# Patient Record
Sex: MALE | Race: WHITE | Employment: OTHER | URBAN - METROPOLITAN AREA
[De-identification: names, ages, dates, MRNs, and addresses within clinical notes are randomized per-mention and may not be internally consistent; named-entity substitution may affect disease eponyms.]

---

## 2017-01-05 ENCOUNTER — OFFICE VISIT (OUTPATIENT)
Dept: NEPHROLOGY | Facility: CLINIC | Age: 72
End: 2017-01-05

## 2017-01-05 VITALS — WEIGHT: 288 LBS | SYSTOLIC BLOOD PRESSURE: 148 MMHG | DIASTOLIC BLOOD PRESSURE: 82 MMHG | BODY MASS INDEX: 39 KG/M2

## 2017-01-05 DIAGNOSIS — N18.3 CKD (CHRONIC KIDNEY DISEASE), STAGE 3 (MODERATE): Primary | ICD-10-CM

## 2017-01-05 PROCEDURE — 99214 OFFICE O/P EST MOD 30 MIN: CPT | Performed by: INTERNAL MEDICINE

## 2017-01-05 NOTE — PROGRESS NOTES
Nephrology Progress Note      ASSESSMENT/PLAN:        1) Elevated Creatinine- historically, serum creatinine was approximately 1.0 mg/dL as of 2014; this is increased to 1.5 mg/dL and is likely due to the addition of torsemide 20 mg daily about 2 years ago Celebrex but no other NSAIDs over the last several years. His blood pressures been well controlled. No new diagnoses recently. Urinary habits are stable.     HISTORY:  Past Medical History   Diagnosis Date   • BPH    • PMR (polymyalgia rheumatica) (HCC) ONCE DAILY Disp: 90 capsule Rfl: 3   Metoprolol Tartrate 50 MG Oral Tab Two tabs in morning, one in evening Disp: 270 tablet Rfl: 3   Sildenafil Citrate (VIAGRA) 100 MG Oral Tab Take 1 tablet (100 mg total) by mouth daily as needed for Erectile Dysfunction

## 2017-01-16 ENCOUNTER — TELEPHONE (OUTPATIENT)
Dept: NEPHROLOGY | Facility: CLINIC | Age: 72
End: 2017-01-16

## 2017-01-16 PROCEDURE — 81001 URINALYSIS AUTO W/SCOPE: CPT | Performed by: INTERNAL MEDICINE

## 2017-01-16 PROCEDURE — 80048 BASIC METABOLIC PNL TOTAL CA: CPT | Performed by: INTERNAL MEDICINE

## 2017-08-03 ENCOUNTER — APPOINTMENT (OUTPATIENT)
Dept: LAB | Facility: HOSPITAL | Age: 72
End: 2017-08-03
Attending: INTERNAL MEDICINE
Payer: OTHER GOVERNMENT

## 2017-08-03 ENCOUNTER — TELEPHONE (OUTPATIENT)
Dept: NEPHROLOGY | Facility: CLINIC | Age: 72
End: 2017-08-03

## 2017-08-03 DIAGNOSIS — N18.30 CKD (CHRONIC KIDNEY DISEASE) STAGE 3, GFR 30-59 ML/MIN (HCC): Primary | ICD-10-CM

## 2017-08-03 DIAGNOSIS — N18.30 CKD (CHRONIC KIDNEY DISEASE) STAGE 3, GFR 30-59 ML/MIN (HCC): ICD-10-CM

## 2017-08-03 DIAGNOSIS — R79.89 ELEVATED SERUM CREATININE: Primary | ICD-10-CM

## 2017-08-03 DIAGNOSIS — I10 ESSENTIAL HYPERTENSION: ICD-10-CM

## 2017-08-03 LAB
BUN BLD-MCNC: 30 MG/DL (ref 8–20)
CALCIUM BLD-MCNC: 9.7 MG/DL (ref 8.3–10.3)
CHLORIDE: 105 MMOL/L (ref 101–111)
CO2: 29 MMOL/L (ref 22–32)
CREAT BLD-MCNC: 1.74 MG/DL (ref 0.7–1.3)
GLUCOSE BLD-MCNC: 126 MG/DL (ref 70–99)
POTASSIUM SERPL-SCNC: 4.1 MMOL/L (ref 3.6–5.1)
SODIUM SERPL-SCNC: 140 MMOL/L (ref 136–144)

## 2017-08-03 PROCEDURE — 80048 BASIC METABOLIC PNL TOTAL CA: CPT

## 2017-08-03 PROCEDURE — 36415 COLL VENOUS BLD VENIPUNCTURE: CPT

## 2018-05-04 PROCEDURE — 87070 CULTURE OTHR SPECIMN AEROBIC: CPT | Performed by: INTERNAL MEDICINE

## 2018-05-04 PROCEDURE — 87205 SMEAR GRAM STAIN: CPT | Performed by: INTERNAL MEDICINE

## 2018-05-15 PROBLEM — M10.072 ACUTE IDIOPATHIC GOUT INVOLVING TOE OF LEFT FOOT: Status: ACTIVE | Noted: 2018-05-15

## 2018-06-20 PROCEDURE — 87186 SC STD MICRODIL/AGAR DIL: CPT | Performed by: INTERNAL MEDICINE

## 2018-06-20 PROCEDURE — 87086 URINE CULTURE/COLONY COUNT: CPT | Performed by: INTERNAL MEDICINE

## 2018-06-20 PROCEDURE — 87077 CULTURE AEROBIC IDENTIFY: CPT | Performed by: INTERNAL MEDICINE

## 2019-04-08 PROCEDURE — 84425 ASSAY OF VITAMIN B-1: CPT | Performed by: OTHER

## 2019-04-08 PROCEDURE — 83883 ASSAY NEPHELOMETRY NOT SPEC: CPT | Performed by: OTHER

## 2019-04-08 PROCEDURE — 86334 IMMUNOFIX E-PHORESIS SERUM: CPT | Performed by: OTHER

## 2019-04-08 PROCEDURE — 84165 PROTEIN E-PHORESIS SERUM: CPT | Performed by: OTHER

## 2019-04-08 PROCEDURE — 86618 LYME DISEASE ANTIBODY: CPT | Performed by: OTHER

## 2019-04-26 PROCEDURE — 84156 ASSAY OF PROTEIN URINE: CPT | Performed by: OTHER

## 2019-04-26 PROCEDURE — 86335 IMMUNFIX E-PHORSIS/URINE/CSF: CPT | Performed by: OTHER

## 2019-04-26 PROCEDURE — 83883 ASSAY NEPHELOMETRY NOT SPEC: CPT | Performed by: OTHER

## 2019-04-26 PROCEDURE — 36415 COLL VENOUS BLD VENIPUNCTURE: CPT | Performed by: OTHER

## 2019-06-06 PROBLEM — T78.3XXA ANGIOEDEMA: Status: ACTIVE | Noted: 2019-06-06

## 2019-06-26 PROCEDURE — 86162 COMPLEMENT TOTAL (CH50): CPT | Performed by: PEDIATRICS

## 2019-06-26 PROCEDURE — 86160 COMPLEMENT ANTIGEN: CPT | Performed by: PEDIATRICS

## 2019-06-26 PROCEDURE — 86161 COMPLEMENT/FUNCTION ACTIVITY: CPT | Performed by: PEDIATRICS

## 2019-06-26 PROCEDURE — 36415 COLL VENOUS BLD VENIPUNCTURE: CPT | Performed by: PEDIATRICS

## 2019-10-01 ENCOUNTER — HEALTH MAINTENANCE LETTER (OUTPATIENT)
Age: 74
End: 2019-10-01

## 2019-12-15 ENCOUNTER — HEALTH MAINTENANCE LETTER (OUTPATIENT)
Age: 74
End: 2019-12-15

## 2020-03-10 PROBLEM — D69.2 PURPURA (HCC): Status: ACTIVE | Noted: 2020-03-10

## 2020-03-10 PROBLEM — D69.2 PURPURA: Status: ACTIVE | Noted: 2020-03-10

## 2020-06-16 PROBLEM — N18.30 CHRONIC RENAL DISEASE, STAGE III (HCC): Status: ACTIVE | Noted: 2020-06-16

## 2021-01-15 ENCOUNTER — HEALTH MAINTENANCE LETTER (OUTPATIENT)
Age: 76
End: 2021-01-15

## 2021-02-03 DIAGNOSIS — Z23 NEED FOR VACCINATION: ICD-10-CM

## 2021-06-22 PROBLEM — I70.0 AORTIC ATHEROSCLEROSIS: Status: ACTIVE | Noted: 2021-06-22

## 2021-06-22 PROBLEM — I70.0 AORTIC ATHEROSCLEROSIS (HCC): Status: ACTIVE | Noted: 2021-06-22

## 2021-07-12 ENCOUNTER — OFFICE VISIT (OUTPATIENT)
Dept: WOUND CARE | Facility: HOSPITAL | Age: 76
End: 2021-07-12
Attending: INTERNAL MEDICINE
Payer: MEDICARE

## 2021-07-12 VITALS
DIASTOLIC BLOOD PRESSURE: 83 MMHG | RESPIRATION RATE: 16 BRPM | BODY MASS INDEX: 38.5 KG/M2 | TEMPERATURE: 98 F | SYSTOLIC BLOOD PRESSURE: 184 MMHG | HEART RATE: 54 BPM | WEIGHT: 275 LBS | HEIGHT: 71 IN

## 2021-07-12 DIAGNOSIS — S21.201A: Primary | ICD-10-CM

## 2021-07-12 DIAGNOSIS — T81.89XA WOUND, SURGICAL, NONHEALING, INITIAL ENCOUNTER: ICD-10-CM

## 2021-07-12 DIAGNOSIS — L02.212 CUTANEOUS ABSCESS OF BACK EXCLUDING BUTTOCKS: ICD-10-CM

## 2021-07-12 DIAGNOSIS — R21 RASH: ICD-10-CM

## 2021-07-12 PROCEDURE — 87205 SMEAR GRAM STAIN: CPT | Performed by: INTERNAL MEDICINE

## 2021-07-12 PROCEDURE — 87077 CULTURE AEROBIC IDENTIFY: CPT | Performed by: INTERNAL MEDICINE

## 2021-07-12 PROCEDURE — 11042 DBRDMT SUBQ TIS 1ST 20SQCM/<: CPT | Performed by: INTERNAL MEDICINE

## 2021-07-12 PROCEDURE — 11042 DBRDMT SUBQ TIS 1ST 20SQCM/<: CPT

## 2021-07-12 PROCEDURE — 87070 CULTURE OTHR SPECIMN AEROBIC: CPT | Performed by: INTERNAL MEDICINE

## 2021-07-12 PROCEDURE — 99214 OFFICE O/P EST MOD 30 MIN: CPT

## 2021-07-12 RX ORDER — MAGNESIUM HYDROXIDE 1200 MG/15ML
LIQUID ORAL
COMMUNITY
Start: 2021-07-07 | End: 2021-09-21 | Stop reason: ALTCHOICE

## 2021-07-12 NOTE — PROGRESS NOTES
Patient ID: Teetee Rebollar is a 67year old male.     Debridement   Wound 07/12/21 # 1 Right lateral upper back Other (comment) Back Lateral;Right;Upper    Consent obtained? verbal  Consent given by: patient    Performed by: provider  Debridement type: surgic

## 2021-07-12 NOTE — PATIENT INSTRUCTIONS
1. Shower with protection  2. Cleanse with saline solution. 3. Apply silver alginate  4. Secure with bordered foam.   5. Apply steroid cream in alyce-wound region. 6. Change dressing every other day. 7. Wound culture done today.    8. High protein diet physician or go to the nearest emergency room. 5.  The treatment plan has been discussed at length with you and your provider. Follow all       instructions carefully, it is very important.  If you do not follow all instructions, you are at        r

## 2021-07-12 NOTE — PROGRESS NOTES
.Weekly Wound Education Note    Teaching Provided To: Patient; Family  Training Topics: Cleasing and general instructions;Dressing; Discharge instructions  Training Method: Explain/Verbal;Written  Training Response: Patient responds and understands

## 2021-07-12 NOTE — PROGRESS NOTES
[address] CONSULTATION NOTE  Adele Ascencio MD  7/12/2021    Subjective   Zara Underwood is a 67year old male. Patient presents with:  Wound Care: Patient is here for an initial consult.  He presents with a wound on his right lateral upper jocelyn HISTORY  08/05/2020    cysto- Dr. Edouard Wright   • SKIN SURGERY  10/01/2019    MMS/R nasal ala /BCC done by [facility]   • TONSILLECTOMY     • TOTAL KNEE REPLACEMENT Right 02/25/2015    Right total knee replacement 02/25/2015   • TOTAL KNEE REPLACEMENT Left 05/11/2015 2 tablets (100 mg total) by mouth daily. 180 tablet 3   • finasteride (PROSCAR) 5 MG Oral Tab Take 1 tablet (5 mg total) by mouth daily.  90 tablet 3   • metoprolol tartrate 50 MG Oral Tab Two tabs in morning, one in evening 270 tablet 3   • Lovastatin 40 M 07/12/21 0825   Wound Length (cm) 2.5 cm 07/12/21 0825   Wound Width (cm) 6.2 cm 07/12/21 0825   Wound Surface Area (cm^2) 15.5 cm^2 07/12/21 0825   Wound Depth (cm) 0.5 cm 07/12/21 0825   Wound Volume (cm^3) 7.75 cm^3 07/12/21 0825   Margins Well-defined gout involving toe of left foot     Angioedema     Purpura ([facility] [address]     Chronic renal disease, stage III (HCC)     Aortic atherosclerosis ([facility] [address]    Debridement     Wound 07/12/21 # 1 Right lateral upper back Other (comment) Back Lateral;Right;Upper    Consent your provider    2. Increase activity as tolerated or as directed by your provider    3.    As directed by your providers Increase protein intake     FOCUS ON THE FOLLOWING FOODS:  Protein: Meats, beans, eggs, milk and yogurt particularly Greek yogurt), tof reviewing separately obtained history, performing a medically appropriate examination, counseling and educating the patient/family/caregiver, ordering medications or testing, referring and communicating with other healthcare providers, documenting clinical

## 2021-07-19 ENCOUNTER — OFFICE VISIT (OUTPATIENT)
Dept: WOUND CARE | Facility: HOSPITAL | Age: 76
End: 2021-07-19
Attending: INTERNAL MEDICINE
Payer: MEDICARE

## 2021-07-19 VITALS
RESPIRATION RATE: 18 BRPM | HEART RATE: 54 BPM | SYSTOLIC BLOOD PRESSURE: 174 MMHG | TEMPERATURE: 99 F | DIASTOLIC BLOOD PRESSURE: 80 MMHG

## 2021-07-19 DIAGNOSIS — S21.201D OPEN WOUND OF RIGHT BACK WALL OF THORAX WITHOUT PENETRATION INTO THORACIC CAVITY, SUBSEQUENT ENCOUNTER: Primary | ICD-10-CM

## 2021-07-19 DIAGNOSIS — T81.89XD NON-HEALING SURGICAL WOUND, SUBSEQUENT ENCOUNTER: ICD-10-CM

## 2021-07-19 DIAGNOSIS — I10 ESSENTIAL HYPERTENSION: ICD-10-CM

## 2021-07-19 PROCEDURE — 99214 OFFICE O/P EST MOD 30 MIN: CPT

## 2021-07-19 PROCEDURE — 11042 DBRDMT SUBQ TIS 1ST 20SQCM/<: CPT | Performed by: INTERNAL MEDICINE

## 2021-07-19 NOTE — PROGRESS NOTES
[facility] NOTE  Js Gray MD  7/19/2021    Chief Complaint:   Patient presents with:  Wound Care: Patient is here for a follow up of a back wound.       HPI:   Subjective   Gume Medic is a 67year old male coming in for a follow- °C)       Wound Assessment  Wound 07/12/21 # 1 Right lateral upper back Other (comment) Back Lateral;Right;Upper (Active)   Wound Image    07/19/21 1307   Drainage Amount Moderate 07/19/21 1307   Drainage Description Serous; Yellow 07/19/21 1307   Wound [place] (cm): 4.8  Depth (cm): 0.6  Percent debrided: 100%  Surface Area (cm^2): 7.2  Area debrided (cm^2): 7.2  Volume (cm^3): 4.32     Tissue and other material debrided: subcutaneous tissue  Devitalized tissue debrided: biofilm and slough  Instrument(s) utilize instructions carefully, it is very important. If you do not follow all instructions, you are at  risk of your wound not healing, infection, possible loss of limb and even end of life.   2. Please call the clinic during regular business hours ( 7:30 AM - 5:3

## 2021-07-19 NOTE — PATIENT INSTRUCTIONS
Patient Instructions and orders for Wound Care      Wound Cleaning and Dressings:    • Wash your hands with soap and water. Always wear gloves while changing dressings. Donot touch wound / alyce-wound skin with un-gloved hands.  Remove old dressing, discard

## 2021-07-19 NOTE — PROGRESS NOTES
.Weekly Wound Education Note    Teaching Provided To: Patient; Family  Training Topics: Discharge instructions;Dressing;Cleasing and general instructions  Training Method: Explain/Verbal;Written  Training Response: Patient responds and understands

## 2021-07-19 NOTE — PROGRESS NOTES
Patient ID: Tania Parekh is a 67year old male.     Debridement   Wound 07/12/21 # 1 Right lateral upper back Other (comment) Back Lateral;Right;Upper    Consent obtained? verbal  Consent given by: patient    Performed by: provider  Debridement type: surgic

## 2021-07-26 ENCOUNTER — OFFICE VISIT (OUTPATIENT)
Dept: WOUND CARE | Facility: HOSPITAL | Age: 76
End: 2021-07-26
Attending: INTERNAL MEDICINE
Payer: MEDICARE

## 2021-07-26 VITALS
DIASTOLIC BLOOD PRESSURE: 82 MMHG | HEART RATE: 62 BPM | SYSTOLIC BLOOD PRESSURE: 152 MMHG | TEMPERATURE: 98 F | RESPIRATION RATE: 16 BRPM

## 2021-07-26 DIAGNOSIS — S21.201D OPEN WOUND OF RIGHT BACK WALL OF THORAX WITHOUT PENETRATION INTO THORACIC CAVITY, SUBSEQUENT ENCOUNTER: Primary | ICD-10-CM

## 2021-07-26 DIAGNOSIS — T81.89XD NON-HEALING SURGICAL WOUND, SUBSEQUENT ENCOUNTER: ICD-10-CM

## 2021-07-26 DIAGNOSIS — I10 ESSENTIAL HYPERTENSION: ICD-10-CM

## 2021-07-26 PROCEDURE — 99214 OFFICE O/P EST MOD 30 MIN: CPT

## 2021-07-26 NOTE — PATIENT INSTRUCTIONS
Patient Instructions and orders for Wound Care      Wound Cleaning and Dressings:    • Wash your hands with soap and water. Always wear gloves while changing dressings. Donot touch wound / alyce-wound skin with un-gloved hands.  Remove old dressing, discard 100.3. 3. For after hour emergencies, please call your primary physician or go to the nearest emergency room.

## 2021-07-26 NOTE — PROGRESS NOTES
[facility] NOTE  Christiano Richardson MD  7/26/2021    Chief Complaint:   Patient presents with:  Wound Care: Patient is here for a follow up visit for a back wound.       HPI:   Subjective   Sulaiman De Luna is a 67year old male coming in for a Temp: 98.2 °F (36.8 °C)       Wound Assessment  Wound 07/12/21 # 1 Right lateral upper back Other (comment) Back Lateral;Right;Upper (Active)   Wound Image   07/26/21 1554   Drainage Amount Scant 07/26/21 5010   Drainage Description Serosanguineous 07/26/2 transfer  Cleansing: Cleanse with normal saline or wound cleanser  Frequency: Change dressing two times per week  •      Miscellaneous Instructions:  • Supplement with a daily multivitamin   • Low salt diet  • Intense blood sugar control - Goal Blood sugar call with any questions, complications, allergies, or worsening or changing symptoms. Patient is to call with any side effects or complications as a result of the treatments today.       DOCUMENTATION OF TIME SPENT: Code selection for this visit was based

## 2021-08-09 ENCOUNTER — OFFICE VISIT (OUTPATIENT)
Dept: WOUND CARE | Facility: HOSPITAL | Age: 76
End: 2021-08-09
Attending: INTERNAL MEDICINE
Payer: MEDICARE

## 2021-08-09 VITALS
TEMPERATURE: 99 F | RESPIRATION RATE: 16 BRPM | HEART RATE: 55 BPM | DIASTOLIC BLOOD PRESSURE: 78 MMHG | SYSTOLIC BLOOD PRESSURE: 157 MMHG

## 2021-08-09 DIAGNOSIS — T81.89XD NON-HEALING SURGICAL WOUND, SUBSEQUENT ENCOUNTER: ICD-10-CM

## 2021-08-09 DIAGNOSIS — S21.201D OPEN WOUND OF RIGHT BACK WALL OF THORAX WITHOUT PENETRATION INTO THORACIC CAVITY, SUBSEQUENT ENCOUNTER: Primary | ICD-10-CM

## 2021-08-09 DIAGNOSIS — I10 ESSENTIAL HYPERTENSION: ICD-10-CM

## 2021-08-09 PROCEDURE — 99213 OFFICE O/P EST LOW 20 MIN: CPT

## 2021-08-09 NOTE — PATIENT INSTRUCTIONS
Patient Instructions and orders for Wound Care        Wound Cleaning and Dressings:     · Wash your hands with soap and water. Always wear gloves while changing dressings. Donot touch wound / alyce-wound skin with un-gloved hands.  Remove old dressing, disca greater than 100.3. 3. For after hour emergencies, please call your primary physician or go to the nearest emergency room.

## 2021-08-09 NOTE — PROGRESS NOTES
.Weekly Wound Education Note    Teaching Provided To: Patient; Family  Training Topics: Discharge instructions;Dressing;Cleasing and general instructions  Training Method: Explain/Verbal;Written  Training Response: Patient responds and understands         C

## 2021-08-09 NOTE — PROGRESS NOTES
Burak Main MD  8/9/2021    Chief Complaint:   Patient presents with:  Wound Care: Patient is here for a follow up visit for a back wound.       HPI:   Sophy Forbes is a 67year old male coming in for a f Assessment  Wound 07/12/21 # 1 Right lateral upper back Other (comment) Back Lateral;Right;Upper (Active)   Wound Image   08/09/21 1417   Drainage Amount Small 08/09/21 1417   Drainage Description Serosanguineous 08/09/21 1417   Wound Length (cm) 0.9 cm 08 Supplement with a daily multivitamin   · Low salt diet  · Intense blood sugar control - Goal Blood sugar below 180 at all times recommended.   · Increase protein intake / consider protein supplements - see below  · Elevate extremities at all times when sitt a result of the treatments today.       DOCUMENTATION OF TIME SPENT: Code selection for this visit was based on time spent : 25 min on date of service in preparing to see the patient, obtaining and/or reviewing separately obtained history, performing a medi

## 2021-08-23 ENCOUNTER — OFFICE VISIT (OUTPATIENT)
Dept: WOUND CARE | Facility: HOSPITAL | Age: 76
End: 2021-08-23
Attending: INTERNAL MEDICINE
Payer: MEDICARE

## 2021-08-23 VITALS
HEART RATE: 61 BPM | RESPIRATION RATE: 16 BRPM | DIASTOLIC BLOOD PRESSURE: 65 MMHG | SYSTOLIC BLOOD PRESSURE: 145 MMHG | TEMPERATURE: 98 F

## 2021-08-23 DIAGNOSIS — I10 ESSENTIAL HYPERTENSION: ICD-10-CM

## 2021-08-23 DIAGNOSIS — S21.201D OPEN WOUND OF RIGHT BACK WALL OF THORAX WITHOUT PENETRATION INTO THORACIC CAVITY, SUBSEQUENT ENCOUNTER: Primary | ICD-10-CM

## 2021-08-23 DIAGNOSIS — T81.89XD NON-HEALING SURGICAL WOUND, SUBSEQUENT ENCOUNTER: ICD-10-CM

## 2021-08-23 PROCEDURE — 99213 OFFICE O/P EST LOW 20 MIN: CPT

## 2021-08-23 NOTE — PROGRESS NOTES
.Weekly Wound Education Note    Teaching Provided To: Patient  Training Topics: Discharge instructions;Cleasing and general instructions;Dressing  Training Method: Explain/Verbal;Written  Training Response: Patient responds and understands           [place]

## 2021-08-23 NOTE — PATIENT INSTRUCTIONS
Patient Instructions and orders for Wound Care        Wound Cleaning and Dressings:     · Wash your hands with soap and water. Always wear gloves while changing dressings. Donot touch wound / alyce-wound skin with un-gloved hands.  Remove old dressing, disca greater than 100. 3.    3. For after hour emergencies, please call your primary physician or go to the nearest emergency room.

## 2021-08-23 NOTE — PROGRESS NOTES
[facility] NOTE  Chelsea Sin MD  8/23/2021    Chief Complaint:   Patient presents with:  Wound Care: Patients is here for a follow up.  Patients has no issues       HPI:   Sophy   Ashli Corcoran is a 67year old male coming in for BP: 145/65   Pulse: 61   Resp: 16   Temp: 98.2 °F (36.8 °C)       Wound Assessment  Wound 07/12/21 # 1 Right lateral upper back Other (comment) Back Lateral;Right;Upper (Active)   Wound Image   08/23/21 1433   Drainage Amount Scant 08/23/21 1433   Drainag Dressing: apply coloplast on wound bed. No dressing needed  · Cleansing: Cleanse with normal saline or wound cleanser  · Frequency: Change dressing daily.        Miscellaneous Instructions:  · Supplement with a daily multivitamin   · Low salt diet  · Intens verbalizes understanding. Patient is notified to call with any questions, complications, allergies, or worsening or changing symptoms. Patient is to call with any side effects or complications as a result of the treatments today.       DOCUMENTATION OF TOYA

## 2021-09-04 ENCOUNTER — HEALTH MAINTENANCE LETTER (OUTPATIENT)
Age: 76
End: 2021-09-04

## 2021-09-13 ENCOUNTER — OFFICE VISIT (OUTPATIENT)
Dept: WOUND CARE | Facility: HOSPITAL | Age: 76
End: 2021-09-13
Attending: INTERNAL MEDICINE
Payer: MEDICARE

## 2021-09-13 VITALS
HEART RATE: 59 BPM | RESPIRATION RATE: 14 BRPM | TEMPERATURE: 98 F | SYSTOLIC BLOOD PRESSURE: 175 MMHG | DIASTOLIC BLOOD PRESSURE: 75 MMHG

## 2021-09-13 DIAGNOSIS — S21.201D OPEN WOUND OF RIGHT BACK WALL OF THORAX WITHOUT PENETRATION INTO THORACIC CAVITY, SUBSEQUENT ENCOUNTER: Primary | ICD-10-CM

## 2021-09-13 DIAGNOSIS — T81.89XD NON-HEALING SURGICAL WOUND, SUBSEQUENT ENCOUNTER: ICD-10-CM

## 2021-09-13 DIAGNOSIS — I10 ESSENTIAL HYPERTENSION: ICD-10-CM

## 2021-09-13 PROCEDURE — 99213 OFFICE O/P EST LOW 20 MIN: CPT

## 2021-09-13 NOTE — PROGRESS NOTES
.Weekly Wound Education Note    Teaching Provided To: Patient  Training Topics: Discharge instructions; Cleasing and general instructions  Training Method: Explain/Verbal; Written  Training Response: Patient responds and understands         Wound healed.

## 2021-09-13 NOTE — PROGRESS NOTES
[facility] NOTE  Fede Springer MD  9/13/2021    Chief Complaint:   Patient presents with:  Wound Care: Patient is here for a follow up visit for a back wound.       HPI:   Subjective   Cheryal Linear is a 67year old male coming in for a lateral upper back Other (comment) Back Lateral;Right;Upper (Active)   Wound Image   09/13/21 1452   Drainage Amount Scant 08/23/21 1433   Drainage Description Sanguineous 08/23/21 1433   Wound Length (cm) 0.9 cm 08/23/21 1433   Wound Width (cm) 1.8 cm 08/ SPENT: Code selection for this visit was based on time spent : 15 min on date of service in preparing to see the patient, obtaining and/or reviewing separately obtained history, performing a medically appropriate examination, counseling and educating the p

## 2022-02-13 ENCOUNTER — HEALTH MAINTENANCE LETTER (OUTPATIENT)
Age: 77
End: 2022-02-13

## 2022-08-04 ENCOUNTER — HOSPITAL ENCOUNTER (EMERGENCY)
Facility: HOSPITAL | Age: 77
Discharge: HOME | End: 2022-08-04
Attending: EMERGENCY MEDICINE
Payer: OTHER GOVERNMENT

## 2022-08-04 ENCOUNTER — APPOINTMENT (OUTPATIENT)
Dept: CT IMAGING | Facility: HOSPITAL | Age: 77
End: 2022-08-04
Attending: EMERGENCY MEDICINE
Payer: OTHER GOVERNMENT

## 2022-08-04 VITALS
OXYGEN SATURATION: 95 % | BODY MASS INDEX: 34.65 KG/M2 | SYSTOLIC BLOOD PRESSURE: 200 MMHG | WEIGHT: 270 LBS | RESPIRATION RATE: 22 BRPM | HEIGHT: 74 IN | HEART RATE: 62 BPM | TEMPERATURE: 98 F | DIASTOLIC BLOOD PRESSURE: 77 MMHG

## 2022-08-04 DIAGNOSIS — I10 PRIMARY HYPERTENSION: ICD-10-CM

## 2022-08-04 DIAGNOSIS — R42 VERTIGO: Primary | ICD-10-CM

## 2022-08-04 LAB
ALBUMIN SERPL-MCNC: 3.7 G/DL (ref 3.4–5)
ALBUMIN/GLOB SERPL: 1.1 {RATIO} (ref 1–2)
ALP LIVER SERPL-CCNC: 85 U/L
ALT SERPL-CCNC: 28 U/L
ANION GAP SERPL CALC-SCNC: 4 MMOL/L (ref 0–18)
AST SERPL-CCNC: 15 U/L (ref 15–37)
ATRIAL RATE: 55 BPM
BASOPHILS # BLD AUTO: 0.04 X10(3) UL (ref 0–0.2)
BASOPHILS NFR BLD AUTO: 0.6 %
BILIRUB SERPL-MCNC: 0.5 MG/DL (ref 0.1–2)
BUN BLD-MCNC: 28 MG/DL (ref 7–18)
CALCIUM BLD-MCNC: 9.6 MG/DL (ref 8.5–10.1)
CHLORIDE SERPL-SCNC: 108 MMOL/L (ref 98–112)
CO2 SERPL-SCNC: 29 MMOL/L (ref 21–32)
CREAT BLD-MCNC: 1.59 MG/DL
EOSINOPHIL # BLD AUTO: 0.42 X10(3) UL (ref 0–0.7)
EOSINOPHIL NFR BLD AUTO: 5.9 %
ERYTHROCYTE [DISTWIDTH] IN BLOOD BY AUTOMATED COUNT: 14.1 %
GFR SERPLBLD BASED ON 1.73 SQ M-ARVRAT: 44 ML/MIN/1.73M2 (ref 60–?)
GLOBULIN PLAS-MCNC: 3.3 G/DL (ref 2.8–4.4)
GLUCOSE BLD-MCNC: 119 MG/DL (ref 70–99)
HCT VFR BLD AUTO: 44.5 %
HGB BLD-MCNC: 14.8 G/DL
IMM GRANULOCYTES # BLD AUTO: 0.03 X10(3) UL (ref 0–1)
IMM GRANULOCYTES NFR BLD: 0.4 %
LYMPHOCYTES # BLD AUTO: 2.36 X10(3) UL (ref 1–4)
LYMPHOCYTES NFR BLD AUTO: 33.4 %
MCH RBC QN AUTO: 30 PG (ref 26–34)
MCHC RBC AUTO-ENTMCNC: 33.3 G/DL (ref 31–37)
MCV RBC AUTO: 90.3 FL
MONOCYTES # BLD AUTO: 0.71 X10(3) UL (ref 0.1–1)
MONOCYTES NFR BLD AUTO: 10.1 %
NEUTROPHILS # BLD AUTO: 3.5 X10 (3) UL (ref 1.5–7.7)
NEUTROPHILS # BLD AUTO: 3.5 X10(3) UL (ref 1.5–7.7)
NEUTROPHILS NFR BLD AUTO: 49.6 %
OSMOLALITY SERPL CALC.SUM OF ELEC: 299 MOSM/KG (ref 275–295)
P AXIS: 30 DEGREES
P-R INTERVAL: 264 MS
PLATELET # BLD AUTO: 162 10(3)UL (ref 150–450)
POTASSIUM SERPL-SCNC: 3.5 MMOL/L (ref 3.5–5.1)
PROT SERPL-MCNC: 7 G/DL (ref 6.4–8.2)
Q-T INTERVAL: 490 MS
QRS DURATION: 102 MS
QTC CALCULATION (BEZET): 468 MS
R AXIS: -7 DEGREES
RBC # BLD AUTO: 4.93 X10(6)UL
SARS-COV-2 RNA RESP QL NAA+PROBE: NOT DETECTED
SODIUM SERPL-SCNC: 141 MMOL/L (ref 136–145)
T AXIS: 8 DEGREES
TROPONIN I HIGH SENSITIVITY: 19 NG/L
VENTRICULAR RATE: 55 BPM
WBC # BLD AUTO: 7.1 X10(3) UL (ref 4–11)

## 2022-08-04 PROCEDURE — 93005 ELECTROCARDIOGRAM TRACING: CPT

## 2022-08-04 PROCEDURE — 99285 EMERGENCY DEPT VISIT HI MDM: CPT

## 2022-08-04 PROCEDURE — 84484 ASSAY OF TROPONIN QUANT: CPT | Performed by: EMERGENCY MEDICINE

## 2022-08-04 PROCEDURE — 85025 COMPLETE CBC W/AUTO DIFF WBC: CPT | Performed by: EMERGENCY MEDICINE

## 2022-08-04 PROCEDURE — 80053 COMPREHEN METABOLIC PANEL: CPT | Performed by: EMERGENCY MEDICINE

## 2022-08-04 PROCEDURE — 93010 ELECTROCARDIOGRAM REPORT: CPT

## 2022-08-04 PROCEDURE — 70450 CT HEAD/BRAIN W/O DYE: CPT | Performed by: EMERGENCY MEDICINE

## 2022-08-04 PROCEDURE — 96374 THER/PROPH/DIAG INJ IV PUSH: CPT

## 2022-08-04 PROCEDURE — 96361 HYDRATE IV INFUSION ADD-ON: CPT

## 2022-08-04 RX ORDER — MECLIZINE HYDROCHLORIDE 25 MG/1
25 TABLET ORAL 3 TIMES DAILY PRN
Qty: 30 TABLET | Refills: 0 | Status: SHIPPED | OUTPATIENT
Start: 2022-08-04

## 2022-08-04 RX ORDER — ONDANSETRON 4 MG/1
4 TABLET, ORALLY DISINTEGRATING ORAL EVERY 4 HOURS PRN
Qty: 10 TABLET | Refills: 0 | Status: SHIPPED | OUTPATIENT
Start: 2022-08-04 | End: 2022-08-11

## 2022-08-04 RX ORDER — HYDRALAZINE HYDROCHLORIDE 20 MG/ML
10 INJECTION INTRAMUSCULAR; INTRAVENOUS ONCE
Status: COMPLETED | OUTPATIENT
Start: 2022-08-04 | End: 2022-08-04

## 2022-08-04 RX ORDER — MECLIZINE HYDROCHLORIDE 25 MG/1
25 TABLET ORAL ONCE
Status: COMPLETED | OUTPATIENT
Start: 2022-08-04 | End: 2022-08-04

## 2022-08-04 NOTE — ED INITIAL ASSESSMENT (HPI)
Arrived via EMS for c/o dizziness, \"woke up to use the bathroom, felt dizzy. \" EMS also reports high 's/90's.  Pt denies CP, he denies MATTHEW, denies n/v, denies visual changes, denies HA

## 2022-10-06 ENCOUNTER — OFFICE VISIT (OUTPATIENT)
Dept: NEPHROLOGY | Facility: CLINIC | Age: 77
End: 2022-10-06
Payer: MEDICARE

## 2022-10-06 VITALS — DIASTOLIC BLOOD PRESSURE: 64 MMHG | BODY MASS INDEX: 36 KG/M2 | SYSTOLIC BLOOD PRESSURE: 168 MMHG | WEIGHT: 280 LBS

## 2022-10-06 DIAGNOSIS — E26.9 HYPERALDOSTERONISM (HCC): ICD-10-CM

## 2022-10-06 DIAGNOSIS — N18.30 STAGE 3 CHRONIC KIDNEY DISEASE, UNSPECIFIED WHETHER STAGE 3A OR 3B CKD (HCC): Primary | ICD-10-CM

## 2022-10-06 DIAGNOSIS — I10 PRIMARY HYPERTENSION: ICD-10-CM

## 2022-10-06 PROCEDURE — 3078F DIAST BP <80 MM HG: CPT | Performed by: INTERNAL MEDICINE

## 2022-10-06 PROCEDURE — 3077F SYST BP >= 140 MM HG: CPT | Performed by: INTERNAL MEDICINE

## 2022-10-06 PROCEDURE — 99204 OFFICE O/P NEW MOD 45 MIN: CPT | Performed by: INTERNAL MEDICINE

## 2022-10-07 ENCOUNTER — TELEPHONE (OUTPATIENT)
Dept: NEPHROLOGY | Facility: CLINIC | Age: 77
End: 2022-10-07

## 2022-10-07 RX ORDER — LOSARTAN POTASSIUM 100 MG/1
100 TABLET ORAL DAILY
Qty: 30 TABLET | Refills: 11 | Status: SHIPPED | OUTPATIENT
Start: 2022-10-07

## 2022-10-07 RX ORDER — AMLODIPINE BESYLATE 10 MG/1
10 TABLET ORAL DAILY
Qty: 30 TABLET | Refills: 11 | Status: SHIPPED | OUTPATIENT
Start: 2022-10-07

## 2022-10-16 ENCOUNTER — HEALTH MAINTENANCE LETTER (OUTPATIENT)
Age: 77
End: 2022-10-16

## 2023-03-26 ENCOUNTER — HEALTH MAINTENANCE LETTER (OUTPATIENT)
Age: 78
End: 2023-03-26

## 2024-01-09 ENCOUNTER — HOSPITAL ENCOUNTER (INPATIENT)
Facility: HOSPITAL | Age: 79
LOS: 2 days | Discharge: HOME | End: 2024-01-11
Attending: EMERGENCY MEDICINE | Admitting: INTERNAL MEDICINE
Payer: MEDICARE

## 2024-01-09 DIAGNOSIS — N17.9 ACUTE KIDNEY INJURY (HCC): ICD-10-CM

## 2024-01-09 DIAGNOSIS — J18.9 COMMUNITY ACQUIRED PNEUMONIA, UNSPECIFIED LATERALITY: Primary | ICD-10-CM

## 2024-01-09 DIAGNOSIS — U07.1 COVID-19: ICD-10-CM

## 2024-01-09 LAB
ADENOVIRUS PCR:: NOT DETECTED
ALBUMIN SERPL-MCNC: 3.6 G/DL (ref 3.4–5)
ALBUMIN/GLOB SERPL: 1.2 {RATIO} (ref 1–2)
ALP LIVER SERPL-CCNC: 78 U/L
ALT SERPL-CCNC: 85 U/L
ANION GAP SERPL CALC-SCNC: 5 MMOL/L (ref 0–18)
ANION GAP SERPL CALC-SCNC: 8 MMOL/L (ref 0–18)
APTT PPP: 28.4 SECONDS (ref 23.3–35.6)
AST SERPL-CCNC: 24 U/L (ref 15–37)
ATRIAL RATE: 58 BPM
B PARAPERT DNA SPEC QL NAA+PROBE: NOT DETECTED
B PERT DNA SPEC QL NAA+PROBE: NOT DETECTED
BASOPHILS # BLD AUTO: 0.06 X10(3) UL (ref 0–0.2)
BASOPHILS NFR BLD AUTO: 0.5 %
BILIRUB SERPL-MCNC: 1 MG/DL (ref 0.1–2)
BUN BLD-MCNC: 49 MG/DL (ref 9–23)
BUN BLD-MCNC: 51 MG/DL (ref 9–23)
C PNEUM DNA SPEC QL NAA+PROBE: NOT DETECTED
CALCIUM BLD-MCNC: 9.4 MG/DL (ref 8.5–10.1)
CALCIUM BLD-MCNC: 9.6 MG/DL (ref 8.5–10.1)
CHLORIDE SERPL-SCNC: 112 MMOL/L (ref 98–112)
CHLORIDE SERPL-SCNC: 114 MMOL/L (ref 98–112)
CO2 SERPL-SCNC: 23 MMOL/L (ref 21–32)
CO2 SERPL-SCNC: 24 MMOL/L (ref 21–32)
CORONAVIRUS 229E PCR:: NOT DETECTED
CORONAVIRUS HKU1 PCR:: NOT DETECTED
CORONAVIRUS NL63 PCR:: NOT DETECTED
CORONAVIRUS OC43 PCR:: NOT DETECTED
CREAT BLD-MCNC: 2.03 MG/DL
CREAT BLD-MCNC: 2.06 MG/DL
EGFRCR SERPLBLD CKD-EPI 2021: 32 ML/MIN/1.73M2 (ref 60–?)
EGFRCR SERPLBLD CKD-EPI 2021: 33 ML/MIN/1.73M2 (ref 60–?)
EOSINOPHIL # BLD AUTO: 0.07 X10(3) UL (ref 0–0.7)
EOSINOPHIL NFR BLD AUTO: 0.5 %
ERYTHROCYTE [DISTWIDTH] IN BLOOD BY AUTOMATED COUNT: 15 %
FLUAV RNA SPEC QL NAA+PROBE: NOT DETECTED
FLUBV RNA SPEC QL NAA+PROBE: NOT DETECTED
GLOBULIN PLAS-MCNC: 3.1 G/DL (ref 2.8–4.4)
GLUCOSE BLD-MCNC: 111 MG/DL (ref 70–99)
GLUCOSE BLD-MCNC: 182 MG/DL (ref 70–99)
HCT VFR BLD AUTO: 40.3 %
HGB BLD-MCNC: 13.3 G/DL
IMM GRANULOCYTES # BLD AUTO: 0.3 X10(3) UL (ref 0–1)
IMM GRANULOCYTES NFR BLD: 2.3 %
LACTATE SERPL-SCNC: 1.8 MMOL/L (ref 0.4–2)
LYMPHOCYTES # BLD AUTO: 2.35 X10(3) UL (ref 1–4)
LYMPHOCYTES NFR BLD AUTO: 17.7 %
MCH RBC QN AUTO: 29.9 PG (ref 26–34)
MCHC RBC AUTO-ENTMCNC: 33 G/DL (ref 31–37)
MCV RBC AUTO: 90.6 FL
METAPNEUMOVIRUS PCR:: NOT DETECTED
MONOCYTES # BLD AUTO: 1.56 X10(3) UL (ref 0.1–1)
MONOCYTES NFR BLD AUTO: 11.8 %
MYCOPLASMA PNEUMONIA PCR:: NOT DETECTED
NEUTROPHILS # BLD AUTO: 8.92 X10 (3) UL (ref 1.5–7.7)
NEUTROPHILS # BLD AUTO: 8.92 X10(3) UL (ref 1.5–7.7)
NEUTROPHILS NFR BLD AUTO: 67.2 %
OSMOLALITY SERPL CALC.SUM OF ELEC: 310 MOSM/KG (ref 275–295)
OSMOLALITY SERPL CALC.SUM OF ELEC: 314 MOSM/KG (ref 275–295)
P AXIS: -1 DEGREES
P-R INTERVAL: 228 MS
PARAINFLUENZA 1 PCR:: NOT DETECTED
PARAINFLUENZA 2 PCR:: NOT DETECTED
PARAINFLUENZA 3 PCR:: NOT DETECTED
PARAINFLUENZA 4 PCR:: NOT DETECTED
PLATELET # BLD AUTO: 212 10(3)UL (ref 150–450)
POTASSIUM SERPL-SCNC: 4.1 MMOL/L (ref 3.5–5.1)
POTASSIUM SERPL-SCNC: 4.1 MMOL/L (ref 3.5–5.1)
PROT SERPL-MCNC: 6.7 G/DL (ref 6.4–8.2)
Q-T INTERVAL: 476 MS
QRS DURATION: 94 MS
QTC CALCULATION (BEZET): 467 MS
R AXIS: 2 DEGREES
RBC # BLD AUTO: 4.45 X10(6)UL
RHINOVIRUS/ENTERO PCR:: NOT DETECTED
RSV RNA SPEC QL NAA+PROBE: NOT DETECTED
SARS-COV-2 RNA NPH QL NAA+NON-PROBE: NOT DETECTED
SODIUM SERPL-SCNC: 143 MMOL/L (ref 136–145)
SODIUM SERPL-SCNC: 143 MMOL/L (ref 136–145)
T AXIS: 10 DEGREES
VENTRICULAR RATE: 58 BPM
WBC # BLD AUTO: 13.3 X10(3) UL (ref 4–11)

## 2024-01-09 RX ORDER — IPRATROPIUM BROMIDE AND ALBUTEROL SULFATE 2.5; .5 MG/3ML; MG/3ML
3 SOLUTION RESPIRATORY (INHALATION)
Status: DISCONTINUED | OUTPATIENT
Start: 2024-01-09 | End: 2024-01-10

## 2024-01-09 RX ORDER — HEPARIN SODIUM AND DEXTROSE 10000; 5 [USP'U]/100ML; G/100ML
1000 INJECTION INTRAVENOUS ONCE
Status: COMPLETED | OUTPATIENT
Start: 2024-01-09 | End: 2024-01-10

## 2024-01-09 RX ORDER — GUAIFENESIN 600 MG/1
600 TABLET, EXTENDED RELEASE ORAL 2 TIMES DAILY
Status: DISCONTINUED | OUTPATIENT
Start: 2024-01-09 | End: 2024-01-11

## 2024-01-09 RX ORDER — MELATONIN
3 NIGHTLY PRN
Status: DISCONTINUED | OUTPATIENT
Start: 2024-01-09 | End: 2024-01-11

## 2024-01-09 RX ORDER — BISACODYL 10 MG
10 SUPPOSITORY, RECTAL RECTAL
Status: DISCONTINUED | OUTPATIENT
Start: 2024-01-09 | End: 2024-01-11

## 2024-01-09 RX ORDER — SODIUM CHLORIDE 9 MG/ML
INJECTION, SOLUTION INTRAVENOUS CONTINUOUS
Status: DISCONTINUED | OUTPATIENT
Start: 2024-01-09 | End: 2024-01-10

## 2024-01-09 RX ORDER — METOPROLOL TARTRATE 50 MG/1
50 TABLET, FILM COATED ORAL EVERY EVENING
Status: DISCONTINUED | OUTPATIENT
Start: 2024-01-09 | End: 2024-01-11

## 2024-01-09 RX ORDER — AZITHROMYCIN 250 MG/1
500 TABLET, FILM COATED ORAL
Status: COMPLETED | OUTPATIENT
Start: 2024-01-10 | End: 2024-01-11

## 2024-01-09 RX ORDER — FINASTERIDE 5 MG/1
5 TABLET, FILM COATED ORAL NIGHTLY
COMMUNITY

## 2024-01-09 RX ORDER — POLYETHYLENE GLYCOL 3350 17 G/17G
17 POWDER, FOR SOLUTION ORAL DAILY PRN
Status: DISCONTINUED | OUTPATIENT
Start: 2024-01-09 | End: 2024-01-11

## 2024-01-09 RX ORDER — BENZONATATE 200 MG/1
200 CAPSULE ORAL 3 TIMES DAILY PRN
Status: DISCONTINUED | OUTPATIENT
Start: 2024-01-09 | End: 2024-01-11

## 2024-01-09 RX ORDER — EPLERENONE 25 MG/1
50 TABLET, FILM COATED ORAL 2 TIMES DAILY
Status: DISCONTINUED | OUTPATIENT
Start: 2024-01-09 | End: 2024-01-11

## 2024-01-09 RX ORDER — SENNOSIDES 8.6 MG
17.2 TABLET ORAL NIGHTLY PRN
Status: DISCONTINUED | OUTPATIENT
Start: 2024-01-09 | End: 2024-01-11

## 2024-01-09 RX ORDER — PRAVASTATIN SODIUM 10 MG
10 TABLET ORAL NIGHTLY
Status: DISCONTINUED | OUTPATIENT
Start: 2024-01-09 | End: 2024-01-09

## 2024-01-09 RX ORDER — IPRATROPIUM BROMIDE AND ALBUTEROL SULFATE 2.5; .5 MG/3ML; MG/3ML
3 SOLUTION RESPIRATORY (INHALATION) ONCE
Status: COMPLETED | OUTPATIENT
Start: 2024-01-09 | End: 2024-01-09

## 2024-01-09 RX ORDER — AMLODIPINE BESYLATE 5 MG/1
5 TABLET ORAL
Status: DISCONTINUED | OUTPATIENT
Start: 2024-01-09 | End: 2024-01-09

## 2024-01-09 RX ORDER — HEPARIN SODIUM AND DEXTROSE 10000; 5 [USP'U]/100ML; G/100ML
INJECTION INTRAVENOUS CONTINUOUS
Status: DISCONTINUED | OUTPATIENT
Start: 2024-01-10 | End: 2024-01-10

## 2024-01-09 RX ORDER — ATORVASTATIN CALCIUM 10 MG/1
10 TABLET, FILM COATED ORAL NIGHTLY
Status: DISCONTINUED | OUTPATIENT
Start: 2024-01-09 | End: 2024-01-11

## 2024-01-09 RX ORDER — METOPROLOL TARTRATE 50 MG/1
100 TABLET, FILM COATED ORAL EVERY MORNING
Status: DISCONTINUED | OUTPATIENT
Start: 2024-01-10 | End: 2024-01-11

## 2024-01-09 RX ORDER — AMLODIPINE BESYLATE 5 MG/1
5 TABLET ORAL 2 TIMES DAILY
Status: DISCONTINUED | OUTPATIENT
Start: 2024-01-09 | End: 2024-01-11

## 2024-01-09 RX ORDER — HEPARIN SODIUM 5000 [USP'U]/ML
5000 INJECTION, SOLUTION INTRAVENOUS; SUBCUTANEOUS EVERY 8 HOURS SCHEDULED
Status: DISCONTINUED | OUTPATIENT
Start: 2024-01-09 | End: 2024-01-09

## 2024-01-09 RX ORDER — HEPARIN SODIUM 1000 [USP'U]/ML
5000 INJECTION, SOLUTION INTRAVENOUS; SUBCUTANEOUS ONCE
Status: COMPLETED | OUTPATIENT
Start: 2024-01-09 | End: 2024-01-09

## 2024-01-09 RX ORDER — ACETAMINOPHEN 500 MG
500 TABLET ORAL EVERY 4 HOURS PRN
Status: DISCONTINUED | OUTPATIENT
Start: 2024-01-09 | End: 2024-01-11

## 2024-01-09 RX ORDER — METOCLOPRAMIDE HYDROCHLORIDE 5 MG/ML
5 INJECTION INTRAMUSCULAR; INTRAVENOUS EVERY 8 HOURS PRN
Status: DISCONTINUED | OUTPATIENT
Start: 2024-01-09 | End: 2024-01-11

## 2024-01-09 RX ORDER — METOPROLOL TARTRATE 50 MG/1
50 TABLET, FILM COATED ORAL
Status: DISCONTINUED | OUTPATIENT
Start: 2024-01-09 | End: 2024-01-09

## 2024-01-09 RX ORDER — EPLERENONE 25 MG/1
100 TABLET, FILM COATED ORAL DAILY
Status: DISCONTINUED | OUTPATIENT
Start: 2024-01-09 | End: 2024-01-09

## 2024-01-09 RX ORDER — ONDANSETRON 2 MG/ML
4 INJECTION INTRAMUSCULAR; INTRAVENOUS EVERY 6 HOURS PRN
Status: DISCONTINUED | OUTPATIENT
Start: 2024-01-09 | End: 2024-01-11

## 2024-01-09 RX ORDER — DEXAMETHASONE SODIUM PHOSPHATE 10 MG/ML
10 INJECTION, SOLUTION INTRAMUSCULAR; INTRAVENOUS ONCE
Status: COMPLETED | OUTPATIENT
Start: 2024-01-09 | End: 2024-01-09

## 2024-01-09 RX ORDER — FINASTERIDE 5 MG/1
5 TABLET, FILM COATED ORAL NIGHTLY
Status: DISCONTINUED | OUTPATIENT
Start: 2024-01-09 | End: 2024-01-11

## 2024-01-09 NOTE — CONSULTS
[facility]    Wagner Sheridan Patient Status:  Emergency    1945 MRN [id number]   Location [facility] EMERGENCY DEPARTMENT Attending Ross Cordova MD   Hosp Day # 0 PCP Edil Roy MD     Date of Admission: 2024  1:12 PM  Admission Diagnosis: No admission diagnoses are documented for this encounter.  Reason for Consult: hypoxia, PNA     History of Present Illness: 77 y/o with h/o HTN, PMR who developed SOB and malaise about one week ago.  Seen by PCP and had CXR which confirmed PNA; was started on doxy but didn't feel better; 5 days into treatment had second CXR which showed worsening PNA and pt subsequently referred to ER.  While in ER, noted to be hypoxic on room air and in mild distress. Still has ongoing cough/some phlegm.  Denies N/V/D/dysuria/hemoptysis.  - labs so far show leukocytosis with left shift.  COVID test today showed + while at [facility] urgent care.     Past Medical History:   Diagnosis Date    BPH     Encounter for incision and drainage procedure 2021    High blood pressure     High cholesterol     Hyperaldosteronism (HCC)     Osteoarthrosis, unspecified whether generalized or localized, unspecified site     PMR (polymyalgia rheumatica) (HCC) 2012    off corticosteroids since 2013    Retention of urine       Past Surgical History:   Procedure Laterality Date    KNEE SURGERY      OTHER SURGICAL HISTORY  09    [facility], Dr. Tavera    OTHER SURGICAL HISTORY  09    [facility] Dr. Tavera    OTHER SURGICAL HISTORY  11    [facility] - Dr. Tavera    OTHER SURGICAL HISTORY  13    [facility] - Dr. Tavera    OTHER SURGICAL HISTORY  2/3/16     [facility]     OTHER SURGICAL HISTORY  2020    cysto- Dr. Tavera    SKIN SURGERY  10/01/2019    MMS/R nasal ala /BCC done by [facility]    TONSILLECTOMY      TOTAL KNEE REPLACEMENT Right 2015    Right total knee replacement 2015    TOTAL KNEE REPLACEMENT Left 2015     Left knee total replacement 2015       Allergies    Allergen Reactions    Clindamycin HIVES    Penicillins HIVES    Amoxicillin HIVES    Benazepril Coughing    Wasp Venom RESPIRATORY FAILURE    Wasp Venom Protein UNKNOWN        Social History:   reports that he has quit smoking. His smoking use included cigarettes. He has a 2.50 pack-year smoking history. He has never used smokeless tobacco. He reports current alcohol use. He reports that he does not use drugs.      Family History:  Family History   Problem Relation Age of Onset    Hypertension Father     Heart Disorder Father     Hypertension Mother     Diabetes Sister          Home Medications:  Outpatient Medications Marked as Taking for the 1/9/24 encounter (Hospital Encounter)   Medication Sig Dispense Refill    losartan 100 MG Oral Tab Take 1 tablet (100 mg total) by mouth daily. 30 tablet 11    CELECOXIB 200 MG Oral Cap TAKE 1 CAPSULE DAILY 90 capsule 1    amLODIPine Besylate 5 MG Oral Tab Take 1 tablet (5 mg total) by mouth once daily. 90 tablet 3    eplerenone (INSPRA) 50 MG Oral Tab Take 2 tablets (100 mg total) by mouth daily. 180 tablet 3    metoprolol tartrate 50 MG Oral Tab Two tabs in morning, one in evening 270 tablet 3    Lovastatin 40 MG Oral Tab Take 1 tablet (40 mg total) by mouth daily with dinner. 90 tablet 3    Sildenafil Citrate 100 MG Oral Tab Take 1 tablet (100 mg total) by mouth daily as needed for Erectile Dysfunction. 24 tablet 3    torsemide 20 MG Oral Tab Take 1 tablet (20 mg total) by mouth once daily. 90 tablet 3        Current Medications:    Current Facility-Administered Medications:     [COMPLETED] sodium chloride 0.9 % IV bolus 500 mL, 500 mL, Intravenous, Once **FOLLOWED BY** sodium chloride 0.9% infusion, , Intravenous, Continuous    azithromycin (Zithromax) 500 mg in sodium chloride 0.9% 250mL IVPB premix, 500 mg, Intravenous, Once     REVIEW OF SYSTEMS:   As listed in HPI, otherwise ten point ROS is negative.   OBJECTIVE:  Patient Vitals for the past 24 hrs:   BP Temp Pulse  Resp SpO2 Height Weight   01/09/24 1505 -- 98 °F (36.7 °C) -- -- -- -- --   01/09/24 1500 (!) 166/87 -- 56 (!) 28 96 % -- --   01/09/24 1445 137/68 -- 55 18 99 % -- --   01/09/24 1412 -- -- 53 15 96 % -- --   01/09/24 1323 -- -- -- -- -- 182.9 cm (6') 280 lb (127 kg)   01/09/24 1322 155/78 -- 60 22 95 % -- --     O2 requirement: 3L nc    Wt Readings from Last 3 Encounters:   01/09/24 280 lb (127 kg)   10/06/22 280 lb (127 kg)   08/04/22 270 lb (122.5 kg)      No intake/output data recorded.  No intake/output data recorded.    General appearance: alert, appears stated age, cooperative, and moderately obese  Head: Normocephalic, without obvious abnormality, atraumatic  Neck: no adenopathy, no carotid bruit, no JVD, and supple, symmetrical, trachea midline  Back: symmetric, no curvature. ROM normal. No CVA tenderness.  Lungs: rhonchi bilaterally  Chest wall: no tenderness  Heart: regular rate and rhythm  Abdomen: soft, non-tender; bowel sounds normal; no masses,  no organomegaly  Extremities: edema trace  Pulses: 2+ and symmetric  Skin: Skin color, texture, turgor normal. No rashes or lesions     Lab Results   Component Value Date    WBC 13.3 01/09/2024    RBC 4.45 01/09/2024    HGB 13.3 01/09/2024    HCT 40.3 01/09/2024    MCV 90.6 01/09/2024    MCH 29.9 01/09/2024    MCHC 33.0 01/09/2024    RDW 15.0 01/09/2024    .0 01/09/2024     Lab Results   Component Value Date     01/09/2024    K 4.1 01/09/2024     01/09/2024    CO2 23.0 01/09/2024    BUN 51 01/09/2024    CREATSERUM 2.06 01/09/2024     01/09/2024    CA 9.6 01/09/2024     Lab Results   Component Value Date     01/09/2024    K 4.1 01/09/2024     01/09/2024    CO2 23.0 01/09/2024    BUN 51 01/09/2024    CREATSERUM 2.06 01/09/2024     01/09/2024    CA 9.6 01/09/2024    ALKPHO 78 01/09/2024    ALT 85 01/09/2024    AST 24 01/09/2024    BILT 1.0 01/09/2024    ALB 3.6 01/09/2024    TP 6.7 01/09/2024     Lab Results    Component Value Date    PT 13.6 12/05/2013    INR 1.0 04/28/2015    INR 1.0 02/10/2015    INR 1.08 12/05/2013        Imaging: CXR at [facility], personally reviewed.  Bilateral infiltrates, worsening from previous CXR.     ASSESSMENT/PLAN:  Acute hypoxic resp failure- due to acute bacterial PNA superimposed on COVID  - cont with o2 and wean as tolerated  - bronchial hygiene  COVID 19- was + 6 weeks ago at home.  Now positive again.  Given severity of illness, I think we should treat empirically with remdesivir and decadron 6mg dialy  - check PCT, ddimer  Bacterial PNA- superimposed on above. Failed OP doxy treatment  - check RVP, PCT, sputum culture  - agree with urine legionella/strep  - has various allergies- started on ceftriaxone and tolerating well- will cont  - add azithro for now  LE edema- mild; no prior h/o CHF  - has held diuretics recently- restart  - check pBNP  Proph- subcut hep  Dispo - Full code  - d/w family    Dakota Lopez MD  1/9/2024  3:44 PM

## 2024-01-09 NOTE — ED QUICK NOTES
Orders for admission, patient is aware of plan and ready to go upstairs. Any questions, please call ED RN  at extension [phone].     Patient Covid vaccination status: Fully vaccinated     COVID Test Ordered in ED: None    COVID Suspicion at Admission: N/A    Running Infusions:    sodium chloride          Mental Status/LOC at time of transport: A/OX4 3L NC. URINAL AT BEDSIDE     Other pertinent information:   CIWA score: N/A   NIH score:  N/A

## 2024-01-09 NOTE — ED INITIAL ASSESSMENT (HPI)
Pt to ED from home w/ c/o sob. Pt diagnosed with PNA last Wednesday, taking prednisone and cipro. Pt had follow xray today and is worsened from previous. +cough, denies fevers. 86% on RA. Placed on 3L NC

## 2024-01-09 NOTE — H&P
[facility] Hospitalist H&P       CC:   Chief Complaint   Patient presents with    Shortness Of Breath        PCP: Edil Roy MD    History of Present Illness:      Patient is a 78-year-old male with significant past medical history of hypertension, CKD stage III hyperaldosteronism, hyperlipidemia presented with worsening dyspnea on exertion x 2 weeks.  Patient states that he was COVID-positive around Thanksgiving, completed Paxlovid and felt better for couple weeks however 2 weeks ago he started to feel short of breath, about a week ago he went to the immediate care was found to have pneumonia started on doxycycline as well as prednisone, continued to feel dyspneic despite the antibiotics and steroids  and saw his PCP today, repeated chest x-ray today that showed worsening of pneumonia and was sent to the emergency room.  Patient at home does not wear any oxygen, no underlying lung disease-patient is compliant with medications no new weight gain lower extremity edema no chest pain, cough is returned and is mostly dry no fevers or chills.  Review of system otherwise is completely negative      In the emergency room, patient was hypoxic to 86% on room air placed on 3 L and saturating 95%, had physical dyspnea on on rest, hypertensive, afebrile, creatinine was slightly elevated 2.06 patient had a positive COVID test in the immediate care center last week, had a leukocytosis of 13.3 but patient has been on steroids he had a chest x-ray done that showed significant progressive multifocal patchy and hazy airspace opacities throughout the right greater than left lung  Mild pulmonary venous congestion    EKG showed sinus bradycardia with first-degree AV block    Patient was given IV antibiotics, admitted    PMH  Past Medical History:   Diagnosis Date    BPH     Encounter for incision and drainage procedure 07/02/2021    High blood pressure     High cholesterol     Hyperaldosteronism (HCC)     Osteoarthrosis, unspecified  whether generalized or localized, unspecified site     PMR (polymyalgia rheumatica) ([facility]) 2012    off corticosteroids since 2013    Retention of urine         PSH  Past Surgical History:   Procedure Laterality Date    KNEE SURGERY      OTHER SURGICAL HISTORY  2/27/09    [facility], Dr. Tavera    OTHER SURGICAL HISTORY  9/4/09    [facility] Dr. Tavera    OTHER SURGICAL HISTORY  1/4/11    PNB - Dr. Tavera    OTHER SURGICAL HISTORY  1/17/13    PVP - Dr. Tavera    OTHER SURGICAL HISTORY  2/3/16     [facility]     OTHER SURGICAL HISTORY  08/05/2020    cysto- Dr. Tavera    SKIN SURGERY  10/01/2019    MMS/R nasal ala /BCC done by MM    TONSILLECTOMY      TOTAL KNEE REPLACEMENT Right 02/25/2015    Right total knee replacement 02/25/2015    TOTAL KNEE REPLACEMENT Left 05/11/2015     Left knee total replacement 05/11/2015         ALL:  Allergies   Allergen Reactions    Clindamycin HIVES    Penicillins HIVES    Amoxicillin HIVES    Benazepril Coughing    Wasp Venom RESPIRATORY FAILURE    Wasp Venom Protein UNKNOWN        Home Medications:  Outpatient Medications Marked as Taking for the 1/9/24 encounter (Hospital Encounter)   Medication Sig Dispense Refill    losartan 100 MG Oral Tab Take 1 tablet (100 mg total) by mouth daily. 30 tablet 11    CELECOXIB 200 MG Oral Cap TAKE 1 CAPSULE DAILY 90 capsule 1    amLODIPine Besylate 5 MG Oral Tab Take 1 tablet (5 mg total) by mouth once daily. 90 tablet 3    eplerenone (INSPRA) 50 MG Oral Tab Take 2 tablets (100 mg total) by mouth daily. 180 tablet 3    metoprolol tartrate 50 MG Oral Tab Two tabs in morning, one in evening 270 tablet 3    Lovastatin 40 MG Oral Tab Take 1 tablet (40 mg total) by mouth daily with dinner. 90 tablet 3    Sildenafil Citrate 100 MG Oral Tab Take 1 tablet (100 mg total) by mouth daily as needed for Erectile Dysfunction. 24 tablet 3    torsemide 20 MG Oral Tab Take 1 tablet (20 mg total) by mouth once daily. 90 tablet 3         Soc Hx  Social History     Tobacco  Use    Smoking status: Former     Packs/day: 0.50     Years: 5.00     Additional pack years: 0.00     Total pack years: 2.50     Types: Cigarettes    Smokeless tobacco: Never    Tobacco comments:     in college   Substance Use Topics    Alcohol use: Yes     Alcohol/week: 0.0 standard drinks of alcohol     Comment: rare        Fam Hx  Family History   Problem Relation Age of Onset    Hypertension Father     Heart Disorder Father     Hypertension Mother     Diabetes Sister        Review of Systems  General: Denies unintentional weight loss, fevers, or chills-negative except for above  HEENT: Denies vision loss or double vision, denies hearing loss  Cardiovascular: Denies chest pain, palpitations, peripheral edema  Pulmonary: Denies cough, shortness of breath, or wheezing  Gastrointestinal: Denies abdominal pain, melena, or hematochezia  Genitourinary: Denies urinary frequency, urgency, and dysuria  Neurologic: Denies numbness, headaches, focal weakness  Skin: Denies rashes, sores  Endocrine: Denies heat or cold intolerance, denies polydipsia  Hematologic: Denies abnormal bleeding or bruising     OBJECTIVE:  BP (!) 166/87   Pulse 56   Temp 98 °F (36.7 °C)   Resp (!) 28   Ht 6' (1.829 m)   Wt 280 lb (127 kg)   SpO2 96%   BMI 37.97 kg/m²     BP Readings from Last 3 Encounters:   01/09/24 (!) 166/87   10/06/22 (!) 168/64   08/04/22 (!) 200/77     Wt Readings from Last 3 Encounters:   01/09/24 280 lb (127 kg)   10/06/22 280 lb (127 kg)   08/04/22 270 lb (122.5 kg)       Wt Readings from Last 6 Encounters:   01/09/24 280 lb (127 kg)   10/06/22 280 lb (127 kg)   08/04/22 270 lb (122.5 kg)   03/10/22 281 lb 14.4 oz (127.9 kg)   11/02/21 270 lb (122.5 kg)   09/21/21 278 lb (126.1 kg)     Gen: No acute distress, alert and oriented x 3  Pulm: inspiratory next very wheezing noted, tightness decreased inspiratory effort  CV:  nL S1/S2  Abd: soft, NT/ND, no hepatomegaly, +BS  MSK: moving all extremities, no edema  Neuro: no  focal deficits  Skin: no rashes/lesions  Psych: normal mood/affect          Diagnostic Data:    CBC/Chem  Recent Labs   Lab 01/09/24  1326   WBC 13.3*   HGB 13.3   MCV 90.6   .0       Recent Labs   Lab 01/09/24  1326      K 4.1      CO2 23.0   BUN 51*   CREATSERUM 2.06*   *   CA 9.6       Recent Labs   Lab 01/09/24  1326   ALT 85*   AST 24   ALB 3.6       No results for input(s): \"TROP\" in the last 168 hours.      Radiology: No results found.         ASSESSMENT / PLAN:       78-year-old male with significant past medical history of hypertension, CKD stage III hyperaldosteronism, hyperlipidemia presented with worsening dyspnea on exertion x 2 weeks    # Acute hypoxic respiratory failure secondary to community-acquired pneumonia  # Superimposed on COVID-pneumonia  -COVID-positive about a week ago at the [facility], currently hypoxic with increased work of breathing  -Given remdesivir, started on steroids  -IV Rocephin as well as azithromycin  -Check RSV, and expanded flu panel, Legionella strep pneumo and sputum cultures  -Pulmonology consulted  -Wean off oxygen as tolerated  -Check a proBNP, troponin  -DuoNebs       # CEDRICK on CKD stage III  -Creatinine at baseline is 1.56, admission creatinine is 2.0  -Holding Lasix  -Resume eplerenone however as patient has hyperaldosteronism  -Dr. Handley consulted  -Checking urine electrolytes  -Hold ARB    # Uncontrolled hypertension secondary to hyperaldosteronism  -Resume eplerenone, resume other antihypertensives holding ARB however    # Hyperaldosteronism    # Hyperlipidemia    # Gout    Prophy:  DVT: Heparin  Deconditioning prevention: PT OT    Dispo: admit to [facility] with telemetry    Outpatient records reviewed confirming patient's medical history and medications.     Further recommendations pending patient's clinical course.  [facility] hospitalist to continue to follow patient while in house    Time spent: greater than 95 minutes spent in d/w  pt/family, coordination of care, and d/w staff.     Terrell romero MD   Internal Medicine  [facility] Hospitalist  Pager: [phone]

## 2024-01-09 NOTE — ED QUICK NOTES
Orders for admission, patient is aware of plan and ready to go upstairs. Any questions, please call ED RN Leo at extension [phone].     Patient Covid vaccination status: Fully vaccinated     COVID Test Ordered in ED: None    COVID Suspicion at Admission: N/A    Running Infusions:    sodium chloride 125 mL/hr at 01/09/24 1427        Mental Status/LOC at time of transport: A/Ox4, calm and cooperative.     Other pertinent information: COVID +  CIWA score: N/A   NIH score:  N/A

## 2024-01-09 NOTE — ED PROVIDER NOTES
Patient Seen in: [facility] Emergency Department      History     Chief Complaint   Patient presents with    Shortness Of Breath     Stated Complaint: SOB, covid pneumonia, 86% RA    Subjective:   HPI    78-year-old male complaining of increasing shortness of breath the patient was diagnosed with a pneumonia with x-ray Wednesday of last week about 5 days ago.  He followed up with his primary care doctor today after taking doxycycline for 5 days had a repeat x-ray and showed worsening pneumonia.  His symptoms started approximate 10 days ago.  Send coughing shortness of breath worse with exertion there is shortness of breath at rest as well no history of CHF has had some chills.  Below is the impression from chest x-ray done as outpatient earlier today  PRESSION:    1.  Significantly progressed multifocal patchy and hazy airspace opacities, which could relate to   worsening pneumonia.   2.  Trace pleural effusions.   Objective:   Past Medical History:   Diagnosis Date    BPH     Encounter for incision and drainage procedure 07/02/2021    High blood pressure     High cholesterol     Hyperaldosteronism (HCC)     Osteoarthrosis, unspecified whether generalized or localized, unspecified site     PMR (polymyalgia rheumatica) (HCC) 2012    off corticosteroids since 2013    Retention of urine               Past Surgical History:   Procedure Laterality Date    KNEE SURGERY      OTHER SURGICAL HISTORY  2/27/09    flow , Dr. Tavera    OTHER SURGICAL HISTORY  9/4/09    flow  Dr. Tavera    OTHER SURGICAL HISTORY  1/4/11    PNB - Dr. Tavera    OTHER SURGICAL HISTORY  1/17/13    PVP - Dr. Tavera    OTHER SURGICAL HISTORY  2/3/16     Flow      OTHER SURGICAL HISTORY  08/05/2020    cysto- Dr. Tavera    SKIN SURGERY  10/01/2019    MMS/R nasal ala /BCC done by [facility]    TONSILLECTOMY      TOTAL KNEE REPLACEMENT Right 02/25/2015    Right total knee replacement 02/25/2015    TOTAL KNEE REPLACEMENT Left 05/11/2015     Left  knee total replacement 05/11/2015                 Social History     Socioeconomic History    Marital status:    Tobacco Use    Smoking status: Former     Packs/day: 0.50     Years: 5.00     Additional pack years: 0.00     Total pack years: 2.50     Types: Cigarettes    Smokeless tobacco: Never    Tobacco comments:     in college   Vaping Use    Vaping Use: Never used   Substance and Sexual Activity    Alcohol use: Yes     Alcohol/week: 0.0 standard drinks of alcohol     Comment: rare    Drug use: No   Other Topics Concern    Exercise Yes    Seat Belt Yes              Review of Systems    Positive for stated complaint: SOB, covid pneumonia, 86% RA  Other systems are as noted in HPI.  Constitutional and vital signs reviewed.      All other systems reviewed and negative except as noted above.    Physical Exam     ED Triage Vitals   BP 01/09/24 1322 155/78   Pulse 01/09/24 1322 60   Resp 01/09/24 1322 22   Temp 01/09/24 1505 98 °F (36.7 °C)   Temp src --    SpO2 01/09/24 1322 95 %   O2 Device 01/09/24 1322 Nasal cannula       Current:BP (!) 166/87   Pulse 56   Temp 98 °F (36.7 °C)   Resp (!) 28   Ht 182.9 cm (6')   Wt 127 kg   SpO2 96%   BMI 37.97 kg/m²         Physical Exam    Patient is alert orient x 3 no acute distress the O2 sat was 86% initially in triage neck there is no JVD lungs there is some mild wheezing throughout with some rales at the base cardiovascular exam shows a grade 2 systolic ejection murm regular rate and rhythm.  Abdomen is soft and nontender extremities trace edema no calf swelling neurologic exam is normal no focal deficits.    ED Course     Labs Reviewed   COMP METABOLIC PANEL (14) - Abnormal; Notable for the following components:       Result Value    Glucose 111 (*)     BUN 51 (*)     Creatinine 2.06 (*)     Calculated Osmolality 310 (*)     eGFR-Cr 32 (*)     ALT 85 (*)     All other components within normal limits   CBC W/ DIFFERENTIAL - Abnormal; Notable for the following  components:    WBC 13.3 (*)     Neutrophil Absolute Prelim 8.92 (*)     Neutrophil Absolute 8.92 (*)     Monocyte Absolute 1.56 (*)     All other components within normal limits   LACTIC ACID, PLASMA - Normal   CBC WITH DIFFERENTIAL WITH PLATELET    Narrative:     The following orders were created for panel order CBC With Differential With Platelet.  Procedure                               Abnormality         Status                     ---------                               -----------         ------                     CBC W/ DIFFERENTIAL[[id number]]          Abnormal            Final result                 Please view results for these tests on the individual orders.   RAINBOW DRAW BLUE   RAINBOW DRAW LAVENDER   RAINBOW DRAW LIGHT GREEN   BLOOD CULTURE   BLOOD CULTURE     EKG    Rate, intervals and axes as noted on EKG Report.  Rate: 58  Rhythm: Sinus bradycardia with first-degree AV block otherwise normal EKG  Reading: Sinus bradycardia first-degree AV block         BUN 51 creatinine 2.06 white this count 13.3                 MDM      Initial differential diagnosis includes pulmonary embolus worsening pneumonia CHF bronchitis  Admission disposition: 1/9/2024  2:18 PM           Patient has worsening pneumonia despite being on doxycycline for 5 days and will be admitted for IV antibiotics he is hypoxic and well                             Medical Decision Making      Disposition and Plan     Clinical Impression:  1. Community acquired pneumonia, unspecified laterality    2. Acute kidney injury (HCC)         Disposition:  Admit  1/9/2024  2:18 pm    Follow-up:  No follow-up provider specified.        Medications Prescribed:  Current Discharge Medication List                            Hospital Problems       Present on Admission  Date Reviewed: 10/6/2022            ICD-10-CM Noted POA    * (Principal) Community acquired pneumonia, unspecified laterality J18.9 1/9/2024 Unknown

## 2024-01-10 ENCOUNTER — APPOINTMENT (OUTPATIENT)
Dept: CV DIAGNOSTICS | Facility: HOSPITAL | Age: 79
End: 2024-01-10
Payer: MEDICARE

## 2024-01-10 PROBLEM — N17.9 AKI (ACUTE KIDNEY INJURY): Status: ACTIVE | Noted: 2024-01-09

## 2024-01-10 PROBLEM — N17.9 AKI (ACUTE KIDNEY INJURY) (HCC): Status: ACTIVE | Noted: 2024-01-09

## 2024-01-10 LAB
ANION GAP SERPL CALC-SCNC: 4 MMOL/L (ref 0–18)
APTT PPP: 21.5 SECONDS (ref 23.3–35.6)
APTT PPP: 44.4 SECONDS (ref 23.3–35.6)
ATRIAL RATE: 84 BPM
BASOPHILS # BLD AUTO: 0.04 X10(3) UL (ref 0–0.2)
BASOPHILS NFR BLD AUTO: 0.5 %
BUN BLD-MCNC: 48 MG/DL (ref 9–23)
CALCIUM BLD-MCNC: 9.7 MG/DL (ref 8.5–10.1)
CHLORIDE SERPL-SCNC: 117 MMOL/L (ref 98–112)
CO2 SERPL-SCNC: 22 MMOL/L (ref 21–32)
CREAT BLD-MCNC: 1.76 MG/DL
CREAT UR-SCNC: 68.3 MG/DL
EGFRCR SERPLBLD CKD-EPI 2021: 39 ML/MIN/1.73M2 (ref 60–?)
EOSINOPHIL # BLD AUTO: 0 X10(3) UL (ref 0–0.7)
EOSINOPHIL NFR BLD AUTO: 0 %
ERYTHROCYTE [DISTWIDTH] IN BLOOD BY AUTOMATED COUNT: 15.1 %
GLUCOSE BLD-MCNC: 132 MG/DL (ref 70–99)
HCT VFR BLD AUTO: 40.6 %
HGB BLD-MCNC: 13 G/DL
IMM GRANULOCYTES # BLD AUTO: 0.18 X10(3) UL (ref 0–1)
IMM GRANULOCYTES NFR BLD: 2.2 %
L PNEUMO AG UR QL: NEGATIVE
LYMPHOCYTES # BLD AUTO: 0.92 X10(3) UL (ref 1–4)
LYMPHOCYTES NFR BLD AUTO: 11.2 %
MCH RBC QN AUTO: 30 PG (ref 26–34)
MCHC RBC AUTO-ENTMCNC: 32 G/DL (ref 31–37)
MCV RBC AUTO: 93.8 FL
MONOCYTES # BLD AUTO: 0.47 X10(3) UL (ref 0.1–1)
MONOCYTES NFR BLD AUTO: 5.7 %
NEUTROPHILS # BLD AUTO: 6.64 X10 (3) UL (ref 1.5–7.7)
NEUTROPHILS # BLD AUTO: 6.64 X10(3) UL (ref 1.5–7.7)
NEUTROPHILS NFR BLD AUTO: 80.4 %
NT-PROBNP SERPL-MCNC: 2417 PG/ML (ref ?–450)
OSMOLALITY SERPL CALC.SUM OF ELEC: 310 MOSM/KG (ref 275–295)
PLATELET # BLD AUTO: 201 10(3)UL (ref 150–450)
POTASSIUM SERPL-SCNC: 4.6 MMOL/L (ref 3.5–5.1)
PROT UR-MCNC: 27.1 MG/DL
PROT/CREAT UR-RTO: 0.4
Q-T INTERVAL: 410 MS
QRS DURATION: 100 MS
QTC CALCULATION (BEZET): 496 MS
R AXIS: 14 DEGREES
RBC # BLD AUTO: 4.33 X10(6)UL
SODIUM SERPL-SCNC: 143 MMOL/L (ref 136–145)
SODIUM SERPL-SCNC: 47 MMOL/L
STREP PNEUMO ANTIGEN, URINE: NEGATIVE
T AXIS: 10 DEGREES
VENTRICULAR RATE: 88 BPM
WBC # BLD AUTO: 8.3 X10(3) UL (ref 4–11)

## 2024-01-10 PROCEDURE — 99222 1ST HOSP IP/OBS MODERATE 55: CPT | Performed by: INTERNAL MEDICINE

## 2024-01-10 PROCEDURE — 93306 TTE W/DOPPLER COMPLETE: CPT

## 2024-01-10 RX ORDER — IPRATROPIUM BROMIDE AND ALBUTEROL SULFATE 2.5; .5 MG/3ML; MG/3ML
3 SOLUTION RESPIRATORY (INHALATION)
Status: DISCONTINUED | OUTPATIENT
Start: 2024-01-10 | End: 2024-01-11

## 2024-01-10 NOTE — PROGRESS NOTES
[facility] AND CARE   Progress Note  -  Wagner Sheridan Patient Status:  Inpatient    1945 MRN [id number]   [facility] 5NW-A Attending Terrell Guzmán MD   Hosp Day # 1 PCP Edil Roy MD     PCP: Edil Roy MD      SEE ATTENDING NOTE AT BOTTOM OF PAGE    Is this a shared or split note between Advanced Practice Provider and Physician? Yes    Assessment and Plan:    78-year-old male with significant past medical history of hypertension, CKD stage III hyperaldosteronism, hyperlipidemia presented with worsening dyspnea on exertion x 2 weeks     # Acute hypoxic respiratory failure secondary to community-acquired pneumonia  # Superimposed on COVID-pneumonia  -COVID-positive about a week ago at the [facility], currently hypoxic with increased work of breathing  -Given remdesivir, started on steroids  -IV Rocephin as well as azithromycin  -RSV, and expanded flu panel negative, Legionella strep pneumo and sputum cultures in process  -Pulmonology consulted, discussed with pulmonology  -Weaned off oxygen now currently on room air  - proBNP-->2417  -DuoNebs  -repeat ECHO today per cards    New onset AFIB  -rate controlled  -heparin IV,  -start eliquis BID  -on metoprolol   -Echo pending         # CEDRICK on CKD stage III  -Creatinine at baseline is 1.56, admission creatinine is 2.0  -1/10 cr 1.76 down trending  -Holding Lasix, DC fluids  -Resume eplerenone however as patient has hyperaldosteronism  -Dr. Handley consulted  -Okay to resume ARB per Dr. Persaud     # Uncontrolled hypertension secondary to hyperaldosteronism  -Resume eplerenone, resume other antihypertensives  -Resume ARB     # Hyperaldosteronism  -eplerenone     # Hyperlipidemia  -statin     # Gout     Deconditioning prevention: PT OT     GOC: Full code    MA/ACO Reach  -Re- Entry:no  -Consults:renal, pulm, cards  -Discharge Needs:TBD  -Appointments: PCP      FEN  -lytes in  am  -IVF  -diet-reg    Prophy  -SCD  -eliquis  -heparin    Dispo  -pending clinical coarse  PCP: Edil Roy MD      Concerns regarding plan of care were discussed with patient. Patient agrees with plan as detailed above. Discussed plan of care with Dr. Guzmán    Note: This chart was prepared using voice recognition software and may contain unintended word substitution errors.        Ronny MARIE  [facility] Hospitalist Team  Contact via [employer] (Check Availability)  1/10/2024    SUBJECTIVE:   Patient sitting on the edge of bed, resting comfortably.  On 4L 02, states it helps him a lot.  Daughter and spouse at bedside.  All questions answered.       OBJECTIVE:   Blood pressure 149/85, pulse 79, temperature 97.7 °F (36.5 °C), resp. rate 24, height 6' (1.829 m), weight 287 lb 11.2 oz (130.5 kg), SpO2 96%.    GENERAL: no apparent distress, overweight  NEURO: A/A Ox3  RESP: non labored, bilateral rhonchi  CARDIO: Regular, no murmur  ABD: soft, NT, ND, BS+  EXTREMITIES: mild LE edema    DIAGNOSTIC DATA:   Labs:     Recent Labs   Lab 01/09/24  1326 01/10/24  0450   WBC 13.3* 8.3   HGB 13.3 13.0   MCV 90.6 93.8   .0 201.0       Recent Labs   Lab 01/09/24  1326 01/09/24  2245 01/10/24  0450    143 143   K 4.1 4.1 4.6    114* 117*   CO2 23.0 24.0 22.0   BUN 51* 49* 48*   CREATSERUM 2.06* 2.03* 1.76*   CA 9.6 9.4 9.7   * 182* 132*       Recent Labs   Lab 01/09/24  1326   ALT 85*   AST 24   ALB 3.6       No results for input(s): \"PGLU\" in the last 168 hours.    No results for input(s): \"TROP\" in the last 168 hours.        MEDICATIONS      Current Facility-Administered Medications   Medication Dose Route Frequency    apixaban (Eliquis) tab 5 mg  5 mg Oral BID    sodium chloride 0.9% infusion   Intravenous Continuous    sodium chloride 0.9% infusion   Intravenous Continuous    acetaminophen (Tylenol Extra Strength) tab 500 mg  500 mg Oral Q4H PRN    melatonin tab 3 mg   3 mg Oral Nightly PRN    polyethylene glycol (PEG 3350) (Miralax) 17 g oral packet 17 g  17 g Oral Daily PRN    sennosides (Senokot) tab 17.2 mg  17.2 mg Oral Nightly PRN    bisacodyl (Dulcolax) 10 MG rectal suppository 10 mg  10 mg Rectal Daily PRN    guaiFENesin ER (Mucinex) 12 hr tab 600 mg  600 mg Oral BID    ondansetron (Zofran) 4 MG/2ML injection 4 mg  4 mg Intravenous Q6H PRN    metoclopramide (Reglan) 5 mg/mL injection 5 mg  5 mg Intravenous Q8H PRN    benzonatate (Tessalon) cap 200 mg  200 mg Oral TID PRN    azithromycin (Zithromax) tab 500 mg  500 mg Oral Daily    ipratropium-albuterol (Duoneb) 0.5-2.5 (3) MG/3ML inhalation solution 3 mL  3 mL Nebulization 6 times per day    metoprolol tartrate (Lopressor) tab 100 mg  100 mg Oral QAM    metoprolol tartrate (Lopressor) tab 50 mg  50 mg Oral QPM    amLODIPine (Norvasc) tab 5 mg  5 mg Oral BID    eplerenone (Inspra) tab 50 mg  50 mg Oral BID    cefTRIAXone (Rocephin) 2 g in D5W 100 mL IVPB-ADD  2 g Intravenous Q24H    atorvastatin (Lipitor) tab 10 mg  10 mg Oral Nightly    finasteride (Proscar) tab 5 mg  5 mg Oral Nightly         IMAGING     No results found.      SEE ATTENDING NOTE BELOW:       Patient seen and evaluated independently by me,    -Presented with acute hypoxic respiratory failure likely combination of bacterial pneumonia as well as COVID viral pneumonia  -Repeat COVID has been negative  -Sputum cultures are pending, continue Decadron, defer remdesivir and Decadron to pulmonology  -Continue IV antibiotics  -Weaned off oxygen, proBNP is elevated to getting an echo    # New onset A-fib with RVR  -Rate is now well-controlled, on anticoagulation  -

## 2024-01-10 NOTE — CONSULTS
[facility]  Report of Consultation    Wagner Sheridan Patient Status:  Inpatient    1945 MRN [id number]   Location [facility] 5NW-A Attending Terrell Guzmán MD   Hosp Day # 1 PCP Edil Roy MD       Assessment / Plan:    1) CKD 3- baseline Cr 1.6-1.7 mg/dl due to longstanding HTN / age-related nephrosclerosis; previous eval for other etiologies unrevealing. PLAN- focus on BP mgmt    2) New onset AF- in setting of PNA -> BB / DOAC per cards; echo pending    3) HTN- essential + hyperaldo; continue usual amlodipine / eplerenone / metoprolol; OK to resume losartan    4) CEDRICK- due to mild dehydration in setting of PNA / COVID- resolved    5) SOB / MORAES- due to PNA / COVID- on azithro / CTX / decadron    D/W wife at bedside.      Reason for Consultation:  CEDRICK / CKD 3 / hyperaldo    History of Present Illness:  Wagner Sheridan is a a(n) 78 year old male.  Very pleasant 78-year-old male well-known to me with a history of chronic kidney disease, hypertension hyperaldosteronism who presents with increasing exertional dyspnea over the past 1 to 2 weeks; had initially developed COVID around Thanksgiving and completed Paxlovid and felt better.  Started developing recurrent shortness of breath with a mild cough.  Found to have pneumonia as well as being COVID-positive.  Feeling significantly better overnight after receiving steroids and antibiotics.  Creatinine was 2.0 on admit, now down to 1.7 mg/dL.      History:  Past Medical History:   Diagnosis Date    BPH     Encounter for incision and drainage procedure 2021    High blood pressure     High cholesterol     Hyperaldosteronism (HCC)     Osteoarthrosis, unspecified whether generalized or localized, unspecified site     PMR (polymyalgia rheumatica) (HCC)     off corticosteroids since     Retention of urine      Past Surgical History:   Procedure Laterality Date    KNEE SURGERY      OTHER SURGICAL HISTORY  09    flow , Dr. Tavera    OTHER  SURGICAL HISTORY  9/4/09    [facility] Dr. Tavera    OTHER SURGICAL HISTORY  1/4/11    PNB - Dr. Tavera    OTHER SURGICAL HISTORY  1/17/13    PVP - Dr. Tavera    OTHER SURGICAL HISTORY  2/3/16     [facility]     OTHER SURGICAL HISTORY  08/05/2020    cysto- Dr. Tavera    SKIN SURGERY  10/01/2019    MMS/R nasal ala /BCC done by MM    TONSILLECTOMY      TOTAL KNEE REPLACEMENT Right 02/25/2015    Right total knee replacement 02/25/2015    TOTAL KNEE REPLACEMENT Left 05/11/2015     Left knee total replacement 05/11/2015      Family History   Problem Relation Age of Onset    Hypertension Father     Heart Disorder Father     Hypertension Mother     Diabetes Sister      Denies family history of kidney disease.    reports that he has quit smoking. His smoking use included cigarettes. He has a 2.5 pack-year smoking history. He has never used smokeless tobacco. He reports current alcohol use. He reports that he does not use drugs.    Allergies:  Allergies   Allergen Reactions    Clindamycin HIVES    Penicillins HIVES    Amoxicillin HIVES    Benazepril Coughing    Wasp Venom RESPIRATORY FAILURE    Wasp Venom Protein UNKNOWN       Medications:    Current Facility-Administered Medications:     apixaban (Eliquis) tab 5 mg, 5 mg, Oral, BID    [COMPLETED] sodium chloride 0.9 % IV bolus 500 mL, 500 mL, Intravenous, Once **FOLLOWED BY** sodium chloride 0.9% infusion, , Intravenous, Continuous    sodium chloride 0.9% infusion, , Intravenous, Continuous    acetaminophen (Tylenol Extra Strength) tab 500 mg, 500 mg, Oral, Q4H PRN    melatonin tab 3 mg, 3 mg, Oral, Nightly PRN    polyethylene glycol (PEG 3350) (Miralax) 17 g oral packet 17 g, 17 g, Oral, Daily PRN    sennosides (Senokot) tab 17.2 mg, 17.2 mg, Oral, Nightly PRN    bisacodyl (Dulcolax) 10 MG rectal suppository 10 mg, 10 mg, Rectal, Daily PRN    guaiFENesin ER (Mucinex) 12 hr tab 600 mg, 600 mg, Oral, BID    ondansetron (Zofran) 4 MG/2ML injection 4 mg, 4 mg, Intravenous, Q6H  PRN    metoclopramide (Reglan) 5 mg/mL injection 5 mg, 5 mg, Intravenous, Q8H PRN    benzonatate (Tessalon) cap 200 mg, 200 mg, Oral, TID PRN    azithromycin (Zithromax) tab 500 mg, 500 mg, Oral, Daily    ipratropium-albuterol (Duoneb) 0.5-2.5 (3) MG/3ML inhalation solution 3 mL, 3 mL, Nebulization, 6 times per day    metoprolol tartrate (Lopressor) tab 100 mg, 100 mg, Oral, QAM    metoprolol tartrate (Lopressor) tab 50 mg, 50 mg, Oral, QPM    amLODIPine (Norvasc) tab 5 mg, 5 mg, Oral, BID    eplerenone (Inspra) tab 50 mg, 50 mg, Oral, BID    cefTRIAXone (Rocephin) 2 g in D5W 100 mL IVPB-ADD, 2 g, Intravenous, Q24H    atorvastatin (Lipitor) tab 10 mg, 10 mg, Oral, Nightly    finasteride (Proscar) tab 5 mg, 5 mg, Oral, Nightly  No current outpatient medications on file.       Review of Systems:  Please see HPI for pertinent positives. 10 point review of systems otherwise reviewed and negative.     Physical Exam:  /85   Pulse 79   Temp 97.7 °F (36.5 °C) (Oral)   Resp 24   Ht 6' (1.829 m)   Wt 287 lb 11.2 oz (130.5 kg)   SpO2 96%   BMI 39.02 kg/m²   Temp (24hrs), Av.9 °F (36.6 °C), Min:97.7 °F (36.5 °C), Max:98.3 °F (36.8 °C)       Intake/Output Summary (Last 24 hours) at 1/10/2024 1233  Last data filed at 1/10/2024 1027  Gross per 24 hour   Intake 1194.3 ml   Output 575 ml   Net 619.3 ml     Wt Readings from Last 3 Encounters:   24 287 lb 11.2 oz (130.5 kg)   10/06/22 280 lb (127 kg)   22 270 lb (122.5 kg)     General: awake alert  HEENT: No scleral icterus, MMM  Neck: Supple, no OWEN or thyromegaly  Cardiac: Regular rate and rhythm, S1, S2 normal, no murmur, rub, or gallop  Lungs: Decreased breath sounds at the bases bilaterally.   Abdomen: Soft, non-tender. + bowel sounds, no palpable organomegaly  Extremities: Without clubbing, cyanosis; no edema  Neurologic: Cranial nerves grossly intact, moving all extremities  Skin: Warm and dry, no rashes      Laboratory Data:  Lab Results    Component Value Date    WBC 8.3 01/10/2024    HGB 13.0 01/10/2024    HCT 40.6 01/10/2024    .0 01/10/2024    CREATSERUM 1.76 01/10/2024    BUN 48 01/10/2024     01/10/2024    K 4.6 01/10/2024     01/10/2024    CO2 22.0 01/10/2024     01/10/2024    CA 9.7 01/10/2024    ALB 3.6 01/09/2024    ALKPHO 78 01/09/2024    BILT 1.0 01/09/2024    TP 6.7 01/09/2024    AST 24 01/09/2024    ALT 85 01/09/2024    PTT 44.4 01/10/2024       Imaging:  All imaging studies reviewed.      Thank you for allowing me to participate in the care of your patient.    Britta Persaud MD  1/10/2024  12:33 PM

## 2024-01-10 NOTE — PROGRESS NOTES
[facility] Note: Antimicrobial Weight Based Dose Adjustment for: ceftriaxone (ROCEPHIN)    Wagner Sheridan is a 78 year old patient who has been prescribed ceftriaxone (ROCEPHIN) 1000 mg every 24 hours.    Estimated Creatinine Clearance: 31.9 mL/min (A) (based on SCr of 2.06 mg/dL (H)).  Wt Readings from Last 6 Encounters:   01/09/24 130.5 kg (287 lb 11.2 oz)   10/06/22 127 kg (280 lb)   08/04/22 122.5 kg (270 lb)   07/12/21 124.7 kg (275 lb)   03/04/19 127 kg (280 lb)   01/05/17 130.6 kg (288 lb)     Body mass index is 39.02 kg/m².    Based on this criteria and renal function, dose will be adjusted to ceftriaxone (ROCEPHIN) 2000 mg every 24 hours.    Thank you,    Romina Muhammad, PharmD  1/9/2024  8:33 PM

## 2024-01-10 NOTE — PLAN OF CARE
Pt is A&Ox 4. VSS, afebrile and denies any pain. SPO2 maintained on 3L. Continuous pulse ox. Denies any SOB. Non-productive cough. Tele/Afib. Heparin gtt 12ml/hr. Reg diet. Denies any n/v/d. Voids. Up with Standby. 0.9 NS @ 75ml/hr IV Zithromax, Rocephen. Safety precautions in place. Pt is updated with plan of care. No further needs at this time.    Problem: Patient/Family Goals  Goal: Patient/Family Long Term Goal  Description: Patient's Long Term Goal: Discharge    Interventions:  - Consults  - See additional Care Plan goals for specific interventions  Outcome: Progressing  Goal: Patient/Family Short Term Goal  Description: Patient's Short Term Goal: Manage cough, get some rest    Interventions:   - Cluster care  PRN meds  - See additional Care Plan goals for specific interventions  Outcome: Progressing

## 2024-01-10 NOTE — PROGRESS NOTES
NURSING ADMISSION NOTE      Patient admitted via Cart  Oriented to room 508.  Safety precautions initiated.  Bed in low position.  Call light in reach.

## 2024-01-10 NOTE — RESPIRATORY THERAPY NOTE
Pt weaned to room air to keep sats above 90-92%. O2 sat 96% BS clear diminished bases. . IS given tolerated well 1500 ml with good breath hold. BD Duo QID.

## 2024-01-10 NOTE — PLAN OF CARE
Pt is admitted for PNA. Pt is AOX4.  VSS, afebrile and denies any pain. O2 weaned down to RA. . SOB with ambulation . Tele-Afib. Heparin gtt DC-ed. PO Eliquis. Reg diet. Denies any n/v/d. Voids. Up with SBA.  IV fluid DC-ed.. IV ABX. Echo done. WCTM. Pt is updated with plan of care.        Problem: Patient/Family Goals  Goal: Patient/Family Long Term Goal  Description: Patient's Long Term Goal: Discharge    Interventions:  - Consults  - See additional Care Plan goals for specific interventions  Outcome: Progressing  Goal: Patient/Family Short Term Goal  Description: Patient's Short Term Goal: Manage cough, get some rest  1/10: wean O2     Interventions:   - meds, IS   - Cluster care  PRN meds  - See additional Care Plan goals for specific interventions  Outcome: Progressing     Problem: CARDIOVASCULAR - ADULT  Goal: Absence of cardiac arrhythmias or at baseline  Description: INTERVENTIONS:  - Continuous cardiac monitoring, monitor vital signs, obtain 12 lead EKG if indicated  - Evaluate effectiveness of antiarrhythmic and heart rate control medications as ordered  - Initiate emergency measures for life threatening arrhythmias  - Monitor electrolytes and administer replacement therapy as ordered  Outcome: Progressing     Problem: RESPIRATORY - ADULT  Goal: Achieves optimal ventilation and oxygenation  Description: INTERVENTIONS:  - Assess for changes in respiratory status  - Assess for changes in mentation and behavior  - Position to facilitate oxygenation and minimize respiratory effort  - Oxygen supplementation based on oxygen saturation or ABGs  - Provide Smoking Cessation handout, if applicable  - Encourage broncho-pulmonary hygiene including cough, deep breathe, Incentive Spirometry  - Assess the need for suctioning and perform as needed  - Assess and instruct to report SOB or any respiratory difficulty  - Respiratory Therapy support as indicated  - Manage/alleviate anxiety  - Monitor for signs/symptoms of  CO2 retention  Outcome: Progressing

## 2024-01-10 NOTE — PHYSICAL THERAPY NOTE
PHYSICAL THERAPY EVALUATION - INPATIENT     Room Number: 508/508-A  Evaluation Date: 1/10/2024  Type of Evaluation: Initial  Physician Order: PT Eval and Treat    Presenting Problem: Pneumonia, COVID 19, CEDRICK, new onset a fib  Co-Morbidities : CKD, HTN, gout  Reason for Therapy: Mobility Dysfunction and Discharge Planning    History related to current admission: Patient is a 78 year old male admitted on 1/9/2024 from home for increasing sob.  Pt diagnosed with pneumonia ~ 5 days ago. Now diagnosed w/ pneumonia and COVID.  Pt had Covid in November.  Per rn pt is not on isolation.    Therapist wore N95 mask, had pt wear mask in hallway.      ASSESSMENT   In this PT evaluation, the patient presents with the following impairments decreased cardiopulmonary endurance, sob w/ minimal gait activities.  These impairments and comorbidities manifest themselves as functional limitations in independent bed mobility, transfers, and gait.  The patient is below baseline and would benefit from skilled inpatient PT to address the above deficits to assist patient in returning to prior to level of function.   Functional outcome measures completed include [facility].  The AM-PAC '6-Clicks' Inpatient Basic Mobility Short Form was completed and this patient is demonstrating a Approx Degree of Impairment: 20.91%  degree of impairment in mobility. Research supports that patients with this level of impairment may benefit from home at d/c.    DISCHARGE RECOMMENDATIONS  PT Discharge Recommendations: Home    PLAN  PT Treatment Plan: Bed mobility;Energy conservation;Patient education;Family education;Gait training;Transfer training;Balance training;Stoop training  Rehab Potential : Good  Frequency (Obs): 3x/week  Number of Visits to Meet Established Goals: 2      CURRENT GOALS    Goal #1 Patient is able to demonstrate supine - sit EOB @ level: modified independent     Goal #2 Patient is able to demonstrate transfers EOB to/from [facility] at assistance  level: modified independent - met 1/10     Goal #3 Patient is able to ambulate 200 feet with assist device: none at assistance level: supervision while maintaining sat level, not on O2.     Goal #4 Up and down one stoop step w/ supervision assist.   Goal #5    Goal #6    Goal Comments: Goals established on 1/10/2024    HOME SITUATION  Type of Home: House   Home Layout: One level  Stairs to Enter : 1     Stairs to Bedroom: 0       Lives With: Spouse  Drives: Yes     Patient Regularly Uses: Glasses    Prior Level of [place]: Lives at [facility] w/ his wife.  Usually walks w/o device, drives.  Has basement, but doesn't have to go down in it when returning home.    SUBJECTIVE  \"I feel a lot better.\" Then \"That was harder than I thought\" - with gait.      OBJECTIVE     Fall Risk: Standard fall risk    WEIGHT BEARING RESTRICTION  Weight Bearing Restriction: None                PAIN ASSESSMENT  Ratin          COGNITION  Overall Cognitive Status:  WFL - within functional limits    RANGE OF MOTION AND STRENGTH ASSESSMENT  Upper extremity ROM and strength are within functional limits     Lower extremity ROM is within functional limits     Lower extremity strength is within functional limits       BALANCE  Static Sitting: Good  Dynamic Sitting: Good  Static Standing: Fair  Dynamic Standing: Fair -    ADDITIONAL TESTS                                    ACTIVITY TOLERANCE  Pulse: 81  Heart Rate Source: Monitor                   O2 WALK  Oxygen Therapy  SPO2% on Oxygen at Rest: 98  At rest oxygen flow (liters per minute): 1  SPO2% Ambulation on Oxygen: 99  Ambulation oxygen flow (liters per minute): 2    NEUROLOGICAL FINDINGS                        AM-PAC '6-Clicks' INPATIENT SHORT FORM - BASIC MOBILITY  How much difficulty does the patient currently have...  Patient Difficulty: Turning over in bed (including adjusting bedclothes, sheets and blankets)?: None   Patient Difficulty: Sitting down on and standing  up from a chair with arms (e.g., wheelchair, bedside commode, etc.): None   Patient Difficulty: Moving from lying on back to sitting on the side of the bed?: None   How much help from another person does the patient currently need...   Help from Another: Moving to and from a bed to a chair (including a wheelchair)?: None   Help from Another: Need to walk in hospital room?: A Little   Help from Another: Climbing 3-5 steps with a railing?: A Little       AM-PAC Score:  Raw Score: 22   Approx Degree of Impairment: 20.91%   Standardized Score (AM-PAC Scale): 53.28   CMS Modifier (G-Code): CJ    FUNCTIONAL ABILITY STATUS  Gait Assessment   Functional Mobility/Gait Assessment  Gait Assistance: Supervision  Distance (ft): 120  Assistive Device: Other (Comment) (1/2 of distance pushing iv pole, 1/2 without)  Pattern: Comment (Trunk flexion w/ inability to achieve full upright posture during gait.)    Skilled Therapy Provided     Transfer Mobility:  Sit to stand: Mod I   Stand to sit: Mod I  Gait = Completed gait 60'x1 pushing IV pole, note anterior lean w/ diminished posture throughout low back.  Pt st this is his norm.  Pt sob at end of 60' - had pt take a standing rest, cues for proper breathing technique and increased O2 to 2L from 1l.  Completed additional 60', this time w/o pushing IV pole and supervision assist.  Pt rated modified Norma as 7/10 at end of gait, sat level was % immediately following gait. Pt recovered more natural breathing pattern w/I one minute of sitting, O2 back down to 1L.    Therapist's Comments: Discussed use of modified Norma to assist w/ mobility - both express understanding.  Covered energy conservation techniques.  Encouraged pt to walk 2-3x a day w/ rn staff to increase his pulmonary endurance.  Pt and spouse express understanding.    MODIFIED NORMA DYSPNEA SCALE - (SHORTNESS OF BREATH SCALE)    SCORE DESCRIPTION   0 Nothing at all   1 Very slight   2 Slight   3 Moderate   4 Somewhat  severe   5 Strong or hard breathing   6    7 Very hard breathing   8    9 Very, Very Severe   10 MAX - You need to stop     Patient Education provided:    Use this scale to rate the difficulty of your breathing:  - As you would rate your pain from 0-10, you can rate your SOB from 0-10  - Goal is to stay below 7/10 with activity  - If you reach beyond 7/10, it is important to rest; stop activity and if you need to sit down to recove     Exercise/Education Provided:  Functional activity tolerated  Gait training  Transfer training    Patient End of Session: Up in chair;Needs met;Call light within reach;RN aware of session/findings;All patient questions and concerns addressed (Rn Lindsey aware of eval session)      Patient Evaluation Complexity Level:  History Moderate - 1 or 2 personal factors and/or co-morbidities   Examination of body systems Moderate - addressing a total of 3 or more elements   Clinical Presentation Low - Stable   Clinical Decision Making Low - Stable       PT Session Time: 25 minutes  Gait Trainin minutes  Therapeutic Activity: 4 minutes  Neuromuscular Re-education: 0 minutes  Therapeutic Exercise: 0 minutes

## 2024-01-10 NOTE — PROGRESS NOTES
Pulmonary Progress Note     Assessment / Plan:  Acute hypoxic resp failure- due to acute bacterial PNA superimposed on recent COVID  - on RA  Bacterial PNA- failed OP doxy treatment  - urine legionella/strep- negative  - has various allergies- started on ceftriaxone/azithromycin and tolerating well  - follow-up cultures  Proph- DOAC  Dispo- full code  - will follow      Subjective:  Feels much better  No new complaints  Wants to go home    Objective:  Vitals:    01/10/24 0744 01/10/24 0933 01/10/24 1026 01/10/24 1206   BP:  149/85     BP Location:       Pulse:       Resp:       Temp:  97.7 °F (36.5 °C)  97.7 °F (36.5 °C)   TempSrc:    Oral   SpO2: 95%  96%    Weight:       Height:         Physical Exam:  General: no apparent distress, conversant  Skin: no rash, ulcers or subcutaneous nodules  Eyes: anicteric sclerae, moist conjunctivae  Head, ears, nose, throat: atraumatic, oropharynx clear with moist mucous membranes  Neck: trachea midline with no thyromegaly  Heart: regular rate and rhythm, no murmurs / rubs / gallops  Lungs: clear bilaterally, normal respiratory effort, no accessory muscle use  Extremities: no edema or cyanosis  Psych: interactive, answering questions appropriately, appropriate affect    Medications:  Reviewed in EMR    Lab Data:  Reviewed in EMR    Imaging:  I independently visualized all relevant chest imaging in PACS and agree with radiology interpretation except where noted.

## 2024-01-10 NOTE — PAYOR COMM NOTE
--------------  ADMISSION REVIEW     Payor: [employer]  Subscriber #:  [id number]  Authorization Number: [id number]    Admit date: 1/9/24  Admit time:  7:27 PM      Stated Complaint: SOB, covid pneumonia, 86% RA      HPI    78-year-old male complaining of increasing shortness of breath the patient was diagnosed with a pneumonia with x-ray Wednesday of last week about 5 days ago.  He followed up with his primary care doctor today after taking doxycycline for 5 days had a repeat x-ray and showed worsening pneumonia.  His symptoms started approximate 10 days ago.  Send coughing shortness of breath worse with exertion there is shortness of breath at rest as well no history of CHF has had some chills.  Below is the impression from chest x-ray done as outpatient earlier today  PRESSION:    1.  Significantly progressed multifocal patchy and hazy airspace opacities, which could relate to   worsening pneumonia.   2.  Trace pleural effusions.       ED Triage Vitals   BP 01/09/24 1322 155/78   Pulse 01/09/24 1322 60   Resp 01/09/24 1322 22   Temp 01/09/24 1505 98 °F (36.7 °C)   Temp src --    SpO2 01/09/24 1322 95 %   O2 Device 01/09/24 1322 Nasal cannula       Physical Exam    Patient is alert orient x 3 no acute distress the O2 sat was 86% initially in triage neck there is no JVD lungs there is some mild wheezing throughout with some rales at the base cardiovascular exam shows a grade 2 systolic ejection murm regular rate and rhythm.  Abdomen is soft and nontender extremities trace edema no calf swelling neurologic exam is normal no focal deficits.      Labs Reviewed   COMP METABOLIC PANEL (14) - Abnormal; Notable for the following components:       Result Value    Glucose 111 (*)     BUN 51 (*)     Creatinine 2.06 (*)     Calculated Osmolality 310 (*)     eGFR-Cr 32 (*)     ALT 85 (*)     All other components within normal limits   CBC W/ DIFFERENTIAL - Abnormal; Notable for the following components:    WBC 13.3 (*)      Neutrophil Absolute Prelim 8.92 (*)     Neutrophil Absolute 8.92 (*)     Monocyte Absolute 1.56 (*)     All other components within normal limits   LACTIC ACID, PLASMA - Normal   CBC WITH DIFFERENTIAL WITH PLATELET    Narrative:     The following orders were created for panel order CBC With Differential With Platelet.  Procedure                               Abnormality         Status                     ---------                               -----------         ------                     CBC W/ DIFFERENTIAL[[id number]]          Abnormal            Final result                 Please view results for these tests on the individual orders.   RAINBOW DRAW BLUE   RAINBOW DRAW LAVENDER   RAINBOW DRAW LIGHT GREEN   BLOOD CULTURE   BLOOD CULTURE     EKG    Rate, intervals and axes as noted on EKG Report.  Rate: 58  Rhythm: Sinus bradycardia with first-degree AV block otherwise normal EKG  Reading: Sinus bradycardia first-degree AV block         Initial differential diagnosis includes pulmonary embolus worsening pneumonia CHF bronchitis  Admission disposition: 1/9/2024  2:18 PM         Patient has worsening pneumonia despite being on doxycycline for 5 days and will be admitted for IV antibiotics he is hypoxic and well    Disposition and Plan     Clinical Impression:  1. Community acquired pneumonia, unspecified laterality    2. Acute kidney injury (HCC)                 H&P - H&P Note          Patient is a 78-year-old male with significant past medical history of hypertension, CKD stage III hyperaldosteronism, hyperlipidemia presented with worsening dyspnea on exertion x 2 weeks.  Patient states that he was COVID-positive around Thanksgiving, completed Paxlovid and felt better for couple weeks however 2 weeks ago he started to feel short of breath, about a week ago he went to the immediate care was found to have pneumonia started on doxycycline as well as prednisone, continued to feel dyspneic despite the antibiotics and  steroids  and saw his PCP today, repeated chest x-ray today that showed worsening of pneumonia and was sent to the emergency room.  Patient at home does not wear any oxygen, no underlying lung disease-patient is compliant with medications no new weight gain lower extremity edema no chest pain, cough is returned and is mostly dry no fevers or chills.        In the emergency room, patient was hypoxic to 86% on room air placed on 3 L and saturating 95%, had physical dyspnea on on rest, hypertensive, afebrile, creatinine was slightly elevated 2.06 patient had a positive COVID test in the [facility] center last week, had a leukocytosis of 13.3 but patient has been on steroids he had a chest x-ray done that showed significant progressive multifocal patchy and hazy airspace opacities throughout the right greater than left lung  Mild pulmonary venous congestion    EKG showed sinus bradycardia with first-degree AV block    Patient was given IV antibiotics, admitted  ²   CBC/Chem  Recent Labs   Lab 01/09/24  1326   WBC 13.3*   HGB 13.3   MCV 90.6   .0       Recent Labs   Lab 01/09/24  1326      K 4.1      CO2 23.0   BUN 51*   CREATSERUM 2.06*   *   CA 9.6       Recent Labs   Lab 01/09/24  1326   ALT 85*   AST 24   ALB 3.6         78-year-old male with significant past medical history of hypertension, CKD stage III hyperaldosteronism, hyperlipidemia presented with worsening dyspnea on exertion x 2 weeks    # Acute hypoxic respiratory failure secondary to community-acquired pneumonia  # Superimposed on COVID-pneumonia  -COVID-positive about a week ago at the [facility], currently hypoxic with increased work of breathing  -Given remdesivir, started on steroids  -IV Rocephin as well as azithromycin  -Check RSV, and expanded flu panel, Legionella strep pneumo and sputum cultures  -Pulmonology consulted  -Wean off oxygen as tolerated  -Check a proBNP, troponin  -DuoNebs       # CEDRICK on CKD  stage III  -Creatinine at baseline is 1.56, admission creatinine is 2.0  -Holding Lasix  -Resume eplerenone however as patient has hyperaldosteronism  -Dr. Handley consulted  -Checking urine electrolytes  -Hold ARB         MEDICATIONS ADMINISTERED IN LAST 1 DAY:           apixaban (Eliquis) tab 5 mg       Date Action Dose Route User    1/10/2024 1057 Given 5 mg Oral Andrzej Alvarez RN                 azithromycin (Zithromax) 500 mg in sodium chloride 0.9% 250mL IVPB premix       Date Action Dose Route User    1/9/2024 1457 New Bag 500 mg Intravenous Simba Irwin RN          heparin (Porcine) 1000 UNIT/ML injection - BOLUS IV 5,000 Units       Date Action Dose Route User    1/9/2024 2247 Given 5,000 Units Intravenous Belen Chand RN          heparin (Porcine) 81070 units/250mL infusion CONTINUOUS       Date Action Dose Route User    1/10/2024 0557 New Bag 1,200 Units/hr Intravenous Belen Chand RN          dexAMETHasone PF (Decadron) 10 MG/ML injection 10 mg       Date Action Dose Route User    1/9/2024 1718 Given 10 mg Intravenous Leo Mueller RN          eplerenone (Inspra) tab 50 mg       Date Action Dose Route User    1/10/2024 0930 Given 50 mg Oral Andrzej Alvarez RN    1/9/2024 2123 Given 50 mg Oral Belen Chand RN          finasteride (Proscar) tab 5 mg       Date Action Dose Route User    1/9/2024 2059 Given 5 mg Oral Belen Chand RN          guaiFENesin ER (Mucinex) 12 hr tab 600 mg       Date Action Dose Route User    1/10/2024 0930 Given 600 mg Oral Andrzej Alvarez RN    1/9/2024 2059 Given 600 mg Oral Belen Chand RN          heparin (Porcine) 5000 UNIT/ML injection 5,000 Units       Date Action Dose Route User    1/9/2024 2123 Given 5,000 Units Subcutaneous (Bilateral Lower Abdomen) Belen Chand RN          heparin (Porcine) 65532 units/250mL infusion INITIAL DOSE       Date Action Dose Route User    1/9/2024 2248 New Bag 1,000 Units/hr Intravenous Mannie  EMI Glover          ipratropium-albuterol (Duoneb) 0.5-2.5 (3) MG/3ML inhalation solution 3 mL       Date Action Dose Route User    1/10/2024 1129 Given 3 mL Nebulization Nikolas Carroll    1/10/2024 0736 Given 3 mL Nebulization Nikolas Carroll    1/10/2024 0343 Given 3 mL Nebulization Clary Burgos    1/9/2024 2317 Given 3 mL Nebulization Clary Burgos          metoprolol tartrate (Lopressor) tab 100 mg       Date Action Dose Route User    1/10/2024 0930 Given 100 mg Oral Andrzej Alvarez RN          metoprolol tartrate (Lopressor) tab 50 mg       Date Action Dose Route User    1/9/2024 2059 Given 50 mg Oral Belen Chand RN          remdesivir (Veklury) 200 mg in sodium chloride 0.9% 100 mL IVPB       Date Action Dose Route User    1/9/2024 1719 New Bag 200 mg Intravenous Leo Mueller RN          sodium chloride 0.9% infusion       Date Action Dose Route User    1/10/2024 0538 New Bag (none) Intravenous Belen Chand RN    1/9/2024 2057 New Bag (none) Intravenous Belen Chand RN          sodium chloride 0.9 % IV bolus 500 mL       Date Action Dose Route User    1/9/2024 1446 New Bag 500 mL Intravenous Simba Irwin RN            Vitals (last day)       Date/Time Temp Pulse Resp BP SpO2 Weight O2 Device O2 Flow Rate (L/min) Who    01/10/24 1206 97.7 °F (36.5 °C) -- -- -- -- -- -- -- WB    01/10/24 1137 -- -- -- -- -- -- None (Room air) -- JK    01/10/24 1026 -- -- -- -- 96 % -- -- 1 L/min EG    01/10/24 0933 97.7 °F (36.5 °C) -- -- 149/85 -- -- -- -- EG    01/10/24 0744 -- -- -- -- 95 % -- -- 3 L/min JK    01/10/24 0624 -- 79 24 156/92 95 % -- -- -- FK    01/10/24 0623 97.8 °F (36.6 °C) 91 24 150/91 95 % -- Nasal cannula 3 L/min FK    01/10/24 0343 -- 79 16 -- 95 % -- Nasal cannula 3 L/min CS    01/09/24 2340 98.1 °F (36.7 °C) 69 30 130/80 94 % -- Nasal cannula 3 L/min FK    01/09/24 2320 -- 91 16 -- 97 % -- -- -- CS    01/09/24 2029 -- -- -- -- -- -- Nasal cannula 3  L/min ER    01/09/24 1945 -- 82 -- 169/73 92 % -- -- -- LM    01/09/24 1944 -- 80 -- 169/80 94 % -- -- -- LM    01/09/24 1944 -- -- -- -- -- 287 lb 11.2 oz -- -- COLLEEN    01/09/24 1940 98.3 °F (36.8 °C) 82 18 195/83 92 % -- Nasal cannula 3 L/min LM    01/09/24 1900 -- 75 18 170/91 97 % -- Nasal cannula 3 L/min LL    01/09/24 1808 -- 60 24 153/71 99 % -- Nasal cannula 3 L/min LL    01/09/24 1730 -- 60 27 153/80 95 % -- Nasal cannula 3 L/min LL    01/09/24 1505 98 °F (36.7 °C) -- -- -- -- -- -- -- AL    01/09/24 1500 -- 56 28 166/87 96 % -- Nasal cannula -- AL    01/09/24 1445 -- 55 18 137/68 99 % -- Nasal cannula 3 L/min AL    01/09/24 1412 -- 53 15 -- 96 % -- Nasal cannula 3 L/min EO    01/09/24 1349 -- -- -- -- -- -- Nasal cannula 3 L/min AL    01/09/24 1323 -- -- -- -- -- 280 lb -- -- AL    01/09/24 1322 -- 60 22 155/78 95 % -- Nasal cannula 3 L/min AL          CIWA Scores (since admission)       None

## 2024-01-10 NOTE — CONSULTS
Cardiology Consultation Note      Wagner Sheridan Patient Status:  Inpatient    1945 MRN [id number]   Location [facility] 5NW-A Attending Terrell Guzmán MD   Hosp Day # 1 PCP Edil Roy MD     Reason for consultation:  New onset AFIB    Impression:  AFIB  Hypoxic respiratory failure  PNA  + COVID  HTN 2/2 hyperaldo  CEDRICK on CKD  HLD    Plan:  AFIB in the setting of respiratory compromise. [id number] 3. Would recommend eliquis 5 mg BID. If AFIB is transient in the setting of acute illness can consider stopping AC  Already on metoprolol and rates are well controlled  Normal Echo and stress test in . Repeat echo today  Continue amlodipine 5 mg, eplerenone  Renal consulted      History of Present Illness:  Wagner Sheridan is a 78 year old male who presented to [facility] on 2024.    This is a patient of my partner: Dr. Billy.    The patient presented with MORAES on admission. Found to have COVID + and evidence of PNA.    1 week prior was started on po antibiotics for PNA but continued to feel dyspnea    CXR shows worsening of PNA. Was hypoxic to 80's in ED with slightly elevaed creatinine     Cardiology consultation was requested.    Medications:  Current Facility-Administered Medications   Medication Dose Route Frequency    sodium chloride 0.9% infusion   Intravenous Continuous    sodium chloride 0.9% infusion   Intravenous Continuous    acetaminophen (Tylenol Extra Strength) tab 500 mg  500 mg Oral Q4H PRN    melatonin tab 3 mg  3 mg Oral Nightly PRN    polyethylene glycol (PEG 3350) (Miralax) 17 g oral packet 17 g  17 g Oral Daily PRN    sennosides (Senokot) tab 17.2 mg  17.2 mg Oral Nightly PRN    bisacodyl (Dulcolax) 10 MG rectal suppository 10 mg  10 mg Rectal Daily PRN    guaiFENesin ER (Mucinex) 12 hr tab 600 mg  600 mg Oral BID    ondansetron (Zofran) 4 MG/2ML injection 4 mg  4 mg Intravenous Q6H PRN    metoclopramide (Reglan) 5 mg/mL injection 5 mg  5 mg Intravenous Q8H PRN    benzonatate  (Tessalon) cap 200 mg  200 mg Oral TID PRN    azithromycin (Zithromax) tab 500 mg  500 mg Oral Daily    ipratropium-albuterol (Duoneb) 0.5-2.5 (3) MG/3ML inhalation solution 3 mL  3 mL Nebulization 6 times per day    metoprolol tartrate (Lopressor) tab 100 mg  100 mg Oral QAM    metoprolol tartrate (Lopressor) tab 50 mg  50 mg Oral QPM    amLODIPine (Norvasc) tab 5 mg  5 mg Oral BID    eplerenone (Inspra) tab 50 mg  50 mg Oral BID    cefTRIAXone (Rocephin) 2 g in D5W 100 mL IVPB-ADD  2 g Intravenous Q24H    atorvastatin (Lipitor) tab 10 mg  10 mg Oral Nightly    finasteride (Proscar) tab 5 mg  5 mg Oral Nightly    heparin (Porcine) 18239 units/250mL infusion CONTINUOUS  200-3,000 Units/hr Intravenous Continuous       Past Medical History:   Diagnosis Date    BPH     Encounter for incision and drainage procedure 07/02/2021    High blood pressure     High cholesterol     Hyperaldosteronism (HCC)     Osteoarthrosis, unspecified whether generalized or localized, unspecified site     PMR (polymyalgia rheumatica) (HCC) 2012    off corticosteroids since 2013    Retention of urine        Past Surgical History:   Procedure Laterality Date    KNEE SURGERY      OTHER SURGICAL HISTORY  2/27/09    [facility], Dr. Tavera    OTHER SURGICAL HISTORY  9/4/09    flow  Dr. Tavera    OTHER SURGICAL HISTORY  1/4/11    PNB - Dr. Tavera    OTHER SURGICAL HISTORY  1/17/13    PVP - Dr. Tavera    OTHER SURGICAL HISTORY  2/3/16     [facility]     OTHER SURGICAL HISTORY  08/05/2020    cysto- Dr. Tavera    SKIN SURGERY  10/01/2019    MMS/R nasal ala /BCC done by [facility]    TONSILLECTOMY      TOTAL KNEE REPLACEMENT Right 02/25/2015    Right total knee replacement 02/25/2015    TOTAL KNEE REPLACEMENT Left 05/11/2015     Left knee total replacement 05/11/2015        Family History  There is no family history of sudden cardiac death.    Social History   reports that he has quit smoking. His smoking use included cigarettes. He has a 2.5 pack-year smoking  history. He has never used smokeless tobacco. He reports current alcohol use. He reports that he does not use drugs.     Allergies  Allergies   Allergen Reactions    Clindamycin HIVES    Penicillins HIVES    Amoxicillin HIVES    Benazepril Coughing    Wasp Venom RESPIRATORY FAILURE    Wasp Venom Protein UNKNOWN         Review of Systems:  Constitutional: negative for fevers  Eyes: negative for visual disturbance  Ears, nose, mouth, throat, and face: negative for epistaxis  Respiratory: negative for dyspnea on exertion  Cardiovascular: negative for chest pain  Gastrointestinal: negative for melena  Genitourinary:negative for hematuria  Hematologic/lymphatic: negative for bleeding  Musculoskeletal:negative for myalgias  Neurological: negative for dizziness and headaches  Endocrine: negative for temperature intolerance      Physical Exam:  Blood pressure (!) 156/92, pulse 79, temperature 97.8 °F (36.6 °C), temperature source Oral, resp. rate 24, height 6' (1.829 m), weight 287 lb 11.2 oz (130.5 kg), SpO2 95%.  Temp (24hrs), Av.1 °F (36.7 °C), Min:97.8 °F (36.6 °C), Max:98.3 °F (36.8 °C)    Wt Readings from Last 3 Encounters:   24 287 lb 11.2 oz (130.5 kg)   10/06/22 280 lb (127 kg)   22 270 lb (122.5 kg)       General: Awake and alert; in no acute distress  HEENT: Extraocular movements are intact; sclerae are anicteric; scalp is atrauamatic; no thyromegaly  Neck: Supple; no JVD; no carotid bruits  Cardiac: Regular rate and regular rhythm; no murmurs/rubs/gallops are appreciated; PMI is non-displaced; there is no evidence of a sternal heave  Lungs: Clear to auscultation bilaterally; no accessory muscle use is noted  Abdomen: Soft, non-tender; bowel sounds are normoactive; no hepatosplenomegaly  Extremities: No clubbing or cyanosis; moves all 4 extremities normally  Psychiatric: Normal mood and affect; answers questions appropriately  Dermatologic: No rashes; normal skin turgor    Diagnostic  testing:    EKG: AFIB    Labs:   Lab Results   Component Value Date    PT 13.6 12/05/2013    INR 1.0 04/28/2015    INR 1.0 02/10/2015        Lab Results   Component Value Date    WBC 8.3 01/10/2024    HGB 13.0 01/10/2024    HCT 40.6 01/10/2024    .0 01/10/2024    CREATSERUM 1.76 01/10/2024    BUN 48 01/10/2024     01/10/2024    K 4.6 01/10/2024     01/10/2024    CO2 22.0 01/10/2024     01/10/2024    CA 9.7 01/10/2024    ALB 3.6 01/09/2024    ALKPHO 78 01/09/2024    BILT 1.0 01/09/2024    TP 6.7 01/09/2024    AST 24 01/09/2024    ALT 85 01/09/2024    PTT 44.4 01/10/2024         Thank you for allowing our practice to participate in the care of your patient. Please do not hesitate to contact me if you have any questions.    Flavio Wyatt MD

## 2024-01-11 VITALS
OXYGEN SATURATION: 95 % | HEART RATE: 54 BPM | BODY MASS INDEX: 38.97 KG/M2 | HEIGHT: 72 IN | TEMPERATURE: 98 F | WEIGHT: 287.69 LBS | SYSTOLIC BLOOD PRESSURE: 104 MMHG | DIASTOLIC BLOOD PRESSURE: 83 MMHG | RESPIRATION RATE: 27 BRPM

## 2024-01-11 PROBLEM — U07.1 COVID-19: Status: RESOLVED | Noted: 2024-01-09 | Resolved: 2024-01-11

## 2024-01-11 PROBLEM — N17.9 AKI (ACUTE KIDNEY INJURY): Status: RESOLVED | Noted: 2024-01-09 | Resolved: 2024-01-11

## 2024-01-11 PROBLEM — N17.9 AKI (ACUTE KIDNEY INJURY) (HCC): Status: RESOLVED | Noted: 2024-01-09 | Resolved: 2024-01-11

## 2024-01-11 LAB
ANION GAP SERPL CALC-SCNC: 4 MMOL/L (ref 0–18)
BASOPHILS # BLD AUTO: 0.07 X10(3) UL (ref 0–0.2)
BASOPHILS NFR BLD AUTO: 0.7 %
BUN BLD-MCNC: 44 MG/DL (ref 9–23)
CALCIUM BLD-MCNC: 9.8 MG/DL (ref 8.5–10.1)
CHLORIDE SERPL-SCNC: 116 MMOL/L (ref 98–112)
CO2 SERPL-SCNC: 21 MMOL/L (ref 21–32)
CREAT BLD-MCNC: 1.64 MG/DL
EGFRCR SERPLBLD CKD-EPI 2021: 42 ML/MIN/1.73M2 (ref 60–?)
EOSINOPHIL # BLD AUTO: 0.35 X10(3) UL (ref 0–0.7)
EOSINOPHIL NFR BLD AUTO: 3.3 %
ERYTHROCYTE [DISTWIDTH] IN BLOOD BY AUTOMATED COUNT: 15.6 %
GLUCOSE BLD-MCNC: 88 MG/DL (ref 70–99)
HCT VFR BLD AUTO: 41.5 %
HGB BLD-MCNC: 13.6 G/DL
IMM GRANULOCYTES # BLD AUTO: 0.18 X10(3) UL (ref 0–1)
IMM GRANULOCYTES NFR BLD: 1.7 %
LYMPHOCYTES # BLD AUTO: 2.57 X10(3) UL (ref 1–4)
LYMPHOCYTES NFR BLD AUTO: 23.9 %
MCH RBC QN AUTO: 29.9 PG (ref 26–34)
MCHC RBC AUTO-ENTMCNC: 32.8 G/DL (ref 31–37)
MCV RBC AUTO: 91.2 FL
MONOCYTES # BLD AUTO: 1.07 X10(3) UL (ref 0.1–1)
MONOCYTES NFR BLD AUTO: 10 %
NEUTROPHILS # BLD AUTO: 6.51 X10 (3) UL (ref 1.5–7.7)
NEUTROPHILS # BLD AUTO: 6.51 X10(3) UL (ref 1.5–7.7)
NEUTROPHILS NFR BLD AUTO: 60.4 %
OSMOLALITY SERPL CALC.SUM OF ELEC: 303 MOSM/KG (ref 275–295)
PLATELET # BLD AUTO: 205 10(3)UL (ref 150–450)
POTASSIUM SERPL-SCNC: 4.7 MMOL/L (ref 3.5–5.1)
RBC # BLD AUTO: 4.55 X10(6)UL
SODIUM SERPL-SCNC: 141 MMOL/L (ref 136–145)
WBC # BLD AUTO: 10.8 X10(3) UL (ref 4–11)

## 2024-01-11 RX ORDER — CEFUROXIME AXETIL 500 MG/1
500 TABLET ORAL 2 TIMES DAILY
Qty: 10 TABLET | Refills: 0 | Status: SHIPPED | OUTPATIENT
Start: 2024-01-12 | End: 2024-01-29

## 2024-01-11 RX ORDER — ATORVASTATIN CALCIUM 10 MG/1
10 TABLET, FILM COATED ORAL NIGHTLY
Qty: 30 TABLET | Refills: 0 | Status: SHIPPED | OUTPATIENT
Start: 2024-01-11 | End: 2024-01-29

## 2024-01-11 RX ORDER — FUROSEMIDE 40 MG/1
40 TABLET ORAL DAILY
Qty: 30 TABLET | Refills: 0 | Status: SHIPPED | OUTPATIENT
Start: 2024-01-11

## 2024-01-11 RX ORDER — AZITHROMYCIN 250 MG/1
TABLET, FILM COATED ORAL
Qty: 3 TABLET | Refills: 0 | Status: SHIPPED | OUTPATIENT
Start: 2024-01-11 | End: 2024-01-11

## 2024-01-11 RX ORDER — AMLODIPINE BESYLATE 5 MG/1
5 TABLET ORAL 2 TIMES DAILY
Qty: 60 TABLET | Refills: 0 | Status: SHIPPED | OUTPATIENT
Start: 2024-01-11

## 2024-01-11 RX ORDER — CEFUROXIME AXETIL 500 MG/1
500 TABLET ORAL 2 TIMES DAILY
Qty: 10 TABLET | Refills: 0 | Status: SHIPPED | OUTPATIENT
Start: 2024-01-12 | End: 2024-01-11

## 2024-01-11 RX ORDER — EPLERENONE 50 MG/1
50 TABLET, FILM COATED ORAL 2 TIMES DAILY
Qty: 60 TABLET | Refills: 0 | Status: SHIPPED | OUTPATIENT
Start: 2024-01-11

## 2024-01-11 RX ORDER — LOSARTAN POTASSIUM 100 MG/1
100 TABLET ORAL DAILY
Status: DISCONTINUED | OUTPATIENT
Start: 2024-01-11 | End: 2024-01-11

## 2024-01-11 RX ORDER — FUROSEMIDE 40 MG/1
40 TABLET ORAL DAILY
Status: DISCONTINUED | OUTPATIENT
Start: 2024-01-11 | End: 2024-01-11

## 2024-01-11 RX ORDER — AZITHROMYCIN 250 MG/1
TABLET, FILM COATED ORAL
Qty: 3 TABLET | Refills: 0 | Status: SHIPPED | OUTPATIENT
Start: 2024-01-11 | End: 2024-01-29

## 2024-01-11 NOTE — DISCHARGE SUMMARY
[facility]ist Discharge Summary   Patient ID:  Wagner Sheridan  [id number]  78 year old  12/24/1945    Admit date: 1/9/2024  Discharge date: 1/11/2024    Primary Care Physician: Edil Roy MD   Attending Physician: Terrell Guzmán MD   Consults:   Consultants         Provider   Role Specialty     Dandre Claudio MD  Consulting Physician CARDIOLOGY     Ronny Stewart DO  Consulting Physician Internal Medicine     Britta Persaud MD  Consulting Physician NEPHROLOGY     Adal Gonzalez IV, MD  Consulting Physician PULMONARY DISEASES     Clemente Harrison MD  Consulting Physician HOSPITALIST            Hospital Discharge Diagnoses:   Community acquired pneumonia, unspecified laterality  ----See D/C Summary for further Dx    Risk of Readmission Lace+ Score: 46  59-90 High Risk  29-58 Medium Risk  0-28   Low Risk.    TCM Follow-Up Recommendation:  LACE 29-58: Moderate Risk of readmission after discharge from the hospital.    Please note that only I[facility] DMG and EMG patients enrolled in the Medicare ACO, [employer] ACO and [employer] HMOs will be handled by the [facility] Care Management team.  For all other patients, please follow usual protocol for discharge care transition.    Reason for admission  Per H/P Dated 1/9/23 by Dr Guzmán  Patient is a 78-year-old male with significant past medical history of hypertension, CKD stage III hyperaldosteronism, hyperlipidemia presented with worsening dyspnea on exertion x 2 weeks.  Patient states that he was COVID-positive around Thanksgiving, completed Paxlovid and felt better for couple weeks however 2 weeks ago he started to feel short of breath, about a week ago he went to the immediate care was found to have pneumonia started on doxycycline as well as prednisone, continued to feel dyspneic despite the antibiotics and steroids  and saw his PCP today, repeated chest x-ray today that showed worsening of pneumonia and was sent to the emergency room.  Patient at home does not  wear any oxygen, no underlying lung disease-patient is compliant with medications no new weight gain lower extremity edema no chest pain, cough is returned and is mostly dry no fevers or chills.  Review of system otherwise is completely negative        In the emergency room, patient was hypoxic to 86% on room air placed on 3 L and saturating 95%, had physical dyspnea on on rest, hypertensive, afebrile, creatinine was slightly elevated 2.06 patient had a positive COVID test in the [facility] center last week, had a leukocytosis of 13.3 but patient has been on steroids he had a chest x-ray done that showed significant progressive multifocal patchy and hazy airspace opacities throughout the right greater than left lung  Mild pulmonary venous congestion     EKG showed sinus bradycardia with first-degree AV block     Patient was given IV antibiotics, admitted    (see HPI on HP for further detail)    Hospital Course:     78-year-old male with significant past medical history of hypertension, CKD stage III hyperaldosteronism, hyperlipidemia presented with worsening dyspnea on exertion x 2 weeks     # Acute hypoxic respiratory failure secondary to community-acquired pneumonia  # Superimposed on COVID-pneumonia  -COVID-positive about a week ago at the [facility] care center, presented with hypoxia and increased work of breathing  -Given remdesivir, started on steroids DC steroids as well as remdesivir  -IV Rocephin while inpatient, going home with azithromycin as well as cefuroxime  -RSV, and expanded flu panel negative, Legionella strep pneumo and sputum cultures negative  -Okay per pulmonology and cardiology to be discharged  -Weaned off oxygen now currently on room air  - proBNP-->2417  -1/10 ECHO-->PAP markedly increased (71), mod to sever mitral regurgitation, EF 65-70, will follow up with cards as an outpatient, discussed with Dr Frausto     New onset AFIB  -rate controlled  -start eliquis   -on metoprolol    -Echo as above      New moderate to severe mitral regurgitation and pulmonary hypertension  -Likely responsible for hypoxemia, discharged on Lasix  -Will need to be assessed by cardiology for STAN possible MitraClip  -Follow-up for pulmonary hypertension     # CEDRICK on CKD stage III  -Resolved  -Creatinine at baseline is 1.56, admission creatinine is 2.0  -cr down trending  -PO lasix 40 mg daily  -Resume eplerenone however as patient has hyperaldosteronism  -Dr. Handley consulted  -Okay to resume ARB per Dr. Persaud     # Uncontrolled hypertension secondary to hyperaldosteronism  -Resume eplerenone, resume other antihypertensives  -Resume ARB     # Hyperaldosteronism  -eplerenone     # Hyperlipidemia  -statin     # Gout     GOC: Full code     MA/ACO Reach  -Re- Entry:no  -Consults:renal, pulm, cards  -Discharge Needs:none  -Appointments: PCP        EXAM: NAD  GENERAL: no apparent distress, overweight  NEURO: A/A Ox3  RESP: non labored, CTA  CARDIO: Regular, no murmur  ABD: soft, NT, ND, BS+  EXTREMITIES: no edema, no calf tenderness    Operative Procedures:   Radiology:   No results found.    Discharge Instructions     Medication List        ASK your doctor about these medications      amLODIPine 5 MG Tabs  Commonly known as: Norvasc  Take 1 tablet (5 mg total) by mouth once daily.  Ask about: Which instructions should I use?     celecoxib 200 MG Caps  Commonly known as: CeleBREX  TAKE 1 CAPSULE DAILY     EPINEPHrine 0.3 MG/0.3ML Soaj  Commonly known as: EpiPen 2-Koko  USE AS DIRECTED     eplerenone 50 MG Tabs  Commonly known as: Inspra  Take 2 tablets (100 mg total) by mouth daily.  Ask about: Which instructions should I use?     finasteride 5 MG Tabs  Commonly known as: Proscar     losartan 100 MG Tabs  Commonly known as: Cozaar  Take 1 tablet (100 mg total) by mouth daily.     Lovastatin 40 MG Tabs  Take 1 tablet (40 mg total) by mouth daily with dinner.     metoprolol tartrate 50 MG Tabs  Commonly known as:  Lopressor  Two tabs in morning, one in evening     torsemide 20 MG Tabs  Commonly known as: Demadex  Take 1 tablet (20 mg total) by mouth once daily.                Activity: activity as tolerated  Diet: regular diet  Wound Care: none needed  Code Status: Prior    Important follow up:      -PCP in [x] within 3 days [] within 14 days [] other   Disposition: home  Discharged Condition: good    =========================================================================================================================    I Reconciled current and discharge medications on day of discharge  Patient had opportunity to ask questions and state understand and agree with therapeutic plan as outlined    Total Time Coordinating Care: greater than 30 minutes  Is this a shared or split note between Advanced Practice Provider and Physician? Yes    Note: This chart was prepared using voice recognition software and may contain unintended word substitution errors.     Ronny MARIE  [facility] Hospitalist Team   1/11/2024      SEE ATTENDING NOTE BELOW      Seen and evaluated also added to the note above, patient is stable to be discharged has new onset A-fib, moderate to severe mitral regurgitation pulmonary hypertension will need STAN on discharge cardiology follow-up    Also being discharged on antibiotics for community-acquired pneumonia  Steroids and remdesivir has been stopped

## 2024-01-11 NOTE — PROGRESS NOTES
Pulmonary Progress Note      NAME: Wagner Sheridan - ROOM: [address] [place] [address]-A - MRN: [id number] - Age: 78 year old - : 1945    Assessment/Plan:    Bacterial PNA- failed OP doxy treatment  - urine legionella/strep- negative  - has various allergies- started on ceftriaxone/azithromycin and tolerating well  - follow-up cultures  Proph- DOAC  Dispo- full code  - will follow  - may discharge from my end.  Will sign off.  Can follow up with PCP after discharge      Elvin Gregory M.D.  Pulmonary and Critical Care Medicine  [facility]      Subjective:  No acute events overnight. He feels great and is on RA now    Medications:   losartan  100 mg Oral Daily    apixaban  5 mg Oral BID    ipratropium-albuterol  3 mL Nebulization QID    guaiFENesin ER  600 mg Oral BID    azithromycin  500 mg Oral Daily    metoprolol tartrate  100 mg Oral QAM    metoprolol tartrate  50 mg Oral QPM    amLODIPine  5 mg Oral BID    eplerenone  50 mg Oral BID    cefTRIAXone  2 g Intravenous Q24H    atorvastatin  10 mg Oral Nightly    finasteride  5 mg Oral Nightly   No intake or output data in the 24 hours ending 24 1157    Physical Exam:  /83   Pulse 54   Temp 97.6 °F (36.4 °C) (Oral)   Resp (!) 27   Ht 6' (1.829 m)   Wt 287 lb 11.2 oz (130.5 kg)   SpO2 95%   BMI 39.02 kg/m²   Physical Exam:   General: alert, cooperative, no respiratory distress.   HEENT: Normocephalic atraumatic.Lips, mucosa, and tongue normal.  No thrush noted.   Neck: supple without mass   Lungs: clear   Chest wall: No tenderness or deformity.   Heart: Regular rate and rhythm, normal S1S2, no murmur.   Abdomen: soft, non-tender, non-distended, positive BS.   Extremity: no edema   Skin: no new rash   Neuro: normal    Recent Labs   Lab 24  0932   RBC 4.55   HGB 13.6   HCT 41.5   MCV 91.2   MCH 29.9   MCHC 32.8   RDW 15.6   NEPRELIM 6.51   WBC 10.8   .0     Recent Labs   Lab 24  1326 24  2245 01/10/24  0450 24  0932   *  182* 132* 88   BUN 51* 49* 48* 44*   CREATSERUM 2.06* 2.03* 1.76* 1.64*   CA 9.6 9.4 9.7 9.8   ALB 3.6  --   --   --     143 143 141   K 4.1 4.1 4.6 4.7    114* 117* 116*   CO2 23.0 24.0 22.0 21.0   ALKPHO 78  --   --   --    AST 24  --   --   --    ALT 85*  --   --   --    BILT 1.0  --   --   --    TP 6.7  --   --   --        Imaging: I independently visualized all relevant chest imaging in PACS, agree with radiology interpretation except where noted.

## 2024-01-11 NOTE — PROGRESS NOTES
[facility] Cardiology Progress Note        Wagner Sheridan Patient Status:  Inpatient    1945 MRN [id number]   Location [facility] 5NW-A Attending Terrell Guzmán MD   Hosp Day # 2 PCP Edil Roy MD     Subjective:  The patient denies  chest pain   Some dyspnea but marked improvement compared to admission    Medications:   losartan  100 mg Oral Daily    apixaban  5 mg Oral BID    ipratropium-albuterol  3 mL Nebulization QID    guaiFENesin ER  600 mg Oral BID    azithromycin  500 mg Oral Daily    metoprolol tartrate  100 mg Oral QAM    metoprolol tartrate  50 mg Oral QPM    amLODIPine  5 mg Oral BID    eplerenone  50 mg Oral BID    cefTRIAXone  2 g Intravenous Q24H    atorvastatin  10 mg Oral Nightly    finasteride  5 mg Oral Nightly       Continuous Infusions:        Allergies:  Allergies   Allergen Reactions    Clindamycin HIVES    Penicillins HIVES    Amoxicillin HIVES    Benazepril Coughing    Wasp Venom RESPIRATORY FAILURE    Wasp Venom Protein UNKNOWN         Objective:        Intake/Output:    No intake or output data in the 24 hours ending 24 1316  Wt Readings from Last 3 Encounters:   24 287 lb 11.2 oz (130.5 kg)   10/06/22 280 lb (127 kg)   22 270 lb (122.5 kg)       Physical Exam:        Vitals:    24 0328 24 0609 24 0849 24 1152   BP:  147/81 (!) 160/98 104/83   BP Location:  Left arm Left arm    Pulse:  81 78 54   Resp:  19 17 (!)    Temp:  97.5 °F (36.4 °C) 97.6 °F (36.4 °C)    TempSrc:  Oral Oral    SpO2: (!) 87% 95% 100% 95%   Weight:       Height:           Temp:  [97.5 °F (36.4 °C)-98.8 °F (37.1 °C)] 97.6 °F (36.4 °C)  Pulse:  [54-81] 54  Resp:  [17-27] 27  BP: (104-160)/(81-98) 104/83  SpO2:  [87 %-100 %] 95 %      Temp: 97.6 °F (36.4 °C)  Pulse: 54  Resp: 27  BP: 104/83  General:  Appears comfortable  HEENT: No focal deficits.  Neck: No JVD, carotids 2+ no bruits.  Cardiac: Irregular S1S2.  No S3, S4, rub, click.    systolic   murmur.  Lungs: diminished at bases  Abdomen: Soft, non-tender.   Extremities: No LE edema.  No clubbing or cyanosis.    Neurologic: Alert and oriented, normal affect.  Skin: Warm and dry.           LABS:      HEM:  Recent Labs   Lab 01/09/24  1326 01/10/24  0450 01/11/24  0932   WBC 13.3* 8.3 10.8   HGB 13.3 13.0 13.6   HCT 40.3 40.6 41.5   .0 201.0 205.0       Chem:  Recent Labs   Lab 01/09/24  1326 01/09/24  2245 01/10/24  0450 01/11/24  0932    143 143 141   K 4.1 4.1 4.6 4.7    114* 117* 116*   CO2 23.0 24.0 22.0 21.0   BUN 51* 49* 48* 44*   CREATSERUM 2.06* 2.03* 1.76* 1.64*   CA 9.6 9.4 9.7 9.8   * 182* 132* 88       Recent Labs   Lab 01/09/24  1326   ALT 85*   AST 24   ALB 3.6       Recent Labs   Lab 01/09/24  2245 01/10/24  0450 01/10/24  1219   PTT 28.4 44.4* 21.5*           Lab Results   Component Value Date    TROP < 0.046 12/27/2012         Invalid input(s): \"PBNPML\"                       Diagnostics:   Telemetry: Atrial Fibrillation    EKG, 1/11/2024,         Echo: 1/10/23:    1. Left ventricle: The cavity size was normal. Wall thickness was normal.      Systolic function was normal. The estimated ejection fraction was 65-70%.      Unable to assess LV diastolic function.   2. Left atrium: The atrium was moderately dilated.   3. Right atrium: The atrium was mildly dilated.   4. Aortic valve: There was mild regurgitation.   5. Aortic root: The aortic root was 4.2cm diameter.   6. Ascending aorta: The ascending aorta was 3.7cm diameter.   7. Mitral valve: There was an eccentric moderate to severe regurgitation jet      directed anteriorally   8. Tricuspid valve: There was mild-moderate regurgitation.   9. Pulmonary arteries: Systolic pressure was markedly increased, estimated      to be 71mm Hg.   Impressions:  Due to the eccentric nature of mitral regurgitation, the   severity of the regurgitation is likely underestimated   *                   Impression:      Atrial  Fibrillation . Controlled HR. May reflect PNA or possible the MR  Hypoxic respiratory failure. Improved with Abx  PNA  + COVID  HTN 2/2 hyperaldo  CEDRICK on CKD. Back to baseline  Dyslipidemia      Plan:    Po lasix 40 mg daily  Eliquis for Atrial Fibrillation  Continue losartan  Cardio f/u next week  Will arrange op STAN thereafter to better assess         Boy Hernandes MD  1/11/2024  1:16 PM

## 2024-01-11 NOTE — PLAN OF CARE
1/11 a/ox4, denies any pain nor sob, on RA at this time. A-fib on tele, up ad carloz, on azithromycin po and rocephin iv . Still with on and off cough. Family at bedside. Call light within reach.    Problem: Patient/Family Goals  Goal: Patient/Family Long Term Goal  Description: Patient's Long Term Goal: Discharge    Interventions:  - Consults  - See additional Care Plan goals for specific interventions  Outcome: Progressing  Goal: Patient/Family Short Term Goal  Description: Patient's Short Term Goal: Manage cough, get some rest  1/10: wean O2   1/10 noc: get some rest  Interventions:   - meds, IS   - Cluster care  PRN meds  - See additional Care Plan goals for specific interventions  Outcome: Progressing     Problem: CARDIOVASCULAR - ADULT  Goal: Absence of cardiac arrhythmias or at baseline  Description: INTERVENTIONS:  - Continuous cardiac monitoring, monitor vital signs, obtain 12 lead EKG if indicated  - Evaluate effectiveness of antiarrhythmic and heart rate control medications as ordered  - Initiate emergency measures for life threatening arrhythmias  - Monitor electrolytes and administer replacement therapy as ordered  Outcome: Progressing     Problem: RESPIRATORY - ADULT  Goal: Achieves optimal ventilation and oxygenation  Description: INTERVENTIONS:  - Assess for changes in respiratory status  - Assess for changes in mentation and behavior  - Position to facilitate oxygenation and minimize respiratory effort  - Oxygen supplementation based on oxygen saturation or ABGs  - Provide Smoking Cessation handout, if applicable  - Encourage broncho-pulmonary hygiene including cough, deep breathe, Incentive Spirometry  - Assess the need for suctioning and perform as needed  - Assess and instruct to report SOB or any respiratory difficulty  - Respiratory Therapy support as indicated  - Manage/alleviate anxiety  - Monitor for signs/symptoms of CO2 retention  Outcome: Progressing

## 2024-01-11 NOTE — PLAN OF CARE
Pt is A&Ox4. VSS, afebrile and denies any pain. SPO2 maintained on 2L overnight. Continuous pulse ox. Denies any SOB, cough. Tele/AFIb. Eliquis. Reg diet. Denies any n/v/d. Voids. Up with Standby. IV Zithromax, Rocephin. Safety precautions in place. Pt is updated with plan of care. No further needs at this time.    Problem: Patient/Family Goals  Goal: Patient/Family Long Term Goal  Description: Patient's Long Term Goal: Discharge    Interventions:  - Consults  - See additional Care Plan goals for specific interventions  Outcome: Progressing  Goal: Patient/Family Short Term Goal  Description: Patient's Short Term Goal: Manage cough, get some rest  1/10: wean O2   1/10 noc: get some rest  Interventions:   - meds, IS   - Cluster care  PRN meds  - See additional Care Plan goals for specific interventions  Outcome: Progressing     Problem: CARDIOVASCULAR - ADULT  Goal: Absence of cardiac arrhythmias or at baseline  Description: INTERVENTIONS:  - Continuous cardiac monitoring, monitor vital signs, obtain 12 lead EKG if indicated  - Evaluate effectiveness of antiarrhythmic and heart rate control medications as ordered  - Initiate emergency measures for life threatening arrhythmias  - Monitor electrolytes and administer replacement therapy as ordered  Outcome: Progressing     Problem: RESPIRATORY - ADULT  Goal: Achieves optimal ventilation and oxygenation  Description: INTERVENTIONS:  - Assess for changes in respiratory status  - Assess for changes in mentation and behavior  - Position to facilitate oxygenation and minimize respiratory effort  - Oxygen supplementation based on oxygen saturation or ABGs  - Provide Smoking Cessation handout, if applicable  - Encourage broncho-pulmonary hygiene including cough, deep breathe, Incentive Spirometry  - Assess the need for suctioning and perform as needed  - Assess and instruct to report SOB or any respiratory difficulty  - Respiratory Therapy support as indicated  -  Manage/alleviate anxiety  - Monitor for signs/symptoms of CO2 retention  Outcome: Progressing

## 2024-01-12 NOTE — PAYOR COMM NOTE
--------------  DISCHARGE REVIEW    Payor: [employer]  Subscriber #:  [id number]  Authorization Number: [id number]    Admit date: 1/9/24  Admit time:   7:27 PM  Discharge Date: 1/11/2024  5:45 PM     Admitting Physician: Cholo Patel MD  Attending Physician:  No att. providers found  Primary Care Physician: Edil Roy MD          Discharge Summary Notes        Discharge Summary signed by Terrell Guzmán MD at 1/11/2024  3:48 PM       Author: Terrell Guzmán MD Specialty: HOSPITALIST Author Type: Physician    Filed: 1/11/2024  3:48 PM Date of Service: 1/11/2024 11:37 AM Status: Signed    : Terrell Guzmán MD (Physician)    Related Notes: Original Note by Ronny Edwards APRN (Nurse Practitioner) filed at 1/11/2024  2:50 PM           [facility] Hospitalist Discharge Summary   Patient ID:  Wagner Sheridan  [id number]  78 year old  12/24/1945    Admit date: 1/9/2024  Discharge date: 1/11/2024    Primary Care Physician: Edil Roy MD   Attending Physician: Terrell Guzmán MD   Consults:   Consultants         Provider   Role Specialty     Dandre Claudio MD  Consulting Physician CARDIOLOGY     Ronny Stewart DO  Consulting Physician Internal Medicine     Britta Persaud MD  Consulting Physician NEPHROLOGY     Adal Gonzalez IV, MD  Consulting Physician PULMONARY DISEASES     Clemente Harrison MD  Consulting Physician HOSPITALIST            Hospital Discharge Diagnoses:   Community acquired pneumonia, unspecified laterality  ----See D/C Summary for further Dx    Risk of Readmission Lace+ Score: 46  59-90 High Risk  29-58 Medium Risk  0-28   Low Risk.    TCM Follow-Up Recommendation:  LACE 29-58: Moderate Risk of readmission after discharge from the hospital.    Please note that only [facility] DMG and EMG patients enrolled in the Medicare ACO, BCBS ACO and BCBS HMOs will be handled by the [facility] Care Management team.  For all other patients, please follow usual protocol for  discharge care transition.    Reason for admission  Per H/P Dated 1/9/23 by Dr Guzmán  Patient is a 78-year-old male with significant past medical history of hypertension, CKD stage III hyperaldosteronism, hyperlipidemia presented with worsening dyspnea on exertion x 2 weeks.  Patient states that he was COVID-positive around Thanksgiving, completed Paxlovid and felt better for couple weeks however 2 weeks ago he started to feel short of breath, about a week ago he went to the immediate care was found to have pneumonia started on doxycycline as well as prednisone, continued to feel dyspneic despite the antibiotics and steroids  and saw his PCP today, repeated chest x-ray today that showed worsening of pneumonia and was sent to the emergency room.  Patient at home does not wear any oxygen, no underlying lung disease-patient is compliant with medications no new weight gain lower extremity edema no chest pain, cough is returned and is mostly dry no fevers or chills.  Review of system otherwise is completely negative        In the emergency room, patient was hypoxic to 86% on room air placed on 3 L and saturating 95%, had physical dyspnea on on rest, hypertensive, afebrile, creatinine was slightly elevated 2.06 patient had a positive COVID test in the immediate care center last week, had a leukocytosis of 13.3 but patient has been on steroids he had a chest x-ray done that showed significant progressive multifocal patchy and hazy airspace opacities throughout the right greater than left lung  Mild pulmonary venous congestion     EKG showed sinus bradycardia with first-degree AV block     Patient was given IV antibiotics, admitted    (see HPI on HP for further detail)    Hospital Course:     78-year-old male with significant past medical history of hypertension, CKD stage III hyperaldosteronism, hyperlipidemia presented with worsening dyspnea on exertion x 2 weeks     # Acute hypoxic respiratory failure secondary to  community-acquired pneumonia  # Superimposed on COVID-pneumonia  -COVID-positive about a week ago at the [facility] center, presented with hypoxia and increased work of breathing  -Given remdesivir, started on steroids DC steroids as well as remdesivir  -IV Rocephin while inpatient, going home with azithromycin as well as cefuroxime  -RSV, and expanded flu panel negative, Legionella strep pneumo and sputum cultures negative  -Okay per pulmonology and cardiology to be discharged  -Weaned off oxygen now currently on room air  - proBNP-->2417  -1/10 ECHO-->PAP markedly increased (71), mod to sever mitral regurgitation, EF 65-70, will follow up with cards as an outpatient, discussed with Dr Frausto     New onset AFIB  -rate controlled  -start eliquis   -on metoprolol   -Echo as above      New moderate to severe mitral regurgitation and pulmonary hypertension  -Likely responsible for hypoxemia, discharged on Lasix  -Will need to be assessed by cardiology for STAN possible MitraClip  -Follow-up for pulmonary hypertension     # CEDRICK on CKD stage III  -Resolved  -Creatinine at baseline is 1.56, admission creatinine is 2.0  -cr down trending  -PO lasix 40 mg daily  -Resume eplerenone however as patient has hyperaldosteronism  -Dr. Handley consulted  -Okay to resume ARB per Dr. Persaud     # Uncontrolled hypertension secondary to hyperaldosteronism  -Resume eplerenone, resume other antihypertensives  -Resume ARB     # Hyperaldosteronism  -eplerenone     # Hyperlipidemia  -statin     # Gout     GOC: Full code     MA/ACO Reach  -Re- Entry:no  -Consults:renal, pulm, cards  -Discharge Needs:none  -Appointments: PCP        EXAM: NAD  GENERAL: no apparent distress, overweight  NEURO: A/A Ox3  RESP: non labored, CTA  CARDIO: Regular, no murmur  ABD: soft, NT, ND, BS+  EXTREMITIES: no edema, no calf tenderness    Operative Procedures:   Radiology:   No results found.    Discharge Instructions     Medication List        ASK your  doctor about these medications      amLODIPine 5 MG Tabs  Commonly known as: Norvasc  Take 1 tablet (5 mg total) by mouth once daily.  Ask about: Which instructions should I use?     celecoxib 200 MG Caps  Commonly known as: CeleBREX  TAKE 1 CAPSULE DAILY     EPINEPHrine 0.3 MG/0.3ML Soaj  Commonly known as: EpiPen 2-Koko  USE AS DIRECTED     eplerenone 50 MG Tabs  Commonly known as: Inspra  Take 2 tablets (100 mg total) by mouth daily.  Ask about: Which instructions should I use?     finasteride 5 MG Tabs  Commonly known as: Proscar     losartan 100 MG Tabs  Commonly known as: Cozaar  Take 1 tablet (100 mg total) by mouth daily.     Lovastatin 40 MG Tabs  Take 1 tablet (40 mg total) by mouth daily with dinner.     metoprolol tartrate 50 MG Tabs  Commonly known as: Lopressor  Two tabs in morning, one in evening     torsemide 20 MG Tabs  Commonly known as: Demadex  Take 1 tablet (20 mg total) by mouth once daily.                Activity: activity as tolerated  Diet: regular diet  Wound Care: none needed  Code Status: Prior    Important follow up:      -PCP in [x] within 3 days [] within 14 days [] other   Disposition: home  Discharged Condition: good    =========================================================================================================================    I Reconciled current and discharge medications on day of discharge  Patient had opportunity to ask questions and state understand and agree with therapeutic plan as outlined    Total Time Coordinating Care: greater than 30 minutes  Is this a shared or split note between Advanced Practice Provider and Physician? Yes    Note: This chart was prepared using voice recognition software and may contain unintended word substitution errors.     Ronny MARIE  [facility] Hospitalist Team   1/11/2024      SEE ATTENDING NOTE BELOW      Seen and evaluated also added to the note above, patient is stable to be discharged has new onset A-fib,  moderate to severe mitral regurgitation pulmonary hypertension will need STAN on discharge cardiology follow-up    Also being discharged on antibiotics for community-acquired pneumonia  Steroids and remdesivir has been stopped        Electronically signed by Terrell Guzmán MD on 1/11/2024  3:48 PM         REVIEWER COMMENTS

## 2024-01-15 NOTE — CDS QUERY
CLINICAL DOCUMENTATION CLARIFICATION FORM    Dear Ronny MARIE or Dr Guzmán   Clinical information (provided below) includes documentation of a Covid-19 Infection.   BASED ON YOUR CLINICAL JUDGEMENT, PLEASE SELECT   THE MOST APPROPRIATE DIAGNOSIS:     (    ) Covid-19 is a current & active infection   (    ) Covid-19 is not a current infection, but the worsening dyspnea is a residual effect/sequelae of the Covid-19     (    ) Covid-19 is not an active infection (personal history of Covid-19 infection only), and the worsening dyspnea is unrelated to the prior Covid-19 infection   (    ) Other (Please comment) ______________________________________  ____________________________________________________________________________                                                    Documentation from the Medical Record:  Risk Factor Advanced age, CKD stage III hyperaldosteronism, Patient states that he was  COVID-positive around Thanksgiving, completed Paxlovid and felt better   Clinical Indicators   >ER patient reports positive covid test this morning at    >H&P- presented with worsening dyspnea on exertion x 2 weeks. Patient states that he was  COVID-positive around Thanksgiving, completed Paxlovid and felt better for couple weeks.  Patient was hypoxic to 86% on room air placed on 3 L and saturating 95%, had physical dyspnea on on rest,  had a positive COVID test in the immediate care center last week. Assessment/plan  Superimposed on COVID-pneumonia -COVID-positive about a week ago at the immediate care center, currently hypoxic with increased work of breathing -Given remdesivir, started on steroids  >01/09/24 Pulmonary- Acute hypoxic resp failure- due to acute bacterial PNA superimposed on COVID…COVID 19- was + 6 weeks ago at home. Now positive again. Given severity of  illness, I think we should treat empirically with remdesivir and decadron 6mg daily    01/11/24 cardiology- PNA  + COVID   Discharge summary  diagnosis -  Superimposed on COVID-pneumonia -COVID-positive about a week ago at the [facility], presented with hypoxia and increased work of breathing -Given remdesivir, started on steroids DC steroids as well as remdesivir    Treatment Pulmonary consult, remdesivir, steroids    For questions regarding this query, please contact Clinical Documentation :  Carol Anderson RN/BSN  [phone] cell                      Thank you!                                                           THIS FORM IS A PERMANENT PART OF THE MEDICAL RECORD

## 2024-01-19 NOTE — H&P
The patient was seen and examined.     There was no change in the patient's clinical status from the date of the office note, to the date of their procedure at [facility]. Thus, the progress note is up-to-date.    Oskar Luz MD, FACC  1/30/2024  9:59 AM

## 2024-01-29 RX ORDER — METOPROLOL SUCCINATE 50 MG/1
50 TABLET, EXTENDED RELEASE ORAL 2 TIMES DAILY
COMMUNITY

## 2024-01-29 RX ORDER — ATORVASTATIN CALCIUM 20 MG/1
20 TABLET, FILM COATED ORAL NIGHTLY
COMMUNITY

## 2024-01-29 NOTE — PAT NURSING NOTE
PreOp Instructions     You are scheduled for: a Cardiac Procedure     Date of Procedure: 01/30/24. Check in at 10:00 am     Diet Instructions: Do not eat or drink anything after midnight     Medications: Medications you are allowed to take can be taken with a sip of water the morning of your procedure. Hold furosemide     Driving After Procedure: If sedation is given, you WILL NOT be able to drive home. You will need a responsible adult  to drive you home.     Discharge Teaching: Your nurse will give you specific instructions before discharge, Most people can resume normal activities in 2-3 days, Any questions, please call the physician's office         parking is available starting at 6 am or park in the [place] parking garage at [facility]. Check in at the [facility] reception desk. Our  will be there to check you in for your procedure. Please bring your insurance cards and ID with you.

## 2024-01-30 ENCOUNTER — HOSPITAL ENCOUNTER (OUTPATIENT)
Dept: CV DIAGNOSTICS | Facility: HOSPITAL | Age: 79
Discharge: HOME | End: 2024-01-30
Payer: MEDICARE

## 2024-01-30 ENCOUNTER — HOSPITAL ENCOUNTER (OUTPATIENT)
Dept: INTERVENTIONAL RADIOLOGY/VASCULAR | Facility: HOSPITAL | Age: 79
Discharge: HOME | End: 2024-01-30
Admitting: INTERNAL MEDICINE
Payer: MEDICARE

## 2024-01-30 VITALS
HEART RATE: 70 BPM | RESPIRATION RATE: 16 BRPM | BODY MASS INDEX: 36.57 KG/M2 | DIASTOLIC BLOOD PRESSURE: 70 MMHG | HEIGHT: 72 IN | TEMPERATURE: 98 F | WEIGHT: 270 LBS | OXYGEN SATURATION: 91 % | SYSTOLIC BLOOD PRESSURE: 120 MMHG

## 2024-01-30 DIAGNOSIS — I34.0 MITRAL REGURGITATION: ICD-10-CM

## 2024-01-30 LAB
ATRIAL RATE: 61 BPM
P AXIS: 31 DEGREES
P-R INTERVAL: 266 MS
Q-T INTERVAL: 478 MS
QRS DURATION: 96 MS
QTC CALCULATION (BEZET): 481 MS
R AXIS: -14 DEGREES
T AXIS: -13 DEGREES
VENTRICULAR RATE: 61 BPM

## 2024-01-30 PROCEDURE — 5A2204Z RESTORATION OF CARDIAC RHYTHM, SINGLE: ICD-10-PCS | Performed by: INTERNAL MEDICINE

## 2024-01-30 PROCEDURE — 93312 ECHO TRANSESOPHAGEAL: CPT | Performed by: INTERNAL MEDICINE

## 2024-01-30 PROCEDURE — 99152 MOD SED SAME PHYS/QHP 5/>YRS: CPT | Performed by: INTERNAL MEDICINE

## 2024-01-30 PROCEDURE — 93325 DOPPLER ECHO COLOR FLOW MAPG: CPT

## 2024-01-30 PROCEDURE — 99153 MOD SED SAME PHYS/QHP EA: CPT | Performed by: INTERNAL MEDICINE

## 2024-01-30 PROCEDURE — B24BZZ4 ULTRASONOGRAPHY OF HEART WITH AORTA, TRANSESOPHAGEAL: ICD-10-PCS | Performed by: INTERNAL MEDICINE

## 2024-01-30 PROCEDURE — 92960 CARDIOVERSION ELECTRIC EXT: CPT | Performed by: INTERNAL MEDICINE

## 2024-01-30 PROCEDURE — 93010 ELECTROCARDIOGRAM REPORT: CPT | Performed by: INTERNAL MEDICINE

## 2024-01-30 PROCEDURE — 93320 DOPPLER ECHO COMPLETE: CPT

## 2024-01-30 PROCEDURE — 93005 ELECTROCARDIOGRAM TRACING: CPT

## 2024-01-30 RX ORDER — MIDAZOLAM HYDROCHLORIDE 1 MG/ML
INJECTION INTRAMUSCULAR; INTRAVENOUS
Status: COMPLETED
Start: 2024-01-30 | End: 2024-01-30

## 2024-01-30 RX ORDER — SODIUM CHLORIDE 9 MG/ML
INJECTION, SOLUTION INTRAVENOUS
Status: COMPLETED | OUTPATIENT
Start: 2024-01-30 | End: 2024-01-30

## 2024-01-30 RX ORDER — METHOHEXITAL IN WATER/PF 100MG/10ML
SYRINGE (ML) INTRAVENOUS
Status: DISCONTINUED
Start: 2024-01-30 | End: 2024-01-30 | Stop reason: WASHOUT

## 2024-01-30 RX ADMIN — MIDAZOLAM HYDROCHLORIDE 7 MG: 1 INJECTION INTRAMUSCULAR; INTRAVENOUS at 11:39:00

## 2024-01-30 RX ADMIN — SODIUM CHLORIDE: 9 INJECTION, SOLUTION INTRAVENOUS at 10:43:00

## 2024-01-30 NOTE — DISCHARGE INSTRUCTIONS
HOME CARE INSTRUCTIONS FOLLOWING CARDIOVERSION OR ICD EVALUATION      Activity:  - DO NOT drive after the procedure. You may resume driving after 24 hours.  - DO NOT operate any machinery (including kitchen appliances or power tools).  - Avoid drinking alcohol for 24 hours.  - You may resume your normal activity after 24 hours.    What is Normal:  - Your skin may be red where the patches were placed.  - The redness may last 2 to 3 days and feel like a \"sunburn\".  - You may treat the area with an over-the-counter cream that is used for sunburned skin.    Special Instructions:  - If you were taking the medication Eliquis, Xarelto, Pradaxa or Coumadin, it is very important to continue taking it until your follow-up appointment with your physician.    When you should NOTIFY YOUR PHYSICIAN:  - If you feel that you have returned to an irregular rhythm  - If you have an ICD, any time your ICD device fires    Other:  - You may resume your present diet, unless otherwise specified by your physician.  - You may resume all of your medications as prescribed, unless otherwise directed by your physician.  - A list of your medications was provided to you at discharge    Home Care Instructions Following   Transesophageal Echocardiography (STAN)    Activity  -DO NOT drive after the procedure. You may resume driving the following day  -Do not operate any machinery (including kitchen appliances and power tools)  -Avoid drinking alcohol for 24 hours  -You may resume your normal activities tomorrow  -Do not eat anything for one hour. Sip cool liquids initially and advance to a soft diet tonight    What is normal  -You may experience a sore throat for 2 to 3 days following the examination  -Gargling with warm salt water (1/2 teaspoon of salt to 8 oz. of warm water) or using throat lozenges will help to soothe your throat    When you should NOTIFY YOUR PHYSICIAN  -If you experience sharp pain in your neck, abdomen, or chest  -If you have  sudden nausea and/or vomiting  -If you vomit blood  -If you have a fever greater than 100 degrees    Other  -You may resume your present diet, unless otherwise specified by your physician  -You may resume all of your medications as prescribed, unless otherwise directed by your physician. A list of your medications was provided to you at discharge

## 2024-01-30 NOTE — PROCEDURES
[facility]  Cardioversion Report    PREOPERATIVE DIAGNOSIS:  Atrial fibrillation.    POSTOPERATIVE DIAGNOSIS:  Normal sinus rhythm.    PROCEDURE PERFORMED:  Direct current cardioversion.     DESCRIPTION OF PROCEDURE:  The risks, benefits and alternatives to cardioversion were discussed with the patient. The risks included, but are not limited to: lethal arrhythmia, stroke, respiratory failure, shock and death. The benefits of the procedure included symptomatic improvement and improved survival. Alternatives to the procedure included: not performing a cardioversion, treatment with medications only and observation. The patient verbalized understanding, and agreed to proceed with the procedure.     Defibrillator pads were placed on the anterior and posterior aspects of the patient's left chest. Intravenous sedation was administered, with the patient monitored by continuous pulse oximetry and cardiac telemetry. Once adequate sedation was achieved, 360 joules of direct current, synchronized, biphasic energy were applied. The patient was successfully cardioverted from atrial fibrillation to normal sinus rhythm. No complications were noted at the end of the procedure.    CONCLUSION:  Successful cardioversion to normal sinus rhythm.    Oskar Luz MD, FACC  1/30/2024  12:37 PM

## 2024-01-30 NOTE — PROGRESS NOTES
Pt received for bedside STAN and CV with Dr. Schmitt. Both procedures completed, pt tolerated well. EKG done to confirm conversion to SR. Recovery complete. Discharge instructions given to pt and pt's wife and daughter. IV discontinued, pt taken down to [facility] via wheelchair for discharge.

## 2024-01-30 NOTE — PROCEDURES
Transesophageal Echocardiogram Report    PREOPERATIVE DIAGNOSIS: Mitral regurgitation    POSTOPERATIVE DIAGNOSIS: Mitral regurgitation    PROCEDURE PERFORMED: Transesophageal echocardiogram  (CPT code 48026) with Doppler echocardiography (CPT code 82597), color flow velocity mapping (CPT code 42007) and 3D rendering with interpretation not requiring image postprocessing on a workstation (CPT code 02130). Maximum intensity projections and 3D volume rendering were utilized.     TECHNIQUE: The risks, benefits, and alternatives to transesophageal echocardiography were discussed with the patient. The risks included, but were not limited to: dental trauma, respiratory failure, esophageal perforation and death. The benefits of the procedure included: identification of a cardiac source of embolus, evaluation of valvular disease, evaluation of aortic pathology, and identification of endocarditis. Alternatives to the procedure included: not performing a transesophageal echocardiogram, treatment with medications only and observation. he verbalized understanding and agreed to proceed with the procedure.     Benzocaine spray was applied to the oropharynx for local anesthesia. The patient was placed in the left lateral decubitus position. A bite block was placed. Intravenous sedation was administered, with the patient monitored by continuous pulse oximetry and cardiac telemetry.     Intravenous sedation was administered by the nurse (trained independent observer) under my supervision from 1112 to 1140. A total of 7 mg midazolam and 125 mcg of fentanyl was administered.    Once adequate sedation was achieved, a lubricated transesophageal echocardiography probe was inserted orally. It was smoothly advanced to the upper esophageal and mid esophageal positions. Imaging was obtained. It was then advanced to the deep gastric position. Further imaging was obtained. It was then slowly withdrawn orally. No complications were noted at  the end of the procedure.    FINDINGS:  2D and 3D echocardiography:  The left ventricle appears normal in size, thickness, and systolic function. The estimated left ventricular ejection fraction is 60%. The right ventricle appears normal in size, thickness and systolic function. The visualized portions of the  pulmonic valve have normal morphology and motion. There is flail of the P3 scallop of the mitral valve. The aortic valve morphology appears trileaflet, with normal leaflet motion. The tricuspid valve has annular enlargement, with central malcoaptation. There is no evidence of valvular vegetations, intracardiac masses or thrombi. A patent foramen ovale is present (tunnel length 34 mm). The visualized portion of the thoracic aorta appeared normal. Agitated saline contrast bubble study was not performed.    Spectral, 2D color and 3D color Doppler echocardiography:  The tricuspid valve has moderate, centrally directed, regurgitation. The pulmonic valve has no evidence of stenosis or regurgitation. The mitral valve has severe, 4+ eccentric (laterally directed) regurgitation. The aortic valve has no evidence of stenosis or regurgitation. The pulmonary artery systolic pressure could not be accurately measured. Left atrial appendage velocities are consistent with preserved left atrial transit function. There is left-to-right shunting across the patent foramen ovale.      CONCLUSIONS:   1. There is flail of the P3 scallop of the mitral valve. The mitral valve has severe, 4+ eccentric (laterally directed) regurgitation.   2. The tricuspid valve has annular enlargement, with central malcoaptation. The tricuspid valve has moderate, centrally directed, regurgitation.  3. The left ventricle appears normal in size, thickness, and systolic function. The estimated left ventricular ejection fraction is 60%  4. A patent foramen ovale is present (tunnel length 34 mm). There is left-to-right shunting across the patent foramen  beae.    Oskar Luz MD, FACC  1/30/2024  12:24 PM

## 2024-02-07 NOTE — PAT NURSING NOTE
PreOp Instructions     You are scheduled for: a Cardiac Procedure     Date of Procedure: 02/13/24     Diet Instructions: Do not eat or drink anything after midnight     Medications: Medications you are allowed to take can be taken with a sip of water the morning of your procedure. Hold furosemide and losartan morning of procedure     Do not take the following Blood Thinner(s): LAST DOSE OF XARELTO SATURDAY EVENING     Skin Prep: Shower with antibacterial soap using a clean washcloth, prior to procedure     Arrival Time: You will receive a call the afternoon before your procedure after 3 pm on what time you should arrive the day of your procedure    Driving After Procedure: If sedation is given, you WILL NOT be able to drive home. You will need a responsible adult  to drive you home.     Discharge Teaching: Your nurse will give you specific instructions before discharge, Most people can resume normal activities in 2-3 days, Any questions, please call the physician's office

## 2024-02-13 ENCOUNTER — HOSPITAL ENCOUNTER (OUTPATIENT)
Dept: INTERVENTIONAL RADIOLOGY/VASCULAR | Facility: HOSPITAL | Age: 79
Discharge: HOME | End: 2024-02-13
Attending: INTERNAL MEDICINE | Admitting: INTERNAL MEDICINE
Payer: MEDICARE

## 2024-02-13 VITALS
DIASTOLIC BLOOD PRESSURE: 74 MMHG | WEIGHT: 270 LBS | TEMPERATURE: 99 F | BODY MASS INDEX: 36.57 KG/M2 | OXYGEN SATURATION: 94 % | HEART RATE: 84 BPM | RESPIRATION RATE: 21 BRPM | SYSTOLIC BLOOD PRESSURE: 127 MMHG | HEIGHT: 72 IN

## 2024-02-13 DIAGNOSIS — I34.0 NONRHEUMATIC MITRAL VALVE REGURGITATION: ICD-10-CM

## 2024-02-13 PROCEDURE — 99153 MOD SED SAME PHYS/QHP EA: CPT | Performed by: INTERNAL MEDICINE

## 2024-02-13 PROCEDURE — 99152 MOD SED SAME PHYS/QHP 5/>YRS: CPT | Performed by: INTERNAL MEDICINE

## 2024-02-13 PROCEDURE — B2111ZZ FLUOROSCOPY OF MULTIPLE CORONARY ARTERIES USING LOW OSMOLAR CONTRAST: ICD-10-PCS | Performed by: INTERNAL MEDICINE

## 2024-02-13 PROCEDURE — 93460 R&L HRT ART/VENTRICLE ANGIO: CPT | Performed by: INTERNAL MEDICINE

## 2024-02-13 PROCEDURE — 4A023N8 MEASUREMENT OF CARDIAC SAMPLING AND PRESSURE, BILATERAL, PERCUTANEOUS APPROACH: ICD-10-PCS | Performed by: INTERNAL MEDICINE

## 2024-02-13 PROCEDURE — B2161ZZ FLUOROSCOPY OF RIGHT AND LEFT HEART USING LOW OSMOLAR CONTRAST: ICD-10-PCS | Performed by: INTERNAL MEDICINE

## 2024-02-13 RX ORDER — MIDAZOLAM HYDROCHLORIDE 1 MG/ML
INJECTION INTRAMUSCULAR; INTRAVENOUS
Status: COMPLETED
Start: 2024-02-13 | End: 2024-02-13

## 2024-02-13 RX ORDER — IODIXANOL 320 MG/ML
100 INJECTION, SOLUTION INTRAVASCULAR
Status: COMPLETED | OUTPATIENT
Start: 2024-02-13 | End: 2024-02-13

## 2024-02-13 RX ORDER — LIDOCAINE HYDROCHLORIDE 10 MG/ML
INJECTION, SOLUTION EPIDURAL; INFILTRATION; INTRACAUDAL; PERINEURAL
Status: COMPLETED
Start: 2024-02-13 | End: 2024-02-13

## 2024-02-13 RX ORDER — HEPARIN SODIUM 5000 [USP'U]/ML
INJECTION, SOLUTION INTRAVENOUS; SUBCUTANEOUS
Status: COMPLETED
Start: 2024-02-13 | End: 2024-02-13

## 2024-02-13 RX ORDER — SODIUM CHLORIDE 9 MG/ML
INJECTION, SOLUTION INTRAVENOUS
Status: DISCONTINUED | OUTPATIENT
Start: 2024-02-14 | End: 2024-02-13 | Stop reason: HOSPADM

## 2024-02-13 RX ADMIN — IODIXANOL 100 ML: 320 INJECTION, SOLUTION INTRAVASCULAR at 08:49:00

## 2024-02-13 NOTE — H&P
[facility] Consultation    Wagner Sheridan Patient Status:  Outpatient    1945 MRN [id number]   [facility] INTERVENTIONAL SUITES Attending Jasper Hernandes*   Hosp Day # 0 PCP Edil Roy MD     Reason for Consultation:  severe MR      History of Present Illness:  Wagner Sheridan is a a(n) 78 year old male.  He has hx of Dyslipidemia , Hypertension , CKD. Presented with resp distress in setting of covid infection, 2024.  Atrial Fibrillation occurred and he was started on eliquis.  His breathing is fairly comfortable with lasix.  TTE and STAN demonstrate severe MR with flail P3 scallop of MV and moderate TR.    History:  Past Medical History:   Diagnosis Date    Arrhythmia     BPH     Encounter for incision and drainage procedure 2021    High blood pressure     High cholesterol     Hyperaldosteronism (HCC)     Mitral regurgitation     Osteoarthrosis, unspecified whether generalized or localized, unspecified site     PMR (polymyalgia rheumatica) (HCC) 2012    off corticosteroids since     Retention of urine      Past Surgical History:   Procedure Laterality Date    KNEE SURGERY      OTHER SURGICAL HISTORY  09    flow , Dr. Tavera    OTHER SURGICAL HISTORY  09    flow  Dr. Tavera    OTHER SURGICAL HISTORY  11    PNB - Dr. Tavera    OTHER SURGICAL HISTORY  13    PVP - Dr. Tavera    OTHER SURGICAL HISTORY  2/3/16     [facility]     OTHER SURGICAL HISTORY  2020    cysto- Dr. Tavera    SKIN SURGERY  10/01/2019    MMS/R nasal ala /BCC done by MM    TONSILLECTOMY      TOTAL KNEE REPLACEMENT Right 2015    Right total knee replacement 2015    TOTAL KNEE REPLACEMENT Left 2015     Left knee total replacement 2015      Family History   Problem Relation Age of Onset    Hypertension Father     Heart Disorder Father     Hypertension Mother     Diabetes Sister          Allergies:  Allergies   Allergen Reactions    Clindamycin HIVES     Penicillins HIVES    Amoxicillin HIVES    Benazepril Coughing    Wasp Venom RESPIRATORY FAILURE    Wasp Venom Protein UNKNOWN       Medications:   [START ON 2/14/2024] sodium chloride   Intravenous On Call    sodium chloride  500 mL Intravenous Once       Continuous Infusions:      Social History:   reports that he has quit smoking. His smoking use included cigarettes. He has a 2.5 pack-year smoking history. He has never used smokeless tobacco. He reports current alcohol use. He reports that he does not use drugs.    Review of Systems:  All systems were reviewed and are negative except as described above in HPI.    Physical Exam:      Wt Readings from Last 3 Encounters:   02/07/24 270 lb (122.5 kg)   01/29/24 270 lb (122.5 kg)   01/09/24 287 lb 11.2 oz (130.5 kg)       Vitals:    02/07/24 1456 02/13/24 0745   BP:  153/90   BP Location:  Right arm   Pulse:  85   Resp:  12   Temp:  98.5 °F (36.9 °C)   TempSrc:  Temporal   SpO2:  95%   Weight: 270 lb (122.5 kg)    Height: 6' (1.829 m)        Temp:  [98.5 °F (36.9 °C)] 98.5 °F (36.9 °C)  Pulse:  [85] 85  Resp:  [12] 12  BP: (153)/(90) 153/90  SpO2:  [95 %] 95 %    Temp: 98.5 °F (36.9 °C)  Pulse: 85  Resp: 12  BP: 153/90      General:  Appears comfortable  HEENT: No focal deficits.  Neck: No JVD, carotids 2+ no bruits.  Cardiac: Regular S1S2.  No S3, S4, rub, click.  2/6 blowing systolic   murmur.  Lungs: Clear to auscultation and percussion.  Abdomen: Soft, non-tender.   Extremities: No LE edema.  No clubbing or cyanosis  Neurologic: Alert and oriented, normal affect.  Skin: Warm and dry.     Labs:      HEM:  No results for input(s): \"WBC\", \"HGB\", \"HCT\", \"PLT\", \"BANDSPCT\", \"LYMPHOPCT\", \"MONOPCT\" in the last 168 hours.    Invalid input(s): \"NEUTOPHILPCT\", \"EOSPCT\"    Chem:  No results for input(s): \"NA\", \"K\", \"CL\", \"CO2\", \"BUN\", \"CREATSERUM\", \"GLUCOSE\", \"CAION\", \"MG\", \"ALT\", \"AST\", \"ALKPHOS\", \"BILITOT\", \"ALB\", \"LIPASE\", \"BNP\" in the last 168 hours.    Invalid input(s):  \"CALCIUM\", \"PHOSPH\", \"BILIDIR\", \"PROT\", \"AMYASE\"    No results for input(s): \"INR\" in the last 168 hours.                  Lab Results   Component Value Date    TROP < 0.046 12/27/2012         Invalid input(s): \"PBNPML\"                 Telemetry:     Laboratories and Data:  Diagnostics:    EKG, 2/13/2024:      CXR, 2/13/2024:            Echo: 1/10/23:     1. Left ventricle: The cavity size was normal. Wall thickness was normal.      Systolic function was normal. The estimated ejection fraction was 65-70%.      Unable to assess LV diastolic function.   2. Left atrium: The atrium was moderately dilated.   3. Right atrium: The atrium was mildly dilated.   4. Aortic valve: There was mild regurgitation.   5. Aortic root: The aortic root was 4.2cm diameter.   6. Ascending aorta: The ascending aorta was 3.7cm diameter.   7. Mitral valve: There was an eccentric moderate to severe regurgitation jet      directed anteriorally   8. Tricuspid valve: There was mild-moderate regurgitation.   9. Pulmonary arteries: Systolic pressure was markedly increased, estimated      to be 71mm Hg.   Impressions:  Due to the eccentric nature of mitral regurgitation, the   severity of the regurgitation is likely underestimated   *                           Impression:        Atrial Fibrillation.  Started 1/2024 in setting of covid infection.  Echo then showed severe MR.  STAN shows flail P3 leaflet of MV and severe MR  Hypoxic respiratory failure, 1/2024.  Improved with Abx  Severe MR with marked pulm Hypertension . Related to flail P3  Moderate TR on STAN  HTN 2/2 hyperaldo  6.   CKD   7. Dyslipidemia       Plan:    R+L heart cath in anticipation of surgical repair of MV and TV        Boy Hernandes MD  2/13/2024  8:05 AM

## 2024-02-13 NOTE — DISCHARGE INSTRUCTIONS
HOME CARE INSTRUCTIONS FOLLOWING CORONARY ANGIOGRAPHY,  PERIPHERAL ANGIOGRAPHY, ANGIOPLASTY (PTCA/PTA) OR INSERTION  OF STENT IN THE CORONARY, CAROTID, AND/OR PERIPHERAL ARTERIES      Activity:   DO NOT drive after the procedure. You may resume driving late the following day according to the nurse or physician’s instructions   Plan on resting and relaxing tonight and tomorrow   Resume your normal activity after 48 hours, or as instructed by your physician   Do not lift anything over 10 pounds for the next 24 hours   Avoid sexual activity for the next 24 hours   Avoid drinking alcohol for the next 24 hours   If the groin site was used, avoid repeated stair use and excessive walking for the next 24 hours   If the wrist was used, avoid bending/flexing of the wrist for the next 24 hours.       What is Normal?   A small lump at the procedure site associated with mild tenderness when touched   The procedure site may be bruised or discolored   There may be a small amount of drainage on the bandage    Special Instructions:   Drink plenty of fluids during the next 24 hours to “flush” the contrast from your system   The bandage is to remain in place for 24 hours   Keep the bandage clean and dry   DO NOT submerge the procedure site for 72 hours (no bath tubs or pools). This includes dishwashing/submersion of the wrist, if the wrist was used   After 24 hours, you must remove the bandage   You should shower after removing the bandage, and wash the procedure site gently with soap and water   If you choose to wear a bandage for a few days, make sure it remains clean and dry and that it is changed daily    When you should NOTIFY YOUR PHYSICIAN:   Bleeding can occur at the procedure site - both on the outside of the skin and/or beneath the surface of the skin   Swelling or a large lump at the procedure site can occur, which may be accompanied by moderate to severe pain. If either of the above occurs, lie down flat. Have someone  apply pressure to the procedure site with both hands, as instructed by the nurse. Hold pressure for 20 minutes and the bleeding should stop. Notify your physician of the occurrence. If the bleeding does not stop, call 911 and continue to apply pressure   If you experience signs of a fever, temperature >101 degrees, chills, infection (redness, swelling, thick yellow drainage, or a foul odor from the procedure site)   If you notice any numbness, tingling, or loss of feeling to your leg or foot for groin access   If you notice any numbness, tingling, or loss of feeling to your fingers or hand, if wrist access was utilized      Other:  - You may resume your present diet, unless otherwise specified by your physician.   - You may resume all of your medications as prescribed, unless otherwise directed by your physician. A list of your medications was provided to you at discharge.  - Please call your physician's office for a follow-up appointment. You should be seen in 2 weeks.

## 2024-02-13 NOTE — PROGRESS NOTES
Pt s/p RLHC via R fem vein (manual hold) and R fem artery (perclose). Post procedure sites remained CDI with no bleeding or hematoma noted throughout recovery period including after ambulating in hallway and voiding. VSS. Pt denied pain. Tolerated PO intake. IV d/c'd. Discharge instructions given-pt verbalized understanding. Pt to lobby via WC and wife to drive home.

## 2024-02-13 NOTE — PROCEDURES
[facility]    PATIENT'S NAME: JULIO KWAN   ATTENDING PHYSICIAN: Pito Hernandes M.D.   OPERATING PHYSICIAN: Pito Hernandes M.D.   PATIENT ACCOUNT#:   [id number]    LOCATION:  [address]  MEDICAL RECORD #:   [id number]       YOB: 1945  ADMISSION DATE:       02/13/2024      OPERATION DATE:  02/13/2024    CARDIAC PROCEDURE TRANSCRIPTION      CARDIAC CATHETERIZATION    PREOPERATIVE DIAGNOSIS:    POSTOPERATIVE DIAGNOSIS:    PROCEDURE PERFORMED:  Right and left heart catheterization, left ventriculography, coronary angiography, Perclose closure of the right femoral artery puncture site.    SEDATION:  Moderate sedation was employed using 2 mg of IV Versed, 50 mcg of IV fentanyl.  A trained professional under my supervision and I observed the patient from 0822 until 0846, for a total of 24 minutes.      CLINICAL SUMMARY:  The patient is a 78-year-old gentleman who is undergoing cardiac catheterization to evaluate mitral regurgitation.  He recently went into atrial fibrillation for the first time and echocardiography demonstrated severe mitral regurgitation.  Transesophageal echocardiography demonstrated severe mitral regurgitation and moderate tricuspid insufficiency.  Flail P3 scallop of the mitral valve was identified.    DESCRIPTION OF PROCEDURE:  Hemodynamics were measured using a pulmonary artery catheter advanced from the right femoral vein to the right atrium, across the tricuspid valve into the right ventricle, across the pulmonic valve into the PA position, and from there into the wedge position.  Pressure was measured in each of those locations.  A pigtail catheter was advanced from right femoral artery to the ascending aorta, across the aortic valve into the left ventricle and pressure was measured in each of those locations.  Selective coronary angiography of the left and right coronary arteries was achieved using 6-Botswanan 4 left and 6-Botswanan 4 right Jeb  catheter respectively.    HEMODYNAMICS:  The right atrial mean pressure is 14, RV 48/8, PA 50/22 with a mean of 35, mean wedge pressure 24 with a V-wave of 40, /22, /67, mean of 89.  Sonam cardiac output was 8.1    LEFT VENTRICULOGRAPHY:  The procedure was performed in the 30-degree PALENCIA projection.  This demonstrated normal left ventricular size and systolic function with an ejection fraction of approximately 60% to 65%.  It was difficult to appreciate the mitral regurgitation on this image, presumably due to enlargement of the left atrium and/or the eccentric nature of the mitral regurgitation.    CORONARY ANGIOGRAPHY:  Fluoroscopy demonstrates dense calcification involving the LAD.  The left coronary artery is the dominant vessel.  The left main segment to the left coronary artery is patulous and ectatic without stenosis.  The left circumflex artery is relatively large.  It gives rise to a large obtuse marginal artery which travels all the way to the apex and then to some extent up the interventricular groove, supplying a portion of the distal anterior wall.  It also gives rise to a left posterior descending artery, a small second obtuse marginal artery, and a posterior left ventricular branch.  The left circumflex artery system shows minor nonobstructive plaquing.  The LAD is normal caliber proximally, but then becomes quite diminutive in its middle one-third and does not reach the apex, as described above.  The distal half of this LAD is severely diffusely diseased with areas of narrowing of up to 90%.  The right coronary artery is normal caliber proximally, but is anatomically nondominant.  At the end of the procedure, the right femoral artery puncture site was closed using a Perclose device.    IMPRESSION:    1.   Hemodynamics show moderate pulmonary hypertension and right and left ventricular diastolic dysfunction.  2.   Mitral regurgitation difficult to visualize on the left ventriculogram;  however, it has been graded as severe on the transesophageal echo.  V-wave seen on the wedge tracing is quite high at 40, which is consistent with severe mitral regurgitation.  3.   Coronary disease as described above involving the distal left anterior descending, which is a fairly diminutive vessel.    RECOMMENDATION:  The patient will be referred to CV Surgery for consideration of mitral valve repair and possibly tricuspid valve repair.    Dictated By Pito Hernandes M.D.  d: 02/13/2024 09:06:42  t: 02/13/2024 09:53:50  [facility] [id number]  [facility]/

## 2024-02-14 ENCOUNTER — PATIENT OUTREACH (OUTPATIENT)
Dept: CASE MANAGEMENT | Age: 79
End: 2024-02-14

## 2024-02-14 NOTE — PROGRESS NOTES
[facility] apt request (discharged 02/13)    Dr Boy Hernandes  [facility]  Cardiovascular Diseases  [address]  [facility] [address]  Phone : [phone]  Follow up 1 week  Apt made:  Fri 02/23 @3:20pm w/Christine Bustillos NP  Confirmed w/pt  Closing encounter

## 2024-02-20 RX ORDER — TRAZODONE HYDROCHLORIDE 50 MG/1
50 TABLET ORAL NIGHTLY
COMMUNITY
End: 2024-03-21 | Stop reason: ALTCHOICE

## 2024-02-20 RX ORDER — FUROSEMIDE 20 MG/1
40 TABLET ORAL DAILY
COMMUNITY
End: 2024-04-12

## 2024-02-20 RX ORDER — EPLERENONE 50 MG/1
50 TABLET, FILM COATED ORAL EVERY EVENING
COMMUNITY
End: 2024-02-20

## 2024-02-23 ENCOUNTER — TELEPHONE (OUTPATIENT)
Dept: CARDIOLOGY UNIT | Facility: HOSPITAL | Age: 79
End: 2024-02-23

## 2024-02-23 ENCOUNTER — HOSPITAL ENCOUNTER (OUTPATIENT)
Dept: CV DIAGNOSTICS | Facility: HOSPITAL | Age: 79
Discharge: HOME | End: 2024-02-23
Attending: THORACIC SURGERY (CARDIOTHORACIC VASCULAR SURGERY)
Payer: MEDICARE

## 2024-02-23 ENCOUNTER — HOSPITAL ENCOUNTER (OUTPATIENT)
Dept: INTERVENTIONAL RADIOLOGY/VASCULAR | Facility: HOSPITAL | Age: 79
Discharge: HOME | End: 2024-02-23
Attending: THORACIC SURGERY (CARDIOTHORACIC VASCULAR SURGERY)
Payer: MEDICARE

## 2024-02-23 ENCOUNTER — HOSPITAL ENCOUNTER (OUTPATIENT)
Dept: GENERAL RADIOLOGY | Facility: HOSPITAL | Age: 79
Discharge: HOME | End: 2024-02-23
Attending: THORACIC SURGERY (CARDIOTHORACIC VASCULAR SURGERY)
Payer: MEDICARE

## 2024-02-23 ENCOUNTER — HOSPITAL ENCOUNTER (OUTPATIENT)
Dept: CARDIOLOGY CLINIC | Facility: HOSPITAL | Age: 79
Discharge: HOME | End: 2024-02-23
Attending: THORACIC SURGERY (CARDIOTHORACIC VASCULAR SURGERY)
Payer: MEDICARE

## 2024-02-23 ENCOUNTER — HOSPITAL ENCOUNTER (OUTPATIENT)
Dept: LAB | Facility: HOSPITAL | Age: 79
Discharge: HOME | End: 2024-02-23
Attending: THORACIC SURGERY (CARDIOTHORACIC VASCULAR SURGERY)
Payer: MEDICARE

## 2024-02-23 ENCOUNTER — HOSPITAL ENCOUNTER (INPATIENT)
Facility: HOSPITAL | Age: 79
LOS: 5 days | Discharge: HOME | End: 2024-02-28
Attending: SURGERY | Admitting: SURGERY
Payer: MEDICARE

## 2024-02-23 DIAGNOSIS — Z91.89 AT HIGH RISK FOR BLEEDING: ICD-10-CM

## 2024-02-23 DIAGNOSIS — Q21.12 PFO (PATENT FORAMEN OVALE) (HCC): ICD-10-CM

## 2024-02-23 DIAGNOSIS — I25.10 CAD (CORONARY ARTERY DISEASE), NATIVE CORONARY ARTERY: ICD-10-CM

## 2024-02-23 DIAGNOSIS — Z01.818 PRE-OP TESTING: ICD-10-CM

## 2024-02-23 DIAGNOSIS — I07.1 TRICUSPID REGURGITATION: ICD-10-CM

## 2024-02-23 DIAGNOSIS — I48.91 A-FIB (HCC): ICD-10-CM

## 2024-02-23 DIAGNOSIS — I34.0 MITRAL REGURGITATION: ICD-10-CM

## 2024-02-23 PROBLEM — D64.9 ANEMIA: Status: ACTIVE | Noted: 2024-02-23

## 2024-02-23 LAB
ALBUMIN SERPL-MCNC: 3.2 G/DL (ref 3.4–5)
ALBUMIN/GLOB SERPL: 1.1 {RATIO} (ref 1–2)
ALP LIVER SERPL-CCNC: 67 U/L
ALT SERPL-CCNC: 14 U/L
ANION GAP SERPL CALC-SCNC: 5 MMOL/L (ref 0–18)
ANTIBODY SCREEN: NEGATIVE
APTT PPP: 32.8 SECONDS (ref 23.3–35.6)
AST SERPL-CCNC: 10 U/L (ref 15–37)
ATRIAL RATE: 214 BPM
BASOPHILS # BLD AUTO: 0.04 X10(3) UL (ref 0–0.2)
BASOPHILS # BLD AUTO: 0.05 X10(3) UL (ref 0–0.2)
BASOPHILS NFR BLD AUTO: 0.6 %
BASOPHILS NFR BLD AUTO: 0.8 %
BILIRUB SERPL-MCNC: 0.6 MG/DL (ref 0.1–2)
BILIRUB UR QL STRIP.AUTO: NEGATIVE
BUN BLD-MCNC: 40 MG/DL (ref 9–23)
CALCIUM BLD-MCNC: 9.4 MG/DL (ref 8.5–10.1)
CHLORIDE SERPL-SCNC: 113 MMOL/L (ref 98–112)
CLARITY UR REFRACT.AUTO: CLEAR
CO2 SERPL-SCNC: 23 MMOL/L (ref 21–32)
CREAT BLD-MCNC: 2.16 MG/DL
DEPRECATED HBV CORE AB SER IA-ACNC: 13.3 NG/ML
EGFRCR SERPLBLD CKD-EPI 2021: 30 ML/MIN/1.73M2 (ref 60–?)
EOSINOPHIL # BLD AUTO: 0.31 X10(3) UL (ref 0–0.7)
EOSINOPHIL # BLD AUTO: 0.32 X10(3) UL (ref 0–0.7)
EOSINOPHIL NFR BLD AUTO: 5.1 %
EOSINOPHIL NFR BLD AUTO: 5.2 %
ERYTHROCYTE [DISTWIDTH] IN BLOOD BY AUTOMATED COUNT: 15.6 %
ERYTHROCYTE [DISTWIDTH] IN BLOOD BY AUTOMATED COUNT: 15.8 %
EST. AVERAGE GLUCOSE BLD GHB EST-MCNC: 105 MG/DL (ref 68–126)
GLOBULIN PLAS-MCNC: 2.8 G/DL (ref 2.8–4.4)
GLUCOSE BLD-MCNC: 132 MG/DL (ref 70–99)
GLUCOSE UR STRIP.AUTO-MCNC: NORMAL MG/DL
HBA1C MFR BLD: 5.3 % (ref ?–5.7)
HCT VFR BLD AUTO: 21.5 %
HCT VFR BLD AUTO: 22.7 %
HGB BLD-MCNC: 7 G/DL
HGB BLD-MCNC: 7 G/DL
HGB BLD-MCNC: 7.3 G/DL
IMM GRANULOCYTES # BLD AUTO: 0.03 X10(3) UL (ref 0–1)
IMM GRANULOCYTES # BLD AUTO: 0.04 X10(3) UL (ref 0–1)
IMM GRANULOCYTES NFR BLD: 0.5 %
IMM GRANULOCYTES NFR BLD: 0.7 %
INR BLD: 1.34 (ref 0.8–1.2)
IRON SATN MFR SERPL: 6 %
IRON SERPL-MCNC: 20 UG/DL
KETONES UR STRIP.AUTO-MCNC: NEGATIVE MG/DL
LEUKOCYTE ESTERASE UR QL STRIP.AUTO: NEGATIVE
LYMPHOCYTES # BLD AUTO: 1.48 X10(3) UL (ref 1–4)
LYMPHOCYTES # BLD AUTO: 1.99 X10(3) UL (ref 1–4)
LYMPHOCYTES NFR BLD AUTO: 24.7 %
LYMPHOCYTES NFR BLD AUTO: 31.5 %
MCH RBC QN AUTO: 28.7 PG (ref 26–34)
MCH RBC QN AUTO: 29.3 PG (ref 26–34)
MCHC RBC AUTO-ENTMCNC: 30.8 G/DL (ref 31–37)
MCHC RBC AUTO-ENTMCNC: 32.6 G/DL (ref 31–37)
MCV RBC AUTO: 90 FL
MCV RBC AUTO: 93 FL
MONOCYTES # BLD AUTO: 0.57 X10(3) UL (ref 0.1–1)
MONOCYTES # BLD AUTO: 0.58 X10(3) UL (ref 0.1–1)
MONOCYTES NFR BLD AUTO: 9 %
MONOCYTES NFR BLD AUTO: 9.7 %
NEUTROPHILS # BLD AUTO: 3.36 X10 (3) UL (ref 1.5–7.7)
NEUTROPHILS # BLD AUTO: 3.36 X10(3) UL (ref 1.5–7.7)
NEUTROPHILS # BLD AUTO: 3.54 X10 (3) UL (ref 1.5–7.7)
NEUTROPHILS # BLD AUTO: 3.54 X10(3) UL (ref 1.5–7.7)
NEUTROPHILS NFR BLD AUTO: 53.3 %
NEUTROPHILS NFR BLD AUTO: 58.9 %
NITRITE UR QL STRIP.AUTO: NEGATIVE
NT-PROBNP SERPL-MCNC: 1139 PG/ML (ref ?–450)
OSMOLALITY SERPL CALC.SUM OF ELEC: 304 MOSM/KG (ref 275–295)
PH UR STRIP.AUTO: 6 [PH] (ref 5–8)
PLATELET # BLD AUTO: 192 10(3)UL (ref 150–450)
PLATELET # BLD AUTO: 207 10(3)UL (ref 150–450)
POTASSIUM SERPL-SCNC: 3.9 MMOL/L (ref 3.5–5.1)
PROT SERPL-MCNC: 6 G/DL (ref 6.4–8.2)
PROT UR STRIP.AUTO-MCNC: 20 MG/DL
PROTHROMBIN TIME: 16.7 SECONDS (ref 11.6–14.8)
Q-T INTERVAL: 424 MS
QRS DURATION: 94 MS
QTC CALCULATION (BEZET): 457 MS
R AXIS: 6 DEGREES
RBC # BLD AUTO: 2.39 X10(6)UL
RBC # BLD AUTO: 2.44 X10(6)UL
RBC UR QL AUTO: NEGATIVE
RH BLOOD TYPE: POSITIVE
SODIUM SERPL-SCNC: 141 MMOL/L (ref 136–145)
SP GR UR STRIP.AUTO: 1.01 (ref 1–1.03)
T AXIS: 0 DEGREES
TIBC SERPL-MCNC: 353 UG/DL (ref 240–450)
TRANSFERRIN SERPL-MCNC: 237 MG/DL (ref 200–360)
UROBILINOGEN UR STRIP.AUTO-MCNC: NORMAL MG/DL
VENTRICULAR RATE: 70 BPM
VIT B12 SERPL-MCNC: 432 PG/ML (ref 193–986)
WBC # BLD AUTO: 6 X10(3) UL (ref 4–11)
WBC # BLD AUTO: 6.3 X10(3) UL (ref 4–11)

## 2024-02-23 PROCEDURE — 81001 URINALYSIS AUTO W/SCOPE: CPT | Performed by: THORACIC SURGERY (CARDIOTHORACIC VASCULAR SURGERY)

## 2024-02-23 PROCEDURE — 93005 ELECTROCARDIOGRAM TRACING: CPT

## 2024-02-23 PROCEDURE — 86900 BLOOD TYPING SEROLOGIC ABO: CPT | Performed by: THORACIC SURGERY (CARDIOTHORACIC VASCULAR SURGERY)

## 2024-02-23 PROCEDURE — 83880 ASSAY OF NATRIURETIC PEPTIDE: CPT

## 2024-02-23 PROCEDURE — 80053 COMPREHEN METABOLIC PANEL: CPT | Performed by: THORACIC SURGERY (CARDIOTHORACIC VASCULAR SURGERY)

## 2024-02-23 PROCEDURE — 85025 COMPLETE CBC W/AUTO DIFF WBC: CPT | Performed by: THORACIC SURGERY (CARDIOTHORACIC VASCULAR SURGERY)

## 2024-02-23 PROCEDURE — 86920 COMPATIBILITY TEST SPIN: CPT

## 2024-02-23 PROCEDURE — 83036 HEMOGLOBIN GLYCOSYLATED A1C: CPT | Performed by: THORACIC SURGERY (CARDIOTHORACIC VASCULAR SURGERY)

## 2024-02-23 PROCEDURE — 86850 RBC ANTIBODY SCREEN: CPT | Performed by: THORACIC SURGERY (CARDIOTHORACIC VASCULAR SURGERY)

## 2024-02-23 PROCEDURE — 82728 ASSAY OF FERRITIN: CPT | Performed by: INTERNAL MEDICINE

## 2024-02-23 PROCEDURE — 86901 BLOOD TYPING SEROLOGIC RH(D): CPT | Performed by: THORACIC SURGERY (CARDIOTHORACIC VASCULAR SURGERY)

## 2024-02-23 PROCEDURE — 85018 HEMOGLOBIN: CPT | Performed by: INTERNAL MEDICINE

## 2024-02-23 PROCEDURE — 82607 VITAMIN B-12: CPT | Performed by: INTERNAL MEDICINE

## 2024-02-23 PROCEDURE — 71045 X-RAY EXAM CHEST 1 VIEW: CPT | Performed by: THORACIC SURGERY (CARDIOTHORACIC VASCULAR SURGERY)

## 2024-02-23 PROCEDURE — 36430 TRANSFUSION BLD/BLD COMPNT: CPT

## 2024-02-23 PROCEDURE — 93010 ELECTROCARDIOGRAM REPORT: CPT | Performed by: INTERNAL MEDICINE

## 2024-02-23 PROCEDURE — 85730 THROMBOPLASTIN TIME PARTIAL: CPT | Performed by: THORACIC SURGERY (CARDIOTHORACIC VASCULAR SURGERY)

## 2024-02-23 PROCEDURE — 83540 ASSAY OF IRON: CPT | Performed by: INTERNAL MEDICINE

## 2024-02-23 PROCEDURE — 83550 IRON BINDING TEST: CPT | Performed by: INTERNAL MEDICINE

## 2024-02-23 PROCEDURE — 85610 PROTHROMBIN TIME: CPT | Performed by: THORACIC SURGERY (CARDIOTHORACIC VASCULAR SURGERY)

## 2024-02-23 PROCEDURE — 30233N1 TRANSFUSION OF NONAUTOLOGOUS RED BLOOD CELLS INTO PERIPHERAL VEIN, PERCUTANEOUS APPROACH: ICD-10-PCS | Performed by: SURGERY

## 2024-02-23 RX ORDER — METOPROLOL SUCCINATE 50 MG/1
50 TABLET, EXTENDED RELEASE ORAL 2 TIMES DAILY
Status: DISCONTINUED | OUTPATIENT
Start: 2024-02-23 | End: 2024-02-28

## 2024-02-23 RX ORDER — TRAZODONE HYDROCHLORIDE 50 MG/1
50 TABLET ORAL NIGHTLY
Status: DISCONTINUED | OUTPATIENT
Start: 2024-02-23 | End: 2024-02-23

## 2024-02-23 RX ORDER — FINASTERIDE 5 MG/1
5 TABLET, FILM COATED ORAL NIGHTLY
Status: DISCONTINUED | OUTPATIENT
Start: 2024-02-23 | End: 2024-02-28

## 2024-02-23 RX ORDER — MIRTAZAPINE 30 MG/1
30 TABLET, FILM COATED ORAL NIGHTLY
Status: DISCONTINUED | OUTPATIENT
Start: 2024-02-23 | End: 2024-02-25

## 2024-02-23 RX ORDER — AMLODIPINE BESYLATE 5 MG/1
5 TABLET ORAL 2 TIMES DAILY
Status: DISCONTINUED | OUTPATIENT
Start: 2024-02-23 | End: 2024-02-28

## 2024-02-23 RX ORDER — SODIUM CHLORIDE 9 MG/ML
INJECTION, SOLUTION INTRAVENOUS ONCE
Status: COMPLETED | OUTPATIENT
Start: 2024-02-23 | End: 2024-02-23

## 2024-02-23 NOTE — PAT NURSING NOTE
PAT nursing note: met with patient, spouse and daughters in [facility] to discuss pre-surgical instructions for his upcoming surgery with Dr. Hansen on 3/6; testing underway; reviewed binder, medication stop dates and shower schedule/instructions; has Chlorhexidine allergy-instructed to use Dial Gold Antibacterial Wash; strict npo after 2200; continue to take the prescribed medications except: the morning of surgery only take Metoprolol with a sip of water; last dose of vitamins, supplements, herbal products and NSAIDs 2/27, Eliquis 2/28, Losartan 3/1; denies taking ASA, diabetic or weight loss medications; no PPM, ICD or spinal cord stimulator; park north garage; register in [facility] at 0530; admit; visitors welcome; keep possessions to a minimum: bring binder, pajama problems, comfortable clothing to wear home, gym shoes, toiletries, eye glass case; keep valuables at home; Covid test 3/4 @[facility]; intra-op and post-op expectations discussed; all questions answered; patient, spouse and daughters verbalized understanding of all instructions.      5 meter walk: 4.51, 4.65, 4.71

## 2024-02-23 NOTE — CONSULTS
[facility]    Report of Consultation    Wagner Sheridan Patient Status:  Inpatient    1945 MRN [id number]   Location [facility] 0SW-A Attending Edil Salgado MD   Hosp Day # 0 PCP Edil Roy MD     Date of Admission:  2024  Date of Consult:  2024  Outpatient cardiologist: Boy Hernandes MD    Reason for Consultation:  Anemia on DOAC    History of Present Illness:    Wagner Sheridan is a a(n) 78 year old male with PMH CKD, HTN, hyperaldosteronism, pAF- on eliquis, HFpEF, severe MR with pulmonary hypertension currently being worked up by CV surgery for mitral valve repair and possibly tricuspid valve repair. Was tentatively planned for 2024 but today with preop labs was noted to have hgb 7.0. directly admitted for workup.     History:  Past Medical History:   Diagnosis Date    Arrhythmia     BPH     Encounter for incision and drainage procedure 2021    High blood pressure     High cholesterol     Hyperaldosteronism (HCC)     Mitral regurgitation     Osteoarthrosis, unspecified whether generalized or localized, unspecified site     PMR (polymyalgia rheumatica) (HCC) 2012    off corticosteroids since 2013    Retention of urine     Shortness of breath      Past Surgical History:   Procedure Laterality Date    KNEE SURGERY      OTHER SURGICAL HISTORY  09    flow , Dr. Tavera    OTHER SURGICAL HISTORY  09    flow  Dr. Tavera    OTHER SURGICAL HISTORY  11    PNB - Dr. Tavera    OTHER SURGICAL HISTORY  13    PVP - Dr. Tavera    OTHER SURGICAL HISTORY  2/3/16     Flow      OTHER SURGICAL HISTORY  2020    cysto- Dr. Tavera    SKIN SURGERY  10/01/2019    MMS/R nasal ala /BCC done by MM    TONSILLECTOMY      TOTAL KNEE REPLACEMENT Right 2015    Right total knee replacement 2015    TOTAL KNEE REPLACEMENT Left 2015     Left knee total replacement 2015      Family History   Problem Relation Age of Onset    Hypertension  Father     Heart Disorder Father     Hypertension Mother     Diabetes Sister       reports that he has quit smoking. His smoking use included cigarettes. He has a 2.5 pack-year smoking history. He has never used smokeless tobacco. He reports current alcohol use. He reports that he does not use drugs.    Allergies:  Allergies   Allergen Reactions    Amoxicillin HIVES    Clindamycin HIVES    Penicillins HIVES    Wasp Venom RESPIRATORY FAILURE    Wasp Venom Protein RESPIRATORY FAILURE    Benazepril Coughing    Chlorhexidine RASH and OTHER (SEE COMMENTS)     redness       Medications:  No current facility-administered medications for this encounter.    Review of Systems:  All systems were reviewed and are negative except as described above in HPI.    Physical Exam:  Blood pressure 123/55, pulse 79, temperature 97.7 °F (36.5 °C), temperature source Oral, resp. rate 16, SpO2 98%.  Temp (24hrs), Av.7 °F (36.5 °C), Min:97.7 °F (36.5 °C), Max:97.7 °F (36.5 °C)    Wt Readings from Last 3 Encounters:   24 275 lb (124.7 kg)   24 270 lb (122.5 kg)   24 270 lb (122.5 kg)       Telemetry: rate controlled afib     Physical Exam  Constitutional:       General: He is not in acute distress.     Appearance: Normal appearance. He is well-developed and well-groomed. He is not ill-appearing, toxic-appearing or diaphoretic.   Cardiovascular:      Rate and Rhythm: Normal rate and regular rhythm.      Heart sounds: Normal heart sounds.   Pulmonary:      Effort: Pulmonary effort is normal.      Breath sounds: Normal breath sounds and air entry.   Skin:     General: Skin is warm and dry.      Capillary Refill: Capillary refill takes less than 2 seconds.   Neurological:      General: No focal deficit present.      Mental Status: He is alert and oriented to person, place, and time.   Psychiatric:         Attention and Perception: Attention normal.         Mood and Affect: Mood normal.         Behavior: Behavior is  cooperative.         Cognition and Memory: Cognition normal.         Laboratories and Data:  Diagnostics:    2/23/24    Atrial fibrillation   Nonspecific ST abnormality   Abnormal ECG   When compared with ECG of 30-JAN-2024 11:48,   Atrial fibrillation has replaced Sinus rhythm     1/30/2024: Cardioversion: STAN  CONCLUSIONS:   1. There is flail of the P3 scallop of the mitral valve. The mitral valve has severe, 4+ eccentric (laterally directed) regurgitation.   2. The tricuspid valve has annular enlargement, with central malcoaptation. The tricuspid valve has moderate, centrally directed, regurgitation.  3. The left ventricle appears normal in size, thickness, and systolic function. The estimated left ventricular ejection fraction is 60%  4. A patent foramen ovale is present (tunnel length 34 mm). There is left-to-right shunting across the patent foramen ovale.  2/13/2024:   R+L heart cath in anticipation of surgical repair of MV and TV  IMPRESSION:   1. Hemodynamics show moderate pulmonary hypertension and right and left ventricular diastolic dysfunction.   2. Mitral regurgitation difficult to visualize on the left ventriculogram; however, it has been graded as severe on the transesophageal echo. V-wave seen on the wedge tracing is quite high at 40, which is consistent with severe mitral regurgitation.   3. Coronary disease as described above involving the distal left anterior descending, which is a fairly diminutive vessel.     RECOMMENDATION: The patient will be referred to CV Surgery for consideration of mitral valve repair and possibly tricuspid valve repair.   Today in office:   Plan for valvular surgery 4-3-2024 patient is attempting to get surgery moved up. Will notify is if able. Post cath site stable no drainage or hematome. Patient had an allergic reaction to skin prep in area causing itching, and redness which have largely improved since procedure. Patient reports increased fatigue over last few weeks.  Improved lower extremity edema. Shortness of breath continues. Patient inquiring about reducing diuretic. Plan to attempt with close symptom monitoring and to resume should dyspnea, edema or weight gain occur.   The patient is tolerating medications with no reported side effects.     Labs:      Lab Results   Component Value Date    PT 13.6 12/05/2013    INR 1.34 (H) 02/23/2024    INR 1.0 04/28/2015    INR 1.0 02/10/2015       Assessment/Plan:  Patient Active Problem List   Diagnosis    Benign non-nodular prostatic hyperplasia without lower urinary tract symptoms    Hyperaldosteronism (HCC)    Essential hypertension    Acute idiopathic gout involving toe of left foot    Angioedema    Purpura (HCC)    Chronic renal disease, stage III (HCC)    Aortic atherosclerosis (HCC)    Community acquired pneumonia, unspecified laterality    Anemia       Is this a shared or split note between Advanced Practice Provider and Physician? Yes    Assessment/Plan    Anemia  Hgb 7 hematocrit 22.7  - GI workup  - Transfuse to keep Hgb >7    HFpEF  moderate to severe mitral regurgitation  pulmonary hypertension   - On furosemide 20 mg bid - hold for CEDRICK  - labs:  add on BNP  - monitor kidney function   - telemetry  - daily weights  - strict I & O  - CV surgery to follow     Atrial Fibrillation  - rates controlled with BB - continue   - Hold eliquis given Hgb 7.     HTN  Hyperaldosteronism   - Bp controlled   - hold  eplerenone 50mg given CEDRICK  - Hold losartan 100mg given CEDRICK  - continue metoprolol ER 50mg   - continue amlodipine 5mg   - titrate amlodipine and metoprolol to maintain BP <140/90    CEDRICK on CKD   - in the setting of acute anemia.  - Cr 2.16  - Cr at baseline 1.56 -> last Cr 1.64  - Hold lasix, eplerenone, losartan as above      MILDRED Burroughs  2/23/2024  12:31 PM    I saw and examined the patient and agree with the note above.    MDM:  Hold DOAC.  Hold Lasix  Eval anemia    Petar Simon MD

## 2024-02-23 NOTE — CONSULTS
GASTROENTEROLOGY CONSULTATION  Francesco Hamm MD    Department of Gastroenterology  [facility]    Wagner Sheridan Patient Status:  Inpatient    1945 MRN [id number]   Location [address] Attending Edil Salgado MD   Hosp Day # 0 PCP Edil Roy MD     Reason for Consultation:  Iron deficiency anemia     History of Present Illness:  Wagner Sheridan is a a(n) 78 year old male with a hx of CKD, HTN, a fib on eliquis, CHF, severe MR with pulmonary HTN, with plan for MVR on 3/6.  GI consult requested to evaluate an iron deficiency anemia, with hgb 7, with low iron levels.  No GI complaints at present - no rectal bleeding or melena.  Last colonoscopy about 5 years ago.    Pt took eliquis today.    History:  Past Medical History:   Diagnosis Date    Arrhythmia     BPH     Encounter for incision and drainage procedure 2021    High blood pressure     High cholesterol     Hyperaldosteronism (HCC)     Mitral regurgitation     Osteoarthrosis, unspecified whether generalized or localized, unspecified site     PMR (polymyalgia rheumatica) (HCC)     off corticosteroids since     Retention of urine     Shortness of breath      Past Surgical History:   Procedure Laterality Date    KNEE SURGERY      OTHER SURGICAL HISTORY  09    flow , Dr. Tavera    OTHER SURGICAL HISTORY  09    flow  Dr. Tavera    OTHER SURGICAL HISTORY  11    PNB - Dr. Tavera    OTHER SURGICAL HISTORY  13    PVP - Dr. Tavera    OTHER SURGICAL HISTORY  2/3/16     Flow      OTHER SURGICAL HISTORY  2020    cysto- Dr. Tavera    SKIN SURGERY  10/01/2019    MMS/R nasal ala /BCC done by [facility]    TONSILLECTOMY      TOTAL KNEE REPLACEMENT Right 2015    Right total knee replacement 2015    TOTAL KNEE REPLACEMENT Left 2015     Left knee total replacement 2015      Family History   Problem Relation Age of Onset    Hypertension Father     Heart Disorder Father     Hypertension Mother      Diabetes Sister       reports that he has quit smoking. His smoking use included cigarettes. He has a 2.5 pack-year smoking history. He has never used smokeless tobacco. He reports current alcohol use. He reports that he does not use drugs.    Allergies:  Allergies   Allergen Reactions    Amoxicillin HIVES    Clindamycin HIVES    Penicillins HIVES    Wasp Venom RESPIRATORY FAILURE    Wasp Venom Protein RESPIRATORY FAILURE    Benazepril Coughing    Chlorhexidine RASH and OTHER (SEE COMMENTS)     redness       Medications:    Current Facility-Administered Medications:     amLODIPine (Norvasc) tab 5 mg, 5 mg, Oral, BID    metoprolol succinate ER (Toprol XL) 24 hr tab 50 mg, 50 mg, Oral, BID    finasteride (Proscar) tab 5 mg, 5 mg, Oral, Nightly    traZODone (Desyrel) tab 50 mg, 50 mg, Oral, Nightly    Review of Systems:  Gastrointestinal: See above  General: Denies fatigue, chills/fever, night sweats, weight loss, loss of appetite, weight gain, sleep disturbance.  Cardiovascular: Denies history of heart murmur, chest pain or angina.  Respiratory: Denies shortness of breath, chronic/frequent hoarseness, wheezing, chronic cough, cough up sputum.  Genitourinary: Denies kidney stones, painful/difficult urination, frequent urinary infections, frequent urination, blood in urine, incontinence, kidney failure.  Psychosocial: noncontributory  Neuro: no tremor, dysarthria, gait disturbance  Skin: Denies severe itching, unusual moles, rash, flushing, change in hair or nails.  Bone/joint: No joint pain or inflammation  Heme/Lymphatic: Denies easy bruising, anemia, excessive bleeding, enlarging or painful lymph nodes.  Allergy: Denies medication allergy, latex/rubber allergy, anaphylactic or other reaction to anesthesia, food allergy.   Eyes: Denies blurred/double vision, eye disease, glasses or contacts, glaucoma.  ENT: Denies nose or gums bleeding, mouth sores, bad breath or bad taste in mouth, hearing loss.  Physical Exam:     Blood pressure 123/55, pulse 79, temperature 97.7 °F (36.5 °C), temperature source Oral, resp. rate 16, SpO2 98%.    General: Appears alert, oriented x3 and in no acute distress.  HEENT: Normal. No neck vein distention. Thyroid not enlarged.  No lymphadenopathy.  CV: S1 and S2 normal.  No murmurs or gallops.  Lungs: Clear to auscultation.  Abdomen: Soft and nondistended.  Nontender.  No masses.  Bowel sounds are present.  Back: No CVA tenderness.  Extremities: No edema, cyanosis, or clubbing.  Skin: Warm and dry.  Rectal: deferred  Laboratory Data:  Lab Results   Component Value Date    B12 432 02/23/2024     Component      Latest Ref Rng 2/23/2024   Iron, Serum      65 - 175 ug/dL 20 (L)    Transferrin      200 - 360 mg/dL 237    Iron Bind.Cap.(TIBC)      240 - 450 ug/dL 353    Iron Saturation      20 - 50 % 6 (L)    FERRITIN      30.0 - 530.0 ng/mL 13.3 (L)      Component      Latest Ref Rng 2/23/2024   WBC      4.0 - 11.0 x10(3) uL 6.0    RBC      3.80 - 5.80 x10(6)uL 2.44 (L)    Hemoglobin      13.0 - 17.5 g/dL 7.0 (L)    Hematocrit      39.0 - 53.0 % 22.7 (L)    Platelet Count      150.0 - 450.0 10(3)uL 207.0    MCV      80.0 - 100.0 fL 93.0        Assessment:    Iron deficiency anemia   The patient presents with iron deficiency anemia of unclear etiology.  Differential diagnosis includes iron malabsorption vs occult gastrointestinal blood loss from the upper or lower GI tract.  Evaluation will be performed to rule out celiac disease, colorectal / gastroesophageal cancer, peptic ulcer disease, esophogitis, AVMs of the upper or lower GI tract.     The pt is presently on eliquis with an elevated creatinine.  Eliquis taken today.    Plan:  1. Colonoscopy and EGD may be done after holding eliquis three full days - which would be 2/27 if eliquis is held.  This may be arranged as an outpt.            2. If the above work up is negative, will consider capsule endoscopy to rule out small bowel sources of GI  bleeding.            3. Hold eliquis.            4. General diet.     Cc: Dr. Salgado      Thank you for allowing to participate in the care of this patient.    Francesco Hamm MD  2/23/2024  2:50 PM

## 2024-02-23 NOTE — PROGRESS NOTES
Upon review of pre op labs hgb noted to be 7.0 down from 13 in January, this after xeralto started for afib, pt is very sob with minimal activity.  D/w Dr. Hansen, will admit patient for work up including GI.  D/w patient and family, awaiting bed placement, if unable to admit direct will admit through ER, would like to avoid ER visit.  Gail Nicole RN  Clinical Coordinator  [facility]

## 2024-02-23 NOTE — H&P
[facility] Hospitalist Consult Note     Wagner Sheridan Patient Status:  Inpatient    1945 MRN [id number]   Location [facility] 0SW-A Attending Edil Salgado MD   Hosp Day # 0 PCP Edil Roy MD     Consult: acute anemia     Consulting Provider: Edil Salgado MD    History of Present Illness: Wagner Sheridan is a 78 year old male with HTN/HL, moderate to severe MR, A-fib on Eliquis who presented as a direct admission for evaluation of acute anemia on outpatient preoperative labs. Routine outpatient labs showed Hgb  7.0 (13.6 on 24), Cr 2.16 (1.64 on 24). He denies any recent trauma, bleeding or bruising events     Patient has been endorsing dyspnea with minimal activity that has had darker stools though does not think they have been black or tarry. He denies any other trauma events or bleeding.  Following arrival to the floor he denies any new or worsening symptoms.     Past Medical History:  Past Medical History:   Diagnosis Date    Arrhythmia     BPH     Encounter for incision and drainage procedure 2021    High blood pressure     High cholesterol     Hyperaldosteronism (HCC)     Mitral regurgitation     Osteoarthrosis, unspecified whether generalized or localized, unspecified site     PMR (polymyalgia rheumatica) (HCC) 2012    off corticosteroids since 2013    Retention of urine     Shortness of breath         Past Surgical History:   Past Surgical History:   Procedure Laterality Date    KNEE SURGERY      OTHER SURGICAL HISTORY  09    [facility], Dr. Tavera    OTHER SURGICAL HISTORY  09    [facility] Dr. Tavera    OTHER SURGICAL HISTORY  11    PNB - Dr. Tavera    OTHER SURGICAL HISTORY  13    PVP - Dr. Tavera    OTHER SURGICAL HISTORY  2/3/16     [facility]     OTHER SURGICAL HISTORY  2020    cysto- Dr. Tavera    SKIN SURGERY  10/01/2019    MMS/R nasal ala /BCC done by MM    TONSILLECTOMY      TOTAL KNEE REPLACEMENT Right 2015    Right total  knee replacement 02/25/2015    TOTAL KNEE REPLACEMENT Left 05/11/2015     Left knee total replacement 05/11/2015        Social History:  reports that he has quit smoking. His smoking use included cigarettes. He has a 2.5 pack-year smoking history. He has never used smokeless tobacco. He reports current alcohol use. He reports that he does not use drugs.    Family History:   Family History   Problem Relation Age of Onset    Hypertension Father     Heart Disorder Father     Hypertension Mother     Diabetes Sister        Allergies:   Allergies   Allergen Reactions    Amoxicillin HIVES    Clindamycin HIVES    Penicillins HIVES    Wasp Venom RESPIRATORY FAILURE    Wasp Venom Protein RESPIRATORY FAILURE    Benazepril Coughing    Chlorhexidine RASH and OTHER (SEE COMMENTS)     redness       Medications:    Current Facility-Administered Medications on File Prior to Encounter   Medication Dose Route Frequency Provider Last Rate Last Admin    [COMPLETED] iodixanol (VISIPAQUE) 320 MG/ML injection 100 mL  100 mL Injection ONCE PRN Boy Hernandes MD   100 mL at 02/13/24 0849    [COMPLETED] lidocaine PF (Xylocaine-MPF) 1 % injection             [COMPLETED] heparin (Porcine) 5000 UNIT/ML injection             [COMPLETED] sodium chloride 0.9 % IV bolus 500 mL  500 mL Intravenous Once Boy Hernandes MD   Stopped at 02/13/24 0845    [COMPLETED] fentaNYL (Sublimaze) 50 mcg/mL injection             [COMPLETED] midazolam (Versed) 2 MG/2ML injection             [COMPLETED] sodium chloride 0.9% infusion   Intravenous On Call Oskar Luz MD   Stopped at 01/30/24 1208    [COMPLETED] benzocaine (Hurricaine/Topex) 20 % mouth spray             [COMPLETED] midazolam (Versed) 2 MG/2ML injection        7 mg at 01/30/24 1139    [COMPLETED] fentaNYL (Sublimaze) 50 mcg/mL injection        125 mcg at 01/30/24 1139    [COMPLETED] Perflutren Lipid Microsphere (DEFINITY) 6.52 MG/ML injection 1.5 mL  1.5 mL  Intravenous ONCE PRN Terrell Guzmán MD   1.5 mL at 01/10/24 1618    [COMPLETED] sodium chloride 0.9 % IV bolus 500 mL  500 mL Intravenous Once Ross Cordova MD   Stopped at 01/09/24 1503    [COMPLETED] cefTRIAXone (Rocephin) 2 g in D5W 100 mL IVPB-ADD  2 g Intravenous Once Ross Cordova MD   Stopped at 01/09/24 1437    [COMPLETED] ipratropium-albuterol (Duoneb) 0.5-2.5 (3) MG/3ML inhalation solution 3 mL  3 mL Nebulization Once Ross Cordova MD   3 mL at 01/09/24 1412    [COMPLETED] azithromycin (Zithromax) 500 mg in sodium chloride 0.9% 250mL IVPB premix  500 mg Intravenous Once Ross Cordova MD   Stopped at 01/09/24 1808    [COMPLETED] azithromycin (Zithromax) tab 500 mg  500 mg Oral Daily Terrell Guzmán MD   500 mg at 01/11/24 1401    [COMPLETED] remdesivir (Veklury) 200 mg in sodium chloride 0.9% 100 mL IVPB  200 mg Intravenous Once Ross Cordova MD   Stopped at 01/09/24 1808    [COMPLETED] dexAMETHasone PF (Decadron) 10 MG/ML injection 10 mg  10 mg Intravenous Once Ross Cordova MD   10 mg at 01/09/24 1718    [COMPLETED] heparin (Porcine) 1000 UNIT/ML injection - BOLUS IV 5,000 Units  5,000 Units Intravenous Once Ronny Stewart DO   5,000 Units at 01/09/24 2247    [COMPLETED] heparin (Porcine) 15987 units/250mL infusion INITIAL DOSE  1,000 Units/hr Intravenous Once Ronny Stewart DO   Stopped at 01/10/24 0500     Current Outpatient Medications on File Prior to Encounter   Medication Sig Dispense Refill    furosemide 20 MG Oral Tab Take 1 tablet (20 mg total) by mouth at bedtime.      traZODone 50 MG Oral Tab Take 1 tablet (50 mg total) by mouth nightly.      atorvastatin 20 MG Oral Tab Take 1 tablet (20 mg total) by mouth nightly.      metoprolol succinate ER 50 MG Oral Tablet 24 Hr Take 1 tablet (50 mg total) by mouth in the morning and 1 tablet (50 mg total) before bedtime.      eplerenone (INSPRA) 50 MG Oral Tab Take 1 tablet (50 mg total) by mouth 2 (two) times daily. One  tablet in the morning one in the evening (Patient taking differently: Take 1 tablet (50 mg total) by mouth 2 (two) times daily.) 60 tablet 0    amLODIPine 5 MG Oral Tab Take 1 tablet (5 mg total) by mouth in the morning and 1 tablet (5 mg total) before bedtime. 60 tablet 0    apixaban 5 MG Oral Tab Take 1 tablet (5 mg total) by mouth 2 (two) times daily. 60 tablet 0    furosemide 40 MG Oral Tab Take 1 tablet (40 mg total) by mouth daily. (Patient taking differently: Take 1 tablet (40 mg total) by mouth every morning.) 30 tablet 0    finasteride 5 MG Oral Tab Take 1 tablet (5 mg total) by mouth at bedtime.      losartan 100 MG Oral Tab Take 1 tablet (100 mg total) by mouth daily. 30 tablet 11    EPINEPHrine (EPIPEN 2-JAZ) 0.3 MG/0.3ML Injection Solution Auto-injector USE AS DIRECTED 2 each 5       Review of Systems:   A comprehensive 14 point review of systems was completed.    Pertinent positives and negatives noted in the HPI.    Physical Exam:    /55 (BP Location: Left arm)   Pulse 79   Temp 97.7 °F (36.5 °C) (Oral)   Resp 16   SpO2 98%       General: No acute distress. Comfortable, nontoxic   HEENT: Normocephalic atraumatic. Moist mucous membranes; Sclera anicteric.  Neck: Supple, no JVD  Respiratory: Clear to auscultation bilaterally. Reg resp rate & effort, no wheezes/crackles   Cardiovascular: S1, S2. Regular rate and rhythm. No murmurs appreciable   Chest and Back: No tenderness or deformity.  Abdomen: Soft, nontender, nondistended.  Positive bowel sounds. No rebound, guarding or organomegaly.  Neurologic: No focal neurological deficits. CNII-XII grossly intact.  Musculoskeletal: Moves all extremities.  Extremities: No edema or cyanosis.  Integument: No rashes or lesions.   Psychiatric: Appropriate mood and affect.      Diagnostic Data:      Labs:  Recent Labs   Lab 02/23/24  0907   WBC 6.0   HGB 7.0*   MCV 93.0   .0   INR 1.34*       Recent Labs   Lab 02/23/24  0907   *   BUN 40*    CREATSERUM 2.16*   CA 9.4   ALB 3.2*      K 3.9   *   CO2 23.0   ALKPHO 67   AST 10*   ALT 14*   BILT 0.6   TP 6.0*       CrCl cannot be calculated (Unknown ideal weight.).    Recent Labs   Lab 02/23/24  0907   PTP 16.7*   INR 1.34*       No results for input(s): \"TROP\", \"CK\" in the last 168 hours.    Imaging: Imaging data reviewed in Epic.      ASSESSMENT / PLAN:     Wagner Sheridan Is a a 78 year old male who presents with acute anemia and CEDRICK on CKD3    Problem List / Diagnoses    Acute Anemia  CEDRICK on CKD3  Severe MR   Paroxysmal Afib   BPH  HTN    Recommendations    Acute Anemia  -- Hgb 7 from 13  -- pt denies active bleeding, suspect GI source  -- hold anticoagulation/antiplatelet agents  -- given trend, will transfuse to maintain goal Hgb > 7   -- GI consulted, plan discussed, anticipate will need EGD/Colonoscopy once off apixaban 72 hours    CEDRICK on CKD3  -- Cr 2.1 on admission, prev baseline 1.6   -- likely secondary to anemia  -- Outpatient UA from 2/23 normal, no need to repeat   -- renally dose all meds, avoid nephrotoxic agents    Severe MR   -- management per [facility] Surgery  -- planned for repair 3/6/24     Paroxysmal Afib   -- holding eliquis per above  -- monitor on telemetry  -- resume home beta bocker     BPH   -- resume finasteride    HTN  -- resume amlodipine     DVT Ppx: SCDs; no heparin given anemia   Code Status: FULL     Dispo: stable on floor; [facility] Hospitalist will continue to follow while admitted.     Plan of care discussed with patient and/or family at bedside.     TERENCE Harrison MD  [facility]   [phone]

## 2024-02-24 LAB
ANION GAP SERPL CALC-SCNC: 3 MMOL/L (ref 0–18)
BASOPHILS # BLD AUTO: 0.04 X10(3) UL (ref 0–0.2)
BASOPHILS NFR BLD AUTO: 0.6 %
BUN BLD-MCNC: 36 MG/DL (ref 9–23)
CALCIUM BLD-MCNC: 9.9 MG/DL (ref 8.5–10.1)
CHLORIDE SERPL-SCNC: 114 MMOL/L (ref 98–112)
CO2 SERPL-SCNC: 24 MMOL/L (ref 21–32)
CREAT BLD-MCNC: 1.86 MG/DL
EGFRCR SERPLBLD CKD-EPI 2021: 36 ML/MIN/1.73M2 (ref 60–?)
EOSINOPHIL # BLD AUTO: 0.33 X10(3) UL (ref 0–0.7)
EOSINOPHIL NFR BLD AUTO: 5 %
ERYTHROCYTE [DISTWIDTH] IN BLOOD BY AUTOMATED COUNT: 15.6 %
GLUCOSE BLD-MCNC: 130 MG/DL (ref 70–99)
HCT VFR BLD AUTO: 25 %
HGB BLD-MCNC: 7.3 G/DL
HGB BLD-MCNC: 7.5 G/DL
IMM GRANULOCYTES # BLD AUTO: 0.06 X10(3) UL (ref 0–1)
IMM GRANULOCYTES NFR BLD: 0.9 %
LYMPHOCYTES # BLD AUTO: 1.27 X10(3) UL (ref 1–4)
LYMPHOCYTES NFR BLD AUTO: 19.2 %
MCH RBC QN AUTO: 28.3 PG (ref 26–34)
MCHC RBC AUTO-ENTMCNC: 30 G/DL (ref 31–37)
MCV RBC AUTO: 94.3 FL
MONOCYTES # BLD AUTO: 0.64 X10(3) UL (ref 0.1–1)
MONOCYTES NFR BLD AUTO: 9.7 %
NEUTROPHILS # BLD AUTO: 4.27 X10 (3) UL (ref 1.5–7.7)
NEUTROPHILS # BLD AUTO: 4.27 X10(3) UL (ref 1.5–7.7)
NEUTROPHILS NFR BLD AUTO: 64.6 %
OSMOLALITY SERPL CALC.SUM OF ELEC: 302 MOSM/KG (ref 275–295)
PLATELET # BLD AUTO: 198 10(3)UL (ref 150–450)
POTASSIUM SERPL-SCNC: 4.1 MMOL/L (ref 3.5–5.1)
RBC # BLD AUTO: 2.65 X10(6)UL
SODIUM SERPL-SCNC: 141 MMOL/L (ref 136–145)
WBC # BLD AUTO: 6.6 X10(3) UL (ref 4–11)

## 2024-02-24 PROCEDURE — 85018 HEMOGLOBIN: CPT | Performed by: HOSPITALIST

## 2024-02-24 PROCEDURE — 80048 BASIC METABOLIC PNL TOTAL CA: CPT | Performed by: HOSPITALIST

## 2024-02-24 PROCEDURE — 85025 COMPLETE CBC W/AUTO DIFF WBC: CPT | Performed by: HOSPITALIST

## 2024-02-24 NOTE — PLAN OF CARE
1930: AOx4. Blood transfusion on going. No fever. NSR on tele.   2000: blood transfusion done. VS stable. No fever.   2100: Rpt Hgb 7.3. Denies any pain or any weakness.   0600: did not sleep much the whole night.     Problem: Patient/Family Goals  Goal: Patient/Family Long Term Goal  Description: Patient's Long Term Goal: DC home    Interventions:  - monitor Hgb/labs, intake and output and VS  - administer meds as ordered  - Telemetry  - See additional Care Plan goals for specific interventions  Outcome: Progressing  Goal: Patient/Family Short Term Goal  Description: Patient's Short Term Goal: VS stable    Interventions:   - monitor Hgb/labs, intake and output and VS  - administer meds as ordered  - Telemetry    - See additional Care Plan goals for specific interventions  Outcome: Progressing     Problem: CARDIOVASCULAR - ADULT  Goal: Absence of cardiac arrhythmias or at baseline  Description: INTERVENTIONS:  - Continuous cardiac monitoring, monitor vital signs, obtain 12 lead EKG if indicated  - Evaluate effectiveness of antiarrhythmic and heart rate control medications as ordered  - Initiate emergency measures for life threatening arrhythmias  - Monitor electrolytes and administer replacement therapy as ordered  Outcome: Progressing     Problem: METABOLIC/FLUID AND ELECTROLYTES - ADULT  Goal: Electrolytes maintained within normal limits  Description: INTERVENTIONS:  - Monitor labs and rhythm and assess patient for signs and symptoms of electrolyte imbalances  - Administer electrolyte replacement as ordered  - Monitor response to electrolyte replacements, including rhythm and repeat lab results as appropriate  - Fluid restriction as ordered  - Instruct patient on fluid and nutrition restrictions as appropriate  Outcome: Progressing  Goal: Hemodynamic stability and optimal renal function maintained  Description: INTERVENTIONS:  - Monitor labs and assess for signs and symptoms of volume excess or deficit  - Monitor  intake, output and patient weight  - Monitor urine specific gravity, serum osmolarity and serum sodium as indicated or ordered  - Monitor response to interventions for patient's volume status, including labs, urine output, blood pressure (other measures as available)  - Encourage oral intake as appropriate  - Instruct patient on fluid and nutrition restrictions as appropriate  Outcome: Progressing

## 2024-02-24 NOTE — CM/SW NOTE
Late entry from 2/23/24:    CM met with patient and family in Pre-Admission Testing dept. prior to scheduled procedure with Dr. Hansen on 3/6.  Patient stated he is retired and lives in a one story home with his spouse Myriam.  Patient still drives and does not use an assistive device for ambulation.  CM discussed home health services for discharge and provided patient with [facility] SMART transitions brochure for review.  Patient agreeable to home health services for discharge, [facility]  liaison updated.  Care coordination team to follow up with patient post procedure.     Claire Montilla RN Case Manager [phone]

## 2024-02-24 NOTE — PROGRESS NOTES
[facility]    Wagner Sheridan Patient Status:  Inpatient    1945 MRN [id number]   Location [facility] 0SW-A Attending Edil Salgado MD   Hosp Day # 1 PCP Edil Roy MD     Date of Admission:  2024  Date of Consult:  2024  Outpatient cardiologist: Boy Hernandes MD    Subjective:   No acute events. Hgb stable overnight. No CP or SOB.     History of Present Illness:    Wagner Sheridan is a a(n) 78 year old male with PMH CKD, HTN, hyperaldosteronism, pAF- on eliquis, HFpEF, severe MR with pulmonary hypertension currently being worked up by CV surgery for mitral valve repair and possibly tricuspid valve repair. Was tentatively planned for 2024 but today with preop labs was noted to have hgb 7.0. directly admitted for workup.     History:  Past Medical History:   Diagnosis Date    Arrhythmia     BPH     Encounter for incision and drainage procedure 2021    High blood pressure     High cholesterol     Hyperaldosteronism (HCC)     Mitral regurgitation     Osteoarthrosis, unspecified whether generalized or localized, unspecified site     PMR (polymyalgia rheumatica) (HCC) 2012    off corticosteroids since 2013    Retention of urine     Shortness of breath      Past Surgical History:   Procedure Laterality Date    KNEE SURGERY      OTHER SURGICAL HISTORY  09    [facility], Dr. Tavera    OTHER SURGICAL HISTORY  09    flow  Dr. Tavera    OTHER SURGICAL HISTORY  11    PNB - Dr. Tavera    OTHER SURGICAL HISTORY  13    PVP - Dr. Tavera    OTHER SURGICAL HISTORY  2/3/16     [facility]     OTHER SURGICAL HISTORY  2020    cysto- Dr. Tavera    SKIN SURGERY  10/01/2019    MMS/R nasal ala /BCC done by [facility]    TONSILLECTOMY      TOTAL KNEE REPLACEMENT Right 2015    Right total knee replacement 2015    TOTAL KNEE REPLACEMENT Left 2015     Left knee total replacement 2015      Family History   Problem Relation Age of Onset    Hypertension  Father     Heart Disorder Father     Hypertension Mother     Diabetes Sister       reports that he has quit smoking. His smoking use included cigarettes. He has a 2.5 pack-year smoking history. He has never used smokeless tobacco. He reports current alcohol use. He reports that he does not use drugs.    Allergies:  Allergies   Allergen Reactions    Amoxicillin HIVES    Clindamycin HIVES    Penicillins HIVES    Wasp Venom RESPIRATORY FAILURE    Wasp Venom Protein RESPIRATORY FAILURE    Benazepril Coughing    Chlorhexidine RASH and OTHER (SEE COMMENTS)     redness       Medications:    Current Facility-Administered Medications:     iron sucrose (Venofer) 20 mg/mL injection 200 mg, 200 mg, Intravenous, Daily    amLODIPine (Norvasc) tab 5 mg, 5 mg, Oral, BID    metoprolol succinate ER (Toprol XL) 24 hr tab 50 mg, 50 mg, Oral, BID    finasteride (Proscar) tab 5 mg, 5 mg, Oral, Nightly    mirtazapine (Remeron SolTab) disintegrating tab 30 mg, 30 mg, Oral, Nightly    Review of Systems:  All systems were reviewed and are negative except as described above in HPI.    Physical Exam:  Blood pressure 128/72, pulse 102, temperature 97.8 °F (36.6 °C), temperature source Oral, resp. rate 18, weight 278 lb 8 oz (126.3 kg), SpO2 96%.  Temp (24hrs), Av.1 °F (36.7 °C), Min:97.7 °F (36.5 °C), Max:98.8 °F (37.1 °C)    Wt Readings from Last 3 Encounters:   24 278 lb 8 oz (126.3 kg)   24 275 lb (124.7 kg)   24 270 lb (122.5 kg)       Telemetry: rate controlled afib     /72 (BP Location: Right arm)   Pulse 102   Temp 97.8 °F (36.6 °C) (Oral)   Resp 18   Wt 278 lb 8 oz (126.3 kg)   SpO2 96%   BMI 37.77 kg/m²   General: On physical examination this is a well-developed, well nourished, healthy appearing patient sitting comfortably in the exam room.   HEENT: Normocephalic and atraumatic. No scleral icterus. Oral mucosa well hydrated.   Skin: Intact without lesions.   Neck: Carotid upstrokes were normal with no  carotid bruits. No cervical lymphadenopathy.   Lungs: Clear to auscultation bilaterally; no accessory muscle use is noted.   Cardiac: Irreg/irreg. 3/6 holosystolic murmur at LLSB and apex.   Abdomen: Soft, non-tender, non-distended and without masses.   Extremities: No cyanosis, clubbing or edema. Peripheral pulses were normal.   Psychiatric: Normal mood and affect; answers questions appropriately  Neurologic: Alert and oriented without motor deficits.       Laboratories and Data:  Diagnostics:    2/23/24    Atrial fibrillation   Nonspecific ST abnormality   Abnormal ECG   When compared with ECG of 30-JAN-2024 11:48,   Atrial fibrillation has replaced Sinus rhythm     1/30/2024: Cardioversion: STAN  CONCLUSIONS:   1. There is flail of the P3 scallop of the mitral valve. The mitral valve has severe, 4+ eccentric (laterally directed) regurgitation.   2. The tricuspid valve has annular enlargement, with central malcoaptation. The tricuspid valve has moderate, centrally directed, regurgitation.  3. The left ventricle appears normal in size, thickness, and systolic function. The estimated left ventricular ejection fraction is 60%  4. A patent foramen ovale is present (tunnel length 34 mm). There is left-to-right shunting across the patent foramen ovale.  2/13/2024:   R+L heart cath in anticipation of surgical repair of MV and TV  IMPRESSION:   1. Hemodynamics show moderate pulmonary hypertension and right and left ventricular diastolic dysfunction.   2. Mitral regurgitation difficult to visualize on the left ventriculogram; however, it has been graded as severe on the transesophageal echo. V-wave seen on the wedge tracing is quite high at 40, which is consistent with severe mitral regurgitation.   3. Coronary disease as described above involving the distal left anterior descending, which is a fairly diminutive vessel.     Labs:   Lab Results   Component Value Date    WBC 6.6 02/24/2024    RBC 2.65 02/24/2024    HGB 7.5  02/24/2024    HCT 25.0 02/24/2024    MCV 94.3 02/24/2024    MCH 28.3 02/24/2024    MCHC 30.0 02/24/2024    RDW 15.6 02/24/2024    .0 02/24/2024     Lab Results   Component Value Date    PT 13.6 12/05/2013    INR 1.34 (H) 02/23/2024    INR 1.0 04/28/2015    INR 1.0 02/10/2015       Assessment/Plan:  Patient Active Problem List   Diagnosis    Benign non-nodular prostatic hyperplasia without lower urinary tract symptoms    Hyperaldosteronism (HCC)    Essential hypertension    Acute idiopathic gout involving toe of left foot    Angioedema    Purpura (HCC)    Chronic renal disease, stage III (HCC)    Aortic atherosclerosis (HCC)    Community acquired pneumonia, unspecified laterality    Anemia     Assessment/Plan    Anemia  Hgb 7 hematocrit 22.7  - GI workup  - Transfuse to keep Hgb >7  - Planning for endoscopy on Tuesday.     HFpEF  moderate to severe mitral regurgitation  pulmonary hypertension   - Compensated. Holding furosemide with CEDRICK.     Atrial Fibrillation  - rates controlled with BB - continue   - Hold eliquis given Hgb 7.     HTN  Hyperaldosteronism   - Bp controlled   - hold  eplerenone 50mg given CEDRICK  - Hold losartan 100mg given CEDRICK  - continue metoprolol ER 50mg   - continue amlodipine 5mg   - Will aim to restart epleronone and losartan as renal function improves.     CEDRICK on CKD   - in the setting of acute anemia.  - Improving. Holding meds as above.     Cardiology to follow.

## 2024-02-24 NOTE — PLAN OF CARE
Direct admit from cardiology office  Received pt alert x 4.   On room air.   On tele, VSS in Afib   I unit blood infusing per Dr order    Pt with no complaints  Cardiology, GI, CV on case  Bed in low position,  Call lightl within reach  Family at bedside.

## 2024-02-24 NOTE — PROGRESS NOTES
[facility]  Progress Note    Wagner Sheridan Patient Status:  Inpatient    1945 MRN [id number]   Location [facility] 0SW-A Attending Eidl Salgado MD   Hosp Day # 1 PCP Edil Roy MD     Subjective:  No Gi bleeding overnight.        Current Facility-Administered Medications:     iron sucrose (Venofer) 20 mg/mL injection 200 mg, 200 mg, Intravenous, Daily    amLODIPine (Norvasc) tab 5 mg, 5 mg, Oral, BID    metoprolol succinate ER (Toprol XL) 24 hr tab 50 mg, 50 mg, Oral, BID    finasteride (Proscar) tab 5 mg, 5 mg, Oral, Nightly    mirtazapine (Remeron SolTab) disintegrating tab 30 mg, 30 mg, Oral, Nightly    Past Medical History:   Diagnosis Date    Arrhythmia     BPH     Encounter for incision and drainage procedure 2021    High blood pressure     High cholesterol     Hyperaldosteronism (HCC)     Mitral regurgitation     Osteoarthrosis, unspecified whether generalized or localized, unspecified site     PMR (polymyalgia rheumatica) (HCC)     off corticosteroids since     Retention of urine     Shortness of breath      Past Surgical History:   Procedure Laterality Date    KNEE SURGERY      OTHER SURGICAL HISTORY  09    flow , Dr. Tavera    OTHER SURGICAL HISTORY  09    flow  Dr. Tavera    OTHER SURGICAL HISTORY  11    PNB - Dr. Tavera    OTHER SURGICAL HISTORY  13    PVP - Dr. Tavera    OTHER SURGICAL HISTORY  2/3/16     [facility]     OTHER SURGICAL HISTORY  2020    cysto- Dr. Tavera    SKIN SURGERY  10/01/2019    MMS/R nasal ala /BCC done by MM    TONSILLECTOMY      TOTAL KNEE REPLACEMENT Right 2015    Right total knee replacement 2015    TOTAL KNEE REPLACEMENT Left 2015     Left knee total replacement 2015          Objective:  Blood pressure 128/72, pulse 102, temperature 97.8 °F (36.6 °C), temperature source Oral, resp. rate 18, weight 278 lb 8 oz (126.3 kg), SpO2 96%.      EXAM:  /72 (BP Location: Right arm)   Pulse  102   Temp 97.8 °F (36.6 °C) (Oral)   Resp 18   Wt 278 lb 8 oz (126.3 kg)   SpO2 96%   BMI 37.77 kg/m²   GENERAL: well developed, well nourished, in no apparent distress  HEENT: atraumatic, normocephalic  CHEST: no chest tenderness  LUNGS: clear to auscultation  CARDIO: RRR without murmur  GI: good BS's and no masses, HSM or tenderness  EXTREMITIES: no cyanosis, clubbing or edema      Labs:   Lab Results   Component Value Date    WBC 6.3 02/23/2024    HGB 7.3 02/23/2024    HCT 21.5 02/23/2024    .0 02/23/2024    B12 432 02/23/2024       Assessment:    Iron deficiency anemia                The patient presents with iron deficiency anemia of unclear etiology.  Differential diagnosis includes iron malabsorption vs occult gastrointestinal blood loss from the upper or lower GI tract.  Evaluation will be performed to rule out celiac disease, colorectal / gastroesophageal cancer, peptic ulcer disease, esophogitis, AVMs of the upper or lower GI tract.                  The pt is presently on eliquis with an elevated creatinine.  Eliquis taken on 2/23.     Plan:  1. Colonoscopy and EGD may be done after holding eliquis three full days - which would be 2/27 if eliquis is held.  This may be arranged as an outpt.            2. If the above work up is negative, will consider capsule endoscopy to rule out small bowel sources of GI bleeding.            3. Hold eliquis for 3 full days prior to the endoscopies - tentative plan for endoscopies on 2/27.  If pt is discharged to home, the procedures will need to be scheduled as an oupt.            4. General diet.     Francesco Hamm MD  2/24/2024  9:12 AM

## 2024-02-24 NOTE — PROGRESS NOTES
[facility]    Progress Note     Wagner Sheridan Patient Status:  Inpatient    1945 MRN [id number]   Location [facility] 0SW-A Attending Edil Salgado MD   Hosp Day # 1 PCP Edil Roy MD     Follow up for: There were no encounter diagnoses.      Interval History/Subjective:     Transfused 1u pRBC, did not respond appropriately   JESUS overnight  Afebrile, HDS    Breathing improved following transfusion.  No further melena or hematochezia.  No other worsening symptoms.  Anxious to go home    Vital signs:  Temp:  [97.7 °F (36.5 °C)-98.8 °F (37.1 °C)] 97.8 °F (36.6 °C)  Pulse:  [71-89] 76  Resp:  [16-22] 18  BP: (114-128)/(55-78) 128/72  SpO2:  [85 %-98 %] 92 %    Physical Exam:    General: NAD, Comfortable, Nontoxic   Respiratory: CTAB; reg resp rate & effort, no wheezes/crackles  Cardiovascular: S1, S2. Regular rate and rhythm. No murmurs appreciated  Abdomen: Soft, NTND, no guarding/rebound   Neurologic: No focal neurological deficits.   Extremities: No edema.  Skin: Dry, no rashes, ulcers or lesions     Diagnostic Data:      Labs:  Recent Labs   Lab 24  0907 24  1857 24   WBC 6.0 6.3  --    HGB 7.0* 7.0* 7.3*   MCV 93.0 90.0  --    .0 192.0  --    INR 1.34*  --   --        Recent Labs   Lab 24  0907   *   BUN 40*   CREATSERUM 2.16*   CA 9.4   ALB 3.2*      K 3.9   *   CO2 23.0   ALKPHO 67   AST 10*   ALT 14*   BILT 0.6   TP 6.0*       Recent Labs   Lab 24  0907   PTP 16.7*   INR 1.34*       No results for input(s): \"TROP\", \"CK\" in the last 168 hours.         Imaging: Imaging data reviewed in Epic.    Medications:    amLODIPine  5 mg Oral BID    metoprolol succinate ER  50 mg Oral BID    finasteride  5 mg Oral Nightly    mirtazapine  30 mg Oral Nightly       ASSESSMENT / PLAN:     Wagner Sheridan Is a a 78 year old male who presents with acute anemia and CEDRICK on CKD3     Problem List / Diagnoses     Acute Anemia  CEDRICK on CKD3  Severe MR    Paroxysmal Afib   BPH  HTN     Recommendations     Acute Anemia  -- Admission Hgb 7 from 13 with modest improvement to 7.5 following transfusion   -- pt denies active bleeding, suspect GI source  -- continue holding hold anticoagulation/antiplatelet agents  -- given will transfuse to maintain goal Hgb > 7   -- GI consulted, plan discussed, anticipate will need EGD/Colonoscopy once off apixaban 72 hours, tentatively Tuesday      CEDRICK on CKD3  -- Cr 2.1 on admission, prev baseline 1.6   -- likely secondary to anemia  -- improved to 1.86 following transfusion, cpm   -- Outpatient UA from 2/23 normal, no need to repeat   -- renally dose all meds, avoid nephrotoxic agents     Severe MR   -- management per CV Surgery  -- planned for repair 3/6/24      Paroxysmal Afib   -- holding eliquis per above  -- monitor on telemetry  -- resume home beta bocker      BPH   -- resume finasteride     HTN  -- resume amlodipine     DVT Mechanical Prophylaxis: BARBARA hose, SCDs,    DVT Pharmacologic Prophylaxis   Medication   None              Code Status: Prior     Dispo: stable on floor, will follow     Plan of care discussed with patient and/or family at bedside.    N Ronny Harrison MD  [facility]   [phone]      This note was created using voice recognition technology. It may include inadvertent transcriptional errors. Any such errors should be contextually interpreted and should not be taken to alter the content or the meaning.     Note to Patient: The 21st Century Cures Act makes medical notes like these available to patients in the interest of transparency. However, be advised this is a medical document. It is intended as peer to peer communication. It is written in medical language and may contain abbreviations or verbiage that are unfamiliar. It may appear blunt or direct. Medical documents are intended to carry relevant information, facts as evident, and the clinical opinion of the practitioner and not intended to be  the primary source of your information.  Please refer directly to myself or clinical staff for information regarding plan of care.

## 2024-02-25 LAB
ANION GAP SERPL CALC-SCNC: 3 MMOL/L (ref 0–18)
BASOPHILS # BLD AUTO: 0.08 X10(3) UL (ref 0–0.2)
BASOPHILS NFR BLD AUTO: 1 %
BLOOD TYPE BARCODE: 5100
BUN BLD-MCNC: 35 MG/DL (ref 9–23)
CALCIUM BLD-MCNC: 10 MG/DL (ref 8.5–10.1)
CHLORIDE SERPL-SCNC: 114 MMOL/L (ref 98–112)
CO2 SERPL-SCNC: 23 MMOL/L (ref 21–32)
CREAT BLD-MCNC: 1.69 MG/DL
EGFRCR SERPLBLD CKD-EPI 2021: 41 ML/MIN/1.73M2 (ref 60–?)
EOSINOPHIL # BLD AUTO: 0.53 X10(3) UL (ref 0–0.7)
EOSINOPHIL NFR BLD AUTO: 6.6 %
ERYTHROCYTE [DISTWIDTH] IN BLOOD BY AUTOMATED COUNT: 15.7 %
GLUCOSE BLD-MCNC: 90 MG/DL (ref 70–99)
HCT VFR BLD AUTO: 24.5 %
HGB BLD-MCNC: 7.6 G/DL
IMM GRANULOCYTES # BLD AUTO: 0.06 X10(3) UL (ref 0–1)
IMM GRANULOCYTES NFR BLD: 0.7 %
LYMPHOCYTES # BLD AUTO: 1.98 X10(3) UL (ref 1–4)
LYMPHOCYTES NFR BLD AUTO: 24.7 %
MCH RBC QN AUTO: 28.7 PG (ref 26–34)
MCHC RBC AUTO-ENTMCNC: 31 G/DL (ref 31–37)
MCV RBC AUTO: 92.5 FL
MONOCYTES # BLD AUTO: 0.76 X10(3) UL (ref 0.1–1)
MONOCYTES NFR BLD AUTO: 9.5 %
NEUTROPHILS # BLD AUTO: 4.6 X10 (3) UL (ref 1.5–7.7)
NEUTROPHILS # BLD AUTO: 4.6 X10(3) UL (ref 1.5–7.7)
NEUTROPHILS NFR BLD AUTO: 57.5 %
OSMOLALITY SERPL CALC.SUM OF ELEC: 298 MOSM/KG (ref 275–295)
PLATELET # BLD AUTO: 199 10(3)UL (ref 150–450)
POTASSIUM SERPL-SCNC: 3.9 MMOL/L (ref 3.5–5.1)
RBC # BLD AUTO: 2.65 X10(6)UL
SODIUM SERPL-SCNC: 140 MMOL/L (ref 136–145)
UNIT VOLUME: 350 ML
WBC # BLD AUTO: 8 X10(3) UL (ref 4–11)

## 2024-02-25 PROCEDURE — 85025 COMPLETE CBC W/AUTO DIFF WBC: CPT | Performed by: HOSPITALIST

## 2024-02-25 PROCEDURE — 80048 BASIC METABOLIC PNL TOTAL CA: CPT | Performed by: HOSPITALIST

## 2024-02-25 RX ORDER — FUROSEMIDE 20 MG/1
20 TABLET ORAL DAILY
Status: DISCONTINUED | OUTPATIENT
Start: 2024-02-25 | End: 2024-02-28

## 2024-02-25 RX ORDER — TRAZODONE HYDROCHLORIDE 50 MG/1
50 TABLET ORAL NIGHTLY
Status: DISCONTINUED | OUTPATIENT
Start: 2024-02-25 | End: 2024-02-28

## 2024-02-25 NOTE — PROGRESS NOTES
Assumed care of pt at 0700.  Pt feeling well, no complaints of pain.  No BM's. Appetite improved. Several visitors throughout the day. Plan for EGD/Colonoscopy on Tuesday (inpatient). Monitor Hg- transfuse if less than 7.Lasix resumed- BLE edema.  Pt and wife updated on plan of care

## 2024-02-25 NOTE — PLAN OF CARE
AOx4. On room air, clear lungs. Afib on tele. Up ad carloz, has 2+ LE edema, holding Lasix for CEDRICK. Hgb 7.3 at 1600. Plan: EGD/colonoscopy Tuesday after 3D  holding of Eliquis. No bloody stool today, black tarry stool.   2200: Report given to Duong MIDDLETON for transfer to room 8600.     Problem: Patient/Family Goals  Goal: Patient/Family Long Term Goal  Description: Patient's Long Term Goal: DC home    Interventions:  - monitor Hgb/labs, intake and output and VS  - administer meds as ordered  - Telemetry  - See additional Care Plan goals for specific interventions  Outcome: Progressing  Goal: Patient/Family Short Term Goal  Description: Patient's Short Term Goal: VS stable    Interventions:   - monitor Hgb/labs, intake and output and VS  - administer meds as ordered  - Telemetry    - See additional Care Plan goals for specific interventions  Outcome: Progressing     Problem: CARDIOVASCULAR - ADULT  Goal: Absence of cardiac arrhythmias or at baseline  Description: INTERVENTIONS:  - Continuous cardiac monitoring, monitor vital signs, obtain 12 lead EKG if indicated  - Evaluate effectiveness of antiarrhythmic and heart rate control medications as ordered  - Initiate emergency measures for life threatening arrhythmias  - Monitor electrolytes and administer replacement therapy as ordered  Outcome: Progressing     Problem: METABOLIC/FLUID AND ELECTROLYTES - ADULT  Goal: Electrolytes maintained within normal limits  Description: INTERVENTIONS:  - Monitor labs and rhythm and assess patient for signs and symptoms of electrolyte imbalances  - Administer electrolyte replacement as ordered  - Monitor response to electrolyte replacements, including rhythm and repeat lab results as appropriate  - Fluid restriction as ordered  - Instruct patient on fluid and nutrition restrictions as appropriate  Outcome: Progressing  Goal: Hemodynamic stability and optimal renal function maintained  Description: INTERVENTIONS:  - Monitor labs and assess  for signs and symptoms of volume excess or deficit  - Monitor intake, output and patient weight  - Monitor urine specific gravity, serum osmolarity and serum sodium as indicated or ordered  - Monitor response to interventions for patient's volume status, including labs, urine output, blood pressure (other measures as available)  - Encourage oral intake as appropriate  - Instruct patient on fluid and nutrition restrictions as appropriate  Outcome: Progressing

## 2024-02-25 NOTE — PROGRESS NOTES
02/25/24 0141 02/25/24 0142 02/25/24 0143   Oxygen Therapy   SpO2 (!) 85 % (!) 84 % (!) 87 %      02/25/24 0144 02/25/24 0145   Oxygen Therapy   SpO2 (!) 83 % (!) 78 %     O2 sleep study. Was placed on 2L

## 2024-02-25 NOTE — PROGRESS NOTES
Patient received from [facility] via wheelchair  Oriented to room  Safety precautions initiated  Bed in low position  Call light within reach    Patient alert and oriented x 4. Up  SBA. On RA, 2L at night. Afib on tele, rate controlled. Continent of bowel and bladder. No complaints of pain, shortness of breath or chest pain/discomfort. POC : GI workup, EGD and Colonoscopy on Tuesday. Fall precautions in place. Call light within reach.

## 2024-02-25 NOTE — PROGRESS NOTES
[facility]  Progress Note    [facility]    Wagner Sheridan Patient Status:  Inpatient    1945 MRN [id number]   Location [facility] 0SW-A Attending Edil Salgado MD   Hosp Day # 2 PCP Edil Roy MD     Date of Admission:  2024  Outpatient cardiologist: Boy Hernandes MD    Subjective:   No acute events. Hgb stable overnight. No CP or SOB. C/o leg swelling, noted this morning.    History of Present Illness:    Wagner Sheridan is a a(n) 78 year old male with PMH CKD, HTN, hyperaldosteronism, pAF- on eliquis, HFpEF, severe MR with pulmonary hypertension currently being worked up by CV surgery for mitral valve repair and possibly tricuspid valve repair. Was tentatively planned for 2024 but today with preop labs was noted to have hgb 7.0. directly admitted for workup.     History:  Past Medical History:   Diagnosis Date    Arrhythmia     BPH     Encounter for incision and drainage procedure 2021    High blood pressure     High cholesterol     Hyperaldosteronism (HCC)     Mitral regurgitation     Osteoarthrosis, unspecified whether generalized or localized, unspecified site     PMR (polymyalgia rheumatica) (HCC) 2012    off corticosteroids since 2013    Retention of urine     Shortness of breath      Past Surgical History:   Procedure Laterality Date    KNEE SURGERY      OTHER SURGICAL HISTORY  09    [facility], Dr. Tavera    OTHER SURGICAL HISTORY  09    [facility] Dr. Tavera    OTHER SURGICAL HISTORY  11    PNB - Dr. Tavera    OTHER SURGICAL HISTORY  13    PVP - Dr. Tavera    OTHER SURGICAL HISTORY  2/3/16     [facility]     OTHER SURGICAL HISTORY  2020    cysto- Dr. Tavera    SKIN SURGERY  10/01/2019    MMS/R nasal ala /BCC done by [facility]    TONSILLECTOMY      TOTAL KNEE REPLACEMENT Right 2015    Right total knee replacement 2015    TOTAL KNEE REPLACEMENT Left 2015     Left knee total replacement 2015      Family History   Problem  Relation Age of Onset    Hypertension Father     Heart Disorder Father     Hypertension Mother     Diabetes Sister       reports that he has quit smoking. His smoking use included cigarettes. He has a 2.5 pack-year smoking history. He has never used smokeless tobacco. He reports current alcohol use. He reports that he does not use drugs.    Allergies:  Allergies   Allergen Reactions    Amoxicillin HIVES    Clindamycin HIVES    Penicillins HIVES    Wasp Venom RESPIRATORY FAILURE    Wasp Venom Protein RESPIRATORY FAILURE    Benazepril Coughing    Chlorhexidine RASH and OTHER (SEE COMMENTS)     redness       Medications:    Current Facility-Administered Medications:     iron sucrose (Venofer) 20 mg/mL injection 200 mg, 200 mg, Intravenous, Daily    amLODIPine (Norvasc) tab 5 mg, 5 mg, Oral, BID    metoprolol succinate ER (Toprol XL) 24 hr tab 50 mg, 50 mg, Oral, BID    finasteride (Proscar) tab 5 mg, 5 mg, Oral, Nightly    mirtazapine (Remeron Soltab) disintegrating tab 30 mg, 30 mg, Oral, Nightly    Review of Systems:  All systems were reviewed and are negative except as described above in HPI.    Physical Exam:  Blood pressure 121/77, pulse 91, temperature 98.2 °F (36.8 °C), temperature source Oral, resp. rate 19, weight 278 lb 9.6 oz (126.4 kg), SpO2 (!) 76%.  Temp (24hrs), Av.1 °F (36.7 °C), Min:97.7 °F (36.5 °C), Max:98.8 °F (37.1 °C)    Wt Readings from Last 3 Encounters:   24 278 lb 9.6 oz (126.4 kg)   24 275 lb (124.7 kg)   24 270 lb (122.5 kg)       Telemetry: rate controlled afib     /77 (BP Location: Right arm)   Pulse 91   Temp 98.2 °F (36.8 °C) (Oral)   Resp 19   Wt 278 lb 9.6 oz (126.4 kg)   SpO2 (!) 76%   BMI 37.78 kg/m²   General: On physical examination this is a well-developed, well nourished, healthy appearing patient sitting comfortably in the exam room.   HEENT: Normocephalic and atraumatic. No scleral icterus. Oral mucosa well hydrated.   Skin: Intact without  lesions.   Neck: Carotid upstrokes were normal with no carotid bruits. No cervical lymphadenopathy.   Lungs: Clear to auscultation bilaterally; no accessory muscle use is noted.   Cardiac: Irreg/irreg. 3/6 holosystolic murmur at LLSB and apex.   Abdomen: Soft, non-tender, non-distended and without masses.   Extremities: No cyanosis, clubbing. +1 pitting edema bilateral ankles, L > R. Peripheral pulses were normal.   Psychiatric: Normal mood and affect; answers questions appropriately  Neurologic: Alert and oriented without motor deficits.       Laboratories and Data:  Diagnostics:    2/23/24    Atrial fibrillation   Nonspecific ST abnormality   Abnormal ECG   When compared with ECG of 30-JAN-2024 11:48,   Atrial fibrillation has replaced Sinus rhythm     1/30/2024: Cardioversion: STAN  CONCLUSIONS:   1. There is flail of the P3 scallop of the mitral valve. The mitral valve has severe, 4+ eccentric (laterally directed) regurgitation.   2. The tricuspid valve has annular enlargement, with central malcoaptation. The tricuspid valve has moderate, centrally directed, regurgitation.  3. The left ventricle appears normal in size, thickness, and systolic function. The estimated left ventricular ejection fraction is 60%  4. A patent foramen ovale is present (tunnel length 34 mm). There is left-to-right shunting across the patent foramen ovale.  2/13/2024:   R+L heart cath in anticipation of surgical repair of MV and TV  IMPRESSION:   1. Hemodynamics show moderate pulmonary hypertension and right and left ventricular diastolic dysfunction.   2. Mitral regurgitation difficult to visualize on the left ventriculogram; however, it has been graded as severe on the transesophageal echo. V-wave seen on the wedge tracing is quite high at 40, which is consistent with severe mitral regurgitation.   3. Coronary disease as described above involving the distal left anterior descending, which is a fairly diminutive vessel.     Labs:   Lab  Results   Component Value Date    WBC 8.0 02/25/2024    RBC 2.65 02/25/2024    HGB 7.6 02/25/2024    HCT 24.5 02/25/2024    MCV 92.5 02/25/2024    MCH 28.7 02/25/2024    MCHC 31.0 02/25/2024    RDW 15.7 02/25/2024    .0 02/25/2024     Lab Results   Component Value Date    PT 13.6 12/05/2013    INR 1.34 (H) 02/23/2024    INR 1.0 04/28/2015    INR 1.0 02/10/2015       Assessment/Plan:  Patient Active Problem List   Diagnosis    Benign non-nodular prostatic hyperplasia without lower urinary tract symptoms    Hyperaldosteronism (HCC)    Essential hypertension    Acute idiopathic gout involving toe of left foot    Angioedema    Purpura (HCC)    Chronic renal disease, stage III (HCC)    Aortic atherosclerosis (HCC)    Community acquired pneumonia, unspecified laterality    Anemia     Assessment/Plan    Anemia  Hgb 7.8 hematocrit 22.7  - GI workup  - Transfuse to keep Hgb >7  - Planning for endoscopy on Tuesday.     HFpEF  moderate to severe mitral regurgitation  pulmonary hypertension   NYHA Class II  LVEF 60%  -Hold eplerenone 50mg given CEDRICK; resume tomorrow if improved renal fx  - Hold losartan 100mg given CEDRICK; resume tomorrow if improved renal fx.  - continue metoprolol ER 50mg  -+1 pitting edema bilateral ankle.  Start furosemide 20 mg daily.   -Low-sodium diet.  Fluids 1,800 mL daily.   -BMP daily  -Daily weights, I & O.  -Discussed with EMI Colon.    Atrial Fibrillation  -Telemetry shows atrial fibrillation  - rates controlled with BB - continue   - Hold eliquis given Hgb 7.     HTN  Hyperaldosteronism   - Bp controlled   - hold  eplerenone 50mg given CEDRICK  - Hold losartan 100mg given CEDRICK  - continue metoprolol ER 50mg   - continue amlodipine 5mg   - Will aim to restart epleronone and losartan as renal function improves.     CEDRICK on CKD   - in the setting of acute anemia.  - Improving. Holding meds as above.     Cardiology to follow.     Plan discussed with patient and family at bedside.  Questions answered,  they state understanding and agree with plan.    Thank you for allowing us to participate in the care of the patient.  Please do not hesitate to contact us.    CYNTHIA Jean/NAGA, CHFN  2/25/24  9:54 AM

## 2024-02-25 NOTE — PLAN OF CARE
Received pt alert x 4.   On room air.   On tele, VSS in Afib   HGB slightly improved  BM with no bleeding  Up in hallway  Pt with no complaints  Cardiology, GI, CV on case  Bed in low position,  Call lightl within reach  Family at [facility] blood infusing per Dr tay.

## 2024-02-25 NOTE — PROGRESS NOTES
[facility]    Progress Note     Wagner Sheridan Patient Status:  Inpatient    1945 MRN [id number]   Location [facility] 0SW-A Attending Edil Salgado MD   Hosp Day # 2 PCP Edil Roy MD     Follow up for: There were no encounter diagnoses.      Interval History/Subjective:     Hgb stable, Cr downtrending   JESUS overnight  Afebrile, HDS    No noted melena/hematochezia.  Otherwise feels well at baseline.  Slept poorly with mirtazapine, requesting to change back to trazodone at night    Vital signs:  Temp:  [97.7 °F (36.5 °C)-98.8 °F (37.1 °C)] 98.8 °F (37.1 °C)  Pulse:  [] 85  Resp:  [16-25] 25  BP: (101-134)/(51-80) 101/51  SpO2:  [78 %-99 %] 99 %    Physical Exam:    General: NAD, Comfortable, Nontoxic   Respiratory: CTAB; reg resp rate & effort, no wheezes/crackles  Cardiovascular: S1, S2. Regular rate and rhythm. No murmurs appreciated  Abdomen: Soft, NTND, no guarding/rebound   Neurologic: No focal neurological deficits.   Extremities: No edema.  Skin: Dry, no rashes, ulcers or lesions     Diagnostic Data:      Labs:  Recent Labs   Lab 24  0907 24  1857 24  0911 24  1632 24  0448   WBC 6.0 6.3  --  6.6  --  8.0   HGB 7.0* 7.0* 7.3* 7.5* 7.3* 7.6*   MCV 93.0 90.0  --  94.3  --  92.5   .0 192.0  --  198.0  --  199.0   INR 1.34*  --   --   --   --   --        Recent Labs   Lab 24  0907 24  0911 24  0448   * 130* 90   BUN 40* 36* 35*   CREATSERUM 2.16* 1.86* 1.69*   CA 9.4 9.9 10.0   ALB 3.2*  --   --     141 140   K 3.9 4.1 3.9   * 114* 114*   CO2 23.0 24.0 23.0   ALKPHO 67  --   --    AST 10*  --   --    ALT 14*  --   --    BILT 0.6  --   --    TP 6.0*  --   --        Recent Labs   Lab 24  0907   PTP 16.7*   INR 1.34*       No results for input(s): \"TROP\", \"CK\" in the last 168 hours.         Imaging: Imaging data reviewed in Epic.    Medications:    iron sucrose  200 mg Intravenous Daily     amLODIPine  5 mg Oral BID    metoprolol succinate ER  50 mg Oral BID    finasteride  5 mg Oral Nightly    mirtazapine  30 mg Oral Nightly       ASSESSMENT / PLAN:     Wagner Sheridan Is a a 78 year old male who presents with acute anemia and CEDRICK on CKD3     Problem List / Diagnoses     Acute Anemia  CEDRICK on CKD3  Severe MR   Paroxysmal Afib   BPH  HTN     Recommendations     Acute Anemia  -- Admission Hgb 7 from 13 with modest improvement to 7.5 following transfusion   -- pt denies active bleeding, suspect GI source  -- continue holding hold anticoagulation/antiplatelet agents  -- given will transfuse to maintain goal Hgb > 7   -- GI consulted, plan discussed, anticipate will need EGD/Colonoscopy once off apixaban 72 hours, tentatively Tuesday      CEDRICK on CKD3  -- Cr 2.1 on admission, prev baseline 1.6   -- likely secondary to anemia  -- improved to 1.69 following transfusion, CPM  -- Outpatient UA from 2/23 normal, no need to repeat   -- renally dose all meds, avoid nephrotoxic agents     Severe MR   -- management per CV Surgery  -- planned for repair 3/6/24      Paroxysmal Afib   -- holding eliquis per above  -- monitor on telemetry  -- resume home beta bocker      BPH   -- resume finasteride     HTN  -- resume amlodipine     DVT Mechanical Prophylaxis: BARBARA hose, SCDs,    DVT Pharmacologic Prophylaxis   Medication   None              Code Status: Prior     Dispo: stable on floor, will follow     Plan of care discussed with patient and/or family at bedside.    TERENCE Harrison MD  [facility]   [phone]      This note was created using voice recognition technology. It may include inadvertent transcriptional errors. Any such errors should be contextually interpreted and should not be taken to alter the content or the meaning.     Note to Patient: The 21st Century Cures Act makes medical notes like these available to patients in the interest of transparency. However, be advised this is a medical document. It  is intended as peer to peer communication. It is written in medical language and may contain abbreviations or verbiage that are unfamiliar. It may appear blunt or direct. Medical documents are intended to carry relevant information, facts as evident, and the clinical opinion of the practitioner and not intended to be the primary source of your information.  Please refer directly to myself or clinical staff for information regarding plan of care.

## 2024-02-25 NOTE — PROGRESS NOTES
[facility]  Progress Note    Wagner Sheridan Patient Status:  Inpatient    1945 MRN [id number]   Location [facility] 8NE-A Attending Edil Salgado MD   Hosp Day # 2 PCP Edil Roy MD     Subjective:  No changes overnight.      Current Facility-Administered Medications:     iron sucrose (Venofer) 20 mg/mL injection 200 mg, 200 mg, Intravenous, Daily    amLODIPine (Norvasc) tab 5 mg, 5 mg, Oral, BID    metoprolol succinate ER (Toprol XL) 24 hr tab 50 mg, 50 mg, Oral, BID    finasteride (Proscar) tab 5 mg, 5 mg, Oral, Nightly    mirtazapine (Remeron Soltab) disintegrating tab 30 mg, 30 mg, Oral, Nightly    Past Medical History:   Diagnosis Date    Arrhythmia     BPH     Encounter for incision and drainage procedure 2021    High blood pressure     High cholesterol     Hyperaldosteronism (HCC)     Mitral regurgitation     Osteoarthrosis, unspecified whether generalized or localized, unspecified site     PMR (polymyalgia rheumatica) (HCC)     off corticosteroids since     Retention of urine     Shortness of breath      Past Surgical History:   Procedure Laterality Date    KNEE SURGERY      OTHER SURGICAL HISTORY  09    flow , Dr. Tavera    OTHER SURGICAL HISTORY  09    [facility] Dr. Tavera    OTHER SURGICAL HISTORY  11    PNB - Dr. Tavera    OTHER SURGICAL HISTORY  13    PVP - Dr. Tavera    OTHER SURGICAL HISTORY  2/3/16     [facility]     OTHER SURGICAL HISTORY  2020    cysto- Dr. Tavera    SKIN SURGERY  10/01/2019    MMS/R nasal ala /BCC done by MM    TONSILLECTOMY      TOTAL KNEE REPLACEMENT Right 2015    Right total knee replacement 2015    TOTAL KNEE REPLACEMENT Left 2015     Left knee total replacement 2015          Objective:  Blood pressure 121/77, pulse 91, temperature 98.2 °F (36.8 °C), temperature source Oral, resp. rate 19, weight 278 lb 9.6 oz (126.4 kg), SpO2 (!) 76%.      EXAM:  /77 (BP Location: Right arm)   Pulse  91   Temp 98.2 °F (36.8 °C) (Oral)   Resp 19   Wt 278 lb 9.6 oz (126.4 kg)   SpO2 (!) 76%   BMI 37.78 kg/m²   GENERAL: well developed, well nourished, in no apparent distress  HEENT: atraumatic, normocephalic  CHEST: no chest tenderness  LUNGS: clear to auscultation  CARDIO: RRR without murmur  GI: good BS's and no masses, HSM or tenderness  EXTREMITIES: no cyanosis, clubbing or edema      Labs:   Lab Results   Component Value Date    WBC 8.0 02/25/2024    HGB 7.6 02/25/2024    HCT 24.5 02/25/2024    .0 02/25/2024    CREATSERUM 1.69 02/25/2024    BUN 35 02/25/2024     02/25/2024    K 3.9 02/25/2024     02/25/2024    CO2 23.0 02/25/2024    GLU 90 02/25/2024    CA 10.0 02/25/2024       Assessment:    Iron deficiency anemia                The patient presents with iron deficiency anemia of unclear etiology.  Differential diagnosis includes iron malabsorption vs occult gastrointestinal blood loss from the upper or lower GI tract.  Evaluation will be performed to rule out celiac disease, colorectal / gastroesophageal cancer, peptic ulcer disease, esophogitis, AVMs of the upper or lower GI tract.                  The pt is presently on eliquis with an elevated creatinine.  Eliquis taken on 2/23.     Plan:  1. Colonoscopy and EGD may be done after holding eliquis three full days - which would be 2/27 if eliquis is held.              2. If the above work up is negative, will consider capsule endoscopy to rule out small bowel sources of GI bleeding.            3. Hold eliquis for 3 full days prior to the endoscopies - tentative plan for endoscopies on 2/27.            4. Clear liquid tomorrow with golytely.    Francesco Hamm MD  2/25/2024  9:50 AM

## 2024-02-26 ENCOUNTER — APPOINTMENT (OUTPATIENT)
Dept: CT IMAGING | Facility: HOSPITAL | Age: 79
End: 2024-02-26
Attending: SURGERY
Payer: MEDICARE

## 2024-02-26 ENCOUNTER — ANESTHESIA EVENT (OUTPATIENT)
Dept: ENDOSCOPY | Facility: HOSPITAL | Age: 79
End: 2024-02-26
Payer: MEDICARE

## 2024-02-26 LAB
ANION GAP SERPL CALC-SCNC: 4 MMOL/L (ref 0–18)
BASOPHILS # BLD AUTO: 0.04 X10(3) UL (ref 0–0.2)
BASOPHILS NFR BLD AUTO: 0.6 %
BUN BLD-MCNC: 32 MG/DL (ref 9–23)
CALCIUM BLD-MCNC: 9.7 MG/DL (ref 8.5–10.1)
CHLORIDE SERPL-SCNC: 113 MMOL/L (ref 98–112)
CO2 SERPL-SCNC: 24 MMOL/L (ref 21–32)
CREAT BLD-MCNC: 1.7 MG/DL
EGFRCR SERPLBLD CKD-EPI 2021: 41 ML/MIN/1.73M2 (ref 60–?)
EOSINOPHIL # BLD AUTO: 0.53 X10(3) UL (ref 0–0.7)
EOSINOPHIL NFR BLD AUTO: 8.3 %
ERYTHROCYTE [DISTWIDTH] IN BLOOD BY AUTOMATED COUNT: 15.9 %
GLUCOSE BLD-MCNC: 88 MG/DL (ref 70–99)
HCT VFR BLD AUTO: 22.9 %
HGB BLD-MCNC: 7.2 G/DL
IMM GRANULOCYTES # BLD AUTO: 0.03 X10(3) UL (ref 0–1)
IMM GRANULOCYTES NFR BLD: 0.5 %
LYMPHOCYTES # BLD AUTO: 1.71 X10(3) UL (ref 1–4)
LYMPHOCYTES NFR BLD AUTO: 26.8 %
MCH RBC QN AUTO: 29 PG (ref 26–34)
MCHC RBC AUTO-ENTMCNC: 31.4 G/DL (ref 31–37)
MCV RBC AUTO: 92.3 FL
MONOCYTES # BLD AUTO: 0.67 X10(3) UL (ref 0.1–1)
MONOCYTES NFR BLD AUTO: 10.5 %
NEUTROPHILS # BLD AUTO: 3.41 X10 (3) UL (ref 1.5–7.7)
NEUTROPHILS # BLD AUTO: 3.41 X10(3) UL (ref 1.5–7.7)
NEUTROPHILS NFR BLD AUTO: 53.3 %
OSMOLALITY SERPL CALC.SUM OF ELEC: 298 MOSM/KG (ref 275–295)
PLATELET # BLD AUTO: 190 10(3)UL (ref 150–450)
POTASSIUM SERPL-SCNC: 4 MMOL/L (ref 3.5–5.1)
RBC # BLD AUTO: 2.48 X10(6)UL
SODIUM SERPL-SCNC: 141 MMOL/L (ref 136–145)
WBC # BLD AUTO: 6.4 X10(3) UL (ref 4–11)

## 2024-02-26 PROCEDURE — 80048 BASIC METABOLIC PNL TOTAL CA: CPT | Performed by: HOSPITALIST

## 2024-02-26 PROCEDURE — 71250 CT THORAX DX C-: CPT | Performed by: SURGERY

## 2024-02-26 PROCEDURE — 85025 COMPLETE CBC W/AUTO DIFF WBC: CPT | Performed by: HOSPITALIST

## 2024-02-26 NOTE — PAYOR COMM NOTE
--------------  ADMISSION REVIEW     Payor: [employer]  Subscriber #:  [id number]  Authorization Number: [id number]    Admit date: 24  Admit time: 11:09 AM       REVIEW DOCUMENTATION:  ED Provider Notes    No notes of this type exist for this encounter.       [facility] Hospitalist Consult Note            Wagner Sheridan Patient Status:  Inpatient    1945 MRN [id number]   Location [facility] 0SW-A Attending Edil Salgado MD   Hosp Day # 0 PCP Edil Roy MD      Consult: acute anemia      Consulting Provider: Edil Salgado MD     History of Present Illness: Wganer Sheridan is a 78 year old male with HTN/HL, moderate to severe MR, A-fib on Eliquis who presented as a direct admission for evaluation of acute anemia on outpatient preoperative labs. Routine outpatient labs showed Hgb  7.0 (13.6 on 24), Cr 2.16 (1.64 on 24). He denies any recent trauma, bleeding or bruising events      Patient has been endorsing dyspnea with minimal activity that has had darker stools though does not think they have been black or tarry. He denies any other trauma events or bleeding.  Following arrival to the floor he denies any new or worsening symptoms.      Past Medical History:       Past Medical History:   Diagnosis Date    Arrhythmia      BPH      Encounter for incision and drainage procedure 2021    High blood pressure      High cholesterol      Hyperaldosteronism (HCC)      Mitral regurgitation      Osteoarthrosis, unspecified whether generalized or localized, unspecified site      PMR (polymyalgia rheumatica) (HCC)      off corticosteroids since     Retention of urine      Shortness of breath           Past Surgical History:         Past Surgical History:   Procedure Laterality Date    KNEE SURGERY        OTHER SURGICAL HISTORY   09     [facility], Dr. Tavera    OTHER SURGICAL HISTORY   09     flow  Dr. Tavera    OTHER SURGICAL HISTORY   11     [facility] - Dr. Tavera    OTHER  SURGICAL HISTORY   1/17/13     PVP - Dr. Tavera    OTHER SURGICAL HISTORY   2/3/16      [facility]     OTHER SURGICAL HISTORY   08/05/2020     cysto- Dr. Tavera    SKIN SURGERY   10/01/2019     MMS/R nasal ala /BCC done by [facility]    TONSILLECTOMY        TOTAL KNEE REPLACEMENT Right 02/25/2015     Right total knee replacement 02/25/2015    TOTAL KNEE REPLACEMENT Left 05/11/2015      Left knee total replacement 05/11/2015          Social History:  reports that he has quit smoking. His smoking use included cigarettes. He has a 2.5 pack-year smoking history. He has never used smokeless tobacco. He reports current alcohol use. He reports that he does not use drugs.     Family History:         Family History   Problem Relation Age of Onset    Hypertension Father      Heart Disorder Father      Hypertension Mother      Diabetes Sister           Allergies:         Allergies   Allergen Reactions    Amoxicillin HIVES    Clindamycin HIVES    Penicillins HIVES    Wasp Venom RESPIRATORY FAILURE    Wasp Venom Protein RESPIRATORY FAILURE    Benazepril Coughing    Chlorhexidine RASH and OTHER (SEE COMMENTS)       redness         Medications:              Current Facility-Administered Medications on File Prior to Encounter   Medication Dose Route Frequency Provider Last Rate Last Admin    [COMPLETED] iodixanol (VISIPAQUE) 320 MG/ML injection 100 mL  100 mL Injection ONCE PRN Boy Hernandes MD   100 mL at 02/13/24 0849    [COMPLETED] lidocaine PF (Xylocaine-MPF) 1 % injection                [COMPLETED] heparin (Porcine) 5000 UNIT/ML injection                [COMPLETED] sodium chloride 0.9 % IV bolus 500 mL  500 mL Intravenous Once Boy Hernandes MD   Stopped at 02/13/24 0845    [COMPLETED] fentaNYL (Sublimaze) 50 mcg/mL injection                [COMPLETED] midazolam (Versed) 2 MG/2ML injection                [COMPLETED] sodium chloride 0.9% infusion   Intravenous On Call Oskar Luz MD   Stopped at  01/30/24 1208    [COMPLETED] benzocaine (Hurricaine/Topex) 20 % mouth spray                [COMPLETED] midazolam (Versed) 2 MG/2ML injection           7 mg at 01/30/24 1139    [COMPLETED] fentaNYL (Sublimaze) 50 mcg/mL injection           125 mcg at 01/30/24 1139    [COMPLETED] Perflutren Lipid Microsphere (DEFINITY) 6.52 MG/ML injection 1.5 mL  1.5 mL Intravenous ONCE PRN Terrell Guzmán MD   1.5 mL at 01/10/24 1618    [COMPLETED] sodium chloride 0.9 % IV bolus 500 mL  500 mL Intravenous Once Ross Cordova MD   Stopped at 01/09/24 1503    [COMPLETED] cefTRIAXone (Rocephin) 2 g in D5W 100 mL IVPB-ADD  2 g Intravenous Once Ross Cordova MD   Stopped at 01/09/24 1437    [COMPLETED] ipratropium-albuterol (Duoneb) 0.5-2.5 (3) MG/3ML inhalation solution 3 mL  3 mL Nebulization Once Ross Cordova MD   3 mL at 01/09/24 1412    [COMPLETED] azithromycin (Zithromax) 500 mg in sodium chloride 0.9% 250mL IVPB premix  500 mg Intravenous Once Ross Cordova MD   Stopped at 01/09/24 1808    [COMPLETED] azithromycin (Zithromax) tab 500 mg  500 mg Oral Daily Terrell Guzmán MD   500 mg at 01/11/24 1401    [COMPLETED] remdesivir (Veklury) 200 mg in sodium chloride 0.9% 100 mL IVPB  200 mg Intravenous Once Ross Cordova MD   Stopped at 01/09/24 1808    [COMPLETED] dexAMETHasone PF (Decadron) 10 MG/ML injection 10 mg  10 mg Intravenous Once Ross Cordova MD   10 mg at 01/09/24 1718    [COMPLETED] heparin (Porcine) 1000 UNIT/ML injection - BOLUS IV 5,000 Units  5,000 Units Intravenous Once Ronny Stewart DO   5,000 Units at 01/09/24 2247    [COMPLETED] heparin (Porcine) 65119 units/250mL infusion INITIAL DOSE  1,000 Units/hr Intravenous Once Ronny Stewart DO   Stopped at 01/10/24 0500             Current Outpatient Medications on File Prior to Encounter   Medication Sig Dispense Refill    furosemide 20 MG Oral Tab Take 1 tablet (20 mg total) by mouth at bedtime.        traZODone 50 MG Oral Tab Take 1  tablet (50 mg total) by mouth nightly.        atorvastatin 20 MG Oral Tab Take 1 tablet (20 mg total) by mouth nightly.        metoprolol succinate ER 50 MG Oral Tablet 24 Hr Take 1 tablet (50 mg total) by mouth in the morning and 1 tablet (50 mg total) before bedtime.        eplerenone (INSPRA) 50 MG Oral Tab Take 1 tablet (50 mg total) by mouth 2 (two) times daily. One tablet in the morning one in the evening (Patient taking differently: Take 1 tablet (50 mg total) by mouth 2 (two) times daily.) 60 tablet 0    amLODIPine 5 MG Oral Tab Take 1 tablet (5 mg total) by mouth in the morning and 1 tablet (5 mg total) before bedtime. 60 tablet 0    apixaban 5 MG Oral Tab Take 1 tablet (5 mg total) by mouth 2 (two) times daily. 60 tablet 0    furosemide 40 MG Oral Tab Take 1 tablet (40 mg total) by mouth daily. (Patient taking differently: Take 1 tablet (40 mg total) by mouth every morning.) 30 tablet 0    finasteride 5 MG Oral Tab Take 1 tablet (5 mg total) by mouth at bedtime.        losartan 100 MG Oral Tab Take 1 tablet (100 mg total) by mouth daily. 30 tablet 11    EPINEPHrine (EPIPEN 2-JAZ) 0.3 MG/0.3ML Injection Solution Auto-injector USE AS DIRECTED 2 each 5         Review of Systems:   A comprehensive 14 point review of systems was completed.    Pertinent positives and negatives noted in the HPI.     Physical Exam:    /55 (BP Location: Left arm)   Pulse 79   Temp 97.7 °F (36.5 °C) (Oral)   Resp 16   SpO2 98%         General: No acute distress. Comfortable, nontoxic   HEENT: Normocephalic atraumatic. Moist mucous membranes; Sclera anicteric.  Neck: Supple, no JVD  Respiratory: Clear to auscultation bilaterally. Reg resp rate & effort, no wheezes/crackles   Cardiovascular: S1, S2. Regular rate and rhythm. No murmurs appreciable   Chest and Back: No tenderness or deformity.  Abdomen: Soft, nontender, nondistended.  Positive bowel sounds. No rebound, guarding or organomegaly.  Neurologic: No focal  neurological deficits. CNII-XII grossly intact.  Musculoskeletal: Moves all extremities.  Extremities: No edema or cyanosis.  Integument: No rashes or lesions.   Psychiatric: Appropriate mood and affect.        Diagnostic Data:       Labs:      Recent Labs   Lab 02/23/24  0907   WBC 6.0   HGB 7.0*   MCV 93.0   .0   INR 1.34*             Recent Labs   Lab 02/23/24  0907   *   BUN 40*   CREATSERUM 2.16*   CA 9.4   ALB 3.2*      K 3.9   *   CO2 23.0   ALKPHO 67   AST 10*   ALT 14*   BILT 0.6   TP 6.0*         CrCl cannot be calculated (Unknown ideal weight.).         Recent Labs   Lab 02/23/24  0907   PTP 16.7*   INR 1.34*         No results for input(s): \"TROP\", \"CK\" in the last 168 hours.     Imaging: Imaging data reviewed in Epic.        ASSESSMENT / PLAN:      Wagner Sherdian Is a a 78 year old male who presents with acute anemia and CEDRICK on CKD3     Problem List / Diagnoses     Acute Anemia  CEDRICK on CKD3  Severe MR   Paroxysmal Afib   BPH  HTN     Recommendations     Acute Anemia  -- Hgb 7 from 13  -- pt denies active bleeding, suspect GI source  -- hold anticoagulation/antiplatelet agents  -- given trend, will transfuse to maintain goal Hgb > 7   -- GI consulted, plan discussed, anticipate will need EGD/Colonoscopy once off apixaban 72 hours     CEDRICK on CKD3  -- Cr 2.1 on admission, prev baseline 1.6   -- likely secondary to anemia  -- Outpatient UA from 2/23 normal, no need to repeat   -- renally dose all meds, avoid nephrotoxic agents     Severe MR   -- management per CV Surgery  -- planned for repair 3/6/24      Paroxysmal Afib   -- holding eliquis per above  -- monitor on telemetry  -- resume home beta bocker      BPH   -- resume finasteride     HTN  -- resume amlodipine      DVT Ppx: SCDs; no heparin given anemia   Code Status: FULL      Dispo: [facility]ist will continue to follow while admitted.    Assessment/Plan     Anemia  Hgb 7 hematocrit 22.7  - GI workup  - Transfuse to keep  Hgb >7     HFpEF  moderate to severe mitral regurgitation  pulmonary hypertension   - On furosemide 20 mg bid - hold for CEDRICK  - labs:  add on BNP  - monitor kidney function   - telemetry  - daily weights  - strict I & O  - CV surgery to follow      Atrial Fibrillation  - rates controlled with BB - continue   - Hold eliquis given Hgb 7.     HTN  Hyperaldosteronism   - Bp controlled   - hold  eplerenone 50mg given CEDRICK  - Hold losartan 100mg given CEDRICK  - continue metoprolol ER 50mg   - continue amlodipine 5mg   - titrate amlodipine and metoprolol to maintain BP <140/90     CEDRICK on CKD   - in the setting of acute anemia.  - Cr 2.16  - Cr at baseline 1.56 -> last Cr 1.64  - Hold lasix, eplerenone, losartan as above  MDM:  Hold DOAC.  Hold Lasix  Eval anemia  Assessment:    Iron deficiency anemia                The patient presents with iron deficiency anemia of unclear etiology.  Differential diagnosis includes iron malabsorption vs occult gastrointestinal blood loss from the upper or lower GI tract.  Evaluation will be performed to rule out celiac disease, colorectal / gastroesophageal cancer, peptic ulcer disease, esophogitis, AVMs of the upper or lower GI tract.                  The pt is presently on eliquis with an elevated creatinine.  Eliquis taken today.    Plan:  1. Colonoscopy and EGD may be done after holding eliquis three full days - which would be 2/27 if eliquis is held.  This may be arranged as an outpt.            2. If the above work up is negative, will consider capsule endoscopy to rule out small bowel sources of GI bleeding.            3. Hold eliquis.  2/24  ASSESSMENT / PLAN:      Wagner Sheridan Is a a 78 year old male who presents with acute anemia and CEDRICK on CKD3     Problem List / Diagnoses     Acute Anemia  CEDRICK on CKD3  Severe MR   Paroxysmal Afib   BPH  HTN     Recommendations     Acute Anemia  -- Admission Hgb 7 from 13 with modest improvement to 7.5 following transfusion   -- pt denies active  bleeding, suspect GI source  -- continue holding hold anticoagulation/antiplatelet agents  -- given will transfuse to maintain goal Hgb > 7   -- GI consulted, plan discussed, anticipate will need EGD/Colonoscopy once off apixaban 72 hours, tentatively Tuesday      CEDRICK on CKD3  -- Cr 2.1 on admission, prev baseline 1.6   -- likely secondary to anemia  -- improved to 1.86 following transfusion, cpm   -- Outpatient UA from 2/23 normal, no need to repeat   -- renally dose all meds, avoid nephrotoxic agents     Severe MR   -- management per CV Surgery  -- planned for repair 3/6/24      Paroxysmal Afib   -- holding eliquis per above  -- monitor on telemetry  -- resume home beta bocker      BPH   -- resume finasteride     HTN  -- resume amlodipine    Assessment:    Iron deficiency anemia                The patient presents with iron deficiency anemia of unclear etiology.  Differential diagnosis includes iron malabsorption vs occult gastrointestinal blood loss from the upper or lower GI tract.  Evaluation will be performed to rule out celiac disease, colorectal / gastroesophageal cancer, peptic ulcer disease, esophogitis, AVMs of the upper or lower GI tract.                  The pt is presently on eliquis with an elevated creatinine.  Eliquis taken on 2/23.     Plan:  1. Colonoscopy and EGD may be done after holding eliquis three full days - which would be 2/27 if eliquis is held.  This may be arranged as an outpt.            2. If the above work up is negative, will consider capsule endoscopy to rule out small bowel sources of GI bleeding.            3. Hold eliquis for 3 full days prior to the endoscopies - tentative plan for endoscopies on 2/27.  If pt is discharged to home, the procedures will need to be scheduled as an oupt.  Assessment/Plan     Anemia  Hgb 7 hematocrit 22.7  - GI workup  - Transfuse to keep Hgb >7  - Planning for endoscopy on Tuesday.      HFpEF  moderate to severe mitral regurgitation  pulmonary  hypertension   - Compensated. Holding furosemide with CEDRICK.      Atrial Fibrillation  - rates controlled with BB - continue   - Hold eliquis given Hgb 7.     HTN  Hyperaldosteronism   - Bp controlled   - hold  eplerenone 50mg given CEDRICK  - Hold losartan 100mg given CEDRICK  - continue metoprolol ER 50mg   - continue amlodipine 5mg   - Will aim to restart epleronone and losartan as renal function improves.      CEDRICK on CKD   - in the setting of acute anemia.  - Improving. Holding meds as above.   2/25  ASSESSMENT / PLAN:      Wagner Sheridan Is a a 78 year old male who presents with acute anemia and CEDRICK on CKD3     Problem List / Diagnoses     Acute Anemia  CEDRICK on CKD3  Severe MR   Paroxysmal Afib   BPH  HTN     Recommendations     Acute Anemia  -- Admission Hgb 7 from 13 with modest improvement to 7.5 following transfusion   -- pt denies active bleeding, suspect GI source  -- continue holding hold anticoagulation/antiplatelet agents  -- given will transfuse to maintain goal Hgb > 7   -- GI consulted, plan discussed, anticipate will need EGD/Colonoscopy once off apixaban 72 hours, tentatively Tuesday      CEDRICK on CKD3  -- Cr 2.1 on admission, prev baseline 1.6   -- likely secondary to anemia  -- improved to 1.69 following transfusion, CPM  -- Outpatient UA from 2/23 normal, no need to repeat   -- renally dose all meds, avoid nephrotoxic agents     Severe MR   -- management per CV Surgery  -- planned for repair 3/6/24      Paroxysmal Afib   -- holding eliquis per above  -- monitor on telemetry  -- resume home beta bocker      BPH   -- resume finasteride     HTN  -- resume amlodipine   Assessment/Plan     Anemia  Hgb 7.8 hematocrit 22.7  - GI workup  - Transfuse to keep Hgb >7  - Planning for endoscopy on Tuesday.      HFpEF  moderate to severe mitral regurgitation  pulmonary hypertension   NYHA Class II  LVEF 60%  -Hold eplerenone 50mg given CEDRICK; resume tomorrow if improved renal fx  - Hold losartan 100mg given CEDRICK; resume  tomorrow if improved renal fx.  - continue metoprolol ER 50mg  -+1 pitting edema bilateral ankle.  Start furosemide 20 mg daily.   -Low-sodium diet.  Fluids 1,800 mL daily.   -BMP daily  -Daily weights, I & O.  -Discussed with EMI Colon.     Atrial Fibrillation  -Telemetry shows atrial fibrillation  - rates controlled with BB - continue   - Hold eliquis given Hgb 7.     HTN  Hyperaldosteronism   - Bp controlled   - hold  eplerenone 50mg given CEDRICK  - Hold losartan 100mg given CEDRICK  - continue metoprolol ER 50mg   - continue amlodipine 5mg   - Will aim to restart epleronone and losartan as renal function improves.      CEDRICK on CKD   - in the setting of acute anemia.  - Improving. Holding meds as above.   Assessment:    Iron deficiency anemia                The patient presents with iron deficiency anemia of unclear etiology.  Differential diagnosis includes iron malabsorption vs occult gastrointestinal blood loss from the upper or lower GI tract.  Evaluation will be performed to rule out celiac disease, colorectal / gastroesophageal cancer, peptic ulcer disease, esophogitis, AVMs of the upper or lower GI tract.                  The pt is presently on eliquis with an elevated creatinine.  Eliquis taken on 2/23.     Plan:  1. Colonoscopy and EGD may be done after holding eliquis three full days - which would be 2/27 if eliquis is held.              2. If the above work up is negative, will consider capsule endoscopy to rule out small bowel sources of GI bleeding.            3. Hold eliquis for 3 full days prior to the endoscopies - tentative plan for endoscopies on 2/27.            4. Clear liquid tomorrow with golytely.     MEDICATIONS ADMINISTERED IN LAST 1 DAY:  amLODIPine (Norvasc) tab 5 mg       Date Action Dose Route User    2/26/2024 0832 Given 5 mg Oral Mahsa Avila, RN    2/25/2024 2021 Given 5 mg Oral Duong Crisostomo, EMI          finasteride (Proscar) tab 5 mg       Date Action Dose Route User    2/25/2024  2021 Given 5 mg Oral Duong rCisostomo RN          furosemide (Lasix) tab 20 mg       Date Action Dose Route User    2/26/2024 0832 Given 20 mg Oral Mahsa Avila RN    2/25/2024 1419 Given 20 mg Oral Darlene Tavera RN          iron sucrose (Venofer) 20 mg/mL injection 200 mg       Date Action Dose Route User    2/26/2024 0832 New Bag 200 mg Intravenous Mahsa Avila RN          metoprolol succinate ER (Toprol XL) 24 hr tab 50 mg       Date Action Dose Route User    2/26/2024 0832 Given 50 mg Oral Mahsa Avila RN    2/25/2024 2021 Given 50 mg Oral Duong Crisostomo RN          traZODone (Desyrel) tab 50 mg       Date Action Dose Route User    2/25/2024 2021 Given 50 mg Oral Duong Crisostomo RN            Vitals (last day)       Date/Time Temp Pulse Resp BP SpO2 Weight O2 Device O2 Flow Rate (L/min) [facility]    02/26/24 0435 98 °F (36.7 °C) 85 25 136/81 94 % 277 lb 9.6 oz High flow nasal cannula 2 L/min AT    02/26/24 0000 -- 79 20 115/74 99 % -- High flow nasal cannula 2 L/min     02/25/24 2336 98.2 °F (36.8 °C) 73 24 -- 97 % -- Nasal cannula 2 L/min     02/25/24 1147 97.6 °F (36.4 °C) 90 22 109/72 88 % -- -- -- MB    02/25/24 0907 -- 91 19 -- 76 % -- -- -- MB    02/25/24 0145 -- -- -- -- 78 % -- -- --     02/25/24 0144 -- -- -- -- 83 % -- -- --     02/25/24 0143 -- -- -- -- 87 % -- -- --     02/25/24 0142 -- -- -- -- 84 % -- -- --     02/25/24 0141 -- -- -- -- 85 % -- -- --           CIWA Scores (since admission)       None          Blood Transfusion Record       Product Unit Status Volume Start End            Transfuse RBC       24  [id number]  [id number] Completed 02/23/24 2038 600 mL 02/23/24 1730 02/23/24 2000              PLEASE FAX DAYS CERTIFIED AND NEXT REVIEW DATE [phone]

## 2024-02-26 NOTE — ANESTHESIA PREPROCEDURE EVALUATION
PRE-OP EVALUATION    Patient Name: Wagner Sheridan    Admit Diagnosis: anemia  Anemia    Pre-op Diagnosis: INPT    ESOPHAGOGASTRODUODENOSCOPY (EGD),  COLONOSCOPY    Anesthesia Procedure: ESOPHAGOGASTRODUODENOSCOPY (EGD), COLONOSCOPY  COLONOSCOPY    Surgeon(s) and Role:     * Heath Mina, DO - Primary    Pre-op vitals reviewed.  Temp: 97.9 °F (36.6 °C)  Pulse: 83  Resp: 23  BP: 111/64  SpO2: 95 %  Body mass index is 37.65 kg/m².    Current medications reviewed.  Hospital Medications:   traZODone (Desyrel) tab 50 mg  50 mg Oral Nightly    furosemide (Lasix) tab 20 mg  20 mg Oral Daily    [COMPLETED] polyethylene glycol-electrolyte (Golytely) 236 g oral solution 4,000 mL  4,000 mL Oral Once    iron sucrose (Venofer) 20 mg/mL injection 200 mg  200 mg Intravenous Daily    amLODIPine (Norvasc) tab 5 mg  5 mg Oral BID    metoprolol succinate ER (Toprol XL) 24 hr tab 50 mg  50 mg Oral BID    finasteride (Proscar) tab 5 mg  5 mg Oral Nightly    [COMPLETED] sodium chloride 0.9% infusion   Intravenous Once    [COMPLETED] iron sucrose (Venofer) 20 mg/mL injection 200 mg  200 mg Intravenous Once       Outpatient Medications:     Medications Prior to Admission   Medication Sig Dispense Refill Last Dose    furosemide 20 MG Oral Tab Take 1 tablet (20 mg total) by mouth at bedtime.   2/22/2024 at 1800    traZODone 50 MG Oral Tab Take 1 tablet (50 mg total) by mouth nightly.   2/22/2024 at 2100    atorvastatin 20 MG Oral Tab Take 1 tablet (20 mg total) by mouth nightly.   2/22/2024    metoprolol succinate ER 50 MG Oral Tablet 24 Hr Take 1 tablet (50 mg total) by mouth in the morning and 1 tablet (50 mg total) before bedtime.   2/23/2024    eplerenone (INSPRA) 50 MG Oral Tab Take 1 tablet (50 mg total) by mouth 2 (two) times daily. One tablet in the morning one in the evening (Patient taking differently: Take 1 tablet (50 mg total) by mouth 2 (two) times daily.) 60 tablet 0 2/23/2024    amLODIPine 5 MG Oral Tab Take 1 tablet (5 mg  total) by mouth in the morning and 1 tablet (5 mg total) before bedtime. 60 tablet 0 2/23/2024    apixaban 5 MG Oral Tab Take 1 tablet (5 mg total) by mouth 2 (two) times daily. 60 tablet 0 2/23/2024 at 0900    finasteride 5 MG Oral Tab Take 1 tablet (5 mg total) by mouth at bedtime.   2/22/2024    losartan 100 MG Oral Tab Take 1 tablet (100 mg total) by mouth daily. 30 tablet 11 2/23/2024    EPINEPHrine (EPIPEN 2-JAZ) 0.3 MG/0.3ML Injection Solution Auto-injector USE AS DIRECTED 2 each 5        Allergies: Amoxicillin, Clindamycin, Penicillins, Wasp venom, Wasp venom protein, Benazepril, and Chlorhexidine      Anesthesia Evaluation    Patient summary reviewed.    Anesthetic Complications           GI/Hepatic/Renal             (+) chronic renal disease                    Cardiovascular  Comment: Per Cardiology:  Anemia  Hgb 7-7.8 hematocrit 22.7  - GI workup  - Transfuse to keep Hgb >7  - Planning for endoscopy on Tuesday.      HFpEF  moderate to severe mitral regurgitation  pulmonary hypertension   NYHA Class II  LVEF 60%  -Hold eplerenone 50mg given CEDRICK; resume tomorrow if improved renal fx  - Hold losartan 100mg given CEDRICK; resume tomorrow if improved renal fx.  - continue metoprolol ER 50mg  -+1 pitting edema bilateral ankle.  Start furosemide 20 mg daily.   -Low-sodium diet.  Fluids 1,800 mL daily.   -BMP daily  -Daily weights, I & O.  -planning on chest CT today to assess asc aorta per CV surgery       Atrial Fibrillation  -Telemetry shows atrial fibrillation  - rates controlled with BB - continue   - Hold eliquis given Hgb 7.         EKG  Atrial fibrillation   Nonspecific ST abnormality   Abnormal ECG   When compared with ECG of 30-JAN-2024 11:48,   Atrial fibrillation has replaced Sinus rhythm   Confirmed by CHANG VARGAS CHRIS ([id number]) on 2/23/2024 6:19:43 PM         STAN  CONCLUSIONS:   1. There is flail of the P3 scallop of the mitral valve. The mitral valve has severe, 4+ eccentric (laterally directed)  regurgitation.   2. The tricuspid valve has annular enlargement, with central malcoaptation. The tricuspid valve has moderate, centrally directed, regurgitation.  3. The left ventricle appears normal in size, thickness, and systolic function. The estimated left ventricular ejection fraction is 60%  4. A patent foramen ovale is present (tunnel length 34 mm). There is left-to-right shunting across the patent foramen ovale.     Oskar Luz MD, [facility]  1/30/2024  12:24 PM      ECG reviewed.            (+) hypertension                                     Endo/Other               (+) anemia                   Pulmonary                           Neuro/Psych                              Hyperaldosteronism          Past Surgical History:   Procedure Laterality Date    KNEE SURGERY      OTHER SURGICAL HISTORY  2/27/09    flow , Dr. Tavera    OTHER SURGICAL HISTORY  9/4/09    flow  Dr. Tavera    OTHER SURGICAL HISTORY  1/4/11    PNB - Dr. Tavera    OTHER SURGICAL HISTORY  1/17/13    PVP - Dr. Tavera    OTHER SURGICAL HISTORY  2/3/16     Flow      OTHER SURGICAL HISTORY  08/05/2020    cysto- Dr. Tavera    SKIN SURGERY  10/01/2019    MMS/R nasal ala /BCC done by [facility]    TONSILLECTOMY      TOTAL KNEE REPLACEMENT Right 02/25/2015    Right total knee replacement 02/25/2015    TOTAL KNEE REPLACEMENT Left 05/11/2015     Left knee total replacement 05/11/2015      Social History     Socioeconomic History    Marital status:    Tobacco Use    Smoking status: Former     Packs/day: 0.50     Years: 5.00     Additional pack years: 0.00     Total pack years: 2.50     Types: Cigarettes    Smokeless tobacco: Never    Tobacco comments:     in college   Vaping Use    Vaping Use: Never used   Substance and Sexual Activity    Alcohol use: Yes     Alcohol/week: 0.0 standard drinks of alcohol     Comment: rare    Drug use: No   Other Topics Concern    Exercise Yes    Seat Belt Yes     History   Drug Use No     Available pre-op  labs reviewed.  Lab Results   Component Value Date    WBC 6.4 02/26/2024    RBC 2.48 (L) 02/26/2024    HGB 7.2 (L) 02/26/2024    HCT 22.9 (L) 02/26/2024    MCV 92.3 02/26/2024    MCH 29.0 02/26/2024    MCHC 31.4 02/26/2024    RDW 15.9 02/26/2024    .0 02/26/2024     Lab Results   Component Value Date     02/26/2024    K 4.0 02/26/2024     (H) 02/26/2024    CO2 24.0 02/26/2024    BUN 32 (H) 02/26/2024    CREATSERUM 1.70 (H) 02/26/2024    GLU 88 02/26/2024    CA 9.7 02/26/2024     Lab Results   Component Value Date    INR 1.34 (H) 02/23/2024         Airway      Mallampati: III  Mouth opening: >3 FB  TM distance: 4 - 6 cm  Neck ROM: full Cardiovascular      Rhythm: irregular       Dental  Comment: Dentition appears grossly intact. Patient denies any loose, chipped or missing teeth other than as noted. Risks of dental trauma related to anesthesia including intubation and during emergence explained.             Pulmonary    Pulmonary exam normal.                 Other findings              ASA: 3   Plan: MAC  NPO status verified and patient meets guidelines.        Comment: This is a limited chart review prior to patient encounter in anticipation of anesthesia for scheduled procedure.    Plan/risks discussed with: patient                Present on Admission:  **None**

## 2024-02-26 NOTE — PROGRESS NOTES
[facility]    Wagner Sheridan Patient Status:  Inpatient    1945 MRN [id number]   Location [facility] 8NE-A Attending Edil Salgado MD   Hosp Day # 3 PCP Edil Roy MD     Wagner Sheridan is a 78 year old male patient.    No overnight events. No nausea or vomiting. No acute abdominal pains. Drinking Golytely colon prep for EGD and colonoscopy tomorrow     Past Medical History:   Diagnosis Date    Arrhythmia     BPH     Encounter for incision and drainage procedure 2021    High blood pressure     High cholesterol     Hyperaldosteronism (HCC)     Mitral regurgitation     Osteoarthrosis, unspecified whether generalized or localized, unspecified site     PMR (polymyalgia rheumatica) (HCC) 2012    off corticosteroids since     Retention of urine     Shortness of breath        No current outpatient medications on file.     Allergies   Allergen Reactions    Amoxicillin HIVES    Clindamycin HIVES    Penicillins HIVES    Wasp Venom RESPIRATORY FAILURE    Wasp Venom Protein RESPIRATORY FAILURE    Benazepril Coughing    Chlorhexidine RASH and OTHER (SEE COMMENTS)     redness     Active Problems:    Anemia    Blood pressure 111/64, pulse 83, temperature 97.9 °F (36.6 °C), temperature source Oral, resp. rate 23, weight 277 lb 9.6 oz (125.9 kg), SpO2 95%.      Physical Exam  GENERAL: well developed, well nourished, in no apparent distress  HEENT: atraumatic, normocephalic  CHEST: no chest tenderness  LUNGS: clear to auscultation  CARDIO: RRR without murmur  GI: good BS's and no masses, HSM or tenderness  EXTREMITIES: no cyanosis, clubbing or edema    Iron deficiency anemia                The patient presents with iron deficiency anemia of unclear etiology.  Differential diagnosis includes iron malabsorption vs occult gastrointestinal blood loss from the upper or lower GI tract.  Evaluation will be performed to rule out celiac disease, colorectal / gastroesophageal cancer, peptic ulcer disease,  esophogitis, AVMs of the upper or lower GI tract.                  The pt is presently on eliquis with an elevated creatinine.  Eliquis taken on 2/23.     Plan:   EGD and colonoscopy tomorrow under MAC   NPO after midnight   Hold NSAID's, antiplatelets and anticoagulation     Heath Mina DO  2/26/2024

## 2024-02-26 NOTE — PLAN OF CARE
Patient alert and oriented x 4. Up  SBA. On RA, supplemental 2L at night. Afib on tele, rates controlled. Continent of bowel and bladder. No complaints of pain, shortness of breath or chest pain/discomfort. POC : EGD & Colonoscopy 2/27, CLD with golytely, monitor Hgb. Fall precautions in place. Call light within reach.    Problem: Patient/Family Goals  Goal: Patient/Family Long Term Goal  Description: Patient's Long Term Goal: DC home    Interventions:  - monitor Hgb/labs, intake and output and VS  - administer meds as ordered  - Telemetry  - See additional Care Plan goals for specific interventions  Outcome: Progressing  Goal: Patient/Family Short Term Goal  Description: Patient's Short Term Goal: VS stable    Interventions:   - monitor Hgb/labs, intake and output and VS  - administer meds as ordered  - Telemetry    - See additional Care Plan goals for specific interventions  Outcome: Progressing     Problem: CARDIOVASCULAR - ADULT  Goal: Absence of cardiac arrhythmias or at baseline  Description: INTERVENTIONS:  - Continuous cardiac monitoring, monitor vital signs, obtain 12 lead EKG if indicated  - Evaluate effectiveness of antiarrhythmic and heart rate control medications as ordered  - Initiate emergency measures for life threatening arrhythmias  - Monitor electrolytes and administer replacement therapy as ordered  Outcome: Progressing     Problem: METABOLIC/FLUID AND ELECTROLYTES - ADULT  Goal: Electrolytes maintained within normal limits  Description: INTERVENTIONS:  - Monitor labs and rhythm and assess patient for signs and symptoms of electrolyte imbalances  - Administer electrolyte replacement as ordered  - Monitor response to electrolyte replacements, including rhythm and repeat lab results as appropriate  - Fluid restriction as ordered  - Instruct patient on fluid and nutrition restrictions as appropriate  Outcome: Progressing  Goal: Hemodynamic stability and optimal renal function  maintained  Description: INTERVENTIONS:  - Monitor labs and assess for signs and symptoms of volume excess or deficit  - Monitor intake, output and patient weight  - Monitor urine specific gravity, serum osmolarity and serum sodium as indicated or ordered  - Monitor response to interventions for patient's volume status, including labs, urine output, blood pressure (other measures as available)  - Encourage oral intake as appropriate  - Instruct patient on fluid and nutrition restrictions as appropriate  Outcome: Progressing

## 2024-02-26 NOTE — PLAN OF CARE
Pt alert and oriented x 4.  Continuous tele monitoring in place.  Afib on the monitor.  Denies pain, dyspnea and dizziness.  Room air.  Steady gait while walking in room and hallways.  IV lasix given. Clear liquids.  Bowel prep to begin later this afternoon.  Safety and comfort maintained.  Will continue to monitor.      Problem: CARDIOVASCULAR - ADULT  Goal: Absence of cardiac arrhythmias or at baseline  Description: INTERVENTIONS:  - Continuous cardiac monitoring, monitor vital signs, obtain 12 lead EKG if indicated  - Evaluate effectiveness of antiarrhythmic and heart rate control medications as ordered  - Initiate emergency measures for life threatening arrhythmias  - Monitor electrolytes and administer replacement therapy as ordered  Outcome: Progressing     Problem: METABOLIC/FLUID AND ELECTROLYTES - ADULT  Goal: Electrolytes maintained within normal limits  Description: INTERVENTIONS:  - Monitor labs and rhythm and assess patient for signs and symptoms of electrolyte imbalances  - Administer electrolyte replacement as ordered  - Monitor response to electrolyte replacements, including rhythm and repeat lab results as appropriate  - Fluid restriction as ordered  - Instruct patient on fluid and nutrition restrictions as appropriate  Outcome: Progressing  Goal: Hemodynamic stability and optimal renal function maintained  Description: INTERVENTIONS:  - Monitor labs and assess for signs and symptoms of volume excess or deficit  - Monitor intake, output and patient weight  - Monitor urine specific gravity, serum osmolarity and serum sodium as indicated or ordered  - Monitor response to interventions for patient's volume status, including labs, urine output, blood pressure (other measures as available)  - Encourage oral intake as appropriate  - Instruct patient on fluid and nutrition restrictions as appropriate  Outcome: Progressing

## 2024-02-26 NOTE — PROGRESS NOTES
[facility] Cardiology Progress Note        Wagner Sheridan Patient Status:  Inpatient    1945 MRN [id number]   [place] [address]NE-A Attending Edil Salgado MD   Hosp Day # 3 PCP Edil Roy MD     Subjective:  The patient denies  chest pain and shortness of breath.  Admitted due to anemia, Hgb of 7.0  Recent dark stools reported    Medications:   traZODone  50 mg Oral Nightly    furosemide  20 mg Oral Daily    polyethylene glycol-electrolyte  4,000 mL Oral Once    iron sucrose  200 mg Intravenous Daily    amLODIPine  5 mg Oral BID    metoprolol succinate ER  50 mg Oral BID    finasteride  5 mg Oral Nightly       Continuous Infusions:        Allergies:  Allergies   Allergen Reactions    Amoxicillin HIVES    Clindamycin HIVES    Penicillins HIVES    Wasp Venom RESPIRATORY FAILURE    Wasp Venom Protein RESPIRATORY FAILURE    Benazepril Coughing    Chlorhexidine RASH and OTHER (SEE COMMENTS)     redness         Objective:        Intake/Output:      Intake/Output Summary (Last 24 hours) at 2024 1034  Last data filed at 2024 0751  Gross per 24 hour   Intake 360 ml   Output --   Net 360 ml     Wt Readings from Last 3 Encounters:   24 277 lb 9.6 oz (125.9 kg)   24 275 lb (124.7 kg)   24 270 lb (122.5 kg)       Physical Exam:        Vitals:    24 2336 24 0000 24 0435 24 0751   BP:  115/74 136/81 116/85   BP Location:  Right arm Right arm Right arm   Pulse: 73 79 85 85   Resp:    Temp: 98.2 °F (36.8 °C)  98 °F (36.7 °C) 98 °F (36.7 °C)   TempSrc: Oral  Oral Oral   SpO2: 97% 99% 94% 92%   Weight:   277 lb 9.6 oz (125.9 kg)        Temp:  [97.5 °F (36.4 °C)-98.2 °F (36.8 °C)] 98 °F (36.7 °C)  Pulse:  [73-97] 85  Resp:  [18-25] 23  BP: (109-137)/(72-85) 116/85  SpO2:  [88 %-100 %] 92 %      Temp: 98 °F (36.7 °C)  Pulse: 85  Resp: 23  BP: 116/85  General:  Appears comfortable  HEENT: No focal deficits.  Neck: No JVD, carotids  2+ no bruits.  Cardiac: Irregular S1S2.  No S3, S4, rub, click.  2/6 holo-systolic   murmur.  Lungs: Clear to auscultation and percussion.  Abdomen: Soft, non-tender.   Extremities: No LE edema.  No clubbing or cyanosis.    Neurologic: Alert and oriented, normal affect.  Skin: Warm and dry.           LABS:      HEM:  Recent Labs   Lab 02/23/24  0907 02/23/24  1857 02/23/24 2024 02/24/24  0911 02/24/24  1632 02/25/24  0448 02/26/24  0429   WBC 6.0 6.3  --  6.6  --  8.0 6.4   HGB 7.0* 7.0* 7.3* 7.5* 7.3* 7.6* 7.2*   HCT 22.7* 21.5*  --  25.0*  --  24.5* 22.9*   .0 192.0  --  198.0  --  199.0 190.0       Chem:  Recent Labs   Lab 02/23/24  0907 02/24/24  0911 02/25/24  0448 02/26/24  0429    141 140 141   K 3.9 4.1 3.9 4.0   * 114* 114* 113*   CO2 23.0 24.0 23.0 24.0   BUN 40* 36* 35* 32*   CREATSERUM 2.16* 1.86* 1.69* 1.70*   CA 9.4 9.9 10.0 9.7   * 130* 90 88       Recent Labs   Lab 02/23/24  0907   ALT 14*   AST 10*   ALB 3.2*       Recent Labs   Lab 02/23/24  0907   PTT 32.8   INR 1.34*           Lab Results   Component Value Date    TROP < 0.046 12/27/2012         Invalid input(s): \"PBNPML\"                       Diagnostics:   Telemetry: Atrial Fibrillation     Diagnostics:     2/23/24     Atrial fibrillation   Nonspecific ST abnormality   Abnormal ECG   When compared with ECG of 30-JAN-2024 11:48,   Atrial fibrillation has replaced Sinus rhythm      1/30/2024: Cardioversion: STAN  CONCLUSIONS:   1. There is flail of the P3 scallop of the mitral valve. The mitral valve has severe, 4+ eccentric (laterally directed) regurgitation.   2. The tricuspid valve has annular enlargement, with central malcoaptation. The tricuspid valve has moderate, centrally directed, regurgitation.  3. The left ventricle appears normal in size, thickness, and systolic function. The estimated left ventricular ejection fraction is 60%  4. A patent foramen ovale is present (tunnel length 34 mm). There is left-to-right  shunting across the patent foramen ovale.  2/13/2024:   R+L heart cath in anticipation of surgical repair of MV and TV  IMPRESSION:   1. Hemodynamics show moderate pulmonary hypertension and right and left ventricular diastolic dysfunction.   2. Mitral regurgitation difficult to visualize on the left ventriculogram; however, it has been graded as severe on the transesophageal echo. V-wave seen on the wedge tracing is quite high at 40, which is consistent with severe mitral regurgitation.   3. Coronary disease as described above involving the distal left anterior descending, which is a fairly diminutive vessel.                                             Assessment/Plan     Anemia  Hgb 7-7.8 hematocrit 22.7  - GI workup  - Transfuse to keep Hgb >7  - Planning for endoscopy on Tuesday.      HFpEF  moderate to severe mitral regurgitation  pulmonary hypertension   NYHA Class II  LVEF 60%  -Hold eplerenone 50mg given CEDRICK; resume tomorrow if improved renal fx  - Hold losartan 100mg given CEDRICK; resume tomorrow if improved renal fx.  - continue metoprolol ER 50mg  -+1 pitting edema bilateral ankle.  Start furosemide 20 mg daily.   -Low-sodium diet.  Fluids 1,800 mL daily.   -BMP daily  -Daily weights, I & O.  -planning on chest CT today to assess asc aorta per CV surgery       Atrial Fibrillation  -Telemetry shows atrial fibrillation  - rates controlled with BB - continue   - Hold eliquis given Hgb 7.     HTN  Hyperaldosteronism   - Bp controlled   - hold  eplerenone 50mg given CEDRICK  - Hold losartan 100mg given CEDRICK  - continue metoprolol ER 50mg   - continue amlodipine 5mg   - Will aim to restart epleronone and losartan as renal function improves.      CEDRICK on CKD   -baseline Cr = 1.62.0    - Improving. Holding meds as above.     Cardiology to follow.          Boy Hernandes MD  2/26/2024  10:34 AM

## 2024-02-26 NOTE — CM/SW NOTE
02/26/24 1000   CM/SW Referral Data   Referral Source Social Work (self-referral)   Reason for Referral Discharge planning   Informant Patient;Spouse/Significant Other;Daughter   Patient Info   Patient's Current Mental Status at Time of Assessment Alert   Number of Levels in Home 1   Number of Stair in Home 2   Patient lives with Spouse/Significant other   Discharge Needs   Anticipated D/C needs To be determined     Patient is a 78 year old male who was admitted for Anemia. The patient presents as alert and oriented x4. Patient resides at home with spouse. [facility] has already been established after the scheduled surgery  on the 3/6.Discussed with Patient if he was interested in HHC before surgery on the 3/6, patient declined the need said he will wait for [facility] after surgery . The patient requires no use of medical devices at home. The patient states that he still drives. Medication is mailed to the home from [facility]. The PCP is Edil Roy.

## 2024-02-27 ENCOUNTER — ANESTHESIA (OUTPATIENT)
Dept: ENDOSCOPY | Facility: HOSPITAL | Age: 79
End: 2024-02-27
Payer: MEDICARE

## 2024-02-27 LAB
ANION GAP SERPL CALC-SCNC: 5 MMOL/L (ref 0–18)
BASOPHILS # BLD AUTO: 0.05 X10(3) UL (ref 0–0.2)
BASOPHILS NFR BLD AUTO: 1 %
BUN BLD-MCNC: 25 MG/DL (ref 9–23)
CALCIUM BLD-MCNC: 9.3 MG/DL (ref 8.5–10.1)
CEA SERPL-MCNC: 0.9 NG/ML (ref ?–5)
CHLORIDE SERPL-SCNC: 111 MMOL/L (ref 98–112)
CO2 SERPL-SCNC: 27 MMOL/L (ref 21–32)
CREAT BLD-MCNC: 1.53 MG/DL
EGFRCR SERPLBLD CKD-EPI 2021: 46 ML/MIN/1.73M2 (ref 60–?)
EOSINOPHIL # BLD AUTO: 0.46 X10(3) UL (ref 0–0.7)
EOSINOPHIL NFR BLD AUTO: 9.4 %
ERYTHROCYTE [DISTWIDTH] IN BLOOD BY AUTOMATED COUNT: 16.9 %
GLUCOSE BLD-MCNC: 89 MG/DL (ref 70–99)
HCT VFR BLD AUTO: 23.6 %
HGB BLD-MCNC: 7.4 G/DL
IMM GRANULOCYTES # BLD AUTO: 0.02 X10(3) UL (ref 0–1)
IMM GRANULOCYTES NFR BLD: 0.4 %
LYMPHOCYTES # BLD AUTO: 1.27 X10(3) UL (ref 1–4)
LYMPHOCYTES NFR BLD AUTO: 26 %
MCH RBC QN AUTO: 28.8 PG (ref 26–34)
MCHC RBC AUTO-ENTMCNC: 31.4 G/DL (ref 31–37)
MCV RBC AUTO: 91.8 FL
MONOCYTES # BLD AUTO: 0.58 X10(3) UL (ref 0.1–1)
MONOCYTES NFR BLD AUTO: 11.9 %
NEUTROPHILS # BLD AUTO: 2.51 X10 (3) UL (ref 1.5–7.7)
NEUTROPHILS # BLD AUTO: 2.51 X10(3) UL (ref 1.5–7.7)
NEUTROPHILS NFR BLD AUTO: 51.3 %
OSMOLALITY SERPL CALC.SUM OF ELEC: 300 MOSM/KG (ref 275–295)
PLATELET # BLD AUTO: 194 10(3)UL (ref 150–450)
POTASSIUM SERPL-SCNC: 3.2 MMOL/L (ref 3.5–5.1)
RBC # BLD AUTO: 2.57 X10(6)UL
SODIUM SERPL-SCNC: 143 MMOL/L (ref 136–145)
WBC # BLD AUTO: 4.9 X10(3) UL (ref 4–11)

## 2024-02-27 PROCEDURE — 3E0H8KZ INTRODUCTION OF OTHER DIAGNOSTIC SUBSTANCE INTO LOWER GI, VIA NATURAL OR ARTIFICIAL OPENING ENDOSCOPIC: ICD-10-PCS | Performed by: INTERNAL MEDICINE

## 2024-02-27 PROCEDURE — 85025 COMPLETE CBC W/AUTO DIFF WBC: CPT | Performed by: HOSPITALIST

## 2024-02-27 PROCEDURE — 0DB68ZX EXCISION OF STOMACH, VIA NATURAL OR ARTIFICIAL OPENING ENDOSCOPIC, DIAGNOSTIC: ICD-10-PCS | Performed by: INTERNAL MEDICINE

## 2024-02-27 PROCEDURE — 80048 BASIC METABOLIC PNL TOTAL CA: CPT | Performed by: HOSPITALIST

## 2024-02-27 PROCEDURE — 82378 CARCINOEMBRYONIC ANTIGEN: CPT | Performed by: INTERNAL MEDICINE

## 2024-02-27 PROCEDURE — 0DBN8ZZ EXCISION OF SIGMOID COLON, VIA NATURAL OR ARTIFICIAL OPENING ENDOSCOPIC: ICD-10-PCS | Performed by: INTERNAL MEDICINE

## 2024-02-27 PROCEDURE — 88305 TISSUE EXAM BY PATHOLOGIST: CPT | Performed by: INTERNAL MEDICINE

## 2024-02-27 PROCEDURE — 0DBK8ZZ EXCISION OF ASCENDING COLON, VIA NATURAL OR ARTIFICIAL OPENING ENDOSCOPIC: ICD-10-PCS | Performed by: INTERNAL MEDICINE

## 2024-02-27 RX ORDER — NALOXONE HYDROCHLORIDE 0.4 MG/ML
0.08 INJECTION, SOLUTION INTRAMUSCULAR; INTRAVENOUS; SUBCUTANEOUS ONCE AS NEEDED
Status: DISCONTINUED | OUTPATIENT
Start: 2024-02-27 | End: 2024-02-27 | Stop reason: HOSPADM

## 2024-02-27 RX ORDER — POTASSIUM CHLORIDE 20 MEQ/1
40 TABLET, EXTENDED RELEASE ORAL ONCE
Status: COMPLETED | OUTPATIENT
Start: 2024-02-27 | End: 2024-02-27

## 2024-02-27 RX ORDER — LIDOCAINE HYDROCHLORIDE 10 MG/ML
INJECTION, SOLUTION EPIDURAL; INFILTRATION; INTRACAUDAL; PERINEURAL AS NEEDED
Status: DISCONTINUED | OUTPATIENT
Start: 2024-02-27 | End: 2024-02-27 | Stop reason: SURG

## 2024-02-27 RX ORDER — SODIUM CHLORIDE, SODIUM LACTATE, POTASSIUM CHLORIDE, CALCIUM CHLORIDE 600; 310; 30; 20 MG/100ML; MG/100ML; MG/100ML; MG/100ML
INJECTION, SOLUTION INTRAVENOUS CONTINUOUS
Status: DISCONTINUED | OUTPATIENT
Start: 2024-02-27 | End: 2024-02-27

## 2024-02-27 RX ORDER — PHENYLEPHRINE HCL 10 MG/ML
VIAL (ML) INJECTION AS NEEDED
Status: DISCONTINUED | OUTPATIENT
Start: 2024-02-27 | End: 2024-02-27 | Stop reason: SURG

## 2024-02-27 RX ORDER — SODIUM CHLORIDE, SODIUM LACTATE, POTASSIUM CHLORIDE, CALCIUM CHLORIDE 600; 310; 30; 20 MG/100ML; MG/100ML; MG/100ML; MG/100ML
INJECTION, SOLUTION INTRAVENOUS CONTINUOUS PRN
Status: DISCONTINUED | OUTPATIENT
Start: 2024-02-27 | End: 2024-02-27 | Stop reason: SURG

## 2024-02-27 RX ORDER — PANTOPRAZOLE SODIUM 40 MG/1
40 TABLET, DELAYED RELEASE ORAL
Status: DISCONTINUED | OUTPATIENT
Start: 2024-02-27 | End: 2024-02-28

## 2024-02-27 RX ADMIN — PHENYLEPHRINE HCL 200 MCG: 10 MG/ML VIAL (ML) INJECTION at 08:09:00

## 2024-02-27 RX ADMIN — PHENYLEPHRINE HCL 100 MCG: 10 MG/ML VIAL (ML) INJECTION at 07:58:00

## 2024-02-27 RX ADMIN — PHENYLEPHRINE HCL 200 MCG: 10 MG/ML VIAL (ML) INJECTION at 08:06:00

## 2024-02-27 RX ADMIN — SODIUM CHLORIDE, SODIUM LACTATE, POTASSIUM CHLORIDE, CALCIUM CHLORIDE: 600; 310; 30; 20 INJECTION, SOLUTION INTRAVENOUS at 07:36:00

## 2024-02-27 RX ADMIN — PHENYLEPHRINE HCL 100 MCG: 10 MG/ML VIAL (ML) INJECTION at 07:52:00

## 2024-02-27 RX ADMIN — PHENYLEPHRINE HCL 100 MCG: 10 MG/ML VIAL (ML) INJECTION at 07:55:00

## 2024-02-27 RX ADMIN — PHENYLEPHRINE HCL 200 MCG: 10 MG/ML VIAL (ML) INJECTION at 08:01:00

## 2024-02-27 RX ADMIN — LIDOCAINE HYDROCHLORIDE 25 MG: 10 INJECTION, SOLUTION EPIDURAL; INFILTRATION; INTRACAUDAL; PERINEURAL at 07:36:00

## 2024-02-27 RX ADMIN — PHENYLEPHRINE HCL 100 MCG: 10 MG/ML VIAL (ML) INJECTION at 07:49:00

## 2024-02-27 RX ADMIN — PHENYLEPHRINE HCL 100 MCG: 10 MG/ML VIAL (ML) INJECTION at 07:46:00

## 2024-02-27 RX ADMIN — PHENYLEPHRINE HCL 200 MCG: 10 MG/ML VIAL (ML) INJECTION at 08:03:00

## 2024-02-27 NOTE — OPERATIVE REPORT
Colonoscopy Operative Report    Wagner Sheridan Patient Status:  Inpatient    1945 MRN [id number]   [facility] Attending Edil Salgado MD   Hosp Day #   4 PCP Edil Roy MD     Pre-Operative Diagnosis: anemia, GIB    Post-Operative Diagnosis:  One 8 mm polypoid ascending colon polyp removed with hot snare. Sara ink was injected for tattooing   One 3 mm sigmoid colon polyp removed with cold forceps   Liquid stool present on right side of colon. Lavaged with tap water. Fair visualization       Procedure Performed: COLONOSCOPY     Informed Consent: Informed consent for both the procedure and sedation were obtained from the patient. The potentially life-threatening complications of sedation, bleeding,  perforation, transfusion or repeat endoscopy  were reviewed along with the possible need for hospitalization, surgical management, transfusion or repeat endoscopy should one of these complications arise. The patient understands and is agreeable to proceed.  Sedation Type: MAC-Patient received sedation with monitored anesthesia provided by an anesthesiologist  A trained sedation nurse was present to assist in monitoring the patient during the entire length of moderate sedation time.    Cecum Withdrawal Time:  12 mins   Date of previous colonoscopy: 10 yrs    Procedure Description: The patient was placed in the left lateral decubitus position.  After careful digital rectal examination, the Adult colonoscope was inserted into the rectum and advanced to the level of the cecum under direct visualization. The cecum was identified by landmarks, including the appendiceal orifice and ileoceccal valve. Careful examination of the entire colon was performed during withdrawal of the endoscope. The scope was withdrawn to the rectum and retroflexion was performed.  The patient tolerated the procedure well with no immediate complications. The  patient was transferred to the recovery area in stable condition.  Quality of Preparation: Adequate  Aronchick Bowel Prep Scale:  3- fair   Findings:   One 8 mm polypoid ascending colon polyp removed with hot snare. Sara ink was injected for tattooing   One 3 mm sigmoid colon polyp removed with cold forceps   Liquid stool present on right side of colon. Lavaged with tap water. Fair visualization     Recommendations:   Await pathology results   Repeat colonoscopy in 3-6 months from now for surveillance of ascending colon polyp as this was suboptimal colon prep   Discharge:  The patient was given an after visit summary detailing the procedure, findings, recommendations and follow up plans.     Heath Mina DO  2/27/2024  8:17 AM

## 2024-02-27 NOTE — PLAN OF CARE
Assumed care of pt around 1930. A&Ox4.  Remains on room air with O2 saturation > 90%. Dyspneic on exertion. Diminished lung sounds. A-fib on tele, denies cardiac symptoms. Generalized edema and bruising noted. Continent. Ambulating with standby assist. Denies pain. Pt updated on plan of care and agreeable. Call light within reach. Pt completed golytely prep.  Plan: IV lasix daily, monitor hgb, NPO- colonoscopy/EGD    0420: pt's AM BP 94/79, recheck after the bathroom 82/60. Orthostatic vitals done, pt asymptomatic. MD notified, see orders.    0630: Pt's K 3.2, MD notified, see orders.     02/27/24 0422 02/27/24 0424 02/27/24 0426   Vital Signs   /69 132/86 114/58   MAP (mmHg) 88 100 74   BP Location Left arm Left arm Left arm   BP Method Automatic Automatic Automatic   Patient Position Lying Sitting Standing       Problem: Patient/Family Goals  Goal: Patient/Family Long Term Goal  Description: Patient's Long Term Goal: DC home    Interventions:  - monitor Hgb/labs, intake and output and VS  - administer meds as ordered  - Telemetry  - See additional Care Plan goals for specific interventions  Outcome: Progressing  Goal: Patient/Family Short Term Goal  Description: Patient's Short Term Goal: VS stable    Interventions:   - monitor Hgb/labs, intake and output and VS  - administer meds as ordered  - Telemetry  - See additional Care Plan goals for specific interventions  Outcome: Progressing     Problem: CARDIOVASCULAR - ADULT  Goal: Absence of cardiac arrhythmias or at baseline  Description: INTERVENTIONS:  - Continuous cardiac monitoring, monitor vital signs, obtain 12 lead EKG if indicated  - Evaluate effectiveness of antiarrhythmic and heart rate control medications as ordered  - Initiate emergency measures for life threatening arrhythmias  - Monitor electrolytes and administer replacement therapy as ordered  Outcome: Progressing     Problem: METABOLIC/FLUID AND ELECTROLYTES - ADULT  Goal: Electrolytes  maintained within normal limits  Description: INTERVENTIONS:  - Monitor labs and rhythm and assess patient for signs and symptoms of electrolyte imbalances  - Administer electrolyte replacement as ordered  - Monitor response to electrolyte replacements, including rhythm and repeat lab results as appropriate  - Fluid restriction as ordered  - Instruct patient on fluid and nutrition restrictions as appropriate  Outcome: Progressing  Goal: Hemodynamic stability and optimal renal function maintained  Description: INTERVENTIONS:  - Monitor labs and assess for signs and symptoms of volume excess or deficit  - Monitor intake, output and patient weight  - Monitor urine specific gravity, serum osmolarity and serum sodium as indicated or ordered  - Monitor response to interventions for patient's volume status, including labs, urine output, blood pressure (other measures as available)  - Encourage oral intake as appropriate  - Instruct patient on fluid and nutrition restrictions as appropriate  Outcome: Progressing

## 2024-02-27 NOTE — PROGRESS NOTES
Pt received post EGD/colonoscopy.  Pt alert.  Denies pain, SOB and nausea.  VSS.  Family at bedside. Resting comfortably in bed.

## 2024-02-27 NOTE — PLAN OF CARE
Pt alert and oriented x 4.  Continuous tele monitoring in place.  Afib with controlled rate on the monitor.  Denies pain, dyspnea and dizziness.  Room air.  Resting in bed.  Safety and comfort maintained.  Will continue to monitor.  Awaiting EGD/colonoscopy later this morning.     Problem: CARDIOVASCULAR - ADULT  Goal: Absence of cardiac arrhythmias or at baseline  Description: INTERVENTIONS:  - Continuous cardiac monitoring, monitor vital signs, obtain 12 lead EKG if indicated  - Evaluate effectiveness of antiarrhythmic and heart rate control medications as ordered  - Initiate emergency measures for life threatening arrhythmias  - Monitor electrolytes and administer replacement therapy as ordered  2/27/2024 1227 by Mahsa Avila RN  Outcome: Progressing  2/27/2024 1226 by Mahsa Avila RN  Outcome: Progressing     Problem: METABOLIC/FLUID AND ELECTROLYTES - ADULT  Goal: Electrolytes maintained within normal limits  Description: INTERVENTIONS:  - Monitor labs and rhythm and assess patient for signs and symptoms of electrolyte imbalances  - Administer electrolyte replacement as ordered  - Monitor response to electrolyte replacements, including rhythm and repeat lab results as appropriate  - Fluid restriction as ordered  - Instruct patient on fluid and nutrition restrictions as appropriate  2/27/2024 1227 by Mahsa Avila RN  Outcome: Progressing  2/27/2024 1226 by Mahsa Avila RN  Outcome: Progressing  Goal: Hemodynamic stability and optimal renal function maintained  Description: INTERVENTIONS:  - Monitor labs and assess for signs and symptoms of volume excess or deficit  - Monitor intake, output and patient weight  - Monitor urine specific gravity, serum osmolarity and serum sodium as indicated or ordered  - Monitor response to interventions for patient's volume status, including labs, urine output, blood pressure (other measures as available)  - Encourage oral intake as appropriate  - Instruct  patient on fluid and nutrition restrictions as appropriate  2/27/2024 1227 by Mahsa Avila, RN  Outcome: Progressing  2/27/2024 1226 by Mahsa Avila, RN  Outcome: Progressing     Problem: GASTROINTESTINAL - ADULT  Goal: Minimal or absence of nausea and vomiting  Description: INTERVENTIONS:  - Maintain adequate hydration with IV or PO as ordered and tolerated  - Nasogastric tube to low intermittent suction as ordered  - Evaluate effectiveness of ordered antiemetic medications  - Provide nonpharmacologic comfort measures as appropriate  - Advance diet as tolerated, if ordered  - Obtain nutritional consult as needed  - Evaluate fluid balance  Outcome: Progressing  Goal: Maintains or returns to baseline bowel function  Description: INTERVENTIONS:  - Assess bowel function  - Maintain adequate hydration with IV or PO as ordered and tolerated  - Evaluate effectiveness of GI medications  - Encourage mobilization and activity  - Obtain nutritional consult as needed  - Establish a toileting routine/schedule  - Consider collaborating with pharmacy to review patient's medication profile  Outcome: Completed  Goal: Maintains adequate nutritional intake (undernourished)  Description: INTERVENTIONS:  - Monitor percentage of each meal consumed  - Identify factors contributing to decreased intake, treat as appropriate  - Assist with meals as needed  - Monitor I&O, WT and lab values  - Obtain nutritional consult as needed  - Optimize oral hygiene and moisture  - Encourage food from home; allow for food preferences  - Enhance eating environment  Outcome: Completed  Goal: Achieves appropriate nutritional intake (bariatric)  Description: INTERVENTIONS:  - Monitor for over-consumption  - Identify factors contributing to increased intake, treat as appropriate  - Monitor I&O, WT and lab values  - Obtain nutritional consult as needed  - Evaluate psychosocial factors contributing to over-consumption  Outcome: Completed

## 2024-02-27 NOTE — PAYOR COMM NOTE
--------------  CONTINUED STAY REVIEW    Payor: [employer]  Subscriber #:  [id number]  Authorization Number: [id number]    Admit date: 2/23/24  Admit time: 11:09 AM    Admitting Physician: Edil Salgado MD  Attending Physician:  Edil Salgado MD  Primary Care Physician: Edil Roy MD    REVIEW DOCUMENTATION:  2/26  Assessment/Plan     Anemia  Hgb 7-7.8 hematocrit 22.7  - GI workup  - Transfuse to keep Hgb >7  - Planning for endoscopy on Tuesday.      HFpEF  moderate to severe mitral regurgitation  pulmonary hypertension   NYHA Class II  LVEF 60%  -Hold eplerenone 50mg given CEDRICK; resume tomorrow if improved renal fx  - Hold losartan 100mg given CEDRICK; resume tomorrow if improved renal fx.  - continue metoprolol ER 50mg  -+1 pitting edema bilateral ankle.  Start furosemide 20 mg daily.   -Low-sodium diet.  Fluids 1,800 mL daily.   -BMP daily  -Daily weights, I & O.  -planning on chest CT today to assess asc aorta per CV surgery       Atrial Fibrillation  -Telemetry shows atrial fibrillation  - rates controlled with BB - continue   - Hold eliquis given Hgb 7.     HTN  Hyperaldosteronism   - Bp controlled   - hold  eplerenone 50mg given CEDRICK  - Hold losartan 100mg given CEDRICK  - continue metoprolol ER 50mg   - continue amlodipine 5mg   - Will aim to restart epleronone and losartan as renal function improves.      CEDRICK on CKD   -baseline Cr = 1.62.0     - Improving. Holding meds as above.     Cardiology to follow.   ASSESSMENT / PLAN:      Wagner Sheridan Is a a 78 year old male who presents with acute anemia and CEDRICK on CKD3     Problem List / Diagnoses     Acute Anemia  CEDRICK on CKD3  Severe MR   Paroxysmal Afib   BPH  HTN     Recommendations     Acute Anemia  -- Admission Hgb 7 from 13 with modest improvement to 7.5 following transfusion.  Hemoglobin now stable  -- pt denies active bleeding, suspect GI source  -- continue holding hold anticoagulation/antiplatelet agents  -- given will transfuse to maintain goal  Hgb > 7   -- GI consulted, plan discussed, anticipate will need EGD/Colonoscopy once off apixaban 72 hours, tentatively Tuesday.  Plan for prep today, n.p.o. after midnight     CEDRICK on CKD3  -- Cr 2.1 on admission, prev baseline 1.6   -- likely secondary to anemia  -- improved to 1.70 following transfusion, CPM  -- Outpatient UA from 2/23 normal, no need to repeat   -- renally dose all meds, avoid nephrotoxic agents     Severe MR   -- management per CV Surgery  -- planned for repair 3/6/24      Paroxysmal Afib   -- holding eliquis per above  -- monitor on telemetry  -- resume home beta bocker      BPH   -- resume finasteride     HTN  -- resume amlodipine     Iron deficiency anemia                The patient presents with iron deficiency anemia of unclear etiology.  Differential diagnosis includes iron malabsorption vs occult gastrointestinal blood loss from the upper or lower GI tract.  Evaluation will be performed to rule out celiac disease, colorectal / gastroesophageal cancer, peptic ulcer disease, esophogitis, AVMs of the upper or lower GI tract.                  The pt is presently on eliquis with an elevated creatinine.  Eliquis taken on 2/23.     Plan:   EGD and colonoscopy tomorrow under MAC   NPO after midnight   Hold NSAID's, antiplatelets and anticoagulation    2/27   Findings:   One 8 mm polypoid ascending colon polyp removed with hot snare. Sara ink was injected for tattooing   One 3 mm sigmoid colon polyp removed with cold forceps   Liquid stool present on right side of colon. Lavaged with tap water. Fair visualization      Recommendations:   Await pathology results   Repeat colonoscopy in 3-6 months from now for surveillance of ascending colon polyp as this was suboptimal colon prep    Findings:    STOMACH:  Small 1-2 mm non-bleeding gastric ulcers near antrum and pylorus. Biopsied for H. Pylori infection. Moderate diffuse gastritis    Recommendations:   Pantoprazole 40 mg 40 daily for 8 weeks and  then 40 mg daily for another 8 weeks   Avoid NSAID's     MEDICATIONS ADMINISTERED IN LAST 1 DAY:  amLODIPine (Norvasc) tab 5 mg       Date Action Dose Route User    2/26/2024 2013 Given 5 mg Oral Barby Linares RN          finasteride (Proscar) tab 5 mg       Date Action Dose Route User    2/26/2024 2013 Given 5 mg Oral Barby Linares RN          lactated ringers infusion       Date Action Dose Route User    2/27/2024 0736 New Bag (none) Intravenous Alexander Weston MD          lidocaine PF (Xylocaine-MPF) 1% injection       Date Action Dose Route User    2/27/2024 0736 Given 25 mg Intravenous Alexander Weston MD          metoprolol succinate ER (Toprol XL) 24 hr tab 50 mg       Date Action Dose Route User    2/26/2024 2013 Given 50 mg Oral Barby Linares RN          phenylephrine (Ray-Synephrine) 10 MG/ML injection       Date Action Dose Route User    2/27/2024 0809 Given 200 mcg Intravenous Alexander Weston MD    2/27/2024 0806 Given 200 mcg Intravenous Alexander Weston MD    2/27/2024 0803 Given 200 mcg Intravenous Alexander Weston MD    2/27/2024 0801 Given 200 mcg Intravenous Alexander Weston MD    2/27/2024 0758 Given 100 mcg Intravenous Alexander Weston MD    2/27/2024 0755 Given 100 mcg Intravenous Alexander Weston MD    2/27/2024 0752 Given 100 mcg Intravenous Alexander Weston MD    2/27/2024 0749 Given 100 mcg Intravenous Alexander Weston MD    2/27/2024 0746 Given 100 mcg Intravenous Alexander Weston MD          polyethylene glycol-electrolyte (Golytely) 236 g oral solution 4,000 mL       Date Action Dose Route User    2/26/2024 1442 Given 4,000 mL Oral Mahsa Avila RN          potassium chloride (K-Dur) tab 40 mEq       Date Action Dose Route User    2/27/2024 0640 Given 40 mEq Oral Barby Linares RN          propofol (Diprivan) 10 MG/ML injection       Date Action Dose Route User    2/27/2024 0737 Given 60 mg Intravenous Alexander Weston MD          propofol (Diprivan) 10 mg/mL infusion premix       Date  Action Dose Route User    2/27/2024 0759 Rate/Dose Change 80 mcg/kg/min × 125.8 kg Intravenous Alexander Weston MD    2/27/2024 0755 Rate/Dose Change 100 mcg/kg/min × 125.8 kg Intravenous Alexander Weston MD    2/27/2024 0749 Rate/Dose Change 120 mcg/kg/min × 125.8 kg Intravenous Alexander Weston MD    2/27/2024 0742 Rate/Dose Change 150 mcg/kg/min × 125.8 kg Intravenous Alexander Weston MD    2/27/2024 0737 New Bag 175 mcg/kg/min × 125.8 kg Intravenous Alexander Weston MD          sodium chloride 0.9 % IV bolus 1,000 mL       Date Action Dose Route User    2/27/2024 0532 Rate/Dose Change (none) Intravenous Vandana Ortiz RN    2/27/2024 0503 New Bag 1,000 mL Intravenous Barby Linares, RN            Vitals (last day)       Date/Time Temp Pulse Resp BP SpO2 Weight O2 Device O2 Flow Rate (L/min) [facility]    02/27/24 0855 -- 77 14 121/95 98 % -- -- 2 L/min     02/27/24 0850 -- 69 17 84/47 96 % -- -- --     02/27/24 0835 -- 72 22 87/55 93 % -- -- --     02/27/24 0830 -- 81 22 85/51 93 % -- -- --     02/27/24 0825 -- 85 20 115/56 86 % -- -- --     02/27/24 0820 -- 79 23 150/108 90 % -- Nasal cannula 3 L/min     02/26/24 0435 98 °F (36.7 °C) 85 25 136/81 94 % 277 lb 9.6 oz High flow nasal cannula 2 L/min AT    02/26/24 0000 -- 79 20 115/74 99 % -- High flow nasal cannula 2 L/min BN          CIWA Scores (since admission)       None          Blood Transfusion Record       Product Unit Status Volume Start End            Transfuse RBC       24  [id number]  [id number] Completed 02/23/24 2038 600 mL 02/23/24 1730 02/23/24 2000                PLEASE FAX DAYS CERTIFIED AND  NEXT REVIEW DATE [phone]

## 2024-02-27 NOTE — ANESTHESIA POSTPROCEDURE EVALUATION
[facility]    Wagner Sheridan Patient Status:  Inpatient   Age/Gender 78 year old male MRN [id number]   Location [facility] Attending Edil Salgado MD   Hosp Day # 4 PCP Edil Roy MD       Anesthesia Post-op Note    ESOPHAGOGASTRODUODENOSCOPY (EGD) with biopsies, COLONOSCOPY with hot snare and forcep polypectomy with tattoo submucosal injection    Procedure Summary       Date: 02/27/24 Room / Location:  ENDOSCOPY 03 / [facility]    Anesthesia Start: 0735 Anesthesia Stop: 0820    Procedures:       ESOPHAGOGASTRODUODENOSCOPY (EGD) with biopsies, COLONOSCOPY with hot snare and forcep polypectomy with tattoo submucosal injection      COLONOSCOPY Diagnosis: (EGD: gastric ulcers with gastritis COLON: ascending and sigmoid polyp)    Surgeons: Heath Mina DO Anesthesiologist: Alexander Weston MD    Anesthesia Type: MAC ASA Status: 3            Anesthesia Type: No value filed.    Vitals Value Taken Time   /98 02/27/24 0821   Temp 99 02/27/24 0824   Pulse 79 02/27/24 0823   Resp 13 02/27/24 0823   SpO2 90 % 02/27/24 0823   Vitals shown include unfiled device data.    Patient Location: Endoscopy    Anesthesia Type: MAC    Airway Patency: patent    Postop Pain Control: adequate    Mental Status: preanesthetic baseline    Nausea/Vomiting: none    Cardiopulmonary/Hydration status: stable euvolemic    Complications: no apparent anesthesia related complications    Postop vital signs: stable    Dental Exam: Unchanged from Preop    Patient to be discharged home when criteria met.

## 2024-02-27 NOTE — OPERATIVE REPORT
EGD Operative Report    Wagner Sheridan Patient Status:  Inpatient    1945 MRN [id number]   [facility] Attending Edil Salgado MD   Hosp Day #   4 PCP Edil Roy MD       Pre-op Diagnosis: anemia, GIB     Post-Op Diagnosis:    ESOPHAGUS:  Normal esophagus. No hiatal hernia    STOMACH:  Small 1-2 mm non-bleeding gastric ulcers near antrum and pylorus. Biopsied for H. Pylori infection. Moderate diffuse gastritis    DUODENUM:  Normal duodenum     Procedure Performed: EGD     Informed Consent: Informed consent for both the procedure and sedation were obtained from the patient. The potentially life-threatening complications of sedation, bleeding,  Perforation, transfusion or repeat endoscopy were reviewed along with the possible need for hospitalization, surgical management, transfusion or repeat endoscopy should one of these complications arise. The patient understands and is agreeable to proceed.  Sedation Type: MAC-Patient received sedation with monitored anesthesia provided by an anesthesiologist  Moderate Sedation Time: None.  Deep sedation provided by anesthesia.  Procedure Description: The patient was placed in the left lateral decubitus position.  A bite block was placed in the patient’s mouth.  The endoscope was inserted through the mouth and advanced under direct visualization to the 3rd portion of the duodenum and was then withdrawn to examine the duodenal bulb and gastric antrum.  The endoscope was then retroflexed to examine the angulus, GE junction, cardia, body and fundus and then withdrawn to examine the esophagus. The endoscope was then removed from the patient. The patient tolerated the procedure well with no immediate complications and was transferred to the recovery area in stable condition.  Findings:     ESOPHAGUS:  Normal esophagus. No hiatal hernia    STOMACH:  Small 1-2 mm non-bleeding gastric ulcers near antrum and pylorus. Biopsied for H. Pylori infection. Moderate diffuse gastritis    DUODENUM:  Normal duodenum   Recommendations:   Pantoprazole 40 mg 40 daily for 8 weeks and then 40 mg daily for another 8 weeks   Avoid NSAID's   Discharge:  The patient was given an after visit summary detailing the procedure, findings, recommendations and follow up plans.  Heath Mina DO  2/27/2024  7:44 AM

## 2024-02-27 NOTE — PROGRESS NOTES
[facility] Cardiology Progress Note        Wagner Sheridan Patient Status:  Inpatient    1945 MRN [id number]   Location [facility] 8NE-A Attending Edil Salgado MD   Hosp Day # 4 PCP Edil Roy MD     Subjective:    The patient denies  chest pain   He notes dyspnea at 30 feet walking prior to admission  Admitted due to anemia, Hgb of 7.0    EGD, 24: moderate diffuse gastritis. 2 small ulcers  C-scope: 2 polyps removed    Medications:   traZODone  50 mg Oral Nightly    furosemide  20 mg Oral Daily    iron sucrose  200 mg Intravenous Daily    amLODIPine  5 mg Oral BID    metoprolol succinate ER  50 mg Oral BID    finasteride  5 mg Oral Nightly       Continuous Infusions:   lactated ringers           Allergies:  Allergies   Allergen Reactions    Amoxicillin HIVES    Clindamycin HIVES    Penicillins HIVES    Wasp Venom RESPIRATORY FAILURE    Wasp Venom Protein RESPIRATORY FAILURE    Benazepril Coughing    Chlorhexidine RASH and OTHER (SEE COMMENTS)     redness         Objective:        Intake/Output:      Intake/Output Summary (Last 24 hours) at 2024 0859  Last data filed at 2024 0000  Gross per 24 hour   Intake 4360 ml   Output --   Net 4360 ml     Wt Readings from Last 3 Encounters:   24 277 lb 4.8 oz (125.8 kg)   24 275 lb (124.7 kg)   24 270 lb (122.5 kg)       Physical Exam:        Vitals:    24 0835 24 0845 24 0850 24 0855   BP: (!) 87/55 99/52 (!) 84/47 (!) 121/95   BP Location:       Pulse: 72 93 69 77   Resp: 22 16 17 14   Temp:       TempSrc:       SpO2: 93% 92% 96% 98%   Weight:           Temp:  [97.6 °F (36.4 °C)-98.4 °F (36.9 °C)] 98.1 °F (36.7 °C)  Pulse:  [69-96] 77  Resp:  [14-23] 14  BP: ()/() 121/95  SpO2:  [86 %-99 %] 98 %      Temp: 98.1 °F (36.7 °C)  Pulse: 77  Resp: 14  BP: 121/95  General:  Appears comfortable  HEENT: No focal deficits.  Neck: No JVD, carotids 2+ no bruits.  Cardiac:  Irregular S1S2.  No S3, S4, rub, click.  2/6 holo-systolic   murmur.  Lungs: Clear to auscultation and percussion.  Abdomen: Soft, non-tender.   Extremities: 1+ bilateral  LE edema.  No clubbing or cyanosis.    Neurologic: Alert and oriented, normal affect.  Skin: Warm and dry.           LABS:      HEM:  Recent Labs   Lab 02/23/24  1857 02/23/24 2024 02/24/24  0911 02/24/24  1632 02/25/24  0448 02/26/24  0429 02/27/24  0534   WBC 6.3  --  6.6  --  8.0 6.4 4.9   HGB 7.0*   < > 7.5* 7.3* 7.6* 7.2* 7.4*   HCT 21.5*  --  25.0*  --  24.5* 22.9* 23.6*   .0  --  198.0  --  199.0 190.0 194.0    < > = values in this interval not displayed.       Chem:  Recent Labs   Lab 02/23/24  0907 02/24/24  0911 02/25/24  0448 02/26/24  0429 02/27/24  0534    141 140 141 143   K 3.9 4.1 3.9 4.0 3.2*   * 114* 114* 113* 111   CO2 23.0 24.0 23.0 24.0 27.0   BUN 40* 36* 35* 32* 25*   CREATSERUM 2.16* 1.86* 1.69* 1.70* 1.53*   CA 9.4 9.9 10.0 9.7 9.3   * 130* 90 88 89       Recent Labs   Lab 02/23/24  0907   ALT 14*   AST 10*   ALB 3.2*       Recent Labs   Lab 02/23/24  0907   PTT 32.8   INR 1.34*           Lab Results   Component Value Date    TROP < 0.046 12/27/2012         Invalid input(s): \"PBNPML\"                       Diagnostics:   Telemetry: Atrial Fibrillation     Diagnostics:     2/23/24     Atrial fibrillation   Nonspecific ST abnormality   Abnormal ECG   When compared with ECG of 30-JAN-2024 11:48,   Atrial fibrillation has replaced Sinus rhythm      1/30/2024: Cardioversion: STAN  CONCLUSIONS:   1. There is flail of the P3 scallop of the mitral valve. The mitral valve has severe, 4+ eccentric (laterally directed) regurgitation.   2. The tricuspid valve has annular enlargement, with central malcoaptation. The tricuspid valve has moderate, centrally directed, regurgitation.  3. The left ventricle appears normal in size, thickness, and systolic function. The estimated left ventricular ejection fraction is  60%  4. A patent foramen ovale is present (tunnel length 34 mm). There is left-to-right shunting across the patent foramen ovale.  2/13/2024:   R+L heart cath in anticipation of surgical repair of MV and TV  IMPRESSION:   1. Hemodynamics show moderate pulmonary hypertension and right and left ventricular diastolic dysfunction.   2. Mitral regurgitation difficult to visualize on the left ventriculogram; however, it has been graded as severe on the transesophageal echo. V-wave seen on the wedge tracing is quite high at 40, which is consistent with severe mitral regurgitation.   3. Coronary disease as described above involving the distal left anterior descending, which is a fairly diminutive vessel.                                             Assessment/Plan     Anemia  Hgb 7-7.8 hematocrit 22.7  - GI workup  - Transfuse to keep Hgb >7  - Planning for endoscopy on Tuesday.     Moderate dilatation asc aorta at 4.4 cm.      HFpEF  moderate to severe mitral regurgitation  pulmonary hypertension   NYHA Class II  LVEF 60%    -timing of MV surgery per CV surgery team.  Some delay would be appropriate given presence of gastritis and gastric ulcers.  - continue metoprolol ER 50mg  -+1 pitting edema bilateral ankle.  Started furosemide 20 mg daily.   -Low-sodium diet.  Fluids 1,800 mL daily.   -BMP daily  -Daily weights, I & O.         Atrial Fibrillation  -Telemetry shows atrial fibrillation  - rates controlled with BB - continue   - Hold eliquis given Hgb 7.     HTN    Hyperaldosteronism      -soft bp's  - continue metoprolol ER 50mg   - continue amlodipine 5mg   -holding losartan and epleranone given low bp's     CEDRICK on CKD   -baseline Cr = 1.5-2.0    - Improving. Holding meds as above.     Cardiology to follow.          Boy Hernandes MD

## 2024-02-28 VITALS
RESPIRATION RATE: 13 BRPM | TEMPERATURE: 98 F | OXYGEN SATURATION: 100 % | SYSTOLIC BLOOD PRESSURE: 126 MMHG | DIASTOLIC BLOOD PRESSURE: 70 MMHG | WEIGHT: 281.94 LBS | HEART RATE: 85 BPM | BODY MASS INDEX: 38 KG/M2

## 2024-02-28 LAB
ANION GAP SERPL CALC-SCNC: 1 MMOL/L (ref 0–18)
BASOPHILS # BLD AUTO: 0.03 X10(3) UL (ref 0–0.2)
BASOPHILS NFR BLD AUTO: 0.5 %
BUN BLD-MCNC: 23 MG/DL (ref 9–23)
CALCIUM BLD-MCNC: 9.1 MG/DL (ref 8.5–10.1)
CHLORIDE SERPL-SCNC: 113 MMOL/L (ref 98–112)
CO2 SERPL-SCNC: 27 MMOL/L (ref 21–32)
CREAT BLD-MCNC: 1.69 MG/DL
EGFRCR SERPLBLD CKD-EPI 2021: 41 ML/MIN/1.73M2 (ref 60–?)
EOSINOPHIL # BLD AUTO: 0.41 X10(3) UL (ref 0–0.7)
EOSINOPHIL NFR BLD AUTO: 6.4 %
ERYTHROCYTE [DISTWIDTH] IN BLOOD BY AUTOMATED COUNT: 17.9 %
GLUCOSE BLD-MCNC: 97 MG/DL (ref 70–99)
HCT VFR BLD AUTO: 24.8 %
HGB BLD-MCNC: 7.5 G/DL
IMM GRANULOCYTES # BLD AUTO: 0.02 X10(3) UL (ref 0–1)
IMM GRANULOCYTES NFR BLD: 0.3 %
LYMPHOCYTES # BLD AUTO: 1.27 X10(3) UL (ref 1–4)
LYMPHOCYTES NFR BLD AUTO: 19.8 %
MCH RBC QN AUTO: 28.4 PG (ref 26–34)
MCHC RBC AUTO-ENTMCNC: 30.2 G/DL (ref 31–37)
MCV RBC AUTO: 93.9 FL
MONOCYTES # BLD AUTO: 0.71 X10(3) UL (ref 0.1–1)
MONOCYTES NFR BLD AUTO: 11 %
NEUTROPHILS # BLD AUTO: 3.99 X10 (3) UL (ref 1.5–7.7)
NEUTROPHILS # BLD AUTO: 3.99 X10(3) UL (ref 1.5–7.7)
NEUTROPHILS NFR BLD AUTO: 62 %
OSMOLALITY SERPL CALC.SUM OF ELEC: 296 MOSM/KG (ref 275–295)
PLATELET # BLD AUTO: 195 10(3)UL (ref 150–450)
POTASSIUM SERPL-SCNC: 3.5 MMOL/L (ref 3.5–5.1)
RBC # BLD AUTO: 2.64 X10(6)UL
SODIUM SERPL-SCNC: 141 MMOL/L (ref 136–145)
WBC # BLD AUTO: 6.4 X10(3) UL (ref 4–11)

## 2024-02-28 PROCEDURE — 80048 BASIC METABOLIC PNL TOTAL CA: CPT | Performed by: HOSPITALIST

## 2024-02-28 PROCEDURE — 85025 COMPLETE CBC W/AUTO DIFF WBC: CPT | Performed by: HOSPITALIST

## 2024-02-28 RX ORDER — MELATONIN
325
Qty: 30 TABLET | Refills: 0 | Status: SHIPPED | OUTPATIENT
Start: 2024-02-28 | End: 2024-03-29

## 2024-02-28 RX ORDER — MELATONIN
325
Qty: 30 TABLET | Refills: 0 | Status: SHIPPED | OUTPATIENT
Start: 2024-02-28 | End: 2024-02-28

## 2024-02-28 RX ORDER — PANTOPRAZOLE SODIUM 40 MG/1
TABLET, DELAYED RELEASE ORAL
Qty: 142 TABLET | Refills: 0 | Status: SHIPPED | OUTPATIENT
Start: 2024-02-28 | End: 2024-02-28

## 2024-02-28 RX ORDER — PANTOPRAZOLE SODIUM 40 MG/1
TABLET, DELAYED RELEASE ORAL
Qty: 142 TABLET | Refills: 0 | Status: SHIPPED | OUTPATIENT
Start: 2024-02-28 | End: 2024-05-24

## 2024-02-28 NOTE — PROGRESS NOTES
[facility]    Wagner Sheridan Patient Status:  Inpatient    1945 MRN [id number]   Location [facility] 8NE-A Attending Edil Salgado MD   Hosp Day # 5 PCP Edil Roy MD     Wagner Sheridan is a 78 year old male patient. Patient seen and examined. Wife at bedside. No nausea or vomiting. No abdominal pain. Hemoglobin is 7.5 this am. Already received iron infusion while in hospital     Past Medical History:   Diagnosis Date    Arrhythmia     BPH     Encounter for incision and drainage procedure 2021    High blood pressure     High cholesterol     Hyperaldosteronism (HCC)     Mitral regurgitation     Osteoarthrosis, unspecified whether generalized or localized, unspecified site     PMR (polymyalgia rheumatica) (HCC)     off corticosteroids since     Retention of urine     Shortness of breath        Current Outpatient Medications   Medication Sig Dispense Refill    pantoprazole 40 MG Oral Tab EC Take 1 tablet (40 mg total) by mouth 2 (two) times daily before meals for 56 days, THEN 1 tablet (40 mg total) every morning before breakfast. 142 tablet 0    ferrous sulfate 325 (65 FE) MG Oral Tab EC Take 1 tablet (325 mg total) by mouth daily with breakfast. 30 tablet 0     Allergies   Allergen Reactions    Amoxicillin HIVES    Clindamycin HIVES    Penicillins HIVES    Wasp Venom RESPIRATORY FAILURE    Wasp Venom Protein RESPIRATORY FAILURE    Benazepril Coughing    Chlorhexidine RASH and OTHER (SEE COMMENTS)     redness     Active Problems:    Anemia    Blood pressure 126/70, pulse 85, temperature 98 °F (36.7 °C), temperature source Oral, resp. rate 13, weight 281 lb 15.5 oz (127.9 kg), SpO2 100%.    ROS  All 12 point ROS negative     Physical Exam  GENERAL: well developed, well nourished, in no apparent distress  HEENT: atraumatic, normocephalic  CHEST: no chest tenderness  LUNGS: clear to auscultation  CARDIO: RRR without murmur  GI: good BS's and no masses, HSM or tenderness  EXTREMITIES: no  cyanosis, clubbing or edema      COLONOSCOPY 2/27/2024  One 8 mm polypoid ascending colon polyp removed with hot snare. Sara ink was injected for tattooing   One 3 mm sigmoid colon polyp removed with cold forceps   Liquid stool present on right side of colon. Lavaged with tap water. Fair visualization     ESOPHAGUS:  Normal esophagus. No hiatal hernia   STOMACH:  Small 1-2 mm non-bleeding gastric ulcers near antrum and pylorus. Biopsied for H. Pylori infection. Moderate diffuse gastritis   DUODENUM:  Normal duodenum   Recommendations:   - Pantoprazole 40 mg 40 daily for 8 weeks and then 40 mg daily for another 8 weeks   - Avoid NSAID's    - Await pathology results   - Repeat colonoscopy in 3-6 months from now for surveillance of ascending colon polyp as this was suboptimal colon prep     Heath Mina DO  2/28/2024

## 2024-02-28 NOTE — PROGRESS NOTES
[facility]    Progress Note     Wagner Sheridan Patient Status:  Inpatient    1945 MRN [id number]   Location [facility] 0SW-A Attending Edil Salgado MD   Hosp Day # 4 PCP Edil Roy MD     Follow up for: There were no encounter diagnoses.      Interval History/Subjective:     Seen and examined at bedside.  This morning, BP slightly low.  Underwent EGD and colonoscopy.  Tolerated.    Vital signs:  Temp:  [97.6 °F (36.4 °C)-98.4 °F (36.9 °C)] 98 °F (36.7 °C)  Pulse:  [69-96] 85  Resp:  [14-31] 24  BP: ()/() 118/68  SpO2:  [86 %-99 %] 96 %    Physical Exam:    General: NAD, Comfortable, Nontoxic   Respiratory: CTAB; reg resp rate & effort, no wheezes/crackles  Cardiovascular: S1, S2. Regular rate and rhythm. No murmurs appreciated  Abdomen: Soft, NTND, no guarding/rebound   Neurologic: No focal neurological deficits.   Extremities: No edema.  Skin: Dry, no rashes, ulcers or lesions     Diagnostic Data:      Labs:  Recent Labs   Lab 24  0907 24  1857 24  0911 24  1632 24  0534   WBC 6.0 6.3  --  6.6  --  8.0 6.4 4.9   HGB 7.0* 7.0*   < > 7.5* 7.3* 7.6* 7.2* 7.4*   MCV 93.0 90.0  --  94.3  --  92.5 92.3 91.8   .0 192.0  --  198.0  --  199.0 190.0 194.0   INR 1.34*  --   --   --   --   --   --   --     < > = values in this interval not displayed.       Recent Labs   Lab 24  0907 24  0911 24  0448 24  04224  0534   *   < > 90 88 89   BUN 40*   < > 35* 32* 25*   CREATSERUM 2.16*   < > 1.69* 1.70* 1.53*   CA 9.4   < > 10.0 9.7 9.3   ALB 3.2*  --   --   --   --       < > 140 141 143   K 3.9   < > 3.9 4.0 3.2*   *   < > 114* 113* 111   CO2 23.0   < > 23.0 24.0 27.0   ALKPHO 67  --   --   --   --    AST 10*  --   --   --   --    ALT 14*  --   --   --   --    BILT 0.6  --   --   --   --    TP 6.0*  --   --   --   --     < > = values in this interval not displayed.        Recent Labs   Lab 02/23/24  0907   PTP 16.7*   INR 1.34*       No results for input(s): \"TROP\", \"CK\" in the last 168 hours.         Imaging: Imaging data reviewed in Epic.    Medications:    pantoprazole  40 mg Oral BID AC    traZODone  50 mg Oral Nightly    furosemide  20 mg Oral Daily    amLODIPine  5 mg Oral BID    metoprolol succinate ER  50 mg Oral BID    finasteride  5 mg Oral Nightly       ASSESSMENT / PLAN:     Wagner Sheridan Is a a 78 year old male who presents with acute anemia and CEDRICK on CKD3     Problem List / Diagnoses     Acute Anemia  CEDRICK on CKD3  Severe MR   Paroxysmal Afib   BPH  HTN     Recommendations     Acute Anemia  -- Admission Hgb 7 from 13 with modest improvement to 7.5 following transfusion.  Hemoglobin now stable  -- pt denies active bleeding, suspect GI source  -- continue holding hold anticoagulation/antiplatelet agents  -- given will transfuse to maintain goal Hgb > 7   -- GI consulted,   -Status post EGD-notable for nonbleeding ulcers, started on PPI 40 mg twice daily  - status post IV iron x4 doses  - Status post colonoscopy, polyps removed, pathology pending     CEDRICK on CKD3  -- Cr 2.1 on admission, prev baseline 1.6   -- likely secondary to anemia  -- improved to 1.53 following transfusion, CPM  -- Outpatient UA from 2/23 normal, no need to repeat   -- renally dose all meds, avoid nephrotoxic agents     Severe MR   -- management per CV Surgery  -- planned for repair 3/6/24      Paroxysmal Afib   -- holding eliquis per above  -- monitor on telemetry  -- resume home beta bocker      BPH   -- resume finasteride     HTN  -- resume amlodipine     DVT Mechanical Prophylaxis: BARBARA hose, SCDs,    DVT Pharmacologic Prophylaxis   Medication   None              Code Status: Prior     Dispo: stable on floor, will follow     Plan of care discussed with patient and/or family at bedside.    Holli Hernandes DO  [facility]        This note was created using voice recognition  technology. It may include inadvertent transcriptional errors. Any such errors should be contextually interpreted and should not be taken to alter the content or the meaning.     Note to Patient: The 21st Century Cures Act makes medical notes like these available to patients in the interest of transparency. However, be advised this is a medical document. It is intended as peer to peer communication. It is written in medical language and may contain abbreviations or verbiage that are unfamiliar. It may appear blunt or direct. Medical documents are intended to carry relevant information, facts as evident, and the clinical opinion of the practitioner and not intended to be the primary source of your information.  Please refer directly to myself or clinical staff for information regarding plan of care.

## 2024-02-28 NOTE — PLAN OF CARE
Pt okay to discharge per hospitalist and consults. Discharge AVS including medication information, follow-ups, and printed prescriptions given. Patient and family at bedside verbalized understanding and is agreeable with discharge. IV removed, telemetry discontinued. All belongings taken with patient. Transported off unit via wheelchair.     Problem: Patient/Family Goals  Goal: Patient/Family Long Term Goal  Description: Patient's Long Term Goal: DC home    Interventions:  - monitor Hgb/labs, intake and output and VS  - administer meds as ordered  - Telemetry  - See additional Care Plan goals for specific interventions  2/28/2024 1611 by Zehra Butcher RN  Outcome: Adequate for Discharge  2/28/2024 1051 by Zehra Butcher RN  Outcome: Progressing  Goal: Patient/Family Short Term Goal  Description: Patient's Short Term Goal: VS stable    Interventions:   - monitor Hgb/labs, intake and output and VS  - administer meds as ordered  - Telemetry    - See additional Care Plan goals for specific interventions  2/28/2024 1611 by Zehra Butcher RN  Outcome: Adequate for Discharge  2/28/2024 1051 by Zehra Butcher RN  Outcome: Progressing     Problem: CARDIOVASCULAR - ADULT  Goal: Absence of cardiac arrhythmias or at baseline  Description: INTERVENTIONS:  - Continuous cardiac monitoring, monitor vital signs, obtain 12 lead EKG if indicated  - Evaluate effectiveness of antiarrhythmic and heart rate control medications as ordered  - Initiate emergency measures for life threatening arrhythmias  - Monitor electrolytes and administer replacement therapy as ordered  2/28/2024 1611 by Zehra Butcher RN  Outcome: Adequate for Discharge  2/28/2024 1051 by Zehra Butcher RN  Outcome: Progressing     Problem: METABOLIC/FLUID AND ELECTROLYTES - ADULT  Goal: Electrolytes maintained within normal limits  Description: INTERVENTIONS:  - Monitor labs and rhythm and assess patient for signs and symptoms of electrolyte  imbalances  - Administer electrolyte replacement as ordered  - Monitor response to electrolyte replacements, including rhythm and repeat lab results as appropriate  - Fluid restriction as ordered  - Instruct patient on fluid and nutrition restrictions as appropriate  2/28/2024 1611 by Zehra Butcher RN  Outcome: Adequate for Discharge  2/28/2024 1051 by Zehra Butcher RN  Outcome: Progressing  Goal: Hemodynamic stability and optimal renal function maintained  Description: INTERVENTIONS:  - Monitor labs and assess for signs and symptoms of volume excess or deficit  - Monitor intake, output and patient weight  - Monitor urine specific gravity, serum osmolarity and serum sodium as indicated or ordered  - Monitor response to interventions for patient's volume status, including labs, urine output, blood pressure (other measures as available)  - Encourage oral intake as appropriate  - Instruct patient on fluid and nutrition restrictions as appropriate  2/28/2024 1611 by Zehra Butcher RN  Outcome: Adequate for Discharge  2/28/2024 1051 by Zehra Butcher RN  Outcome: Progressing     Problem: GASTROINTESTINAL - ADULT  Goal: Minimal or absence of nausea and vomiting  Description: INTERVENTIONS:  - Maintain adequate hydration with IV or PO as ordered and tolerated  - Nasogastric tube to low intermittent suction as ordered  - Evaluate effectiveness of ordered antiemetic medications  - Provide nonpharmacologic comfort measures as appropriate  - Advance diet as tolerated, if ordered  - Obtain nutritional consult as needed  - Evaluate fluid balance  2/28/2024 1611 by Zehra Butcher RN  Outcome: Adequate for Discharge  2/28/2024 1051 by Zehra Butcher RN  Outcome: Progressing

## 2024-02-28 NOTE — PLAN OF CARE
Assumed care of patient @ 0730 patient resting in bed, AOX4, reports no pain. Lung sounds clear, but diminished bilaterally, O2 saturation adequate on room air. No complaints of shortness of breath or chest pain at this time. Afib on tele. Bowel sounds present and active in all quadrants, last bowel movement 2/27. Patient voiding without issue. Pt ambulating in pierce with steady gait. Swelling to BLE and feet.     Plan of Care: Monitor Hgb. Possible discharge for planned readmit with CV surgery in March.     Discussed plan of care with patient, wife at bedside, verbalized understanding. Call light within reach.     Problem: Patient/Family Goals  Goal: Patient/Family Long Term Goal  Description: Patient's Long Term Goal: DC home    Interventions:  - monitor Hgb/labs, intake and output and VS  - administer meds as ordered  - Telemetry  - See additional Care Plan goals for specific interventions  Outcome: Progressing  Goal: Patient/Family Short Term Goal  Description: Patient's Short Term Goal: VS stable    Interventions:   - monitor Hgb/labs, intake and output and VS  - administer meds as ordered  - Telemetry    - See additional Care Plan goals for specific interventions  Outcome: Progressing     Problem: CARDIOVASCULAR - ADULT  Goal: Absence of cardiac arrhythmias or at baseline  Description: INTERVENTIONS:  - Continuous cardiac monitoring, monitor vital signs, obtain 12 lead EKG if indicated  - Evaluate effectiveness of antiarrhythmic and heart rate control medications as ordered  - Initiate emergency measures for life threatening arrhythmias  - Monitor electrolytes and administer replacement therapy as ordered  Outcome: Progressing     Problem: METABOLIC/FLUID AND ELECTROLYTES - ADULT  Goal: Electrolytes maintained within normal limits  Description: INTERVENTIONS:  - Monitor labs and rhythm and assess patient for signs and symptoms of electrolyte imbalances  - Administer electrolyte replacement as ordered  - Monitor  response to electrolyte replacements, including rhythm and repeat lab results as appropriate  - Fluid restriction as ordered  - Instruct patient on fluid and nutrition restrictions as appropriate  Outcome: Progressing  Goal: Hemodynamic stability and optimal renal function maintained  Description: INTERVENTIONS:  - Monitor labs and assess for signs and symptoms of volume excess or deficit  - Monitor intake, output and patient weight  - Monitor urine specific gravity, serum osmolarity and serum sodium as indicated or ordered  - Monitor response to interventions for patient's volume status, including labs, urine output, blood pressure (other measures as available)  - Encourage oral intake as appropriate  - Instruct patient on fluid and nutrition restrictions as appropriate  Outcome: Progressing     Problem: GASTROINTESTINAL - ADULT  Goal: Minimal or absence of nausea and vomiting  Description: INTERVENTIONS:  - Maintain adequate hydration with IV or PO as ordered and tolerated  - Nasogastric tube to low intermittent suction as ordered  - Evaluate effectiveness of ordered antiemetic medications  - Provide nonpharmacologic comfort measures as appropriate  - Advance diet as tolerated, if ordered  - Obtain nutritional consult as needed  - Evaluate fluid balance  Outcome: Progressing

## 2024-02-28 NOTE — PROGRESS NOTES
Per Dr. BRIAN Salgado ok to discharge home today, will not need transfusion per Dr. Fenton, will follow up in two weeks for repeat hgb. D/w Zehra Nicole RN  Clinical Coordinator  CV Surgery

## 2024-02-28 NOTE — DISCHARGE INSTRUCTIONS
Your new surgery date is 4/4.  You will receive a phone call to arrange any necessary repeat testing from the pre op nurse within a week of surgery.     Refer to your primary care for any medication refills.

## 2024-02-28 NOTE — PROGRESS NOTES
[facility] Group Cardiology Progress Note        Wagner Sheridan Patient Status:  Inpatient    1945 MRN [id number]   Formerly [facility] 8NE-A Attending Edil Salgado MD   Hosp Day # 5 PCP Edil Roy MD     Subjective:    The patient denies  chest pain   Breathing is ok while walking in hallway today    EGD, 24: moderate diffuse gastritis. 2 small ulcers  C-scope: 2 polyps removed    Medications:   pantoprazole  40 mg Oral BID AC    traZODone  50 mg Oral Nightly    furosemide  20 mg Oral Daily    amLODIPine  5 mg Oral BID    metoprolol succinate ER  50 mg Oral BID    finasteride  5 mg Oral Nightly       Continuous Infusions:          Allergies:  Allergies   Allergen Reactions    Amoxicillin HIVES    Clindamycin HIVES    Penicillins HIVES    Wasp Venom RESPIRATORY FAILURE    Wasp Venom Protein RESPIRATORY FAILURE    Benazepril Coughing    Chlorhexidine RASH and OTHER (SEE COMMENTS)     redness         Objective:        Intake/Output:      Intake/Output Summary (Last 24 hours) at 2024 1002  Last data filed at 2024 2030  Gross per 24 hour   Intake 600 ml   Output --   Net 600 ml     Wt Readings from Last 3 Encounters:   24 281 lb 15.5 oz (127.9 kg)   24 275 lb (124.7 kg)   24 270 lb (122.5 kg)       Physical Exam:        Vitals:    24 0451 24 0630 24 0700 24 0813   BP: 125/78   112/75   BP Location: Right arm   Right arm   Pulse: 94 88  100   Resp: 23 21  20   Temp: 97.7 °F (36.5 °C)   98.2 °F (36.8 °C)   TempSrc: Oral   Oral   SpO2: 99% 90% 96% 95%   Weight:  281 lb 15.5 oz (127.9 kg)         Temp:  [97.7 °F (36.5 °C)-98.4 °F (36.9 °C)] 98.2 °F (36.8 °C)  Pulse:  [] 100  Resp:  [18-31] 20  BP: (112-138)/() 112/75  SpO2:  [90 %-99 %] 95 %      Temp: 98.2 °F (36.8 °C)  Pulse: 100  Resp: 20  BP: 112/75  General:  Appears comfortable  HEENT: No focal deficits.  Neck: No JVD, carotids 2+ no bruits.  Cardiac: Irregular S1S2.   No S3, S4, rub, click.  2/6 holo-systolic   murmur.  Lungs: Clear to auscultation and percussion.  Abdomen: Soft, non-tender.   Extremities: 1+ bilateral  LE edema.  No clubbing or cyanosis.    Neurologic: Alert and oriented, normal affect.  Skin: Warm and dry.           LABS:      HEM:  Recent Labs   Lab 02/24/24  0911 02/24/24  1632 02/25/24  0448 02/26/24  0429 02/27/24  0534 02/28/24  0501   WBC 6.6  --  8.0 6.4 4.9 6.4   HGB 7.5* 7.3* 7.6* 7.2* 7.4* 7.5*   HCT 25.0*  --  24.5* 22.9* 23.6* 24.8*   .0  --  199.0 190.0 194.0 195.0       Chem:  Recent Labs   Lab 02/24/24  0911 02/25/24  0448 02/26/24  0429 02/27/24  0534 02/28/24  0501    140 141 143 141   K 4.1 3.9 4.0 3.2* 3.5   * 114* 113* 111 113*   CO2 24.0 23.0 24.0 27.0 27.0   BUN 36* 35* 32* 25* 23   CREATSERUM 1.86* 1.69* 1.70* 1.53* 1.69*   CA 9.9 10.0 9.7 9.3 9.1   * 90 88 89 97       Recent Labs   Lab 02/23/24  0907   ALT 14*   AST 10*   ALB 3.2*       Recent Labs   Lab 02/23/24  0907   PTT 32.8   INR 1.34*           Lab Results   Component Value Date    TROP < 0.046 12/27/2012         Invalid input(s): \"PBNPML\"                       Diagnostics:   Telemetry: Atrial Fibrillation     Diagnostics:     2/23/24     Atrial fibrillation   Nonspecific ST abnormality   Abnormal ECG   When compared with ECG of 30-JAN-2024 11:48,   Atrial fibrillation has replaced Sinus rhythm      1/30/2024: Cardioversion: STAN  CONCLUSIONS:   1. There is flail of the P3 scallop of the mitral valve. The mitral valve has severe, 4+ eccentric (laterally directed) regurgitation.   2. The tricuspid valve has annular enlargement, with central malcoaptation. The tricuspid valve has moderate, centrally directed, regurgitation.  3. The left ventricle appears normal in size, thickness, and systolic function. The estimated left ventricular ejection fraction is 60%  4. A patent foramen ovale is present (tunnel length 34 mm). There is left-to-right shunting across  the patent foramen ovale.  2/13/2024:   R+L heart cath in anticipation of surgical repair of MV and TV  IMPRESSION:   1. Hemodynamics show moderate pulmonary hypertension and right and left ventricular diastolic dysfunction.   2. Mitral regurgitation difficult to visualize on the left ventriculogram; however, it has been graded as severe on the transesophageal echo. V-wave seen on the wedge tracing is quite high at 40, which is consistent with severe mitral regurgitation.   3. Coronary disease as described above involving the distal left anterior descending, which is a fairly diminutive vessel.                                             Assessment/Plan     Anemia  Hgb 7-7.8 hematocrit 22.7  - GI workup  - Transfuse to keep Hgb >7  - Planning for endoscopy on Tuesday.     Moderate dilatation asc aorta at 4.4 cm.      HFpEF  moderate to severe mitral regurgitation  pulmonary hypertension   NYHA Class II  LVEF 60%    -timing of MV surgery per CV surgery team.  Some delay would be appropriate given presence of gastritis and gastric ulcers.  - continue metoprolol ER 50mg  -+1 pitting edema bilateral ankle.  Started furosemide 20 mg daily.   -Low-sodium diet.  Fluids 1,800 mL daily.   -BMP daily  -Daily weights, I & O.         Atrial Fibrillation  -Telemetry shows atrial fibrillation  - rates controlled with BB - continue   - Hold eliquis given low  Hgb      HTN    Hyperaldosteronism      -soft bp's  - continue metoprolol ER 50mg   - continue amlodipine 5mg   -holding losartan and epleranone given low bp's     CEDRICK on CKD   -baseline Cr = 1.5-2.0    - Improving. Holding meds as above.         Home today  Iron 325 mg daily  MV surgery in 3-4 weeks         Boy Hernandes MD

## 2024-02-28 NOTE — PLAN OF CARE
Assumed care of pt around 1930. A&Ox4.  Remains on room air with O2 saturation > 90%. Requires 2L O2 with sleep. Dyspneic on exertion. Diminished lung sounds. A-fib on tele, rates controlled 's. Denies cardiac symptoms. Generalized edema and bruising noted. Continent. Ambulating with standby assist. Denies pain. Pt and family updated on plan of care and agreeable. Call light within reach.   Plan: IV lasix daily, monitor Hgb    Problem: Patient/Family Goals  Goal: Patient/Family Long Term Goal  Description: Patient's Long Term Goal: DC home    Interventions:  - monitor Hgb/labs, intake and output and VS  - administer meds as ordered  - Telemetry  - See additional Care Plan goals for specific interventions  Outcome: Progressing  Goal: Patient/Family Short Term Goal  Description: Patient's Short Term Goal: VS stable    Interventions:   - monitor Hgb/labs, intake and output and VS  - administer meds as ordered  - Telemetry    - See additional Care Plan goals for specific interventions  Outcome: Progressing     Problem: CARDIOVASCULAR - ADULT  Goal: Absence of cardiac arrhythmias or at baseline  Description: INTERVENTIONS:  - Continuous cardiac monitoring, monitor vital signs, obtain 12 lead EKG if indicated  - Evaluate effectiveness of antiarrhythmic and heart rate control medications as ordered  - Initiate emergency measures for life threatening arrhythmias  - Monitor electrolytes and administer replacement therapy as ordered  Outcome: Progressing     Problem: METABOLIC/FLUID AND ELECTROLYTES - ADULT  Goal: Electrolytes maintained within normal limits  Description: INTERVENTIONS:  - Monitor labs and rhythm and assess patient for signs and symptoms of electrolyte imbalances  - Administer electrolyte replacement as ordered  - Monitor response to electrolyte replacements, including rhythm and repeat lab results as appropriate  - Fluid restriction as ordered  - Instruct patient on fluid and nutrition restrictions as  appropriate  Outcome: Progressing  Goal: Hemodynamic stability and optimal renal function maintained  Description: INTERVENTIONS:  - Monitor labs and assess for signs and symptoms of volume excess or deficit  - Monitor intake, output and patient weight  - Monitor urine specific gravity, serum osmolarity and serum sodium as indicated or ordered  - Monitor response to interventions for patient's volume status, including labs, urine output, blood pressure (other measures as available)  - Encourage oral intake as appropriate  - Instruct patient on fluid and nutrition restrictions as appropriate  Outcome: Progressing     Problem: GASTROINTESTINAL - ADULT  Goal: Minimal or absence of nausea and vomiting  Description: INTERVENTIONS:  - Maintain adequate hydration with IV or PO as ordered and tolerated  - Nasogastric tube to low intermittent suction as ordered  - Evaluate effectiveness of ordered antiemetic medications  - Provide nonpharmacologic comfort measures as appropriate  - Advance diet as tolerated, if ordered  - Obtain nutritional consult as needed  - Evaluate fluid balance  Outcome: Progressing

## 2024-02-29 NOTE — DISCHARGE SUMMARY
[facility]  Discharge Summary    Wagner Sheridan Patient Status:  Inpatient    1945 MRN [id number]   Location [facility] 8NE-A Attending No att. providers found   Hosp Day # 5 PCP Edil Roy MD     Admit date: 2024    Discharge date and time: 2024  4:32 PM     Discharge Diagnoses:       Procedures Performed:      Colonoscopy  One 8 mm polypoid ascending colon polyp removed with hot snare. Sara ink was injected for tattooing   One 3 mm sigmoid colon polyp removed with cold forceps   Liquid stool present on right side of colon. Lavaged with tap water. Fair visualization     Upper endoscopy  ESOPHAGUS:  Normal esophagus. No hiatal hernia   STOMACH:  Small 1-2 mm non-bleeding gastric ulcers near antrum and pylorus. Biopsied for H. Pylori infection. Moderate diffuse gastritis   DUODENUM:  Normal duodenum     HPI & Hospital Course: Wagner Sheridan is a 78 year old male with extensive PMHx who was admitted to the hospital from pre-operative clinic when found to have symptomatic anemia which was new. Patient was admitted and gastroenterology was consulted. Performed above procedures and patient was started on pantoprazole. Patient did receive 1 unit of pRBC on . Hemoglobin remained stable. Patient was discharged with plans to perform surgery in 4-6 weeks after gastritis settled down to decrease risk of intraoperative GI bleed when performing his MVR/r, TVR/r, PFO closure and MAZE.    Discharge Condition: Stable     Discharge Instructions:  Pt was instructed to call back if symptoms worsen or concerns regarding any rebleeding episodes.    Signed:      Edil Salgado MD  2024

## 2024-02-29 NOTE — CDS QUERY
Clinical Validation  CLINICAL DOCUMENTATION CLARIFICATION FORM   Dear Dr. Hernandes,   Clinical information (provided below) documents a diagnosis of Acute Kidney Injury.  There is either a lack of clinical support for this condition in the current medical record, or there is a lack of recognized standard criteria to support the condition.     BASED ON YOUR CLINICAL JUDGMENT, PLEASE CLARIFY  (  x) Acute Kidney Injury ruled out   (  ) Acute Kidney Injury Confirmed (Please provide additional information supportive of the diagnosis):______________________  (   ) Other: ______________________________________________________________  The current consensus-based authoritative criteria for acute kidney injury (CEDRICK) comes from the National Kidney Foundation KDIGO conference definition.   KDIGO defines CEDRICK (applicable to both adult or pediatric patients) as any of the following:    Increase in creatinine level to ? 1.5x baseline (historical or measured), which is known or presumed to have occurred within the prior 7 days; or  Increase (not a decrease) in creatinine of ? 0.3 mg/dL comparing two separate levels, the second done within 48 hours or less of the first; or  Urine output < 0.5 ml/kg/hr for 6 hours    The KDIGO criteria apply to patients with and without CKD.     When baseline creatinine is unknown, KDIGO advises: “The lowest SCr [Creatinine level] obtained during a hospitalization is usually equal to or greater than the baseline. This SCr should be used to diagnose (and stage) CEDRICK.” Documentation from the Medical Record:    Risk: CKD 3, anemia      Clinical indicators - Creatinine; 2/23-2.16, 2/25-1.69, 2/26-1.7, 2/27-1.53, 2/28-1.69  Historical Creatinine (1/26/24) 1.76. No recorded UOP  2/27 Hospitalist PN ; A/P CEDRICK on CKD 3 notes previous baseline 1.6. likely 2/2 anemia   Treatment - IVF, trend creatinine, renal dose medications       For questions regarding this query, please contact Clinical Documentation  : Johnny Cheng RN, BSN, CCDS ext [phone] . [email or web address].    THIS FORM IS A PERMANENT PART OF THE MEDICAL RECORD

## 2024-03-01 NOTE — PAYOR COMM NOTE
--------------  DISCHARGE REVIEW    Payor: [employer]  Subscriber #:  [id number]  Authorization Number: [id number]    Admit date: 24  Admit time:  11:09 AM  Discharge Date: 2024  4:32 PM     Admitting Physician: Edil Salgado MD  Attending Physician:  No att. providers found  Primary Care Physician: Edil Roy MD          Discharge Summary Notes        Discharge Summary signed by Edil Salgado MD at 2024 11:41 AM       Author: Edil Salgado MD Specialty: Surgery, Cardiothoracic Author Type: Physician    Filed: 2024 11:41 AM Date of Service: 2024  4:30 PM Status: Signed    : Edil Salgado MD (Physician)         [facility]  Discharge Summary    Wagner Sheridan Patient Status:  Inpatient    1945 MRN [id number]   Location [address] Attending No att. providers found   Hosp Day # 5 PCP Edil Roy MD     Admit date: 2024    Discharge date and time: 2024  4:32 PM     Discharge Diagnoses:       Procedures Performed:      Colonoscopy  One 8 mm polypoid ascending colon polyp removed with hot snare. Sara ink was injected for tattooing   One 3 mm sigmoid colon polyp removed with cold forceps   Liquid stool present on right side of colon. Lavaged with tap water. Fair visualization     Upper endoscopy  ESOPHAGUS:  Normal esophagus. No hiatal hernia   STOMACH:  Small 1-2 mm non-bleeding gastric ulcers near antrum and pylorus. Biopsied for H. Pylori infection. Moderate diffuse gastritis   DUODENUM:  Normal duodenum     HPI & Hospital Course: Wagner Sheridan is a 78 year old male with extensive PMHx who was admitted to the hospital from pre-operative clinic when found to have symptomatic anemia which was new. Patient was admitted and gastroenterology was consulted. Performed above procedures and patient was started on pantoprazole. Patient did receive 1 unit of pRBC on . Hemoglobin remained stable. Patient was discharged with plans  to perform surgery in 4-6 weeks after gastritis settled down to decrease risk of intraoperative GI bleed when performing his MVR/r, TVR/r, PFO closure and MAZE.    Discharge Condition: Stable     Discharge Instructions:  Pt was instructed to call back if symptoms worsen or concerns regarding any rebleeding episodes.    Signed:      Edil Salgado MD  2/28/2024     Electronically signed by Edil Salgado MD on 2/29/2024 11:41 AM

## 2024-03-19 ENCOUNTER — TELEPHONE (OUTPATIENT)
Dept: CARDIOLOGY UNIT | Facility: HOSPITAL | Age: 79
End: 2024-03-19

## 2024-03-19 NOTE — PROGRESS NOTES
Per Dr. EFRAIN Salgado, repeat labs within a week of surgery, will arrange.  Reviewed CT chest with Dr. EFRAIN Salgado, ok to proceed with surgery.  Gail Nicole RN  Clinical Coordinator  CV Surgery

## 2024-03-21 RX ORDER — POTASSIUM CHLORIDE 1500 MG/1
40 TABLET, EXTENDED RELEASE ORAL DAILY
Status: ON HOLD | COMMUNITY
End: 2024-04-12

## 2024-03-21 RX ORDER — METOLAZONE 2.5 MG/1
2.5 TABLET ORAL SEE ADMIN INSTRUCTIONS
COMMUNITY
End: 2024-04-12

## 2024-03-21 NOTE — PAT NURSING NOTE
PAT nursing note: denies taking ACEs, ARBs, ASA, blood thinners, diabetic or weight loss medications; no PPM, ICD or nerve stimulator; patient/spouse verbalized understanding of all instructions; per Dr. Mtz to use EKG from 2/23.      Pre-Surgical Instructions for 4/4/24 with Dr. Hansen      Pre-Surgical Testing:     -You are scheduled for a COVID-19 test, non fasting blood work and a urinalysis on Monday, 4/1/24 starting at 10:00 am at the [facility], located at the corner of [address].    -If the COVID-19 test is refused or is positive, the surgery will be cancelled.      Visitor Instructions    Adult Patients:     -Visitors are welcome to accompany you the day of surgery.   -Visiting hours are 7:00 am to 8:00 pm.        Pre-Op Instructions    Scheduled Surgery: You are scheduled for Cardiac Surgery with Dr. Hansen      Date of Procedure: Thursday, 04/04/24      Time of Arrival: 05:30 am    -This is going to be a tentative time of arrival for surgery.   -We will call you the buisness day prior to your surgery in the late afternoon to reconfirm your arrival.   -Please check your voicemail for a message.      Diet Instructions:     -Do not eat or drink after 10:00 pm. This includes water, gum, candy and food.      Medication Instructions:     CONTINUE to take your prescribed medications as directed EXCEPT:    -The morning of surgery only take your Metoprolol with a sip of water.        Medications to Stop:     -Stop all vitamins, supplements, herbal products and NSAID's (ex. Ibuprofen, Excederin, Aleve, Diclofenac, and any gels having steroids) starting 3/27.       Skin Prep:     -Since you have any allergy to Chlorhexidine solution, use Dial Gold Antibacterial Liquid Soap for the 3 showers.     -1st shower Wednesday, 4/3 in the morning.    -2nd shower will be Wednesday, 4/3 in the evening.    -3rd shower will be Thursday, 4/4 in the morning.        Sleep Apnea:      -If you have sleep apnea, please bring your mask and tubing      Admit/Discharge Status:     -You will be admitted to the hospital after surgery.      Discharge Teaching:     -Your nurse will give you specific instructions before discharge.  -Any questions, please call the surgeon's office.    Additional Instructions:     -Bring the pre-surgical binder with you to the hospital.  -Bring insurance card(s) and picture ID.  -Leave all valuables at home, including jewelry.  -Wear comfortable clothing; bring pajama bottoms or sweat pants, slippers and gym shoes, toiletries, eye glass case; denture cup/supplies and/or hearing aid container.    -Park in the [place] parking garage at [facility].    -Check in at the [facility] reception desk.   -Our  will be there to check you in for your procedure.   -Complimentary  parking is available starting at 6:00 am.      If you are scheduled to arrive early for 5:30 am, the [facility] reception desk does not open till 5:30 am. It may be dark, but you are in the correct location.     We are open M-F from 8am- 5pm. We are closed on holidays and weekends. We can be reached at [phone].         Thank you

## 2024-04-01 ENCOUNTER — LAB ENCOUNTER (OUTPATIENT)
Dept: LAB | Age: 79
End: 2024-04-01
Attending: THORACIC SURGERY (CARDIOTHORACIC VASCULAR SURGERY)
Payer: MEDICARE

## 2024-04-01 ENCOUNTER — LAB ENCOUNTER (OUTPATIENT)
Dept: LAB | Age: 79
End: 2024-04-01
Attending: SURGERY
Payer: MEDICARE

## 2024-04-01 DIAGNOSIS — I34.0 MITRAL REGURGITATION: ICD-10-CM

## 2024-04-01 DIAGNOSIS — I25.10 CAD (CORONARY ARTERY DISEASE), NATIVE CORONARY ARTERY: ICD-10-CM

## 2024-04-01 DIAGNOSIS — I48.91 A-FIB (HCC): ICD-10-CM

## 2024-04-01 DIAGNOSIS — Z01.818 PRE-OP EVALUATION: ICD-10-CM

## 2024-04-01 DIAGNOSIS — Q21.12 PFO (PATENT FORAMEN OVALE) (HCC): ICD-10-CM

## 2024-04-01 DIAGNOSIS — Z01.818 PRE-OP TESTING: ICD-10-CM

## 2024-04-01 DIAGNOSIS — I07.1 TRICUSPID REGURGITATION: ICD-10-CM

## 2024-04-01 DIAGNOSIS — Z91.89 AT HIGH RISK FOR BLEEDING: ICD-10-CM

## 2024-04-01 LAB
ALBUMIN SERPL-MCNC: 3.5 G/DL (ref 3.4–5)
ALBUMIN/GLOB SERPL: 1.1 {RATIO} (ref 1–2)
ALP LIVER SERPL-CCNC: 90 U/L
ALT SERPL-CCNC: 18 U/L
ANION GAP SERPL CALC-SCNC: 7 MMOL/L (ref 0–18)
ANTIBODY SCREEN: NEGATIVE
APTT PPP: 30.9 SECONDS (ref 23.3–35.6)
AST SERPL-CCNC: 15 U/L (ref 15–37)
BASOPHILS # BLD AUTO: 0.05 X10(3) UL (ref 0–0.2)
BASOPHILS NFR BLD AUTO: 1 %
BILIRUB SERPL-MCNC: 0.7 MG/DL (ref 0.1–2)
BILIRUB UR QL STRIP.AUTO: NEGATIVE
BUN BLD-MCNC: 33 MG/DL (ref 9–23)
CALCIUM BLD-MCNC: 10.2 MG/DL (ref 8.5–10.1)
CHLORIDE SERPL-SCNC: 109 MMOL/L (ref 98–112)
CLARITY UR REFRACT.AUTO: CLEAR
CO2 SERPL-SCNC: 29 MMOL/L (ref 21–32)
CREAT BLD-MCNC: 2.32 MG/DL
EGFRCR SERPLBLD CKD-EPI 2021: 28 ML/MIN/1.73M2 (ref 60–?)
EOSINOPHIL # BLD AUTO: 0.29 X10(3) UL (ref 0–0.7)
EOSINOPHIL NFR BLD AUTO: 5.8 %
ERYTHROCYTE [DISTWIDTH] IN BLOOD BY AUTOMATED COUNT: 17 %
FASTING STATUS PATIENT QL REPORTED: NO
GLOBULIN PLAS-MCNC: 3.1 G/DL (ref 2.8–4.4)
GLUCOSE BLD-MCNC: 140 MG/DL (ref 70–99)
GLUCOSE UR STRIP.AUTO-MCNC: NORMAL MG/DL
HCT VFR BLD AUTO: 35.1 %
HGB BLD-MCNC: 10.8 G/DL
IMM GRANULOCYTES # BLD AUTO: 0.03 X10(3) UL (ref 0–1)
IMM GRANULOCYTES NFR BLD: 0.6 %
INR BLD: 1.15 (ref 0.8–1.2)
KETONES UR STRIP.AUTO-MCNC: NEGATIVE MG/DL
LEUKOCYTE ESTERASE UR QL STRIP.AUTO: 75
LYMPHOCYTES # BLD AUTO: 1.23 X10(3) UL (ref 1–4)
LYMPHOCYTES NFR BLD AUTO: 24.7 %
MCH RBC QN AUTO: 27.4 PG (ref 26–34)
MCHC RBC AUTO-ENTMCNC: 30.8 G/DL (ref 31–37)
MCV RBC AUTO: 89.1 FL
MONOCYTES # BLD AUTO: 0.5 X10(3) UL (ref 0.1–1)
MONOCYTES NFR BLD AUTO: 10 %
NEUTROPHILS # BLD AUTO: 2.88 X10 (3) UL (ref 1.5–7.7)
NEUTROPHILS # BLD AUTO: 2.88 X10(3) UL (ref 1.5–7.7)
NEUTROPHILS NFR BLD AUTO: 57.9 %
NITRITE UR QL STRIP.AUTO: NEGATIVE
OSMOLALITY SERPL CALC.SUM OF ELEC: 310 MOSM/KG (ref 275–295)
PH UR STRIP.AUTO: 6.5 [PH] (ref 5–8)
PLATELET # BLD AUTO: 221 10(3)UL (ref 150–450)
POTASSIUM SERPL-SCNC: 3.9 MMOL/L (ref 3.5–5.1)
PROT SERPL-MCNC: 6.6 G/DL (ref 6.4–8.2)
PROT UR STRIP.AUTO-MCNC: 50 MG/DL
PROTHROMBIN TIME: 14.8 SECONDS (ref 11.6–14.8)
RBC # BLD AUTO: 3.94 X10(6)UL
RBC UR QL AUTO: NEGATIVE
RH BLOOD TYPE: POSITIVE
SODIUM SERPL-SCNC: 145 MMOL/L (ref 136–145)
SP GR UR STRIP.AUTO: 1.01 (ref 1–1.03)
UROBILINOGEN UR STRIP.AUTO-MCNC: NORMAL MG/DL
WBC # BLD AUTO: 5 X10(3) UL (ref 4–11)

## 2024-04-01 PROCEDURE — 86850 RBC ANTIBODY SCREEN: CPT

## 2024-04-01 PROCEDURE — 87086 URINE CULTURE/COLONY COUNT: CPT

## 2024-04-01 PROCEDURE — 87635 SARS-COV-2 COVID-19 AMP PRB: CPT

## 2024-04-01 PROCEDURE — 86901 BLOOD TYPING SEROLOGIC RH(D): CPT

## 2024-04-01 PROCEDURE — 80053 COMPREHEN METABOLIC PANEL: CPT

## 2024-04-01 PROCEDURE — 85610 PROTHROMBIN TIME: CPT

## 2024-04-01 PROCEDURE — 36415 COLL VENOUS BLD VENIPUNCTURE: CPT

## 2024-04-01 PROCEDURE — 85025 COMPLETE CBC W/AUTO DIFF WBC: CPT

## 2024-04-01 PROCEDURE — 85730 THROMBOPLASTIN TIME PARTIAL: CPT

## 2024-04-01 PROCEDURE — 81001 URINALYSIS AUTO W/SCOPE: CPT

## 2024-04-01 PROCEDURE — 86900 BLOOD TYPING SEROLOGIC ABO: CPT

## 2024-04-02 LAB — SARS-COV-2 RNA RESP QL NAA+PROBE: NOT DETECTED

## 2024-04-02 NOTE — PLAN OF CARE
Reviewed pre-ops labs/urine with Dr. BRIAN Salgado, recommends starting antibiotic for possible UTI. Appears patient has had a few in the past, last treated by Dr. Rosa. Sent message to WEI Winston who will review with Dr. Rosa and provide us with antibiotic recommendation. Will notify patient.     Clary Ahmadi PA-C   Cardiothoracic Surgery   4/2/2024  10:05 AM

## 2024-04-03 ENCOUNTER — ANESTHESIA EVENT (OUTPATIENT)
Dept: CARDIAC SURGERY | Facility: HOSPITAL | Age: 79
End: 2024-04-03
Payer: MEDICARE

## 2024-04-04 ENCOUNTER — ANESTHESIA (OUTPATIENT)
Dept: CARDIAC SURGERY | Facility: HOSPITAL | Age: 79
End: 2024-04-04
Payer: MEDICARE

## 2024-04-04 ENCOUNTER — HOSPITAL ENCOUNTER (INPATIENT)
Facility: HOSPITAL | Age: 79
LOS: 8 days | Discharge: HOME | End: 2024-04-12
Attending: THORACIC SURGERY (CARDIOTHORACIC VASCULAR SURGERY) | Admitting: THORACIC SURGERY (CARDIOTHORACIC VASCULAR SURGERY)
Payer: MEDICARE

## 2024-04-04 ENCOUNTER — APPOINTMENT (OUTPATIENT)
Dept: GENERAL RADIOLOGY | Facility: HOSPITAL | Age: 79
End: 2024-04-04
Attending: PHYSICIAN ASSISTANT
Payer: MEDICARE

## 2024-04-04 DIAGNOSIS — J90 PLEURAL EFFUSION: ICD-10-CM

## 2024-04-04 DIAGNOSIS — Q21.12 PFO (PATENT FORAMEN OVALE) (HCC): ICD-10-CM

## 2024-04-04 DIAGNOSIS — I48.91 A-FIB (HCC): ICD-10-CM

## 2024-04-04 DIAGNOSIS — Z91.89 AT HIGH RISK FOR BLEEDING: ICD-10-CM

## 2024-04-04 DIAGNOSIS — Z01.818 PRE-OP TESTING: ICD-10-CM

## 2024-04-04 DIAGNOSIS — I07.1 TRICUSPID REGURGITATION: ICD-10-CM

## 2024-04-04 DIAGNOSIS — I25.10 CAD (CORONARY ARTERY DISEASE), NATIVE CORONARY ARTERY: ICD-10-CM

## 2024-04-04 DIAGNOSIS — I34.0 MITRAL REGURGITATION: Primary | ICD-10-CM

## 2024-04-04 LAB
ANTIBODY SCREEN: NEGATIVE
APTT PPP: 33 SECONDS (ref 23.3–35.6)
ATRIAL RATE: 83 BPM
BASE EXCESS BLD CALC-SCNC: -2 MMOL/L
BASE EXCESS BLD CALC-SCNC: 2 MMOL/L
BASE EXCESS BLDA CALC-SCNC: 5 MMOL/L (ref ?–30)
BASE EXCESS BLDA CALC-SCNC: 5 MMOL/L (ref ?–30)
BUN BLD-MCNC: 34 MG/DL (ref 9–23)
CA-I BLD-SCNC: 1.24 MMOL/L (ref 1.12–1.32)
CA-I BLD-SCNC: 1.25 MMOL/L (ref 1.12–1.32)
CA-I BLDA-SCNC: 1.23 MMOL/L (ref 1.12–1.32)
CA-I BLDA-SCNC: 1.24 MMOL/L (ref 1.12–1.32)
CALCIUM BLD-MCNC: 9.2 MG/DL (ref 8.5–10.1)
CHLORIDE SERPL-SCNC: 114 MMOL/L (ref 98–112)
CO2 BLD-SCNC: 25 MMOL/L (ref 22–32)
CO2 BLD-SCNC: 27 MMOL/L (ref 22–32)
CO2 BLDA-SCNC: 30 MMOL/L (ref 22–32)
CO2 BLDA-SCNC: 31 MMOL/L (ref 22–32)
CO2 SERPL-SCNC: 25 MMOL/L (ref 21–32)
CREAT BLD-MCNC: 2.75 MG/DL
EGFRCR SERPLBLD CKD-EPI 2021: 23 ML/MIN/1.73M2 (ref 60–?)
ERYTHROCYTE [DISTWIDTH] IN BLOOD BY AUTOMATED COUNT: 16.6 %
FIBRINOGEN PPP-MCNC: 337 MG/DL (ref 180–480)
GLUCOSE BLD-MCNC: 132 MG/DL (ref 70–99)
GLUCOSE BLD-MCNC: 137 MG/DL (ref 70–99)
GLUCOSE BLD-MCNC: 143 MG/DL (ref 70–99)
GLUCOSE BLD-MCNC: 147 MG/DL (ref 70–99)
GLUCOSE BLD-MCNC: 159 MG/DL (ref 70–99)
GLUCOSE BLD-MCNC: 168 MG/DL (ref 70–99)
GLUCOSE BLD-MCNC: 170 MG/DL (ref 70–99)
GLUCOSE BLD-MCNC: 175 MG/DL (ref 70–99)
GLUCOSE BLD-MCNC: 180 MG/DL (ref 70–99)
GLUCOSE BLD-MCNC: 180 MG/DL (ref 70–99)
GLUCOSE BLD-MCNC: 83 MG/DL (ref 70–99)
GLUCOSE BLD-MCNC: 97 MG/DL (ref 70–99)
GLUCOSE BLDA-MCNC: 105 MG/DL (ref 70–99)
GLUCOSE BLDA-MCNC: 121 MG/DL (ref 70–99)
HCO3 BLD-SCNC: 23.8 MEQ/L
HCO3 BLD-SCNC: 26.3 MEQ/L
HCO3 BLDA-SCNC: 28.7 MEQ/L (ref 22–26)
HCO3 BLDA-SCNC: 29.7 MEQ/L (ref 22–26)
HCT VFR BLD AUTO: 30.7 %
HCT VFR BLD CALC: 27 %
HCT VFR BLD CALC: 30 %
HCT VFR BLDA CALC: 25 %
HCT VFR BLDA CALC: 27 %
HGB BLD-MCNC: 9.7 G/DL
INR BLD: 1.41 (ref 0.8–1.2)
ISTAT ACTIVATED CLOTTING TIME: 601 SECONDS (ref 74–137)
ISTAT ACTIVATED CLOTTING TIME: 617 SECONDS (ref 74–137)
ISTAT PATIENT TEMPERATURE: 30 DEGREE
ISTAT PATIENT TEMPERATURE: 30 DEGREE
MAGNESIUM SERPL-MCNC: 2.7 MG/DL (ref 1.6–2.6)
MCH RBC QN AUTO: 27.2 PG (ref 26–34)
MCHC RBC AUTO-ENTMCNC: 31.6 G/DL (ref 31–37)
MCV RBC AUTO: 86 FL
MRSA DNA SPEC QL NAA+PROBE: NEGATIVE
P AXIS: 109 DEGREES
P-R INTERVAL: 224 MS
PCO2 BLD: 39.8 MMHG
PCO2 BLD: 43.8 MMHG
PCO2 BLDA: 31 MMHG (ref 35–45)
PCO2 BLDA: 33.4 MMHG (ref 35–45)
PH BLD: 7.34 [PH]
PH BLD: 7.43 [PH]
PH BLDA: 7.53 [PH] (ref 7.35–7.45)
PH BLDA: 7.55 [PH] (ref 7.35–7.45)
PLATELET # BLD AUTO: 158 10(3)UL (ref 150–450)
PO2 BLD: 113 MMHG
PO2 BLD: 203 MMHG
PO2 BLDA: >400 MMHG (ref 80–105)
PO2 BLDA: >400 MMHG (ref 80–105)
POTASSIUM BLD-SCNC: 2.4 MMOL/L (ref 3.6–5.1)
POTASSIUM BLD-SCNC: 2.7 MMOL/L (ref 3.6–5.1)
POTASSIUM SERPL-SCNC: 2.8 MMOL/L (ref 3.5–5.1)
POTASSIUM SERPL-SCNC: 2.9 MMOL/L (ref 3.5–5.1)
PROTHROMBIN TIME: 17.4 SECONDS (ref 11.6–14.8)
Q-T INTERVAL: 436 MS
QRS DURATION: 96 MS
QTC CALCULATION (BEZET): 512 MS
R AXIS: 24 DEGREES
RBC # BLD AUTO: 3.57 X10(6)UL
RH BLOOD TYPE: POSITIVE
SAO2 % BLD: 100 %
SAO2 % BLD: 99 %
SAO2 % BLDA: 100 % (ref 92–100)
SAO2 % BLDA: 100 % (ref 92–100)
SODIUM BLD-SCNC: 144 MMOL/L (ref 136–145)
SODIUM BLD-SCNC: 148 MMOL/L (ref 136–145)
SODIUM BLDA-SCNC: 144 MMOL/L (ref 136–145)
SODIUM BLDA-SCNC: 144 MMOL/L (ref 136–145)
SODIUM BLDA-SCNC: 2.6 MMOL/L (ref 3.6–5.1)
SODIUM BLDA-SCNC: 3.3 MMOL/L (ref 3.6–5.1)
SODIUM SERPL-SCNC: 150 MMOL/L (ref 136–145)
T AXIS: 6 DEGREES
VENTRICULAR RATE: 83 BPM
WBC # BLD AUTO: 11.8 X10(3) UL (ref 4–11)

## 2024-04-04 PROCEDURE — 71045 X-RAY EXAM CHEST 1 VIEW: CPT | Performed by: PHYSICIAN ASSISTANT

## 2024-04-04 PROCEDURE — 02UG0JZ SUPPLEMENT MITRAL VALVE WITH SYNTHETIC SUBSTITUTE, OPEN APPROACH: ICD-10-PCS | Performed by: SURGERY

## 2024-04-04 PROCEDURE — 36430 TRANSFUSION BLD/BLD COMPNT: CPT | Performed by: ANESTHESIOLOGY

## 2024-04-04 PROCEDURE — 30233R1 TRANSFUSION OF NONAUTOLOGOUS PLATELETS INTO PERIPHERAL VEIN, PERCUTANEOUS APPROACH: ICD-10-PCS | Performed by: SURGERY

## 2024-04-04 PROCEDURE — 02580ZZ DESTRUCTION OF CONDUCTION MECHANISM, OPEN APPROACH: ICD-10-PCS | Performed by: SURGERY

## 2024-04-04 PROCEDURE — 3E0F7SD INTRODUCTION OF NITRIC OXIDE GAS INTO RESPIRATORY TRACT, VIA NATURAL OR ARTIFICIAL OPENING: ICD-10-PCS | Performed by: ANESTHESIOLOGY

## 2024-04-04 PROCEDURE — 93312 ECHO TRANSESOPHAGEAL: CPT | Performed by: ANESTHESIOLOGY

## 2024-04-04 PROCEDURE — B24BZZ4 ULTRASONOGRAPHY OF HEART WITH AORTA, TRANSESOPHAGEAL: ICD-10-PCS | Performed by: ANESTHESIOLOGY

## 2024-04-04 PROCEDURE — 0PU00JZ SUPPLEMENT STERNUM WITH SYNTHETIC SUBSTITUTE, OPEN APPROACH: ICD-10-PCS | Performed by: SURGERY

## 2024-04-04 PROCEDURE — 02UJ0JZ SUPPLEMENT TRICUSPID VALVE WITH SYNTHETIC SUBSTITUTE, OPEN APPROACH: ICD-10-PCS | Performed by: SURGERY

## 2024-04-04 PROCEDURE — 5A1221Z PERFORMANCE OF CARDIAC OUTPUT, CONTINUOUS: ICD-10-PCS | Performed by: SURGERY

## 2024-04-04 PROCEDURE — 02BG0ZZ EXCISION OF MITRAL VALVE, OPEN APPROACH: ICD-10-PCS | Performed by: SURGERY

## 2024-04-04 PROCEDURE — S0028 INJECTION, FAMOTIDINE, 20 MG: HCPCS | Performed by: ANESTHESIOLOGY

## 2024-04-04 PROCEDURE — 5A1223Z PERFORMANCE OF CARDIAC PACING, CONTINUOUS: ICD-10-PCS | Performed by: SURGERY

## 2024-04-04 PROCEDURE — 02B70ZK EXCISION OF LEFT ATRIAL APPENDAGE, OPEN APPROACH: ICD-10-PCS | Performed by: SURGERY

## 2024-04-04 PROCEDURE — 30233N0 TRANSFUSION OF AUTOLOGOUS RED BLOOD CELLS INTO PERIPHERAL VEIN, PERCUTANEOUS APPROACH: ICD-10-PCS | Performed by: SURGERY

## 2024-04-04 PROCEDURE — 30233K1 TRANSFUSION OF NONAUTOLOGOUS FROZEN PLASMA INTO PERIPHERAL VEIN, PERCUTANEOUS APPROACH: ICD-10-PCS | Performed by: SURGERY

## 2024-04-04 PROCEDURE — 30233M1 TRANSFUSION OF NONAUTOLOGOUS PLASMA CRYOPRECIPITATE INTO PERIPHERAL VEIN, PERCUTANEOUS APPROACH: ICD-10-PCS | Performed by: SURGERY

## 2024-04-04 PROCEDURE — 02Q50ZZ REPAIR ATRIAL SEPTUM, OPEN APPROACH: ICD-10-PCS | Performed by: SURGERY

## 2024-04-04 DEVICE — IMPLANTABLE DEVICE
Type: IMPLANTABLE DEVICE | Site: HEART | Status: FUNCTIONAL
Brand: COSGROVE-EDWARDS ANNULOPLASTY BAND WITH TEMPLATE/LANYARD

## 2024-04-04 DEVICE — SCREW BNE 2.4X14MM CANC LOK SLF DRL: Type: IMPLANTABLE DEVICE | Site: CHEST | Status: FUNCTIONAL

## 2024-04-04 DEVICE — PLATE BNE 8 H BLU X PRI CLSR SYS STERNALOCK: Type: IMPLANTABLE DEVICE | Site: CHEST | Status: FUNCTIONAL

## 2024-04-04 DEVICE — EDWARDS MC3 TRICUSPID ANNULOPLASTY RING
Type: IMPLANTABLE DEVICE | Site: HEART | Status: FUNCTIONAL
Brand: EDWARDS MC3 TRICUSPID ANNULOPLASTY RING

## 2024-04-04 DEVICE — BARD® PTFE FELT PLEDGETS, (OVAL), 4.8 MM X 6 MM
Type: IMPLANTABLE DEVICE
Brand: BARD® PTFE FELT PLEDGETS

## 2024-04-04 RX ORDER — SENNOSIDES 8.6 MG
17.2 TABLET ORAL NIGHTLY PRN
Status: DISCONTINUED | OUTPATIENT
Start: 2024-04-04 | End: 2024-04-12

## 2024-04-04 RX ORDER — ONDANSETRON 2 MG/ML
4 INJECTION INTRAMUSCULAR; INTRAVENOUS EVERY 6 HOURS PRN
Status: DISCONTINUED | OUTPATIENT
Start: 2024-04-04 | End: 2024-04-12

## 2024-04-04 RX ORDER — MORPHINE SULFATE 4 MG/ML
4 INJECTION, SOLUTION INTRAMUSCULAR; INTRAVENOUS EVERY 2 HOUR PRN
Status: DISCONTINUED | OUTPATIENT
Start: 2024-04-04 | End: 2024-04-12

## 2024-04-04 RX ORDER — DIPHENHYDRAMINE HYDROCHLORIDE 50 MG/ML
INJECTION INTRAMUSCULAR; INTRAVENOUS AS NEEDED
Status: DISCONTINUED | OUTPATIENT
Start: 2024-04-04 | End: 2024-04-04 | Stop reason: SURG

## 2024-04-04 RX ORDER — NICOTINE POLACRILEX 4 MG
15 LOZENGE BUCCAL
Status: DISCONTINUED | OUTPATIENT
Start: 2024-04-04 | End: 2024-04-12

## 2024-04-04 RX ORDER — IPRATROPIUM BROMIDE AND ALBUTEROL SULFATE 2.5; .5 MG/3ML; MG/3ML
3 SOLUTION RESPIRATORY (INHALATION) EVERY 4 HOURS
Status: DISCONTINUED | OUTPATIENT
Start: 2024-04-04 | End: 2024-04-05

## 2024-04-04 RX ORDER — DOBUTAMINE HYDROCHLORIDE 200 MG/100ML
INJECTION INTRAVENOUS CONTINUOUS PRN
Status: DISCONTINUED | OUTPATIENT
Start: 2024-04-04 | End: 2024-04-04 | Stop reason: SURG

## 2024-04-04 RX ORDER — NALOXONE HYDROCHLORIDE 0.4 MG/ML
0.08 INJECTION, SOLUTION INTRAMUSCULAR; INTRAVENOUS; SUBCUTANEOUS
Status: DISCONTINUED | OUTPATIENT
Start: 2024-04-04 | End: 2024-04-07

## 2024-04-04 RX ORDER — FAMOTIDINE 10 MG/ML
INJECTION, SOLUTION INTRAVENOUS AS NEEDED
Status: DISCONTINUED | OUTPATIENT
Start: 2024-04-04 | End: 2024-04-04 | Stop reason: SURG

## 2024-04-04 RX ORDER — MORPHINE SULFATE 2 MG/ML
2 INJECTION, SOLUTION INTRAMUSCULAR; INTRAVENOUS EVERY 2 HOUR PRN
Status: DISCONTINUED | OUTPATIENT
Start: 2024-04-04 | End: 2024-04-12

## 2024-04-04 RX ORDER — CEFAZOLIN SODIUM 1 G/3ML
INJECTION, POWDER, FOR SOLUTION INTRAMUSCULAR; INTRAVENOUS AS NEEDED
Status: DISCONTINUED | OUTPATIENT
Start: 2024-04-04 | End: 2024-04-04 | Stop reason: SURG

## 2024-04-04 RX ORDER — ROCURONIUM BROMIDE 10 MG/ML
INJECTION, SOLUTION INTRAVENOUS AS NEEDED
Status: DISCONTINUED | OUTPATIENT
Start: 2024-04-04 | End: 2024-04-04 | Stop reason: SURG

## 2024-04-04 RX ORDER — VANCOMYCIN HYDROCHLORIDE
15 EVERY 24 HOURS
Qty: 250 ML | Refills: 0 | Status: COMPLETED | OUTPATIENT
Start: 2024-04-05 | End: 2024-04-05

## 2024-04-04 RX ORDER — SODIUM CHLORIDE 9 MG/ML
INJECTION, SOLUTION INTRAVENOUS CONTINUOUS
Status: DISCONTINUED | OUTPATIENT
Start: 2024-04-04 | End: 2024-04-07

## 2024-04-04 RX ORDER — MIDAZOLAM HYDROCHLORIDE 1 MG/ML
INJECTION INTRAMUSCULAR; INTRAVENOUS AS NEEDED
Status: DISCONTINUED | OUTPATIENT
Start: 2024-04-04 | End: 2024-04-04 | Stop reason: SURG

## 2024-04-04 RX ORDER — PROTAMINE SULFATE 10 MG/ML
INJECTION, SOLUTION INTRAVENOUS AS NEEDED
Status: DISCONTINUED | OUTPATIENT
Start: 2024-04-04 | End: 2024-04-04 | Stop reason: SURG

## 2024-04-04 RX ORDER — FINASTERIDE 5 MG/1
5 TABLET, FILM COATED ORAL NIGHTLY
Status: DISCONTINUED | OUTPATIENT
Start: 2024-04-04 | End: 2024-04-12

## 2024-04-04 RX ORDER — KETAMINE HYDROCHLORIDE 50 MG/ML
INJECTION, SOLUTION INTRAMUSCULAR; INTRAVENOUS AS NEEDED
Status: DISCONTINUED | OUTPATIENT
Start: 2024-04-04 | End: 2024-04-04 | Stop reason: SURG

## 2024-04-04 RX ORDER — POLYETHYLENE GLYCOL 3350 17 G/17G
17 POWDER, FOR SOLUTION ORAL DAILY PRN
Status: DISCONTINUED | OUTPATIENT
Start: 2024-04-04 | End: 2024-04-12

## 2024-04-04 RX ORDER — CEFAZOLIN SODIUM IN 0.9 % NACL 3 G/100 ML
3 INTRAVENOUS SOLUTION, PIGGYBACK (ML) INTRAVENOUS
Status: DISCONTINUED | OUTPATIENT
Start: 2024-04-05 | End: 2024-04-04 | Stop reason: HOSPADM

## 2024-04-04 RX ORDER — IPRATROPIUM BROMIDE AND ALBUTEROL SULFATE 2.5; .5 MG/3ML; MG/3ML
SOLUTION RESPIRATORY (INHALATION)
Status: DISPENSED
Start: 2024-04-04 | End: 2024-04-05

## 2024-04-04 RX ORDER — MAGNESIUM SULFATE 1 G/100ML
1 INJECTION INTRAVENOUS AS NEEDED
Status: DISCONTINUED | OUTPATIENT
Start: 2024-04-04 | End: 2024-04-07

## 2024-04-04 RX ORDER — MELATONIN
3 NIGHTLY PRN
Status: DISCONTINUED | OUTPATIENT
Start: 2024-04-04 | End: 2024-04-12

## 2024-04-04 RX ORDER — VANCOMYCIN HYDROCHLORIDE 1 G/20ML
INJECTION, POWDER, LYOPHILIZED, FOR SOLUTION INTRAVENOUS AS NEEDED
Status: DISCONTINUED | OUTPATIENT
Start: 2024-04-04 | End: 2024-04-04 | Stop reason: SURG

## 2024-04-04 RX ORDER — DEXTROSE MONOHYDRATE 25 G/50ML
50 INJECTION, SOLUTION INTRAVENOUS
Status: DISCONTINUED | OUTPATIENT
Start: 2024-04-04 | End: 2024-04-12

## 2024-04-04 RX ORDER — PANTOPRAZOLE SODIUM 40 MG/1
40 TABLET, DELAYED RELEASE ORAL
Status: DISCONTINUED | OUTPATIENT
Start: 2024-04-05 | End: 2024-04-12

## 2024-04-04 RX ORDER — DEXMEDETOMIDINE HYDROCHLORIDE 4 UG/ML
INJECTION, SOLUTION INTRAVENOUS CONTINUOUS PRN
Status: DISCONTINUED | OUTPATIENT
Start: 2024-04-04 | End: 2024-04-04 | Stop reason: SURG

## 2024-04-04 RX ORDER — MIDAZOLAM HYDROCHLORIDE 1 MG/ML
1 INJECTION INTRAMUSCULAR; INTRAVENOUS EVERY 5 MIN PRN
Status: DISCONTINUED | OUTPATIENT
Start: 2024-04-04 | End: 2024-04-07

## 2024-04-04 RX ORDER — DIPHENHYDRAMINE HYDROCHLORIDE 50 MG/ML
12.5 INJECTION INTRAMUSCULAR; INTRAVENOUS EVERY 4 HOURS PRN
Status: DISCONTINUED | OUTPATIENT
Start: 2024-04-04 | End: 2024-04-07

## 2024-04-04 RX ORDER — ATORVASTATIN CALCIUM 20 MG/1
20 TABLET, FILM COATED ORAL NIGHTLY
Status: DISCONTINUED | OUTPATIENT
Start: 2024-04-04 | End: 2024-04-12

## 2024-04-04 RX ORDER — NICOTINE POLACRILEX 4 MG
30 LOZENGE BUCCAL
Status: DISCONTINUED | OUTPATIENT
Start: 2024-04-04 | End: 2024-04-12

## 2024-04-04 RX ORDER — DEXTROSE AND SODIUM CHLORIDE 5; .45 G/100ML; G/100ML
INJECTION, SOLUTION INTRAVENOUS CONTINUOUS
Status: DISCONTINUED | OUTPATIENT
Start: 2024-04-04 | End: 2024-04-07

## 2024-04-04 RX ORDER — NITROGLYCERIN 20 MG/100ML
INJECTION INTRAVENOUS CONTINUOUS PRN
Status: DISCONTINUED | OUTPATIENT
Start: 2024-04-04 | End: 2024-04-07

## 2024-04-04 RX ORDER — DOBUTAMINE HYDROCHLORIDE 200 MG/100ML
INJECTION INTRAVENOUS CONTINUOUS PRN
Status: DISCONTINUED | OUTPATIENT
Start: 2024-04-04 | End: 2024-04-07

## 2024-04-04 RX ORDER — HEPARIN SODIUM 1000 [USP'U]/ML
INJECTION, SOLUTION INTRAVENOUS; SUBCUTANEOUS AS NEEDED
Status: DISCONTINUED | OUTPATIENT
Start: 2024-04-04 | End: 2024-04-04 | Stop reason: SURG

## 2024-04-04 RX ORDER — POTASSIUM CHLORIDE 29.8 MG/ML
40 INJECTION INTRAVENOUS AS NEEDED
Status: DISCONTINUED | OUTPATIENT
Start: 2024-04-04 | End: 2024-04-07

## 2024-04-04 RX ORDER — DEXMEDETOMIDINE HYDROCHLORIDE 4 UG/ML
INJECTION, SOLUTION INTRAVENOUS CONTINUOUS
Status: DISCONTINUED | OUTPATIENT
Start: 2024-04-04 | End: 2024-04-07

## 2024-04-04 RX ORDER — BISACODYL 10 MG
10 SUPPOSITORY, RECTAL RECTAL
Status: DISCONTINUED | OUTPATIENT
Start: 2024-04-04 | End: 2024-04-12

## 2024-04-04 RX ORDER — MAGNESIUM SULFATE HEPTAHYDRATE 40 MG/ML
2 INJECTION, SOLUTION INTRAVENOUS AS NEEDED
Status: DISCONTINUED | OUTPATIENT
Start: 2024-04-04 | End: 2024-04-07

## 2024-04-04 RX ORDER — ACETAMINOPHEN 10 MG/ML
1000 INJECTION, SOLUTION INTRAVENOUS EVERY 6 HOURS
Qty: 600 ML | Refills: 0 | Status: COMPLETED | OUTPATIENT
Start: 2024-04-04 | End: 2024-04-05

## 2024-04-04 RX ORDER — CHLORHEXIDINE GLUCONATE ORAL RINSE 1.2 MG/ML
15 SOLUTION DENTAL
Status: DISCONTINUED | OUTPATIENT
Start: 2024-04-04 | End: 2024-04-05

## 2024-04-04 RX ORDER — METHYLPREDNISOLONE SODIUM SUCCINATE 500 MG/8ML
INJECTION INTRAMUSCULAR; INTRAVENOUS AS NEEDED
Status: DISCONTINUED | OUTPATIENT
Start: 2024-04-04 | End: 2024-04-04 | Stop reason: SURG

## 2024-04-04 RX ORDER — POTASSIUM CHLORIDE 14.9 MG/ML
20 INJECTION INTRAVENOUS AS NEEDED
Status: DISCONTINUED | OUTPATIENT
Start: 2024-04-04 | End: 2024-04-07

## 2024-04-04 RX ADMIN — ROCURONIUM BROMIDE 100 MG: 10 INJECTION, SOLUTION INTRAVENOUS at 06:52:00

## 2024-04-04 RX ADMIN — VANCOMYCIN HYDROCHLORIDE 1000 MG: 1 INJECTION, POWDER, LYOPHILIZED, FOR SOLUTION INTRAVENOUS at 07:21:00

## 2024-04-04 RX ADMIN — MIDAZOLAM HYDROCHLORIDE 2 MG: 1 INJECTION INTRAMUSCULAR; INTRAVENOUS at 06:50:00

## 2024-04-04 RX ADMIN — ROCURONIUM BROMIDE 50 MG: 10 INJECTION, SOLUTION INTRAVENOUS at 08:39:00

## 2024-04-04 RX ADMIN — CEFAZOLIN SODIUM 3 G: 1 INJECTION, POWDER, FOR SOLUTION INTRAMUSCULAR; INTRAVENOUS at 07:20:00

## 2024-04-04 RX ADMIN — MIDAZOLAM HYDROCHLORIDE 2 MG: 1 INJECTION INTRAMUSCULAR; INTRAVENOUS at 08:38:00

## 2024-04-04 RX ADMIN — FAMOTIDINE 20 MG: 10 INJECTION, SOLUTION INTRAVENOUS at 06:52:00

## 2024-04-04 RX ADMIN — DEXMEDETOMIDINE HYDROCHLORIDE 0.5 MCG/KG/HR: 4 INJECTION, SOLUTION INTRAVENOUS at 07:22:00

## 2024-04-04 RX ADMIN — DOBUTAMINE HYDROCHLORIDE 3 MCG/KG/MIN: 200 INJECTION INTRAVENOUS at 11:40:00

## 2024-04-04 RX ADMIN — METHYLPREDNISOLONE SODIUM SUCCINATE 500 MG: 500 INJECTION INTRAMUSCULAR; INTRAVENOUS at 06:52:00

## 2024-04-04 RX ADMIN — DIPHENHYDRAMINE HYDROCHLORIDE 50 MG: 50 INJECTION INTRAMUSCULAR; INTRAVENOUS at 06:52:00

## 2024-04-04 RX ADMIN — KETAMINE HYDROCHLORIDE 50 MG: 50 INJECTION, SOLUTION INTRAMUSCULAR; INTRAVENOUS at 06:50:00

## 2024-04-04 RX ADMIN — HEPARIN SODIUM 38000 UNITS: 1000 INJECTION, SOLUTION INTRAVENOUS; SUBCUTANEOUS at 08:25:00

## 2024-04-04 RX ADMIN — PROTAMINE SULFATE 25 MG: 10 INJECTION, SOLUTION INTRAVENOUS at 11:24:00

## 2024-04-04 RX ADMIN — ROCURONIUM BROMIDE 50 MG: 10 INJECTION, SOLUTION INTRAVENOUS at 10:52:00

## 2024-04-04 NOTE — PROGRESS NOTES
[facility] Pharmacy Note:  Renal Adjustment for Vancomycin    Wagner Sheridan is a 78 year old patient who has been prescribed vancomycin 15 mg/kg mg every 12 hrs for 24 hours post surgery.    The estimated creatinine clearance is 28.3 mL/min (A) (based on SCr of 2.32 mg/dL (H))..    Vancomycin has been dose adjusted based on adjusted body weight to 1.5 gm IV every 24 hours for 1 dose(s) following the perioperative dose per hospital renal dose adjustment protocol for post-op surgical prophylaxis. Pharmacy will follow and adjust dose as warranted for additional renal function changes.    Thank you,    [place], PharmD  4/4/2024  1:47 PM

## 2024-04-04 NOTE — DISCHARGE INSTRUCTIONS
To access the Dr. Hansen discharge instructions video on your home computer:   [email or web address]  Remove steri strips from all incisions on 4/18      Sometimes managing your health at home requires assistance.  The [facility] team has recognized your preference to use [facility].  They can be reached by phone at [phone].  The fax number for your reference is [phone].  A representative from the home health agency will contact you or your family to schedule your first visit.       Your orientation appointment for cardiac rehabilitation is Tuesday, May 21, 2024, at 10:00am.. (Your initial appointment will be approx 1 hours) Please obtain an order for cardiac rehabilitation from your cardiologist. Stress test to be done before appointment or waived by cardiologist. Patient to confirm insurance coverage for cardiac rehab. Diabetic patients should bring glucometer.  Please wear comfortable clothing, exercise shoes, and glasses if needed. If you have questions about cardiac rehabilitation please call [phone].

## 2024-04-04 NOTE — DIETARY NOTE
Clinical Nutrition    Dietitian consult received per cardiac rehab standing order. Pt to be educated by cardiac rehab staff and encouraged to attend outpatient classes taught by RD. RD available PRN.    Christine Burrell MS, RD, LDN  Clinical Dietitian  Ext: [phone]

## 2024-04-04 NOTE — DIETARY NOTE
Clinical Nutrition     Dietitian consult received per cardiac rehab standing order. Pt to be educated by cardiac rehab staff and encouraged to attend outpatient classes taught by ANANT. ANANT available PRN.    Francheska Ramirez RD, LDN, [facility]  Clinical Dietitian  Phone [phone]

## 2024-04-04 NOTE — ANESTHESIA PROCEDURE NOTES
Central Line    Performed by: Maldonado Patel MD  Authorized by: Maldonado Patel MD    General Information and Staff    Procedure Start:   Anesthesiologist:  Maldonado Patel MD  Performed by:  Anesthesiologist  Patient Location:  OR  Indication: central venous access and CVP monitoring    Site Identification: real time ultrasound guided        Procedure Detail    Patient Position:  Trendelenburg  Laterality:  Right  Site:  Internal jugular  Prep:  Chloraprep  Catheter Size:  9 Fr  Catheter Type:  MAC introducer  Number of Lumens:  Double lumen  Procedure Detail: target vein identified, needle advanced into vein and blood aspirated and guidewire advanced into vein    Seldinger Technique?: Yes    Intravenous Verification: verified by ultrasound and venous blood return    Post Insertion: all ports aspirated, all ports flushed easily, guidewire was removed intact, line was sutured in place and dressing was applied      Assessment    Events: patient tolerated procedure well with no complications      PA Catheter Placement    PA Catheter Placed?: Yes    PA Catheter Type:  Oximetric  PA Catheter Size:  8  Laterality:  Right  Site:  Internal jugular  Placement Confirmation: pressure tracing changes and verified by STAN    Events: patient tolerated procedure well with no complications      Additional Comments     Riggins inserted to 20cm in anticipation of tricuspid repair

## 2024-04-04 NOTE — ANESTHESIA PROCEDURE NOTES
Airway  Date/Time: 4/4/2024 6:54 AM  Urgency: elective      General Information and Staff    Patient location during procedure: OR  Anesthesiologist: Maldonado Patel MD  Performed: anesthesiologist   Performed by: Maldonado Patel MD  Authorized by: Maldonado Patel MD      Indications and Patient Condition  Indications for airway management: anesthesia  Sedation level: deep  Preoxygenated: yes  Patient position: sniffing  Mask difficulty assessment: 1 - vent by mask    Final Airway Details  Final airway type: endotracheal airway      Successful airway: ETT  Cuffed: yes   Successful intubation technique: direct laryngoscopy  Endotracheal tube insertion site: oral  Blade: Juan Francisco  Blade size: #3  ETT size (mm): 8.0    Cormack-Lehane Classification: grade I - full view of glottis  Placement verified by: capnometry   Measured from: lips  ETT to lips (cm): 23  Number of attempts at approach: 1

## 2024-04-04 NOTE — ANESTHESIA POSTPROCEDURE EVALUATION
[facility]    Wagner Sheridan Patient Status:  Inpatient   Age/Gender 78 year old male MRN [id number]   Location [facility] 6NE-A Attending Jason Hansen MD   Hosp Day # 0 PCP Edil Roy MD       Anesthesia Post-op Note    MITRAL VALVE REPAIR, TRICUSPID VALVE REPAIR, MAZE PROCEDURE, LEFT ATRIAL APPENDAGE AMPUTATION, PATENT FORAMEN OVALE CLOSURE.  STERNAL CLOSURE WITH PLATES.  INTRAOPERATIVE TRANSESOPHAGEAL ECHOCARDIOGRAM.    Procedure Summary       Date: 04/04/24 Room / Location:  CVOR 02 /  CVOR    Anesthesia Start: 0644 Anesthesia Stop: 1345    Procedure: MITRAL VALVE REPAIR, TRICUSPID VALVE REPAIR, MAZE PROCEDURE, LEFT ATRIAL APPENDAGE AMPUTATION, PATENT FORAMEN OVALE CLOSURE.  STERNAL CLOSURE WITH PLATES.  INTRAOPERATIVE TRANSESOPHAGEAL ECHOCARDIOGRAM. (Chest)/Intraoperative STAN  Diagnosis: (MITRAL AND TRICUSPID REGURGITATION, ATRIALA FIBRILLATION, CORONARY ARTERY DISEASE, PATENT FORAMEN OVALE)    Surgeons: Edil Salgado MD Anesthesiologist: Maldonado Patel MD    Anesthesia Type: general ASA Status: 4            Anesthesia Type: No value filed.    Vitals Value Taken Time   BP 92/57 04/04/24 1345   Temp 97.3 04/04/24 1347   Pulse 85 04/04/24 1346   Resp 16 04/04/24 1345   SpO2 100 % 04/04/24 1346   Vitals shown include unfiled device data.    Patient Location: ICU    Anesthesia Type: general    Airway Patency: intubated    Postop Pain Control: sedated until time of extubation    Mental Status: sedated until time of extubation    Nausea/Vomiting: none    Cardiopulmonary/Hydration status: stable euvolemic    Complications: no apparent anesthesia related complications    Postop vital signs: stable    Comments: Patient was monitored throughout case with Cerebral oximetry, standard monitors as well a monitors noted in procedure list.  Upon completion of the surgery and satisfactory hemostasis the patient was transferred to a CCU bed and a mobile monitor.  The patient was taken intubated with ambu bag  and appropriate sedation.  All relevant information about patient, medications, procedure and plan discussed with the nurse as I relinquished care of the patient.        Dental Exam: Unchanged from Preop    Patient to be transferred to ICU.

## 2024-04-04 NOTE — CONSULTS
[facility] Consultation    Wagner Sheridan Patient Status:  Inpatient    1945 MRN [id number]   [facility] 6NE-A Attending Jason Hansen MD   Hosp Day # 0 PCP Edil Roy MD     Reason for Consultation:  post-op cardiology mgt      History of Present Illness: Wagner Sheridan is a a(n) 78 year old male.  He has hx of Dyslipidemia , Hypertension , CKD. Presented with resp distress in setting of covid infection, 2024.  Atrial Fibrillation occurred and he was started on eliquis.  His breathing is fairly comfortable with lasix.  TTE and STAN demonstrate severe MR with flail P3 scallop of MV and moderate TR.  Cath 24 showed:     -normal left ventricular size and systolic function with an ejection fraction of approximately 60% to 65%.     - right atrial mean pressure is 14, RV 48/8, PA 50/22 with a mean of 35, mean wedge pressure 24 with a V-wave of 40, /22, /67, mean of 89. Sonam cardiac output was 8.1     -The left main segment to the left coronary artery is patulous and ectatic without stenosis. The left circumflex artery is relatively large. It gives rise to a large obtuse marginal artery which travels all the way to the apex and then to some extent up the interventricular groove, supplying a portion of the distal anterior wall. It also gives rise to a left posterior descending artery, a small second obtuse marginal artery, and a posterior left ventricular branch. The left circumflex artery system shows minor nonobstructive plaquing. The LAD is normal caliber proximally, but then becomes quite diminutive in its middle one-third and does not reach the apex, as described above. The distal half of this LAD is severely diffusely diseased with areas of narrowing of up to 90%       He was admitted with anemia, 2024.  GI eval revealed: EGD, 24: moderate diffuse gastritis. 2 small ulcers  C-scope: 2 polyps removed    Today he underwent elective MITRAL VALVE REPAIR, TRICUSPID VALVE  REPAIR, MAZE PROCEDURE, LEFT ATRIAL APPENDAGE AMPUTATION, PATENT FORAMEN OVALE CLOSURE.     History:  Past Medical History:   Diagnosis Date    Arrhythmia     BPH     Encounter for incision and drainage procedure 07/02/2021    High blood pressure     High cholesterol     Hyperaldosteronism (HCC)     Mitral regurgitation     Osteoarthrosis, unspecified whether generalized or localized, unspecified site     PMR (polymyalgia rheumatica) (HCC) 2012    off corticosteroids since 2013    Retention of urine     Shortness of breath      Past Surgical History:   Procedure Laterality Date    COLONOSCOPY N/A 2/27/2024    Procedure: COLONOSCOPY;  Surgeon: Heath Mina DO;  Location:  ENDOSCOPY    KNEE SURGERY      OTHER SURGICAL HISTORY  2/27/09    flow , Dr. Tavera    OTHER SURGICAL HISTORY  9/4/09    flow  Dr. Tavera    OTHER SURGICAL HISTORY  1/4/11    PNB - Dr. Tavera    OTHER SURGICAL HISTORY  1/17/13    PVP - Dr. Tavera    OTHER SURGICAL HISTORY  2/3/16     Flow      OTHER SURGICAL HISTORY  08/05/2020    cysto- Dr. Tavera    SKIN SURGERY  10/01/2019    MMS/R nasal ala /BCC done by [facility]    TONSILLECTOMY      TOTAL KNEE REPLACEMENT Right 02/25/2015    Right total knee replacement 02/25/2015    TOTAL KNEE REPLACEMENT Left 05/11/2015     Left knee total replacement 05/11/2015      Family History   Problem Relation Age of Onset    Hypertension Father     Heart Disorder Father     Hypertension Mother     Diabetes Sister          Allergies:  Allergies   Allergen Reactions    Ciprofloxacin HIVES    Clindamycin HIVES    Penicillins HIVES     Tolerated Ancef 4/4/24    Wasp Venom RESPIRATORY FAILURE    Wasp Venom Protein RESPIRATORY FAILURE    Benazepril Coughing    Chlorhexidine RASH and OTHER (SEE COMMENTS)     redness       Medications:   ipratropium-albuterol  3 mL Nebulization Q4H    chlorhexidine gluconate  15 mL Mouth/Throat BID@0800,2000    atorvastatin  20 mg Oral Nightly    finasteride  5 mg Oral Nightly     [START ON 4/5/2024] vancomycin  15 mg/kg (Adjusted) Intravenous Q24H    mupirocin  1 Application Nasal BID    acetaminophen  1,000 mg Intravenous Q6H    [START ON 4/5/2024] pantoprazole  40 mg Intravenous QAM AC    Or    [START ON 4/5/2024] pantoprazole  40 mg Oral QAM AC    ipratropium-albuterol           Continuous Infusions:   dexmedetomidine 0.5 mcg/kg/hr (04/04/24 1351)    sodium chloride 10 mL/hr (04/04/24 1428)    DOBUTamine 3 mcg/kg/min (04/04/24 1428)    nitroGLYCERIN in dextrose 5%      norepinephrine      NitroPRUSSide (Nipride) 50 mg in dextrose 5% 250 mL infusion      dextrose 5%-sodium chloride 0.45% 70 mL/hr (04/04/24 1420)    sodium chloride      HYDROmorphone in sodium chloride 0.9%      insulin regular 2 Units/hr (04/04/24 1452)    EPINEPHrine (Adrenalin) 5 mg in sodium chloride 0.9% 250 mL infusion 6 mcg/min (04/04/24 1427)       Social History:   reports that he has quit smoking. His smoking use included cigarettes. He has a 2.5 pack-year smoking history. He has never used smokeless tobacco. He reports current alcohol use. He reports that he does not use drugs.    Review of Systems:  All systems were reviewed and are negative except as described above in HPI.    Physical Exam:      Wt Readings from Last 3 Encounters:   02/20/24 275 lb (124.7 kg)   02/28/24 281 lb 15.5 oz (127.9 kg)   02/07/24 270 lb (122.5 kg)       Vitals:    02/20/24 1531 04/04/24 1345 04/04/24 1400   BP:  92/57 93/58   Pulse:  89 85   Resp:  16    Temp:  (!) 95.1 °F (35.1 °C)    TempSrc:  Pulmonary Artery    SpO2:  100% 100%   Weight: 275 lb (124.7 kg)     Height: 6' (1.829 m)         Temp:  [95.1 °F (35.1 °C)] 95.1 °F (35.1 °C)  Pulse:  [85-89] 85  Resp:  [16] 16  BP: (92-93)/(57-58) 93/58  SpO2:  [100 %] 100 %  AO: (94-96)/(38-44) 96/44  FiO2 (%):  [50 %] 50 %    Temp: 95.1 °F (35.1 °C)  Pulse: 85  Resp: 16  BP: 93/58  AO: 96/44  FiO2 (%): 50 %      General:   intubated  HEENT: No focal deficits.  Neck: No JVD, carotids 2+  no bruits.  Cardiac: Regular S1S2.  No S3, S4, rub, click.  No murmur.  Lungs: Clear to auscultation and percussion.  Abdomen: Soft, non-tender.   Extremities: No LE edema.  No clubbing or cyanosis  Neurologic:  sedated.  Skin: Warm and dry.     Labs:      HEM:  Recent Labs   Lab 04/01/24  1002 04/04/24  1341   WBC 5.0 11.8*   HGB 10.8* 9.7*   HCT 35.1* 30.7*   .0 158.0       Chem:  Recent Labs   Lab 04/01/24  1002 04/04/24  1341    150*   K 3.9 2.8*    114*   CO2 29.0 25.0   BUN 33* 34*   CREATSERUM 2.32* 2.75*   MG  --  2.7*   ALT 18  --    AST 15  --    ALB 3.5  --        Recent Labs   Lab 04/01/24  1002 04/04/24  1341   INR 1.15 1.41*                     Lab Results   Component Value Date    TROP < 0.046 12/27/2012         Invalid input(s): \"PBNPML\"                 Telemetry: NSR    Laboratories and Data:  Diagnostics:    EKG, 4/4/2024:  NSR, FAV, prolonged QT    CXR, 4/4/2024:      STAN, 1/2024:      CONCLUSIONS:   1. There is flail of the P3 scallop of the mitral valve. The mitral valve has severe, 4+ eccentric (laterally directed) regurgitation.   2. The tricuspid valve has annular enlargement, with central malcoaptation. The tricuspid valve has moderate, centrally directed, regurgitation.  3. The left ventricle appears normal in size, thickness, and systolic function. The estimated left ventricular ejection fraction is 60%  4. A patent foramen ovale is present (tunnel length 34 mm). There is left-to-right shunting across the patent foramen ovale.      Impression:    1.  Severe MR with marked pulm Hypertension . 2/2 flail P3 scallop of MV, and moderate TR,  s/p MITRAL VALVE REPAIR, TRICUSPID VALVE REPAIR, MAZE PROCEDURE, LEFT ATRIAL APPENDAGE AMPUTATION, PATENT FORAMEN OVALE CLOSURE.  4/4/24.    -currently on dobutamine, epi drip and nitric oxide  -stable rhythm  -PAS = 37 mm Hg  -CO = 7 l/min        2. Atrial Fibrillation.  Started 1/2024 in setting of covid infection.  Echo then showed  severe MR.  STAN shows flail P3 leaflet of MV and severe MR    -s/p MAZE, AAP amputation  -currently in NSR    3. Hypertension     4. CKD,  Cr = 2.3 pre-op    5. Dyslipidemia     6. UGI bleeding while on DOAC, 2/2024      Recommend:    1.  To remain intubated overnight  2.  Wean pressors, nitric  as bp and PA pressures allow  3. TTE later after extubation for MV, TV fct          Boy Hernandes MD  4/4/2024  3:02 PM

## 2024-04-04 NOTE — ANESTHESIA PROCEDURE NOTES
Procedure Performed: STAN       Start Time:        End Time:      Preanesthesia Checklist:  Patient identified, IV assessed, risks and benefits discussed, monitors and equipment assessed, procedure being performed at surgeon's request and anesthesia consent obtained.    General Procedure Information  Diagnostic Indications for Echo:  assessment of ascending aorta  Physician Requesting Echo: Jason Hansen MD  Location performed:  OR  Intubated  Bite block placed  Heart visualized  Probe Insertion:  Easy  Probe Type:  Multiplane  Modalities:  2D only, color flow mapping, pulse wave Doppler and continuous wave Doppler    Echocardiographic and Doppler Measurements    Ventricles    Right Ventricle:  Cavity size normal.  Hypertrophy present.  Thrombus not present.    Left Ventricle:  Cavity size dilated.  Hypertrophy present.  Thrombus not present.  Global Function mildly impaired.  Ejection Fraction 45%.      Ventricular Regional Function:  1- Basal Anteroseptal:  normal  2- Basal Anterior:  normal  3- Basal Anterolateral:  normal  4- Basal Inferolateral:  normal  5- Basal Inferior:  normal  6- Basal Inferoseptal:  normal  7- Mid Anteroseptal:  normal  8- Mid Anterior:  normal  9- Mid Anterolateral:  normal  10- Mid Inferolateral:  normal  11- Mid Inferior:  normal  12- Mid Inferoseptal:  normal  13- Apical Anterior:  normal  14- Apical Lateral:  normal  15- Apical Inferior:  normal  16- Apical Septal:  normal  17- [place]:  normal      Valves    Aortic Valve:  Annulus normal.  Stenosis not present.  Mean Gradient: 4 mmHg.  Regurgitation +2.  Leaflets normal.  Leaflet motions normal.      Mitral Valve:  Annulus dilated.  Stenosis not present.  Regurgitation +4.  Leaflets normal.  Leaflet motions flail.      Tricuspid Valve:  Annulus dilated.  Stenosis not present.  Regurgitation +2.  Leaflets normal.  Leaflet motions prolapsed.    Pulmonic Valve:  Annulus normal.  Regurgitation +1.        Aorta    Ascending Aorta:  Size  normal.  Dissection not present.  Mobile plaque not present.    Descending Aorta:  Size normal.  Dissection not present.  Mobile plaque not present.        Atria    Right Atrium:  Size dilated.  Spontaneous echo contrast not present.  Thrombus not present.  Tumor not present.  Device present.    Left Atrium:  Size dilated.  Spontaneous echo contrast not present.  Thrombus not present.  Tumor not present.  Device not present.    Left atrial appendage normal.      Septa    Atrial Septum:  Intra-atrial septal morphology contains patent foramen ovale.              Other Findings  Pericardium:  normal  Pleural Effusion:  bilateral      Anesthesia Information  Performed Personally  Anesthesiologist:  Maldonado Patel MD      Post    Ventricular FXN:  Global FXN: Improved and Decreased   Regional FXN: Improved              Comments: 4mmhg mv with 2+ MR  TR with trivial regurgitant flow    Complications:None

## 2024-04-04 NOTE — ANESTHESIA PREPROCEDURE EVALUATION
PRE-OP EVALUATION    Patient Name: Wagner Sheridan    Admit Diagnosis: MITRAL AND TRICUSPID REGURGITATION, ATRIALA FIBRILLATION, CORONARY ARTERY DISEASE, PATENT FORAMEN OVALE    Pre-op Diagnosis: MITRAL AND TRICUSPID REGURGITATION, ATRIALA FIBRILLATION, CORONARY ARTERY DISEASE, PATENT FORAMEN OVALE    MITRAL VALVE REPAIR, TRICUSPID VALVE REPAIR, MAZE PROCEDURE, LEFT ATRIAL APPENDAGE AMPUTATION, PATENT FORAMEN OVALE CLOSURE.  STERNAL CLOSURE WITH PLATES.  INTRAOPERATIVE TRANSESOPHAGEAL ECHOCARDIOGRAM.    Anesthesia Procedure: MITRAL VALVE REPAIR, TRICUSPID VALVE REPAIR, MAZE PROCEDURE, LEFT ATRIAL APPENDAGE AMPUTATION, PATENT FORAMEN OVALE CLOSURE.  STERNAL CLOSURE WITH PLATES.  INTRAOPERATIVE TRANSESOPHAGEAL ECHOCARDIOGRAM. (Chest)    Surgeon(s) and Role:     * Edil Salgado MD - Primary     * Jason Hansen MD    Pre-op vitals reviewed.        Body mass index is 37.3 kg/m².    Current medications reviewed.  Hospital Medications:   mupirocin (Bactroban) 2% nasal ointment 1 Application  1 Application Nasal Once    [START ON 4/5/2024] ceFAZolin (Ancef) 3 g in sodium chloride 0.9% 100mL IVPB premix  3 g Intravenous On Call    gelatin adsorbable (Gelfoam) 100 external spone    PRN    heparin in lactated ringers ((Porcine)) 6500 UNITS - 1 L for CVOR procedural use    PRN    vancomycin (Vancocin) 2 g in sodium chloride 0.9% 2 L irrigation    PRN    dexmedeTOMIDine in sodium chloride 0.9% (Precedex) 400 mcg/100mL infusion premix  0.2-1.5 mcg/kg/hr Intravenous Continuous    EPINEPHrine (Adrenalin) 5 mg in sodium chloride 0.9% 250 mL infusion  1-10 mcg/min Intravenous PRN    insulin regular human (Novolin R, Humulin R) 100 Units in sodium chloride 0.9% 100 mL standard infusion (100 mL)  1-40 Units/hr Intravenous PRN       Outpatient Medications:     Medications Prior to Admission   Medication Sig Dispense Refill Last Dose    Potassium Chloride ER 20 MEQ Oral Tab CR Take 40 mcg by mouth daily.   4/3/2024    metOLazone 2.5 MG  Oral Tab Take 1 tablet (2.5 mg total) by mouth See Admin Instructions. Take once a week PRN for swelling   4/3/2024    [] ferrous sulfate 325 (65 FE) MG Oral Tab EC Take 1 tablet (325 mg total) by mouth daily with breakfast. 30 tablet 0     pantoprazole 40 MG Oral Tab EC Take 1 tablet (40 mg total) by mouth 2 (two) times daily before meals for 56 days, THEN 1 tablet (40 mg total) every morning before breakfast. 142 tablet 0 Past Week    furosemide 20 MG Oral Tab Take 2 tablets (40 mg total) by mouth daily.   4/3/2024    atorvastatin 20 MG Oral Tab Take 1 tablet (20 mg total) by mouth nightly.   4/3/2024    metoprolol succinate ER 50 MG Oral Tablet 24 Hr Take 1 tablet (50 mg total) by mouth in the morning and 1 tablet (50 mg total) before bedtime.   2024    amLODIPine 5 MG Oral Tab Take 1 tablet (5 mg total) by mouth in the morning and 1 tablet (5 mg total) before bedtime. 60 tablet 0 4/3/2024    finasteride 5 MG Oral Tab Take 1 tablet (5 mg total) by mouth at bedtime.   4/3/2024    EPINEPHrine (EPIPEN 2-JAZ) 0.3 MG/0.3ML Injection Solution Auto-injector USE AS DIRECTED 2 each 5     ciprofloxacin 500 MG Oral Tab Take 0.5 tablets (250 mg total) by mouth daily for 5 days. 3 tablet 0        Allergies: Amoxicillin, Ciprofloxacin, Clindamycin, Penicillins, Wasp venom, Wasp venom protein, Benazepril, and Chlorhexidine      Anesthesia Evaluation    Patient summary reviewed.    Anesthetic Complications           GI/Hepatic/Renal             (+) chronic renal disease                    Cardiovascular  Comment: Per Cardiology:  Anemia  Hgb 7-7.8 hematocrit 22.7  - GI workup  - Transfuse to keep Hgb >7  - Planning for endoscopy on Tuesday.      HFpEF  moderate to severe mitral regurgitation  pulmonary hypertension   NYHA Class II  LVEF 60%  -Hold eplerenone 50mg given CEDRICK; resume tomorrow if improved renal fx  - Hold losartan 100mg given CEDRICK; resume tomorrow if improved renal fx.  - continue metoprolol ER 50mg  -+1  pitting edema bilateral ankle.  Start furosemide 20 mg daily.   -Low-sodium diet.  Fluids 1,800 mL daily.   -BMP daily  -Daily weights, I & O.  -planning on chest CT today to assess asc aorta per CV surgery       Atrial Fibrillation  -Telemetry shows atrial fibrillation  - rates controlled with BB - continue   - Hold eliquis given Hgb 7.         EKG  Atrial fibrillation   Nonspecific ST abnormality   Abnormal ECG   When compared with ECG of 30-JAN-2024 11:48,   Atrial fibrillation has replaced Sinus rhythm   Confirmed by CHANG VARGAS CHRIS ([id number]) on 2/23/2024 6:19:43 PM         STAN  CONCLUSIONS:   1. There is flail of the P3 scallop of the mitral valve. The mitral valve has severe, 4+ eccentric (laterally directed) regurgitation.   2. The tricuspid valve has annular enlargement, with central malcoaptation. The tricuspid valve has moderate, centrally directed, regurgitation.  3. The left ventricle appears normal in size, thickness, and systolic function. The estimated left ventricular ejection fraction is 60%  4. A patent foramen ovale is present (tunnel length 34 mm). There is left-to-right shunting across the patent foramen ovale.     Oskar Luz MD, [facility]  1/30/2024  12:24 PM      ECG reviewed.            (+) hypertension             (+) valvular problems/murmurs and TR and MR                       Endo/Other               (+) anemia                   Pulmonary               (+) shortness of breath            Neuro/Psych                              Patient Active Problem List:     Benign non-nodular prostatic hyperplasia without lower urinary tract symptoms     Hyperaldosteronism (HCC)     Essential hypertension     Acute idiopathic gout involving toe of left foot     Angioedema     Purpura (HCC)     Chronic renal disease, stage III (HCC)     Aortic atherosclerosis (HCC)     Community acquired pneumonia, unspecified laterality     Anemia              Past Surgical History:   Procedure Laterality  Date    COLONOSCOPY N/A 2/27/2024    Procedure: COLONOSCOPY;  Surgeon: Heath Mina DO;  Location:  ENDOSCOPY    KNEE SURGERY      OTHER SURGICAL HISTORY  2/27/09    flow , Dr. Tavera    OTHER SURGICAL HISTORY  9/4/09    flow  Dr. Tavera    OTHER SURGICAL HISTORY  1/4/11    PNB - Dr. Tavera    OTHER SURGICAL HISTORY  1/17/13    PVP - Dr. Tavera    OTHER SURGICAL HISTORY  2/3/16     Flow      OTHER SURGICAL HISTORY  08/05/2020    cysto- Dr. Tavera    SKIN SURGERY  10/01/2019    MMS/R nasal ala /BCC done by [facility]    TONSILLECTOMY      TOTAL KNEE REPLACEMENT Right 02/25/2015    Right total knee replacement 02/25/2015    TOTAL KNEE REPLACEMENT Left 05/11/2015     Left knee total replacement 05/11/2015      Social History     Socioeconomic History    Marital status:    Tobacco Use    Smoking status: Former     Packs/day: 0.50     Years: 5.00     Additional pack years: 0.00     Total pack years: 2.50     Types: Cigarettes    Smokeless tobacco: Never    Tobacco comments:     in college   Vaping Use    Vaping Use: Never used   Substance and Sexual Activity    Alcohol use: Yes     Alcohol/week: 0.0 standard drinks of alcohol     Comment: rare    Drug use: No   Other Topics Concern    Exercise Yes    Seat Belt Yes     History   Drug Use No     Available pre-op labs reviewed.  Lab Results   Component Value Date    WBC 5.0 04/01/2024    RBC 3.94 04/01/2024    HGB 10.8 (L) 04/01/2024    HCT 35.1 (L) 04/01/2024    MCV 89.1 04/01/2024    MCH 27.4 04/01/2024    MCHC 30.8 (L) 04/01/2024    RDW 17.0 04/01/2024    .0 04/01/2024     Lab Results   Component Value Date     04/01/2024    K 3.9 04/01/2024     04/01/2024    CO2 29.0 04/01/2024    BUN 33 (H) 04/01/2024    CREATSERUM 2.32 (H) 04/01/2024     (H) 04/01/2024    CA 10.2 (H) 04/01/2024     Lab Results   Component Value Date    INR 1.15 04/01/2024         Airway      Mallampati: II  Mouth opening: >3 FB  TM distance: > 6 cm  Neck ROM:  full Cardiovascular    Cardiovascular exam normal.         Dental    Dentition appears grossly intact         Pulmonary    Pulmonary exam normal.                 Other findings              ASA: 4   Plan: general  NPO status verified and patient meets guidelines.        Comment: We discussed GA w/ETT and postoperative ventilation and CCU admission.  We discussed placement of additional lines including arterial catheter, additional PIVs, central venous catheter/PAC and intraop STAN.  Patient understands extubation will occur once the overall hemodynamic status is stable. Discussed rare risk of dental trauma from intubation, sore throat, and postop nausea and vomiting.  Discussed possible use of blood products. We discussed postoperative usage of sedatives, analgesics and anxiolytics.  All questions re: anesthetic management were answered.       Plan/risks discussed with: patient                Present on Admission:  **None**

## 2024-04-04 NOTE — H&P
CV Surgery H&P    Mr. Wagner Sheridan, presented today for surgical management of his  his severe mitral regurgitation, moderate tricuspid regurgitation, and atrial fibrillation.      Mr. Sheridan is a 78-year-old gentleman who was recently hospitalized in early January for what was presumed to be Covid pneumonia.  Hospitalization at that point was for a couple of days.  He subsequently underwent further workup with an ultrasound on January 10, 2024, that revealed an EF of 65-70%, a root measuring 4.2 cm, an ascending measuring 3.7 cm, a severe centric anterior directed jet, mitral regurgitation with P3 prolapse, and moderate tricuspid regurgitation with an annulus measuring 4.6 cm.  The patient subsequently also had an episode of atrial fibrillation while hospitalized and was in and out of sinus rhythm.  He was then started on Eliquis which he remains on currently.  The patient has since undergone a STAN which confirmed moderate tricuspid regurgitation, severe mitral regurgitation with flail P3, and a tricuspid dilation of about 4.6 cm with moderate central tricuspid regurgitation.  He has an EF that measures about 60% and there is also a PFO.  On February 13, 2024, the patient also underwent coronary catheterization which revealed large coronary vasculature with maybe mild luminal irregularities except for the LAD which has a mid narrowing to about 70-80% with an increasing caliber and then kind of a diffusely diseased distal LAD narrowing up to 90%.  The circumflex, RCA, and left main are otherwise normal.  The patient also underwent a right heart cath which showed pulmonary hypertension 50s/20s and a cardiac output of 8.1.  The patient is also noted to have mild aortic insufficiency and trivial to mild aortic stenosis.       The patient states that since January, he has noticed increased fatigue although he feels like he is starting to get a little back toward normal.  He has been noting to have bilateral lower extremity  edema which has improved with diuresis but he still complains of feeling quite heavy in the legs.  The patient is limited with his activity at this point.  He maybe can walk one or two blocks before getting short of breath.  He does become short of breath on the walk and this resolves after just a few moments of rest.  The patient denies any chest pain with his activities.  He denies any lightheadedness, dizziness, or episodes of syncope.  Of note, the patient and family are not sure if he really had Covid in January as he also had Covid in November of 2023.       Past medical history is positive for atrial fibrillation for which he is on Xarelto and hypertension on Norvasc.  He is also on Lasix, losartan, finasteride for BPH, atorvastatin for hyperlipidemia, as well as metoprolol.  The patient does have an underlying history of kidney disease with baseline creatinine most recently of 1.76.  He has a very, very remote history of smoking only smoking a little bit in college.  He rarely drinks alcohol other than occasionally socially.  The patient had a right total knee replacement in February of 2015 followed by a left total knee replacement in May of 2015.  He can ambulate fine but does have some limitations due to arthritis.  The patient is also noted to have hyperaldosteronism.  He also has a history of polymyalgia rheumatica.  He has been off steroids for that since 2013.  He did do a short course of steroids following his admission in January; he is currently off those steroids.  The patient denies any liver disease, peptic ulcer disease, lung disease, TB, cancer, or vein stripping.  He does, as mentioned, have a history of BPH.  The patient has a family history of heart disease with his dad having a history of CHF and his sister recently undergoing stenting for coronary artery disease at age 69.       On review of systems, the patient complains of bilateral lower extremity edema and progressively worsening  fatigue over the past couple of months both of which have improved with diuresis; however, he does not feel like he is back to his baseline from last summer.  The patient denies fevers, chills, nausea, vomiting, chest pain, abdominal pain, vision changes, or new gross motor sensory deficits.  He does complain of arthritic pain that somewhat limits his mobility but he can walk on his own.       On physical examination, this is a 78-year-old gentleman who is 6' tall and weighs 283 pounds.  BMI is 38.4.  Eyes are equal and reactive to light.  The patient has a normal affect; humor intact.  Lungs are clear to auscultation bilaterally with nonlabored breathing on room air.  Cardiac examination shows an irregularly irregular rhythm with normal S1 and S2; there is a 4/6 holosystolic murmur.  Bilateral lower extremities show 1-2+ pitting edema; no rashes, no varicose veins noted.  Gross motor sensory is intact.       The patient's most recent labs show a creatinine of 1.76, BNP 1500, white blood cell count 10.8, hemoglobin 13.6, and platelets 205,000.     I had a lengthy conversation with Mr. Sheridan, his wife, and daughter today regarding his current cardiac condition of symptomatic severe mitral regurgitation, moderate tricuspid regurgitation, atrial fibrillation, coronary artery disease, and the incidental finding of a PFO.  I discussed with the patient his current treatment options which include continued medical management, versus possible percutaneous intervention in regard to the potential for TMVR versus MitraClip to address the mitral valve as well potential PCI to the LAD, versus surgical intervention which would include a mitral valve repair or replacement, a tricuspid valve repair or replacement, a Maze that likely would be just a left atrial box within the setting of performing a longer pump run, a left atrial appendage excision, a PFO closure. We have decidided not to proceed with bypass due to the diminutive  distal vessel. I discussed with the patient if we were to proceed with valve replacement that the valve type options would include a mechanical or bioprosthetic valve but that at his age most would recommend against a mechanical valve due to the need to be on lifelong blood thinners which the patient agrees with.  He understands that the shelf life of a bioprosthetic valve is about 8-12 years.       Edil Salgado

## 2024-04-04 NOTE — ANESTHESIA PROCEDURE NOTES
Arterial Line    Performed by: Maldonado Patel MD  Authorized by: Maldonado Patel MD    General Information and Staff    Procedure Start:   Anesthesiologist:  Maldonado Patel MD  Performed By:  Anesthesiologist  Patient Location:  OR  Indication: continuous blood pressure monitoring and blood sampling needed    Site Identification: real time ultrasound guided    Preanesthetic Checklist: 2 patient identifiers, IV checked, risks and benefits discussed, monitors and equipment checked, pre-op evaluation, timeout performed, anesthesia consent and sterile technique used    Procedure Details    Catheter Size:  20 G  Catheter Length:  5 inch  Catheter Type:  Arrow  Seldinger Technique?: Yes    Laterality:  Right  Site:  Radial artery  Site Prep: chlorhexidine    Line Secured:  Tape and Tegaderm    Assessment    Events: patient tolerated procedure well with no complications      Medications      Additional Comments    Failed willy on left/ right sided willy placed.

## 2024-04-05 ENCOUNTER — APPOINTMENT (OUTPATIENT)
Dept: GENERAL RADIOLOGY | Facility: HOSPITAL | Age: 79
End: 2024-04-05
Attending: PHYSICIAN ASSISTANT
Payer: MEDICARE

## 2024-04-05 LAB
ATRIAL RATE: 68 BPM
ATRIAL RATE: 71 BPM
BASOPHILS # BLD AUTO: 0.01 X10(3) UL (ref 0–0.2)
BASOPHILS NFR BLD AUTO: 0.1 %
BLOOD TYPE BARCODE: 6200
BLOOD TYPE BARCODE: 9500
BUN BLD-MCNC: 39 MG/DL (ref 9–23)
CALCIUM BLD-MCNC: 9.3 MG/DL (ref 8.5–10.1)
CHLORIDE SERPL-SCNC: 116 MMOL/L (ref 98–112)
CO2 SERPL-SCNC: 27 MMOL/L (ref 21–32)
CREAT BLD-MCNC: 2.42 MG/DL
EGFRCR SERPLBLD CKD-EPI 2021: 27 ML/MIN/1.73M2 (ref 60–?)
EOSINOPHIL # BLD AUTO: 0 X10(3) UL (ref 0–0.7)
EOSINOPHIL NFR BLD AUTO: 0 %
ERYTHROCYTE [DISTWIDTH] IN BLOOD BY AUTOMATED COUNT: 16.6 %
GLUCOSE BLD-MCNC: 102 MG/DL (ref 70–99)
GLUCOSE BLD-MCNC: 115 MG/DL (ref 70–99)
GLUCOSE BLD-MCNC: 116 MG/DL (ref 70–99)
GLUCOSE BLD-MCNC: 124 MG/DL (ref 70–99)
GLUCOSE BLD-MCNC: 126 MG/DL (ref 70–99)
GLUCOSE BLD-MCNC: 132 MG/DL (ref 70–99)
GLUCOSE BLD-MCNC: 141 MG/DL (ref 70–99)
GLUCOSE BLD-MCNC: 149 MG/DL (ref 70–99)
GLUCOSE BLD-MCNC: 193 MG/DL (ref 70–99)
GLUCOSE BLD-MCNC: 203 MG/DL (ref 70–99)
GLUCOSE BLD-MCNC: 94 MG/DL (ref 70–99)
GLUCOSE BLD-MCNC: 95 MG/DL (ref 70–99)
GLUCOSE BLD-MCNC: 98 MG/DL (ref 70–99)
HCT VFR BLD AUTO: 27.8 %
HGB BLD-MCNC: 9 G/DL
IMM GRANULOCYTES # BLD AUTO: 0.08 X10(3) UL (ref 0–1)
IMM GRANULOCYTES NFR BLD: 0.5 %
LYMPHOCYTES # BLD AUTO: 0.44 X10(3) UL (ref 1–4)
LYMPHOCYTES NFR BLD AUTO: 2.7 %
MAGNESIUM SERPL-MCNC: 2.4 MG/DL (ref 1.6–2.6)
MCH RBC QN AUTO: 27 PG (ref 26–34)
MCHC RBC AUTO-ENTMCNC: 32.4 G/DL (ref 31–37)
MCV RBC AUTO: 83.5 FL
MONOCYTES # BLD AUTO: 1.08 X10(3) UL (ref 0.1–1)
MONOCYTES NFR BLD AUTO: 6.6 %
NEUTROPHILS # BLD AUTO: 14.83 X10 (3) UL (ref 1.5–7.7)
NEUTROPHILS # BLD AUTO: 14.83 X10(3) UL (ref 1.5–7.7)
NEUTROPHILS NFR BLD AUTO: 90.1 %
P AXIS: 76 DEGREES
P-R INTERVAL: 232 MS
P-R INTERVAL: 288 MS
PLATELET # BLD AUTO: 147 10(3)UL (ref 150–450)
POTASSIUM SERPL-SCNC: 3.9 MMOL/L (ref 3.5–5.1)
Q-T INTERVAL: 454 MS
Q-T INTERVAL: 494 MS
QRS DURATION: 90 MS
QRS DURATION: 92 MS
QTC CALCULATION (BEZET): 493 MS
QTC CALCULATION (BEZET): 525 MS
R AXIS: 27 DEGREES
R AXIS: 32 DEGREES
RBC # BLD AUTO: 3.33 X10(6)UL
SODIUM SERPL-SCNC: 148 MMOL/L (ref 136–145)
T AXIS: 11 DEGREES
T AXIS: 7 DEGREES
UNIT VOLUME: 205 ML
UNIT VOLUME: 260 ML
UNIT VOLUME: 75 ML
VENTRICULAR RATE: 68 BPM
VENTRICULAR RATE: 71 BPM
WBC # BLD AUTO: 16.4 X10(3) UL (ref 4–11)

## 2024-04-05 PROCEDURE — 71045 X-RAY EXAM CHEST 1 VIEW: CPT | Performed by: PHYSICIAN ASSISTANT

## 2024-04-05 RX ORDER — IPRATROPIUM BROMIDE AND ALBUTEROL SULFATE 2.5; .5 MG/3ML; MG/3ML
3 SOLUTION RESPIRATORY (INHALATION) EVERY 4 HOURS PRN
Status: DISCONTINUED | OUTPATIENT
Start: 2024-04-05 | End: 2024-04-12

## 2024-04-05 RX ORDER — HYDROCODONE BITARTRATE AND ACETAMINOPHEN 5; 325 MG/1; MG/1
2 TABLET ORAL EVERY 4 HOURS PRN
Status: DISCONTINUED | OUTPATIENT
Start: 2024-04-05 | End: 2024-04-12

## 2024-04-05 RX ORDER — AMIODARONE HYDROCHLORIDE 200 MG/1
400 TABLET ORAL 3 TIMES DAILY
Status: DISCONTINUED | OUTPATIENT
Start: 2024-04-05 | End: 2024-04-05

## 2024-04-05 RX ORDER — HYDROCODONE BITARTRATE AND ACETAMINOPHEN 5; 325 MG/1; MG/1
1 TABLET ORAL EVERY 4 HOURS PRN
Status: DISCONTINUED | OUTPATIENT
Start: 2024-04-05 | End: 2024-04-12

## 2024-04-05 RX ORDER — SODIUM CHLORIDE 9 MG/ML
INJECTION, SOLUTION INTRAVENOUS ONCE
Status: DISCONTINUED | OUTPATIENT
Start: 2024-04-05 | End: 2024-04-12

## 2024-04-05 RX ORDER — AMIODARONE HYDROCHLORIDE 200 MG/1
400 TABLET ORAL 3 TIMES DAILY
Status: DISPENSED | OUTPATIENT
Start: 2024-04-05 | End: 2024-04-08

## 2024-04-05 RX ORDER — AMIODARONE HYDROCHLORIDE 200 MG/1
400 TABLET ORAL DAILY
Status: DISCONTINUED | OUTPATIENT
Start: 2024-04-08 | End: 2024-04-07

## 2024-04-05 NOTE — CDS QUERY
CLINICAL DOCUMENTATION CLARIFICATION FORM  Dear WEI Mata;   CEDRICK documented by anesthesia:  (  )   Ruled In  (  )   Ruled Out  (  )   Other (please specify)                                                   4/5 4/4  CREATININE (mg/dL)  2.42H 2.75H   4/4-4/5 - Urine output:  0.3  H&P: mitral regurgitation, moderate tricuspid regurgitation, atrial fibrillation, coronary artery disease, and the incidental finding of a PFO. Bilateral lower extremities show 1-2+ pitting edema  -The patient does have an underlying history of kidney disease with baseline creatinine most recently of 1.76.    4/4 anesthesia evaluation: Hold eplerenone 50mg given CEDRICK; resume tomorrow if improved renal fx;   - Hold losartan 100mg given CEDRICK; resume tomorrow if improved renal fx.; bmp daily   4/5 cardiothoracic: s/p mv repair; paf; leukocytosis acute post op blood loss anemia, expected - likely 2/2 consumption; ckd    sodium chloride 0.9% infusion Rate: 10 mL/hr Freq: Continuous Route: IV     *The current consensus-based authoritative criteria for acute kidney injury (CEDRICK) comes from the National Kidney Foundation KDIGO conference definition.  KDIGO defines CEDRICK (applicable to both adult or pediatric patients) as any of the following:                   Increase in creatinine level to ? 1.5x baseline (historical or measured), which is known or presumed to have occurred within the prior 7 days; or                Increase (not a decrease) in creatinine of ? 0.3 mg/dL comparing two separate levels, the second done within 48 hours or less of the first; or                Urine output < 0.5 ml/kg/hr for 6 hours     The KDIGO criteria apply to patients with and without CKD.    If you have any questions, please contact Clinical : Sera Thurman RN, CDS  at cell: [phone]. Thank You!  THIS FORM IS A PERMANENT PART OF THE MEDICAL RECORD

## 2024-04-05 NOTE — PLAN OF CARE
Pt received from [facility] at 1330. Pt recovered per routine post open heart surgery orders. Potassium 2.8 replaced with 40meq IV and lab redrawn post replacement, came back 2.9. Pt given an additional 40meq. Nitric oxide at 40 PPM weaned at 1730 per orders by Dr Patel.  Pt awakened enough to squeeze hands and wriggle toes on command. Pt continues on precedex for sedation. Wife and 2 daughters in to see pt, updated with pt's condition and plan of care.

## 2024-04-05 NOTE — PROGRESS NOTES
[facility]   part of [facility]     CV Surgery Progress Note    Wagner Sheridan Patient Status:  Inpatient    1945 MRN [id number]   Location [facility] 6NE-A Attending Jason Hansen MD   Hosp Day # 1 PCP Edil Roy MD     Subjective:  Patient reports feeling good. Denies any pain or nausea.     Tele: SR, PAF    Objective:  BP 90/54   Pulse 66   Temp 97.5 °F (36.4 °C) (Pulmonary Artery)   Resp 16   Ht 6' (1.829 m)   Wt 289 lb 11 oz (131.4 kg)   SpO2 96%   BMI 39.29 kg/m²     Intake/Output:    Intake/Output Summary (Last 24 hours) at 2024 0948  Last data filed at 2024 0900  Gross per 24 hour   Intake 4152.1 ml   Output 2525 ml   Net 1627.1 ml       Labs:  Lab Results   Component Value Date    WBC 16.4 2024    RBC 3.33 2024    HGB 9.0 2024    HCT 27.8 2024    MCV 83.5 2024    MCH 27.0 2024    MCHC 32.4 2024    RDW 16.6 2024    .0 2024     Lab Results   Component Value Date     2024    K 3.9 2024     2024    CO2 27.0 2024    BUN 39 2024    CREATSERUM 2.42 2024    GLU 98 2024    CA 9.3 2024     Lab Results   Component Value Date    PT 13.6 2013    INR 1.41 (H) 2024    INR 1.15 2024    INR 1.34 (H) 2024       Studies:  CXR 24:  Mild pulm edema      Physical Exam:  General: VSS, A&Ox3, In NAD  Neck: No JVD. Gray/Cordis in RIJ  Lungs: clear anteriorly   Heart: S1,S2 RRR ; Sternum Stable   Abdomen: Soft, non-tender, +bs  Extremities: Warm, dry, +generalized edema  Skin: sternotomy incision C/D/I  Neuro: no focal deficits     Assessment/Plan:   S/P MV repair with a 36mm ring, TV repair with a 28mm ring, BECCA amputaiton, PFO closure, and MAZE procedure POD#1  -HD compensated on epi, wean keeping SBP >100- hold bb for now   -PAF, currently SR- UGI bleed while on DOAC, cardiology following   -Leukocytosis, likely reactive afebrile- monitor    -Acute post op blood loss anemia, expected- monitor   -TCP, likely 2/2 consumption- monitor   -Volume OL, likely eventual diuresis   -CKD, Cr stable, good UOP- monior   -Bowel regimen   -Pain management, dilaudid pca  -Keep chest tubes for now  -[place] for accurate I/Os  -Keep PW for now   -GI PPX: protonix  -DVT PPX: TEDs/SCDs  -Encourage IS/ambulation   -PT/OT/Cardiac rehab   -CCU monitoring     -D/W Dr. Hansen/Naomi Ahmadi PA-C  Cardiothoracic Surgery   4/5/2024  9:48 AM

## 2024-04-05 NOTE — PROGRESS NOTES
04/04/24 2146   Vent Information   $ RT Standby Charge (per 15 min) 1   Vent Initiation Date 04/04/24   Vent Initiation Time 1330   Ventilation Day(s) 1   Interface Invasive   Vent Type    Vent plugged into main power? Yes   Vent -4   Vent Mode VC+   Settings   FiO2 (%) 40 %   Resp Rate (Set) 18   Vt (Set, mL) 500 mL   Waveform Decelerating ramp   PEEP/CPAP (cm H2O) 5 cm H20   Insp Time (sec) 0.9 sec   Insp Rise Time (%) 70 %   Humidification Heater   H2O Bag Level 3/4 Full   Heater Temperature 98.4 °F (36.9 °C)   Readings   Total RR 20   Minute Ventilation (L/min) 10.6 L/min   Inspiratory Tidal Volume 510 mL   Expiratory Tidal Volume 520 mL   PIP Observed (cm H2O) 18 cm H2O   MAP (cm H2O) 8.8   I/E Ratio 1:2.3   Plateau Pressure (cm H2O) 16 cm H2O   Alarms   High RR 40   Insp Pressure High (cm H2O) 40 cm H2O   Insp Pressure Low (cm H2O) 9 cm H2O   MV High (L/min) 20 L/min   MV Low (L/min) 3 L/min   Apnea Interval (sec) 20 seconds   Apnea Rate 18   Apnea Volume (mL) 500 mL   ETT   Placement Date/Time: 04/04/24 (c) 8937   Airway Size: 8 mm  Cuffed: Cuffed  Insertion attempts: 1  Technique: Direct laryngoscopy  Placement Verification: Capnometry  Placed By: Anesthesiologist   Secured at (cm) 27 cm   Cuff Pressure (cm H2O) 26 cm H2O   Suctioned? N   Measured From Lips   Secured Location Left   Secured by Commercial tube white   Site Condition Dry   Req'd equipment at bedside Bag mask     Pt on above settings will continue moitor

## 2024-04-05 NOTE — PLAN OF CARE
Upon care transfer circa 0730, pt resting in bed. Pt a/o x4 and interacting with family members. Pt c/o of mild to no pain. Epi gtt; unable to wean; blood pressures remain soft (SBP: 90-110s). Insulin gtt off. Started subcutaneous insulin and ACHS protocol. Franklin removed. VSS. Advanced to normal cardiac diet. Flutter valve given for wet cough. Pt dangled off side of bed x2. Activity tolerated poorly. Pt and family updated on plan of care. Per pt and family, no further questions. Plan of care continues.

## 2024-04-05 NOTE — CONSULTS
[facility] Hospitalist H&P       CC: No chief complaint on file.       PCP: Edil Roy MD    History of Present Illness:      Patient is a 78-year-old male significant past medical history CKD stage III, severe MR, paroxysmal A-fib, BPH, hypertension, hyperaldosteronism, hyperlipidemia, pulmonary hypertension, moderate tricuspid regurgitation a is status post mitral valve repair, tricuspid valve repair, maze procedure and left atrial appendage amputation and PFO closure    Postop patient is intubated currently on dobutamine epi and nitric oxide      PMH  Past Medical History:   Diagnosis Date    Arrhythmia     BPH     Encounter for incision and drainage procedure 07/02/2021    High blood pressure     High cholesterol     Hyperaldosteronism (HCC)     Mitral regurgitation     Osteoarthrosis, unspecified whether generalized or localized, unspecified site     PMR (polymyalgia rheumatica) (HCC) 2012    off corticosteroids since 2013    Retention of urine     Shortness of breath         PSH  Past Surgical History:   Procedure Laterality Date    COLONOSCOPY N/A 2/27/2024    Procedure: COLONOSCOPY;  Surgeon: Heath Mina DO;  Location:  ENDOSCOPY    KNEE SURGERY      OTHER SURGICAL HISTORY  2/27/09    [facility], Dr. Tavera    OTHER SURGICAL HISTORY  9/4/09    [facility] Dr. Tavera    OTHER SURGICAL HISTORY  1/4/11    [facility] - Dr. Tavera    OTHER SURGICAL HISTORY  1/17/13    [facility] - Dr. Tavera    OTHER SURGICAL HISTORY  2/3/16     [facility]     OTHER SURGICAL HISTORY  08/05/2020    cysto- Dr. Tavera    SKIN SURGERY  10/01/2019    MMS/R nasal ala /BCC done by [facility]    TONSILLECTOMY      TOTAL KNEE REPLACEMENT Right 02/25/2015    Right total knee replacement 02/25/2015    TOTAL KNEE REPLACEMENT Left 05/11/2015     Left knee total replacement 05/11/2015         ALL:  Allergies   Allergen Reactions    Ciprofloxacin HIVES    Clindamycin HIVES    Penicillins HIVES     Tolerated Ancef 4/4/24    Wasp Venom RESPIRATORY FAILURE    Wasp  Venom Protein RESPIRATORY FAILURE    Benazepril Coughing    Chlorhexidine RASH and OTHER (SEE COMMENTS)     redness        Home Medications:  Outpatient Medications Marked as Taking for the 24 encounter (Hospital Encounter)   Medication Sig Dispense Refill    Potassium Chloride ER 20 MEQ Oral Tab CR Take 40 mcg by mouth daily.      metOLazone 2.5 MG Oral Tab Take 1 tablet (2.5 mg total) by mouth See Admin Instructions. Take once a week PRN for swelling      [] ferrous sulfate 325 (65 FE) MG Oral Tab EC Take 1 tablet (325 mg total) by mouth daily with breakfast. 30 tablet 0    pantoprazole 40 MG Oral Tab EC Take 1 tablet (40 mg total) by mouth 2 (two) times daily before meals for 56 days, THEN 1 tablet (40 mg total) every morning before breakfast. 142 tablet 0    furosemide 20 MG Oral Tab Take 2 tablets (40 mg total) by mouth daily.      atorvastatin 20 MG Oral Tab Take 1 tablet (20 mg total) by mouth nightly.      metoprolol succinate ER 50 MG Oral Tablet 24 Hr Take 1 tablet (50 mg total) by mouth in the morning and 1 tablet (50 mg total) before bedtime.      [DISCONTINUED] eplerenone (INSPRA) 50 MG Oral Tab Take 1 tablet (50 mg total) by mouth 2 (two) times daily. One tablet in the morning one in the evening (Patient taking differently: Take 1 tablet (50 mg total) by mouth 2 (two) times daily.) 60 tablet 0    amLODIPine 5 MG Oral Tab Take 1 tablet (5 mg total) by mouth in the morning and 1 tablet (5 mg total) before bedtime. 60 tablet 0    [DISCONTINUED] apixaban 5 MG Oral Tab Take 1 tablet (5 mg total) by mouth 2 (two) times daily. 60 tablet 0    [DISCONTINUED] furosemide 40 MG Oral Tab Take 1 tablet (40 mg total) by mouth daily. (Patient taking differently: Take 1 tablet (40 mg total) by mouth every morning.) 30 tablet 0    finasteride 5 MG Oral Tab Take 1 tablet (5 mg total) by mouth at bedtime.      [DISCONTINUED] losartan 100 MG Oral Tab Take 1 tablet (100 mg total) by mouth daily. 30 tablet 11     EPINEPHrine (EPIPEN 2-JAZ) 0.3 MG/0.3ML Injection Solution Auto-injector USE AS DIRECTED 2 each 5         Soc Hx  Social History     Tobacco Use    Smoking status: Former     Packs/day: 0.50     Years: 5.00     Additional pack years: 0.00     Total pack years: 2.50     Types: Cigarettes    Smokeless tobacco: Never    Tobacco comments:     in college   Substance Use Topics    Alcohol use: Yes     Alcohol/week: 0.0 standard drinks of alcohol     Comment: rare        Fam Hx  Family History   Problem Relation Age of Onset    Hypertension Father     Heart Disorder Father     Hypertension Mother     Diabetes Sister        Review of Systems  General: Denies unintentional weight loss, fevers, or chills-negative except for above  HEENT: Denies vision loss or double vision, denies hearing loss  Cardiovascular: Denies chest pain, palpitations, peripheral edema  Pulmonary: Denies cough, shortness of breath, or wheezing  Gastrointestinal: Denies abdominal pain, melena, or hematochezia  Genitourinary: Denies urinary frequency, urgency, and dysuria  Neurologic: Denies numbness, headaches, focal weakness  Skin: Denies rashes, sores  Endocrine: Denies heat or cold intolerance, denies polydipsia  Hematologic: Denies abnormal bleeding or bruising     OBJECTIVE:  /56 (BP Location: Right arm)   Pulse 79   Temp 97.1 °F (36.2 °C) (Pulmonary Artery)   Resp 18   Ht 6' (1.829 m)   Wt 275 lb (124.7 kg)   SpO2 100%   BMI 37.30 kg/m²     BP Readings from Last 3 Encounters:   04/04/24 102/56   02/28/24 126/70   02/13/24 127/74     Wt Readings from Last 3 Encounters:   02/20/24 275 lb (124.7 kg)   02/28/24 281 lb 15.5 oz (127.9 kg)   02/07/24 270 lb (122.5 kg)       Wt Readings from Last 6 Encounters:   02/20/24 275 lb (124.7 kg)   02/28/24 281 lb 15.5 oz (127.9 kg)   02/07/24 270 lb (122.5 kg)   01/29/24 270 lb (122.5 kg)   01/09/24 287 lb 11.2 oz (130.5 kg)   10/06/22 280 lb (127 kg)     Gen: No acute distress, alert and oriented x  0 unable to assess as patient is intubated and sedated  Pulm: Lungs clear bilaterally, good inspiratory effort   CV:  nL S1/S2 chest tube in place  Abd: soft, NT/ND, no hepatomegaly, +BS  MSK: moving all extremities, no edema  Neuro: no focal deficits  Skin: no rashes/lesions            Diagnostic Data:    CBC/Chem  Recent Labs   Lab 04/01/24  1002 04/04/24  1341   WBC 5.0 11.8*   HGB 10.8* 9.7*   MCV 89.1 86.0   .0 158.0   INR 1.15 1.41*       Recent Labs   Lab 04/01/24  1002 04/04/24  1341 04/04/24  1742    150*  --    K 3.9 2.8* 2.9*    114*  --    CO2 29.0 25.0  --    BUN 33* 34*  --    CREATSERUM 2.32* 2.75*  --    * 137*  --    CA 10.2* 9.2  --    MG  --  2.7*  --        Recent Labs   Lab 04/01/24  1002   ALT 18   AST 15   ALB 3.5       No results for input(s): \"TROP\" in the last 168 hours.      Radiology: XR CHEST AP PORTABLE  (CPT=71045)    Result Date: 4/4/2024  PROCEDURE:  XR CHEST AP PORTABLE  (CPT=71045)  TECHNIQUE:  AP chest radiograph was obtained.  COMPARISON:  [facility] , XR, XR CHEST AP PORTABLE  (CPT=71045), 2/23/2024, 9:05 AM.  INDICATIONS:  s/p surgery  PATIENT STATED HISTORY: (As transcribed by Technologist)  Patient offered no additional history at this time.    FINDINGS:  Interval cardiac surgery with sternal sutures.  Endotracheal tube tip is projected at the thoracic inlet 8 cm above the yoel.  Enteric tube tip is projected over the stomach.  Terrell-Tunde catheter tip is projected over the pulmonary outflow tract.  Bilateral chest tubes in place.  No pneumothorax.  Magnified heart.  Pulmonary vascular congestion and perihilar edema.  Retrocardiac opacity consistent with atelectasis.            CONCLUSION:  1. Interval cardiac surgery with support tubes and catheters in place. 2. Vascular congestion and perihilar edema. 3. Left basilar atelectasis.   LOCATION:  [facility]      Dictated by (CST): Wagner Westbrook MD on 4/04/2024 at 3:21 PM     Finalized by (CST): Wagner Westbrook,  MD on 4/04/2024 at 3:22 PM              ASSESSMENT / PLAN:       78-year-old male significant past medical history CKD stage III, severe MR, paroxysmal A-fib, BPH, hypertension, hyperaldosteronism, hyperlipidemia, pulmonary hypertension, moderate tricuspid regurgitation a is status post mitral valve repair, tricuspid valve repair, maze procedure and left atrial appendage amputation and PFO closur    # Severe MR s/pmitral valve repair, tricuspid valve repair, maze procedure and left atrial appendage amputation and PFO closur  -POD 0, intubated, chest tube in place  -post op care per CV surgery  -NO, wean off as tolerated  -[place], chest tube, pacer wires management per CV surgery   -cards also consulted     #afib  -NSR, first degree AV block  -monitor   0currently on dobutamine , wean as tolerated   -post op echo per cards and CV surgery    #CKD 3  -check creat I am     #HTN -anagement per cards  #BPH  -resume finasteride     #    Dispo: admit to ccu    Outpatient records reviewed confirming patient's medical history and medications.     Further recommendations pending patient's clinical course.  DM hospitalist to continue to follow patient while in house    Time spent: greater than 75 minutes spent in d/w pt/family, coordination of care, and d/w staff.   Terrell romero MD   Internal Medicine  [facility] Hospitalist  Pager: [phone]

## 2024-04-05 NOTE — OPERATIVE REPORT
OPERATIVE NOTE  DATE OF PROCEDURE: 4/4/2024    PREOPERATIVE DIAGNOSIS:  Severe, symptomatic mitral regurgitation  Moderate tricuspid regurgitation with severe annular dilation  Atrial fibrillation  PFO  Chronic kidney disease  Hypertension  BPH  Hyperlipidemia  Hx of GI bleeds  Polymyalgia rehumatica    POSTOPERATIVE DIAGNOSIS:  Same    OPERATION PERFORMED:  Complex mitral valve repair with size 36mm luz ring ring (partial P3 resection with posteromedial commisure advancement, P2-P3 cleft closure)  Intraoperative transesophageal echocardiography  Tricuspid valve repair with size 28mm MC3 ring  Modified MAZE (Encompass left atrial box, SVC/IVC ablation, Ligament of Shin)  Left atrial appendage excision  Ultrasound guided right common femoral arterial line placement  PFO closure    Sternal plating    SURGEON: Edil Salgado MD  ASSISTANT: Jason Hansen MD  PA: Sylvain Henderson PA-C; Rian MARIE    The PA helped with exposure, following sutures and closing the chest.    ANESTHESIA: General endotracheal anesthesia  ESTIMATED BLOOD LOSS: On cardiopulmonary bypass and cell saver  IV FLUIDS: see anesthesia record    DRAINS:  36 German anterior mediastinal and left pleural  36 German anterior mediastinal and right pleural    SPECIMEN: None  CARDIOPULMONARY BYPASS: 164 minutes  CROSS-CLAMP: 104 minutes    FINDINGS:  Preoperative STAN: The mitral valve demonstrated severe MR with P3 prolapse. Moderate central TR with annular dilation of 4.6cm. Ventricular function was normal at 60%. Moderate sized PFO.  Intraoperatively the mitral valve had P3 torn cord  Postoperative STAN: The mitral had at most trivial regurgitation with mean gradient of 2mmHg and at most trivial regurgitation with swan in place and unchanged LV function.    BRIEF HISTORY: Wagner Sheridan is a 78 year old male with a history of CKD and symptomatic severe mitral regurgitation, PFO, Afib and moderate tricuspid regurgtiation. The patient was counseled on  and understand the potential risks, alternatives, and benefits associated with repair and elected to proceed with mitral valve repair. Patient opted for bioprosthetic valve if a replacement was required    PROCEDURE: A timeout procedure was performed confirming the correct patient, surgical site, and procedure. The patient underwent uneventful general endotracheal anesthesia and invasive hemodynamic monitoring lines were placed. The neck, chest, abdomen, groins, and legs were prepped and draped in the usual sterile fashion and appropriate perioperative antibiotics were administered.     A sternal retractor was inserted and a pericardial well was created. Two pledgeted 3-0 ethibond suture were placed in the distal ascending aorta in a pursestring fashion. A full 300 units per kilogram of heparin was administered. Patient was cannulated for cardiopulmonary bypass using the high ascending aorta via Seldinger technique and venous drainage was accomplished via bi-caval cannulation in the SVC and IVC.. A retrograde cardioplegic catheter was placed in the coronary sinus. An antegrade cardioplegia catheter was placed in the proximal ascending aorta to provide antegrade cardioplegia and root ventilation. When a satisfactory activating clotting time greater than 400 was confirmed, cardiopulmonary bypass was initiated, and the patient was cooled to 30 degrees. The interatrial groove was rolled to assist with exposure.    The ligament of Shin was taken down and noted to have a small patent left SVC which was ligated with two clips on the stay side and on the heart side. The encompass clamp guide was then placed in the oblique and transverse sinuses then used to pass the clamp. Three sets of two ablations were performed.     The aortic cross-clamp was applied with the heart being actively fibrillated and cardioplegic arrest was initiated with antegrade  and retrograde cold blood Buckberg cardioplegia while ice was placed on  the heart, yielding satisfactory diastolic arrest. The cardioplegia was regularly re-dosed throughout the procedure.     APPENDAGE   The heart was rotated and the left atrial appendage was exposed. It was excised leaving a rim of tissue for closure. It was closed with 4-0 prolene in running fashion in two layers.      MITRAL REPAIR  The SVC and IVC were occluded because of the known PFO. The left atrium was entered. The retractor was placed and an LV vent was placed. There were torn cords at P3 with redundancy. To prepare for the annuplasty ring, #2-0 Ethibond sutures were placed around the annulus. P3 was partiall resected and then a a posteromedial commisure advancement was performed using interruppted 4-0 ethibond. The valve was tested and there was a small leak noted from a cleft between P2 and P3. This was closed with interrupted 4-0 ethibond. The valve was sized with a true size 36 mm Vonda annuloplasty partial ring. The annular sutures were then brought through the annuloplasty ring. The annuloplasty ring was lowered into place and secured with a Cor-Knot. The valve was once again tested and found to be patent.  The left arteriotomy was closed with running #3-0 Prolene suture in single layer fashion.    RIGHT ABLATION AND PFO  The right atrium was then entered incorporating the retrograde cannulation site which was removed. The PFO was identified with a non fused flap. It was moderate in size. It was closed using running two layer of 4-0 prolene. We  then performed a partial right atrial cryoablation including both the SVC and the IVC using the encompass clamp.     TRICUSPID REPAIR   Based on the STAN findings, we elected to proceed with tricuspid repair.  2-0 Ethibond sutures were placed with the exception of the anticipated location of the AV node.  The sutures were brought through a size 28mm MC3 ring. At this point a hot shot was then given, the patient rewarmed and heart filled for de-airing. The clamp  was removed.  The ring was then anchored using the Corknot.  The right atrium was closed with running  4-0 prolene in double layer fashion.    Ventricular pacing wires were then placed along the diaphragmatic surface of the heart and tunneled through and secured to the skin. A 36 Tajik straight chest tube was placed in the anterior mediastinum and right pleural space while a 36 Tajik right angle chest tube was placed in the anterior mediastinum and left pleural space with each being tunneled through and secured to the skin. Transesophageal echocardiography was then used to confirm valve function with trivial to no mitral regurgitation with a mean gradient of 2mmHg and no significant tricuspid regurgitation. Confirmed adequate de-airing and no change in ventricular function as well.  The antegrade cannula was then removed and its pursestring was secured. After all operative hemostasis was ensured, the patient was weaned from cardiopulmonary bypass. The venous cannulas were removed and  pursestrings were secured. Protamine was administered and pump suckers were immediately removed prior to protamine reaching the patient. The arterial cannula was removed and its pursestring was secured. The field was then re-inspected for hemostasis, and the pericardial well was taken down.    The chest was closed with stainless steel wires. After ensuring hemostasis, the sternum was then re-approximated, and the sternal wires were tightened, secured, and turned down. Two sternal plates were placed over the manubrium and sternal body respectively. The wound was then irrigated with antibiotic saline solution. The pre-sternal fascia was re-approximated with a running #0 Vicryl suture. The deep dermis was re-approximated with a running #2-0 Vicryl suture. The skin was re-approximated with a #3-0 Vicryl suture in a running, subcuticular fashion. The wound was cleaned and sealed with steri strips and a sterile dressing.    All counts were  correct. The patient was subsequently transferred to the cardiothoracic surgery intensive care unit still intubated and sedated with stable hemodynamics and fluid resuscitation requirements.       Edil Salgado MD

## 2024-04-05 NOTE — PHYSICAL THERAPY NOTE
PHYSICAL THERAPY EVALUATION - INPATIENT     Room Number: 6607/6607-A  Evaluation Date: 4/5/2024  Type of Evaluation: Initial  Physician Order: PT Eval and Treat    Presenting Problem: S/p planned status post mitral valve repair, tricuspid valve repair, maze procedure and left atrial appendage amputation and PFO closure 4/4  Co-Morbidities : Dyslipidemia , Hypertension , CKD  Reason for Therapy: Mobility Dysfunction and Discharge Planning    PHYSICAL THERAPY ASSESSMENT   Patient is currently functioning below baseline with bed mobility, transfers, gait, and stair negotiation.  Prior to admission, patient's baseline is indep with hx of mechanical falls.  Patient is requiring moderate assist as a result of the following impairments: decreased functional strength, decreased endurance/aerobic capacity, impaired static and dynamic sitting balance, decreased muscular endurance, and hypotension SBP initially 86 after transferring EOB compared to 99 in supine with pt reporting mild dizziness, performed gentle LE and UE exercises with BP dropping to 63/46 after ~ 4 minutes, returned to supine with BP increasing to 81/56 then 96/48, RN aware .  Physical Therapy will continue to follow for duration of hospitalization.    Patient will benefit from continued skilled PT Services at discharge to promote functional independence and safety with additional support and return home with home health PT.    PLAN  PT Treatment Plan: Bed mobility;Body mechanics;Coordination;Endurance;Energy conservation;Patient education;Family education;Gait training;Range of motion;Neuromuscular re-educate;Strengthening;Transfer training;Balance training;Stair training  Rehab Potential : Good  Frequency (Obs): 3-5x/week  Number of Visits to Meet Established Goals: 5      CURRENT GOALS    Goal #1 Patient is able to demonstrate supine - sit EOB @ level: supervision     Goal #2 Patient is able to demonstrate transfers EOB to/from Chair/Wheelchair at  assistance level: supervision     Goal #3 Patient is able to ambulate 150 feet with assist device: walker - rolling at assistance level: supervision     Goal #4 Patient will navigate 1 step with rail and SBA   Goal #5    Goal #6    Goal Comments: Goals established on 2024      PHYSICAL THERAPY MEDICAL/SOCIAL HISTORY  History related to current admission: Patient is a 78 year old male admitted on 2024 : S/p planned status post mitral valve repair, tricuspid valve repair, maze procedure and left atrial appendage amputation and PFO closure     Recent Admit  -24: anemia, not seen by therapy  -24: PNA > home      HOME SITUATION  Type of Home: House ([facility])   Home Layout: One level  Stairs to Enter : 1             Lives With: Spouse  Drives: Yes  Patient Owned Equipment: Rolling walker  Patient Regularly Uses: Glasses    Prior Level of [place]: indep, reports hx of 2 falls in the past 6 months - tripped over a rug, retired supervisor for a [employer]     SUBJECTIVE  \" I was having weird dreams\"       OBJECTIVE  Precautions: Sternal;Cardiac;Chest tube  Fall Risk: High fall risk    WEIGHT BEARING RESTRICTION  Weight Bearing Restriction: None                PAIN ASSESSMENT  Ratin          COGNITION  Overall Cognitive Status:  WFL - within functional limits    RANGE OF MOTION AND STRENGTH ASSESSMENT  Upper extremity ROM and strength are within functional limits     Lower extremity ROM is within functional limits     Lower extremity strength is within functional limits       BALANCE  Static Sitting: Poor +  Dynamic Sitting: Poor +  Static Standing: Not tested  Dynamic Standing: Not tested    ADDITIONAL TESTS                                    ACTIVITY TOLERANCE           BP: (!) 63/46  BP Location: Right arm  BP Method: Automatic  Patient Position: Sitting    O2 WALK       NEUROLOGICAL FINDINGS                        AM-PAC '6-Clicks' INPATIENT SHORT FORM - BASIC MOBILITY  How  much difficulty does the patient currently have...  Patient Difficulty: Turning over in bed (including adjusting bedclothes, sheets and blankets)?: A Lot   Patient Difficulty: Sitting down on and standing up from a chair with arms (e.g., wheelchair, bedside commode, etc.): Unable   Patient Difficulty: Moving from lying on back to sitting on the side of the bed?: A Lot   How much help from another person does the patient currently need...   Help from Another: Moving to and from a bed to a chair (including a wheelchair)?: Total   Help from Another: Need to walk in hospital room?: Total   Help from Another: Climbing 3-5 steps with a railing?: Total       AM-PAC Score:  Raw Score: 8   Approx Degree of Impairment: 86.62%   Standardized Score (AM-PAC Scale): 28.58   CMS Modifier (G-Code): CM    FUNCTIONAL ABILITY STATUS  Gait Assessment   Functional Mobility/Gait Assessment  Gait Assistance: Not tested  Distance (ft): 0    Skilled Therapy Provided     Bed Mobility:  Rolling: mod A   Supine to sit: mod A x2   Sit to supine: max A x2     Therapist's Comments: pt educated on sternal precautions and body mechanics for transfer EOB required mod a x2 and increased time,  tolerated sitting EOB ~ 4 minutes while performing ankle pumps, heel raises, LAQ,  open/close, elbow flexion/extension. Reporting mild dizziness  throughout with eyes remaining open and following commands appropriately. BP re-assessed while sitting EOB x2 as noted above. HR remained in 70s with activity, O2 sats in 90s on 2L.     Exercise/Education Provided:  Bed mobility  Body mechanics  Functional activity tolerated  ROM  Transfer training    Patient End of Session: In bed;With  staff;Call light within reach;Needs met;RN aware of session/findings;All patient questions and concerns addressed;Alarm set;Family present;SCDs in place      Patient Evaluation Complexity Level:  History High - 3 or more personal factors and/or co-morbidities   Examination of  body systems High - addressing a total of 4 or more elements   Clinical Presentation High - Unstable   Clinical Decision Making High - Unstable       PT Session Time: 23 minutes  Gait Training:  minutes  Therapeutic Activity: 10 minutes  Neuromuscular Re-education:  minutes  Therapeutic Exercise:  minutes

## 2024-04-05 NOTE — OCCUPATIONAL THERAPY NOTE
OCCUPATIONAL THERAPY EVALUATION - INPATIENT     Room Number: 6607/6607-A  Evaluation Date: 4/5/2024  Type of Evaluation: Initial  Presenting Problem: 4/4 S/p planned status post mitral valve repair, tricuspid valve repair, maze procedure and left atrial appendage amputation and PFO closure    Physician Order: IP Consult to Occupational Therapy  Reason for Therapy: ADL/IADL Dysfunction and Discharge Planning    OCCUPATIONAL THERAPY ASSESSMENT   Patient is currently functioning below baseline with toileting, upper body dressing, lower body dressing, bed mobility, transfers, and energy conservation strategies. Prior to admission, patient's baseline is independent with all ADL.  Patient is requiring contact guard assist and moderate assist as a result of the following impairments: decreased endurance. Occupational Therapy will continue to follow for duration of hospitalization.     *BP monitored during the session. Supine SBP 99,  sitting 86/49, after about 4 minutes, BP 63/46, returned to supine with BP increasing to 81/56 then 96/48, RN in the room.     Patient will benefit from continued skilled OT Services at discharge to promote functional independence and safety with additional support and return home with home health OT      History Related to Current Admission: Patient is a 78 year old male admitted on 4/4/2024 with Presenting Problem: 4/4 S/p planned status post mitral valve repair, tricuspid valve repair, maze procedure and left atrial appendage amputation and PFO closure. Co-Morbidities : A-fib, HTN, R TKR 02/2015, gout. Admitted for the above noted procedure.  POD1 in the morning, stroke navigator was called, since pt was less responsive with fixed stare. Resolved after family arrived.      Recent admissions:  2/23 to 2/28/24 for anemia-> home  1/9 to 1/11/24 for COVID and A-fib-> home    WEIGHT BEARING RESTRICTION  Weight Bearing Restriction: None                  EVALUATION SESSION:  Patient Start of  Session: supine  FUNCTIONAL TRANSFER ASSESSMENT     BED MOBILITY  Supine to Sit : Dependent (mod A x 2)  Sit to Supine (OT): Dependent (max A x 2)    O2 SATURATIONS  Oxygen Therapy  SPO2% on Oxygen at Rest: 95  At rest oxygen flow (liters per minute): 2    COGNITION  Overall Cognitive Status:  WFL - within functional limits    Upper Extremity   ROM: within functional limits   Strength: within functional limits   Coordination  Gross motor:   Fine motor: intact  Sensation: Light touch:  intact    EDUCATION PROVIDED  Patient: Role of Occupational Therapy; Plan of Care; Surgical Precautions; Posture/Positioning  Patient's Response to Education: Requires Further Education  Family/Caregiver: Role of Occupational Therapy; Plan of Care  Family/Caregiver's Response to Education: Verbalized Understanding    Equipment used: none  Demonstrates functional use, Would benefit from additional trial         Therapist comments: Cleared for therapy to start with sitting at edge of bed. See above section for BP reading.  Initiated pt education about sternal precautions and energy conservation. Pt conversing appropriately throughout the session. 2-person supine to sit. Cueing to initiate each task.  SBA static sitting balance. Pt completed B hand pump and ankle exercises with cueing. This transfer was completed in preparation for seated ADL.       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 Pt fell once within the past 3 months when he tripped over a big rug.   Used to work as a supervision for a company that makes [employer]. Lives at [facility].    SUBJECTIVE   \"I don't even play music.\" Per family, pt was moving his arms and hips to music earlier.    PAIN ASSESSMENT  Ratin          OBJECTIVE  Precautions: Sternal;Cardiac;Chest tube  Fall Risk: High fall risk      ASSESSMENTS    AM-PAC ‘6-Clicks’ Inpatient Daily Activity Short Form  -   Putting on and taking off regular lower body clothing?: A Lot  -   Bathing (including washing, rinsing, drying)?: A Lot  -   Toileting, which includes using toilet, bedpan or urinal? : A Lot  -   Putting on and taking off regular upper body clothing?: A Little  -   Taking care of personal grooming such as brushing teeth?: A Little  -   Eating meals?: None    AM-PAC Score:  Score: 16  Approx Degree of Impairment: 53.32%  Standardized Score (AM-PAC Scale): 35.96    ADDITIONAL TESTS     NEUROLOGICAL FINDINGS      COGNITION ASSESSMENTS       PLAN  OT Treatment Plan: Balance activities;Energy conservation/work simplification techniques;ADL training;Functional transfer training;UE strengthening/ROM;Patient/Family education;Patient/Family training;Equipment eval/education;Compensatory technique education  Rehab Potential : Good  Frequency: 3-5x/week  Number of Visits to Meet Established Goals: 5    ADL Goals   Patient will perform grooming: with supervision and while standing at sink  Patient will perform upper body dressing:  with supervision  Patient will perform lower body dressing:  with supervision  Patient will perform toileting: with supervision    Functional Transfer Goals  Patient will transfer from sit to supine:  with supervision  Patient will transfer from supine to sit:  with supervision  Patient will transfer to toilet:  with supervision    UE Exercise Program Goal  Patient will be independent with bilateral AROM HEP (home exercise program). POST CV surgery  HEP.    Patient Evaluation Complexity Level:   Occupational Profile/Medical History LOW - Brief history including review of medical or therapy records    Specific performance deficits impacting engagement in ADL/IADL MODERATE  3 - 5 performance deficits   Client Assessment/Performance Deficits MODERATE - Comorbidities and min to mod modifications of tasks    Clinical Decision Making LOW - Analysis of occupational profile, problem-focused assessments, limited treatment options    Overall Complexity LOW     OT Session Time: 21 minutes  Self-Care Home Management:  minutes  Therapeutic Activity: 10 minutes

## 2024-04-05 NOTE — PROGRESS NOTES
[facility] Cardiology Progress Note        Wagner Sheridan Patient Status:  Inpatient    1945 MRN [id number]   [facility] 6NE-A Attending Jason Hansen MD   Hosp Day # 1 PCP Edil Roy MD     Subjective:  The patient denies  chest pain and shortness of breath.  Extubated this am    Medications:   insulin aspart  1-5 Units Subcutaneous TID AC and HS    chlorhexidine gluconate  15 mL Mouth/Throat BID@0800,2000    atorvastatin  20 mg Oral Nightly    finasteride  5 mg Oral Nightly    mupirocin  1 Application Nasal BID    acetaminophen  1,000 mg Intravenous Q6H    pantoprazole  40 mg Intravenous QAM AC    Or    pantoprazole  40 mg Oral QAM AC       Continuous Infusions:   dexmedetomidine Stopped (24 0300)    sodium chloride 10 mL/hr (24 1428)    DOBUTamine Stopped (24 0017)    nitroGLYCERIN in dextrose 5%      norepinephrine      NitroPRUSSide (Nipride) 50 mg in dextrose 5% 250 mL infusion      dextrose 5%-sodium chloride 0.45% 70 mL/hr (24 1420)    sodium chloride      HYDROmorphone in sodium chloride 0.9%      EPINEPHrine (Adrenalin) 5 mg in sodium chloride 0.9% 250 mL infusion 3 mcg/min (24 0551)         Allergies:  Allergies   Allergen Reactions    Ciprofloxacin HIVES    Clindamycin HIVES    Penicillins HIVES     Tolerated Ancef 24    Wasp Venom RESPIRATORY FAILURE    Wasp Venom Protein RESPIRATORY FAILURE    Benazepril Coughing    Chlorhexidine RASH and OTHER (SEE COMMENTS)     redness         Objective:        Intake/Output:      Intake/Output Summary (Last 24 hours) at 2024 1147  Last data filed at 2024 1100  Gross per 24 hour   Intake 3292.1 ml   Output 1565 ml   Net 1727.1 ml     Wt Readings from Last 3 Encounters:   24 289 lb 11 oz (131.4 kg)   24 281 lb 15.5 oz (127.9 kg)   24 270 lb (122.5 kg)       Physical Exam:        Vitals:    24 0800 24 0900 24 1000 24 1100   BP: 90/54 (!) 86/52  91/52 92/53   BP Location:       Pulse: 66 69 72 70   Resp:       Temp: 97.4 °F (36.3 °C) 97.5 °F (36.4 °C)     TempSrc: Pulmonary Artery Pulmonary Artery     SpO2: 96% 96% 97% 97%   Weight:       Height:           Temp:  [95.1 °F (35.1 °C)-97.9 °F (36.6 °C)] 97.5 °F (36.4 °C)  Pulse:  [63-89] 70  Resp:  [16-18] 16  BP: ()/(52-64) 92/53  SpO2:  [95 %-100 %] 97 %  AO: ()/(37-54) 122/38  FiO2 (%):  [40 %-50 %] 40 %      Temp: 97.5 °F (36.4 °C)  Pulse: 70  Resp: 16  BP: 92/53  AO: 122/38  FiO2 (%): 40 %  General:  Appears comfortable  HEENT: No focal deficits.  Neck: No JVD, carotids 2+ no bruits.  Cardiac: Regular S1S2.  No S3, S4, rub, click.  No murmur.  Lungs: Clear to auscultation and percussion.  Abdomen: Soft, non-tender.   Extremities: No LE edema.  No clubbing or cyanosis.    Neurologic: Alert and oriented, normal affect.  Skin: Warm and dry.           LABS:      HEM:  Recent Labs   Lab 04/01/24  1002 04/04/24  1341 04/05/24  0417   WBC 5.0 11.8* 16.4*   HGB 10.8* 9.7* 9.0*   HCT 35.1* 30.7* 27.8*   .0 158.0 147.0*       Chem:  Recent Labs   Lab 04/01/24  1002 04/04/24  1341 04/04/24  1742 04/05/24  0417    150*  --  148*   K 3.9 2.8* 2.9* 3.9    114*  --  116*   CO2 29.0 25.0  --  27.0   BUN 33* 34*  --  39*   CREATSERUM 2.32* 2.75*  --  2.42*   CA 10.2* 9.2  --  9.3   MG  --  2.7*  --  2.4   * 137*  --  98       Recent Labs   Lab 04/01/24  1002   ALT 18   AST 15   ALB 3.5       Recent Labs   Lab 04/01/24  1002 04/04/24  1341   PTT 30.9 33.0   INR 1.15 1.41*           Lab Results   Component Value Date    TROP < 0.046 12/27/2012         Invalid input(s): \"PBNPML\"                       Diagnostics:   Telemetry: SR. Some brief AF earlier today    EKG, 4/4/2024:  NSR, FAV, prolonged QT     CXR, 4/4/2024:       STAN, 1/2024:       CONCLUSIONS:   1. There is flail of the P3 scallop of the mitral valve. The mitral valve has severe, 4+ eccentric (laterally directed)  regurgitation.   2. The tricuspid valve has annular enlargement, with central malcoaptation. The tricuspid valve has moderate, centrally directed, regurgitation.  3. The left ventricle appears normal in size, thickness, and systolic function. The estimated left ventricular ejection fraction is 60%  4. A patent foramen ovale is present (tunnel length 34 mm). There is left-to-right shunting across the patent foramen ovale.        Impression:     1.  Severe MR with marked pulm Hypertension . 2/2 flail P3 scallop of MV, and moderate TR,  s/p MITRAL VALVE REPAIR, TRICUSPID VALVE REPAIR, MAZE PROCEDURE, LEFT ATRIAL APPENDAGE AMPUTATION, PATENT FORAMEN OVALE CLOSURE.  4/4/24.     -currently on dobutamine, epi drip and nitric oxide  -stable rhythm  -PAS = 37 mm Hg  -CO = 7 l/min           2.  Paroxysmal Atrial Fibrillation. Started 1/2024 in setting of covid infection.  Echo then showed severe MR.  STAN shows flail P3 leaflet of MV and severe MR.      -s/p MAZE, AAP amputation  -currently in NSR  -brief Atrial Fibrillation spells this am  -would avoid AAT such as amio for now, artemio given long QT     3. Hypertension . Low bp's still. Currently on low dose epi     4. CKD,  Cr = 2.3 pre-op     5. Dyslipidemia      6. UGI bleeding while on DOAC, 2/2024        Recommend:     1.  CCU today  2.  Wean pressors as bp allow  3. TTE later in admission for MV, TV fct            Boy Hernandes MD  4/5/2024  11:47 AM

## 2024-04-05 NOTE — CDS QUERY
CLINICAL DOCUMENTATION CLARIFICATION FORM  Dear Dr. Stewart  Please clarify the diagnosis of heart failure documented by Anesthesia:   ( x ) Chronic Diastolic Heart Failure ruled in   (  )  Heart Failure ruled out   (  ) Other (please specify):       CLINICAL INFORMATION FROM THE MEDICAL RECORD      4/4 Anesthesia note:   HFpEF -moderate to severe mitral regurgitation; pulmonary hypertension  NYHA Class II;  LVEF 60%  -Hold eplerenone 50mg given CEDRICK; resume tomorrow if improved renal fx  - Hold losartan 100mg given CEDRICK; resume tomorrow if improved renal fx.  - continue metoprolol ER 50mg  -+1 pitting edema bilateral ankle. Start furosemide 20 mg daily.  -Low-sodium diet.Fluids 1,800 mL daily.  -BMP daily  -Daily weights, I & O.  -planning on chest CT today to assess asc aorta per CV surgery      If you have any questions, please contact Clinical  Sera Thurman RN, CDS  at #[phone]. Thank You!    THIS FORM IS A PERMANENT PART OF THE MEDICAL RECORD

## 2024-04-05 NOTE — PLAN OF CARE
Assumed care of pt at approximately 1930. NSR on tele. Chest tubes and [place] remain intact, see outputs. Epi and dobutamine gtt infusing, attempted to titrate overnight. Pacer wires connected to pacemaker. Pt intubated, precedex infusing, pt able to follow commands. Fem arterial line removed. Dr. Salgado updated on patient status, see orders. Dr. Patel updated on pt status, ordered to wean off nitric and attempt breathing trial. Patient failed first breathing trial, Dr. Patel updated, ordered to re-sedate patient and re-attempt breathing trial early in morning extubated at 0330. Myriam, patient's wife, at bedside during report and called this RN during the night; updated on POC, all questions answered.    Around 0530, upon attempting dysphagia screening, Wagner became difficult to awaken from sleep. Upon waking up, Wagner stared at a fixed point on the ceiling, and (while laying flat) starting marching in bed, with no reaction to staff verbal direction. SBP dropped to 80s. Charge RN and 2nd RN called to bedside, patient A&Ox4, moves all extremities, and began following staff directions again.    Problem: CARDIOVASCULAR - ADULT  Goal: Maintains optimal cardiac output and hemodynamic stability  Description: INTERVENTIONS:  - Monitor vital signs, rhythm, and trends  - Monitor for bleeding, hypotension and signs of decreased cardiac output  - Evaluate effectiveness of vasoactive medications to optimize hemodynamic stability  - Monitor arterial and/or venous puncture sites for bleeding and/or hematoma  - Assess quality of pulses, skin color and temperature  - Assess for signs of decreased coronary artery perfusion - ex. Angina  - Evaluate fluid balance, assess for edema, trend weights  Outcome: Progressing     Problem: SKIN/TISSUE INTEGRITY - ADULT  Goal: Incision(s), wounds(s) or drain site(s) healing without S/S of infection  Description: INTERVENTIONS:  - Assess and document risk factors for pressure ulcer development  -  Assess and document skin integrity  - Assess and document dressing/incision, wound bed, drain sites and surrounding tissue  - Implement wound care per orders  - Initiate isolation precautions as appropriate  - Initiate Pressure Ulcer prevention bundle as indicated  Outcome: Progressing

## 2024-04-05 NOTE — PROGRESS NOTES
[facility] Hospitalist note    PCP: Edil Roy MD    Chief Complaint:  F/u s/p mitral valve repair    SUBJECTIVE:  Extubated, feeling well.     OBJECTIVE:  Temp:  [95.1 °F (35.1 °C)-97.9 °F (36.6 °C)] 97.5 °F (36.4 °C)  Pulse:  [63-89] 69  Resp:  [16-18] 16  BP: ()/(52-64) 86/52  SpO2:  [95 %-100 %] 96 %  AO: ()/(37-54) 122/38  FiO2 (%):  [40 %-50 %] 40 %    Intake/Output:    Intake/Output Summary (Last 24 hours) at 4/5/2024 1001  Last data filed at 4/5/2024 0900  Gross per 24 hour   Intake 4152.1 ml   Output 2525 ml   Net 1627.1 ml       Last 3 Weights   04/05/24 0500 289 lb 11 oz (131.4 kg)   02/20/24 1531 275 lb (124.7 kg)   02/28/24 0630 281 lb 15.5 oz (127.9 kg)   02/27/24 0426 277 lb 4.8 oz (125.8 kg)   02/26/24 0435 277 lb 9.6 oz (125.9 kg)   02/25/24 0438 278 lb 9.6 oz (126.4 kg)   02/24/24 0322 278 lb 8 oz (126.3 kg)   02/23/24 1456 274 lb 14.6 oz (124.7 kg)   02/07/24 1456 270 lb (122.5 kg)       Exam  Gen: No acute distress  HEENT: anicteric sclera, MMM  Pulm: Lungs clear, normal respiratory effort  CV: Heart with regular rate and rhythm, no peripheral edema  Abd: Abdomen soft, nontender, nondistended, no organomegaly, bowel sounds present  MSK: Full range of motion in extremities, no clubbing, no cyanosis  Skin: no rashes or lesions  Neuro: A&OX3, no focal deficits    Data Review:         Labs:     Recent Labs   Lab 04/01/24  1002 04/04/24  1341 04/05/24  0417   WBC 5.0 11.8* 16.4*   HGB 10.8* 9.7* 9.0*   MCV 89.1 86.0 83.5   .0 158.0 147.0*   NE 2.88  --  14.83*   LYMABS 1.23  --  0.44*   INR 1.15 1.41*  --        Recent Labs   Lab 04/01/24  1002 04/04/24  1341 04/04/24  1742 04/05/24  0417    150*  --  148*   K 3.9 2.8* 2.9* 3.9    114*  --  116*   CO2 29.0 25.0  --  27.0   BUN 33* 34*  --  39*   CREATSERUM 2.32* 2.75*  --  2.42*   CA 10.2* 9.2  --  9.3   MG  --  2.7*  --  2.4   * 137*  --  98       Recent Labs   Lab 04/01/24  1002   ALT 18   AST 15   ALB 3.5       No  results for input(s): \"TROP\", \"CK\", \"PBNP\", \"PCT\" in the last 168 hours.    No results for input(s): \"CRP\", \"CIERRA\", \"LDH\", \"DDIMER\" in the last 168 hours.    Recent Labs   Lab 04/05/24  0514 04/05/24  0608 04/05/24  0655 04/05/24  0816 04/05/24  0924   PGLU 132* 124* 126* 102* 95       Meds:   Scheduled Medication:   insulin aspart  1-5 Units Subcutaneous TID AC and HS    chlorhexidine gluconate  15 mL Mouth/Throat BID@0800,2000    atorvastatin  20 mg Oral Nightly    finasteride  5 mg Oral Nightly    mupirocin  1 Application Nasal BID    acetaminophen  1,000 mg Intravenous Q6H    pantoprazole  40 mg Intravenous QAM AC    Or    pantoprazole  40 mg Oral QAM AC     Continuous Infusing Medication:   dexmedetomidine Stopped (04/05/24 0300)    sodium chloride 10 mL/hr (04/04/24 1428)    DOBUTamine Stopped (04/05/24 0017)    nitroGLYCERIN in dextrose 5%      norepinephrine      NitroPRUSSide (Nipride) 50 mg in dextrose 5% 250 mL infusion      dextrose 5%-sodium chloride 0.45% 70 mL/hr (04/04/24 1420)    sodium chloride      HYDROmorphone in sodium chloride 0.9%      EPINEPHrine (Adrenalin) 5 mg in sodium chloride 0.9% 250 mL infusion 3 mcg/min (04/05/24 0551)     PRN Medication:ipratropium-albuterol, midazolam, morphINE **OR** morphINE, melatonin, polyethylene glycol (PEG 3350), sennosides, bisacodyl, ondansetron, DOBUTamine, nitroGLYCERIN in dextrose 5%, norepinephrine, NitroPRUSSide (Nipride) 50 mg in dextrose 5% 250 mL infusion, potassium chloride **OR** potassium chloride, calcium gluconate, magnesium sulfate in dextrose 5%, magnesium sulfate in sterile water for injection, naloxone, diphenhydrAMINE, glucose **OR** glucose **OR** glucose-vitamin C **OR** dextrose **OR** glucose **OR** glucose **OR** glucose-vitamin C       Microbiology:    No results found for this visit on 04/04/24.    Lab Results   Component Value Date    COVID19 Not Detected 04/01/2024    COVID19 Not Detected 01/09/2024    COVID19 Not Detected  08/04/2022        Assessment/Plan:     78-year-old male significant past medical history CKD stage III, severe MR, paroxysmal A-fib, BPH, hypertension, hyperaldosteronism, hyperlipidemia, pulmonary hypertension, moderate tricuspid regurgitation a is status post mitral valve repair, tricuspid valve repair, maze procedure and left atrial appendage amputation and PFO closure     # Severe MR s/p mitral valve repair, tricuspid valve repair, maze procedure and left atrial appendage amputation and PFO closure  -post op care per CV surgery- chest tube in place, wean pressors  -cards following, planning echo     #afib  -NSR, first degree AV block  -monitor     #CKD 3; hypernatremia  -cr stable. Encourage free water intake     #HTN   #BPH  -resume finasteride     #postop hyperglycemia- stopping insulin gtt and switching to ssi    Romina Alegria MD  [facility] Hospitalist  Pager: [phone]

## 2024-04-05 NOTE — RESPIRATORY THERAPY NOTE
Tried weaning parameters RR 19  RISBI 45 pt's NIF -7.8 did not meet criteria notified to Anesthesia placed back on full support will continue monitor

## 2024-04-06 PROBLEM — I07.1 TRICUSPID VALVE INSUFFICIENCY: Status: ACTIVE | Noted: 2024-04-06

## 2024-04-06 PROBLEM — N17.9 AKI (ACUTE KIDNEY INJURY) (HCC): Status: ACTIVE | Noted: 2024-04-06

## 2024-04-06 PROBLEM — N17.9 AKI (ACUTE KIDNEY INJURY): Status: ACTIVE | Noted: 2024-04-06

## 2024-04-06 PROBLEM — I34.0 MITRAL VALVE INSUFFICIENCY: Status: ACTIVE | Noted: 2024-04-06

## 2024-04-06 PROBLEM — Q21.12 PFO (PATENT FORAMEN OVALE) (HCC): Status: ACTIVE | Noted: 2024-04-06

## 2024-04-06 LAB
ANION GAP SERPL CALC-SCNC: 5 MMOL/L (ref 0–18)
ATRIAL RATE: 80 BPM
BLOOD TYPE BARCODE: 5100
BUN BLD-MCNC: 56 MG/DL (ref 9–23)
CALCIUM BLD-MCNC: 9.2 MG/DL (ref 8.5–10.1)
CHLORIDE SERPL-SCNC: 113 MMOL/L (ref 98–112)
CO2 SERPL-SCNC: 27 MMOL/L (ref 21–32)
CREAT BLD-MCNC: 3.17 MG/DL
CREAT UR-SCNC: 119 MG/DL
EGFRCR SERPLBLD CKD-EPI 2021: 19 ML/MIN/1.73M2 (ref 60–?)
ERYTHROCYTE [DISTWIDTH] IN BLOOD BY AUTOMATED COUNT: 17 %
GLUCOSE BLD-MCNC: 122 MG/DL (ref 70–99)
GLUCOSE BLD-MCNC: 133 MG/DL (ref 70–99)
GLUCOSE BLD-MCNC: 145 MG/DL (ref 70–99)
GLUCOSE BLD-MCNC: 158 MG/DL (ref 70–99)
GLUCOSE BLD-MCNC: 185 MG/DL (ref 70–99)
HCT VFR BLD AUTO: 27.5 %
HGB BLD-MCNC: 8.5 G/DL
MCH RBC QN AUTO: 26.6 PG (ref 26–34)
MCHC RBC AUTO-ENTMCNC: 30.9 G/DL (ref 31–37)
MCV RBC AUTO: 86.2 FL
OSMOLALITY SERPL CALC.SUM OF ELEC: 318 MOSM/KG (ref 275–295)
PLATELET # BLD AUTO: 130 10(3)UL (ref 150–450)
POTASSIUM SERPL-SCNC: 4.8 MMOL/L (ref 3.5–5.1)
Q-T INTERVAL: 406 MS
QRS DURATION: 88 MS
QTC CALCULATION (BEZET): 491 MS
R AXIS: 20 DEGREES
RBC # BLD AUTO: 3.19 X10(6)UL
SODIUM SERPL-SCNC: 145 MMOL/L (ref 136–145)
SODIUM SERPL-SCNC: <5 MMOL/L
T AXIS: 5 DEGREES
UNIT VOLUME: 350 ML
VENTRICULAR RATE: 88 BPM
WBC # BLD AUTO: 18.1 X10(3) UL (ref 4–11)

## 2024-04-06 PROCEDURE — 99223 1ST HOSP IP/OBS HIGH 75: CPT | Performed by: INTERNAL MEDICINE

## 2024-04-06 RX ORDER — FUROSEMIDE 10 MG/ML
40 INJECTION INTRAMUSCULAR; INTRAVENOUS
Status: DISCONTINUED | OUTPATIENT
Start: 2024-04-06 | End: 2024-04-08

## 2024-04-06 NOTE — SLP NOTE
ADULT SWALLOWING EVALUATION    ASSESSMENT    ASSESSMENT/OVERALL IMPRESSION:  Patient seen for swallowing evaluation as he was noted to exhibit coughing after thin liquids and noted his tongue felt thick. Patient admitted for mitral valve repair, tricuspid valve repair, maze procedure, BECCA amputation, PFO closure, sternal closure with plates and intraoperative STAN. Patient family present and supportive. Patient reports he feels his tongue is thick/heavy at times which is affecting his speech. Upon my entry, his voice is a bit hoarse, but clear. His family notes speech has been slow and deliberate at times. Patient notes this was occurring prior to admission but family reports they noted it after surgery. RN noted patient with good tolerance of  medication in applesauce.     Oral mechanism exam unremarkable. Patient speech with fluctuating clarity with some episodes of imprecise articulation which appeared to be mostly lingual sounds but not consistently present. Patient reports he prefers to drink liquids by straw. Patient able to self present solid and thin liquid by straw trials with intact oral retrieval and containment. Patient self limited to small bites and single sips by straw. Oral prep and transit appeared functional. Mastication was adequate with complete oral clearance. Laryngeal excursion judged to be of adequate strength and timeliness to palpation. There was a single cough response after initial trial of thin liquids by straw but no other overt signs of aspiration across all other trials of solids and thin liquids.     Discussed above with patient and family at bedside. Recommended patient continue current diet with observation of aspiration precautions including slow rate, small bites and sips, single sip, upright and alert for all po. Discussed plan to monitor intermittent speech difficulties as well.  All questions answered. Will continue to follow.          RECOMMENDATIONS   Diet Recommendations -  Solids: Regular  Diet Recommendations - Liquids: Thin Liquids                        Compensatory Strategies Recommended: Small bites and sips;Alternate consistencies (single sips)  Aspiration Precautions: Upright position;Slow rate;Small bites and sips  Medication Administration Recommendations: Whole in puree;Crushed in puree (as tolerated)  Treatment Plan/Recommendations: Dysphagia therapy    HISTORY   MEDICAL HISTORY  Reason for Referral: R/O aspiration (coughing with thin liquids)    Problem List  Active Problems:    * No active hospital problems. *      Past Medical History  Past Medical History:   Diagnosis Date    Arrhythmia     BPH     Encounter for incision and drainage procedure 07/02/2021    High blood pressure     High cholesterol     Hyperaldosteronism (HCC)     Mitral regurgitation     Osteoarthrosis, unspecified whether generalized or localized, unspecified site     PMR (polymyalgia rheumatica) (HCC) 2012    off corticosteroids since 2013    Retention of urine     Shortness of breath        Prior Living Situation: Home with spouse  Diet Prior to Admission: Regular;Thin liquids  Precautions: Aspiration    Patient/Family Goals: to get better and go home    SWALLOWING HISTORY  Current Diet Consistency: Regular;Thin liquids  Dysphagia History: denied  Imaging Results:   CXR from 4/5/24 revealed:  CONCLUSION:       Postoperative changes of cardiothoracic surgery with stable cardiac and mediastinal contours.  Mild interstitial pulmonary edema, improved in the interim.  Persistent confluent opacification of the left lung base may reflect edema, atelectasis, pleural   effusion, or a combination there of, similar to prior.  No appreciable pneumothorax.      Interval extubation and removal of enteric catheter.  Additional support lines remain in stable position.         LOCATION:  [facility]                  Dictated by (CST): Michelle Wade MD on 4/05/2024 at 8:00 AM       Finalized by (CST): Michelle Wade MD on  4/05/2024 at 8:01 AM     SUBJECTIVE       OBJECTIVE   ORAL MOTOR EXAMINATION  Dentition: Natural;Functional  Symmetry: Within Functional Limits  Strength: Within Functional Limits  Tone: Within Functional Limits  Range of Motion: Within Functional Limits  Rate of Motion: Within Functional Limits    Voice Quality: Clear  Respiratory Status: Unlabored  Consistencies Trialed: Thin liquids;Hard solid  Method of Presentation: Self presentation;Straw;Single sips  Patient Positioning: Upright;Midline (in bed)    Oral Phase of Swallow: Within Functional Limits                      Pharyngeal Phase of Swallow: Within Functional Limits           (Please note: Silent aspiration cannot be evaluated clinically. Videofluoroscopic Swallow Study is required to rule-out silent aspiration.)    Esophageal Phase of Swallow: No complaints consistent with possible esophageal involvement                GOALS  Goal #1 The patient will tolerate regular consistency and thin liquids without overt signs or symptoms of aspiration with 100 % accuracy over 1-2 session(s).  In Progress   Goal #2 The patient/family/caregiver will demonstrate understanding and implementation of aspiration precautions and swallow strategies independently over 1-2 session(s).    In Progress     FOLLOW UP  Treatment Plan/Recommendations: Dysphagia therapy  Number of Visits to Meet Established Goals: 2  Follow Up Needed (Documentation Required): Yes  SLP Follow-up Date: 04/08/24    Thank you for your referral.   If you have any questions, please contact Demetrius Kothari MS CCC-SLP  Pager [phone]

## 2024-04-06 NOTE — PHYSICAL THERAPY NOTE
PHYSICAL THERAPY TREATMENT NOTE - INPATIENT    Room Number: 6607/6607-A     Session: 1     Number of Visits to Meet Established Goals: 5    Presenting Problem: S/p planned status post mitral valve repair, tricuspid valve repair, maze procedure and left atrial appendage amputation and PFO closure 4/4  Co-Morbidities : A-fib, HTN, R TKR 02/2015, gout    ASSESSMENT   Patient demonstrates limited progress this session, goals  remain in progress.    Patient continues to function below baseline with bed mobility, transfers, and gait.  Contributing factors to remaining limitations include decreased functional strength, decreased endurance/aerobic capacity, decreased muscular endurance, medical status, and dizziness c positional changes  .  Next session anticipate patient to progress bed mobility, transfers, and gait.  Physical Therapy will continue to follow patient for duration of hospitalization.    Patient continues to benefit from continued skilled PT services: will continue to follow to determine most appropriate d/c recs and progress mobility as able.     PLAN  PT Treatment Plan: Bed mobility;Body mechanics;Coordination;Endurance;Energy conservation;Patient education;Family education;Gait training;Range of motion;Neuromuscular re-educate;Strengthening;Transfer training;Balance training;Stair training  Rehab Potential : Good  Frequency (Obs): 3-5x/week    CURRENT GOALS       Goal #1 Patient is able to demonstrate supine - sit EOB @ level: supervision      Goal #2 Patient is able to demonstrate transfers EOB to/from Chair/Wheelchair at assistance level: supervision      Goal #3 Patient is able to ambulate 150 feet with assist device: walker - rolling at assistance level: supervision      Goal #4 Patient will navigate 1 step with rail and SBA   Goal #5     Goal #6     Goal Comments: Goals established on 4/5/2024 4/6/2024 all goals ongoing     SUBJECTIVE  \"I'm warning you, I am dead weight\"      OBJECTIVE  Precautions: Sternal;Cardiac;Chest tube    WEIGHT BEARING RESTRICTION  Weight Bearing Restriction: None                PAIN ASSESSMENT   Ratin  Location: pt denies pain       BALANCE                                                                                                                       Static Sitting: Fair -  Dynamic Sitting: Fair -           Static Standing: Dependent  Dynamic Standing: Not tested    ACTIVITY TOLERANCE                         O2 WALK         AM-PAC '6-Clicks' INPATIENT SHORT FORM - BASIC MOBILITY  How much difficulty does the patient currently have...  Patient Difficulty: Turning over in bed (including adjusting bedclothes, sheets and blankets)?: A Lot   Patient Difficulty: Sitting down on and standing up from a chair with arms (e.g., wheelchair, bedside commode, etc.): Unable   Patient Difficulty: Moving from lying on back to sitting on the side of the bed?: A Lot   How much help from another person does the patient currently need...   Help from Another: Moving to and from a bed to a chair (including a wheelchair)?: Total   Help from Another: Need to walk in hospital room?: Total   Help from Another: Climbing 3-5 steps with a railing?: Total       AM-PAC Score:  Raw Score: 8   Approx Degree of Impairment: 86.62%   Standardized Score (AM-PAC Scale): 28.58   CMS Modifier (G-Code): CM    FUNCTIONAL ABILITY STATUS  Gait Assessment   Functional Mobility/Gait Assessment  Gait Assistance: Not tested  Distance (ft): 0    Skilled Therapy Provided  RN consulted prior to session. Pt had been up in chair this AM, however, had to be lifted via STS back to bed- RN now agreeable to therapy this afternoon   Pt educated on sternal lifting precautions and therapist cued pt for sequencing and body mechanics for t/f and mobility   Pt t/f EOB c A x 1 and cues for sequencing  Pt demos fair seated balance c supervision .  Pt attempted standing x3 cues for foot placement and force  generation through BLE ,   Pt unable to fully clear hips off bed.  Pt cued for lateral scoot c minimal progress  Pt noted to lean L and report dizziness, pt able to come to  neutral c cues, however, less responsive and diaphoretic.  Max x 2 to return to sup and reposition, pt able to answer questions appropriately  BP in semi sup 100 systolic   Pt remains c CT and pacer all intact  RN updated on above.  Pt encouraged in hand pumps and AP and possible chair position later today . RN and pt in agreement       Bed Mobility:  Rolling: mod A    Supine<>Sit: mod A x 1     Sit<>Supine: max x 2      Transfer Mobility:  Sit<>Stand:    Stand<>Sit:    Gait:     Therapist's Comments:       THERAPEUTIC EXERCISES  Lower Extremity Ankle pumps     Upper Extremity  - open/close     Position Sitting and Supine     Repetitions   10   Sets   1     Patient End of Session: In bed;Needs met;Call light within reach;RN aware of session/findings;All patient questions and concerns addressed;Family present    PT Session Time: 30 minutes  Gait Training:  minutes  Therapeutic Activity: 30 minutes  Therapeutic Exercise:  minutes   Neuromuscular Re-education:  minutes

## 2024-04-06 NOTE — PLAN OF CARE
AFIB on monitors. SBP maintained >90. Remains off all drips.  Dilaudid PCA d/cd, denies pain-oral meds and PRN pushes available.  Up to chair this AM, lift back to bed.  Attempted to get up with PT but became diaphoretic, dizzy and felt faint. Chest tube dumped out 430cc at this time. Notified Dr Salgado.  [place] removed, urinal provided. Urine sent per renal.  Cordis and art line removed.  Weaned to RA.  Wife and daughters at bedside throughout the day.

## 2024-04-06 NOTE — PROGRESS NOTES
.[facility] note    PCP: Edil Roy MD    Chief Complaint:  F/u s/p mitral valve repair    SUBJECTIVE:  Tried to stand with nursing staff but unsuccessful. Otherwise, eating okay. No sig pain/sob. Feels quite swollen all over.    OBJECTIVE:  Temp:  [97.6 °F (36.4 °C)-99.3 °F (37.4 °C)] 97.6 °F (36.4 °C)  Pulse:  [] 73  Resp:  [14-20] 18  BP: ()/(46-89) 105/61  SpO2:  [92 %-99 %] 98 %    Intake/Output:    Intake/Output Summary (Last 24 hours) at 4/6/2024 0909  Last data filed at 4/6/2024 0900  Gross per 24 hour   Intake 1492.7 ml   Output 1140 ml   Net 352.7 ml       Last 3 Weights   04/05/24 0500 289 lb 11 oz (131.4 kg)   02/20/24 1531 275 lb (124.7 kg)   02/28/24 0630 281 lb 15.5 oz (127.9 kg)   02/27/24 0426 277 lb 4.8 oz (125.8 kg)   02/26/24 0435 277 lb 9.6 oz (125.9 kg)   02/25/24 0438 278 lb 9.6 oz (126.4 kg)   02/24/24 0322 278 lb 8 oz (126.3 kg)   02/23/24 1456 274 lb 14.6 oz (124.7 kg)   02/07/24 1456 270 lb (122.5 kg)       Exam  Gen: No acute distress  HEENT: anicteric sclera, MMM  Pulm: Lungs clear, normal respiratory effort  CV: Heart with regular rate and rhythm, edematous throughout  Abd: Abdomen soft, nontender, nondistended, no organomegaly, bowel sounds present  Neuro: A&OX3, no focal deficits    Data Review:         Labs:     Recent Labs   Lab 04/01/24  1002 04/04/24  1341 04/05/24  0417 04/06/24  0411   WBC 5.0 11.8* 16.4* 18.1*   HGB 10.8* 9.7* 9.0* 8.5*   MCV 89.1 86.0 83.5 86.2   .0 158.0 147.0* 130.0*   NE 2.88  --  14.83*  --    LYMABS 1.23  --  0.44*  --    INR 1.15 1.41*  --   --        Recent Labs   Lab 04/01/24  1002 04/04/24  1341 04/04/24  1742 04/05/24  0417 04/06/24 0411    150*  --  148* 145   K 3.9 2.8* 2.9* 3.9 4.8    114*  --  116* 113*   CO2 29.0 25.0  --  27.0 27.0   BUN 33* 34*  --  39* 56*   CREATSERUM 2.32* 2.75*  --  2.42* 3.17*   CA 10.2* 9.2  --  9.3 9.2   MG  --  2.7*  --  2.4  --    * 137*  --  98 145*       Recent Labs    Lab 04/01/24  1002   ALT 18   AST 15   ALB 3.5       No results for input(s): \"TROP\", \"CK\", \"PBNP\", \"PCT\" in the last 168 hours.    No results for input(s): \"CRP\", \"CIERRA\", \"LDH\", \"DDIMER\" in the last 168 hours.    Recent Labs   Lab 04/05/24  0924 04/05/24  1106 04/05/24  1640 04/05/24  2031 04/06/24  0643   PGLU 95 115* 193* 203* 133*       Meds:   Scheduled Medication:   metoprolol tartrate  25 mg Oral 2x Daily(Beta Blocker)    furosemide  40 mg Intravenous BID (Diuretic)    insulin aspart  1-5 Units Subcutaneous TID AC and HS    amiodarone  400 mg Oral TID    Followed by    [START ON 4/8/2024] amiodarone  400 mg Oral Daily    sodium chloride   Intravenous Once    atorvastatin  20 mg Oral Nightly    finasteride  5 mg Oral Nightly    mupirocin  1 Application Nasal BID    pantoprazole  40 mg Intravenous QAM AC    Or    pantoprazole  40 mg Oral QAM AC     Continuous Infusing Medication:   dexmedetomidine Stopped (04/05/24 0300)    sodium chloride 10 mL/hr (04/04/24 1428)    DOBUTamine Stopped (04/05/24 0017)    nitroGLYCERIN in dextrose 5%      norepinephrine      NitroPRUSSide (Nipride) 50 mg in dextrose 5% 250 mL infusion      dextrose 5%-sodium chloride 0.45% 70 mL/hr (04/04/24 1420)    sodium chloride      HYDROmorphone in sodium chloride 0.9%      EPINEPHrine (Adrenalin) 5 mg in sodium chloride 0.9% 250 mL infusion Stopped (04/06/24 0240)     PRN Medication:ipratropium-albuterol, HYDROcodone-acetaminophen **OR** HYDROcodone-acetaminophen, midazolam, morphINE **OR** morphINE, melatonin, polyethylene glycol (PEG 3350), sennosides, bisacodyl, ondansetron, DOBUTamine, nitroGLYCERIN in dextrose 5%, norepinephrine, NitroPRUSSide (Nipride) 50 mg in dextrose 5% 250 mL infusion, potassium chloride **OR** potassium chloride, calcium gluconate, magnesium sulfate in dextrose 5%, magnesium sulfate in sterile water for injection, naloxone, diphenhydrAMINE, glucose **OR** glucose **OR** glucose-vitamin C **OR** dextrose **OR**  glucose **OR** glucose **OR** glucose-vitamin C       Microbiology:    No results found for this visit on 04/04/24.    Lab Results   Component Value Date    COVID19 Not Detected 04/01/2024    COVID19 Not Detected 01/09/2024    COVID19 Not Detected 08/04/2022        Assessment/Plan:     78-year-old male significant past medical history CKD stage III, severe MR, paroxysmal A-fib, BPH, hypertension, hyperaldosteronism, hyperlipidemia, pulmonary hypertension, moderate tricuspid regurgitation a is status post mitral valve repair, tricuspid valve repair, maze procedure and left atrial appendage amputation and PFO closure     # Severe MR s/p mitral valve repair, tricuspid valve repair, maze procedure and left atrial appendage amputation and PFO closure  -post op care per CV surgery- chest tube in place  -cards following  - iv diuresis     #afib  -has had some PAF, started on amio  -monitor     #CEDRICK CKD 3; hypernatremia  -Na improved but Cr worsening and uop declining. Edematous  -IV lasix ordered, will ask nephrology to see as well.      #HTN   #BPH  -resume finasteride     #postop hyperglycemia- ssi    Romina Alegria MD  [facility]  Pager: [phone]

## 2024-04-06 NOTE — PLAN OF CARE
Assumed care of pt at approximately 1930. Upon shift handoff, pt rhythm afib. EKG done to confirm. Cardiology notified, no orders given. Dr. Salgado updated, amio PO started with bolus. Chest tube outputs and [place] outputs recorded. Epi weaned off overnight. Up to chair in morning, 120cc dump in chest tube.     Problem: CARDIOVASCULAR - ADULT  Goal: Maintains optimal cardiac output and hemodynamic stability  Description: INTERVENTIONS:  - Monitor vital signs, rhythm, and trends  - Monitor for bleeding, hypotension and signs of decreased cardiac output  - Evaluate effectiveness of vasoactive medications to optimize hemodynamic stability  - Monitor arterial and/or venous puncture sites for bleeding and/or hematoma  - Assess quality of pulses, skin color and temperature  - Assess for signs of decreased coronary artery perfusion - ex. Angina  - Evaluate fluid balance, assess for edema, trend weights  Outcome: Progressing     Problem: SKIN/TISSUE INTEGRITY - ADULT  Goal: Incision(s), wounds(s) or drain site(s) healing without S/S of infection  Description: INTERVENTIONS:  - Assess and document risk factors for pressure ulcer development  - Assess and document skin integrity  - Assess and document dressing/incision, wound bed, drain sites and surrounding tissue  - Implement wound care per orders  - Initiate isolation precautions as appropriate  - Initiate Pressure Ulcer prevention bundle as indicated  Outcome: Progressing

## 2024-04-06 NOTE — PROGRESS NOTES
[facility] Consultation    Wagner Sheridan Patient Status:  Inpatient    1945 MRN [id number]   [facility] ED 6NE-A Attending Jason Hansen MD   Hosp Day # 2 PCP Edil Roy MD     Reason for Consultation:  post-op cardiology mgt      History of Present Illness: Wagner Sheridan is a a(n) 78 year old male.  He has hx of Dyslipidemia , Hypertension , CKD. Presented with resp distress in setting of covid infection, 2024.  Atrial Fibrillation occurred and he was started on eliquis.  His breathing is fairly comfortable with lasix.  TTE and STAN demonstrate severe MR with flail P3 scallop of MV and moderate TR.  Cath 24 showed:     -normal left ventricular size and systolic function with an ejection fraction of approximately 60% to 65%.     - right atrial mean pressure is 14, RV 48/8, PA 50/22 with a mean of 35, mean wedge pressure 24 with a V-wave of 40, /22, /67, mean of 89. Sonam cardiac output was 8.1     -The left main segment to the left coronary artery is patulous and ectatic without stenosis. The left circumflex artery is relatively large. It gives rise to a large obtuse marginal artery which travels all the way to the apex and then to some extent up the interventricular groove, supplying a portion of the distal anterior wall. It also gives rise to a left posterior descending artery, a small second obtuse marginal artery, and a posterior left ventricular branch. The left circumflex artery system shows minor nonobstructive plaquing. The LAD is normal caliber proximally, but then becomes quite diminutive in its middle one-third and does not reach the apex, as described above. The distal half of this LAD is severely diffusely diseased with areas of narrowing of up to 90%       He was admitted with anemia, 2024.  GI eval revealed: EGD, 24: moderate diffuse gastritis. 2 small ulcers  C-scope: 2 polyps removed    elective MITRAL VALVE REPAIR, TRICUSPID VALVE REPAIR, MAZE  PROCEDURE, LEFT ATRIAL APPENDAGE AMPUTATION, PATENT FORAMEN OVALE CLOSURE.     Extubated awake alert oriented  Pain is controlled no shortness of breath    History:  Past Medical History:   Diagnosis Date    Arrhythmia     BPH     Encounter for incision and drainage procedure 07/02/2021    High blood pressure     High cholesterol     Hyperaldosteronism (HCC)     Mitral regurgitation     Osteoarthrosis, unspecified whether generalized or localized, unspecified site     PMR (polymyalgia rheumatica) (HCC) 2012    off corticosteroids since 2013    Retention of urine     Shortness of breath      Past Surgical History:   Procedure Laterality Date    COLONOSCOPY N/A 2/27/2024    Procedure: COLONOSCOPY;  Surgeon: Heath Mina DO;  Location:  ENDOSCOPY    KNEE SURGERY      OTHER SURGICAL HISTORY  2/27/09    flow , Dr. Taevra    OTHER SURGICAL HISTORY  9/4/09    flow  Dr. Tavera    OTHER SURGICAL HISTORY  1/4/11    PNB - Dr. Tavera    OTHER SURGICAL HISTORY  1/17/13    PVP - Dr. Tavera    OTHER SURGICAL HISTORY  2/3/16     Flow      OTHER SURGICAL HISTORY  08/05/2020    cysto- Dr. Tavera    SKIN SURGERY  10/01/2019    MMS/R nasal ala /BCC done by MM    TONSILLECTOMY      TOTAL KNEE REPLACEMENT Right 02/25/2015    Right total knee replacement 02/25/2015    TOTAL KNEE REPLACEMENT Left 05/11/2015     Left knee total replacement 05/11/2015      Family History   Problem Relation Age of Onset    Hypertension Father     Heart Disorder Father     Hypertension Mother     Diabetes Sister          Allergies:  Allergies   Allergen Reactions    Albumin Human ANAPHYLAXIS    Ciprofloxacin HIVES    Clindamycin HIVES    Other ANAPHYLAXIS     Alcohol swab caps    Penicillins HIVES     Tolerated Ancef 4/4/24    Wasp Venom RESPIRATORY FAILURE    Wasp Venom Protein RESPIRATORY FAILURE    Benazepril Coughing    Chlorhexidine RASH and OTHER (SEE COMMENTS)     redness       Medications:   metoprolol tartrate  25 mg Oral 2x  Daily(Beta Blocker)    furosemide  40 mg Intravenous BID (Diuretic)    insulin aspart  1-5 Units Subcutaneous TID AC and HS    amiodarone  400 mg Oral TID    Followed by    [START ON 4/8/2024] amiodarone  400 mg Oral Daily    sodium chloride   Intravenous Once    atorvastatin  20 mg Oral Nightly    finasteride  5 mg Oral Nightly    mupirocin  1 Application Nasal BID    pantoprazole  40 mg Intravenous QAM AC    Or    pantoprazole  40 mg Oral QAM AC       Continuous Infusions:   dexmedetomidine Stopped (04/05/24 0300)    sodium chloride 10 mL/hr (04/04/24 1428)    DOBUTamine Stopped (04/05/24 0017)    nitroGLYCERIN in dextrose 5%      norepinephrine      NitroPRUSSide (Nipride) 50 mg in dextrose 5% 250 mL infusion      dextrose 5%-sodium chloride 0.45% 70 mL/hr (04/04/24 1420)    sodium chloride      HYDROmorphone in sodium chloride 0.9%      EPINEPHrine (Adrenalin) 5 mg in sodium chloride 0.9% 250 mL infusion Stopped (04/06/24 0240)       Social History:   reports that he has quit smoking. His smoking use included cigarettes. He has a 2.5 pack-year smoking history. He has never used smokeless tobacco. He reports current alcohol use. He reports that he does not use drugs.    Review of Systems:  All systems were reviewed and are negative except as described above in HPI.    Physical Exam:      Wt Readings from Last 3 Encounters:   04/05/24 289 lb 11 oz (131.4 kg)   02/28/24 281 lb 15.5 oz (127.9 kg)   02/07/24 270 lb (122.5 kg)       Vitals:    04/06/24 0600 04/06/24 0700 04/06/24 0800 04/06/24 0900   BP: 105/66 110/70 112/68 105/61   BP Location:   Right arm    Pulse: 68 77 76 73   Resp:   18    Temp:   97.6 °F (36.4 °C)    TempSrc:   Temporal    SpO2: 99% 98% 99% 98%   Weight:       Height:           Temp:  [97.6 °F (36.4 °C)-99.3 °F (37.4 °C)] 97.6 °F (36.4 °C)  Pulse:  [] 73  Resp:  [14-20] 18  BP: ()/(46-89) 105/61  SpO2:  [92 %-99 %] 98 %    Temp: 97.6 °F (36.4 °C)  Pulse: 73  Resp: 18  BP:  105/61      General:   intubated  HEENT: No focal deficits.  Neck: No JVD, carotids 2+ no bruits.  Cardiac: Regular S1S2.  No S3, S4, rub, click.  No murmur.  Lungs: Clear to auscultation and percussion.  Abdomen: Soft, non-tender.   Extremities: No LE edema.  No clubbing or cyanosis  Neurologic:  sedated.  Skin: Warm and dry.     Labs:      HEM:  Recent Labs   Lab 04/01/24 1002 04/04/24 1341 04/05/24 0417 04/06/24 0411   WBC 5.0 11.8* 16.4* 18.1*   HGB 10.8* 9.7* 9.0* 8.5*   HCT 35.1* 30.7* 27.8* 27.5*   .0 158.0 147.0* 130.0*       Chem:  Recent Labs   Lab 04/01/24 1002 04/04/24 1341 04/04/24  1742 04/05/24 0417 04/06/24 0411    150*  --  148* 145   K 3.9 2.8* 2.9* 3.9 4.8    114*  --  116* 113*   CO2 29.0 25.0  --  27.0 27.0   BUN 33* 34*  --  39* 56*   CREATSERUM 2.32* 2.75*  --  2.42* 3.17*   MG  --  2.7*  --  2.4  --    ALT 18  --   --   --   --    AST 15  --   --   --   --    ALB 3.5  --   --   --   --        Recent Labs   Lab 04/01/24 1002 04/04/24 1341   INR 1.15 1.41*                     Lab Results   Component Value Date    TROP < 0.046 12/27/2012         Invalid input(s): \"PBNPML\"                 Telemetry: NSR    Laboratories and Data:  Diagnostics:    EKG, 4/4/2024:  NSR, FAV, prolonged QT    CXR, 4/4/2024:      STAN, 1/2024:      CONCLUSIONS:   1. There is flail of the P3 scallop of the mitral valve. The mitral valve has severe, 4+ eccentric (laterally directed) regurgitation.   2. The tricuspid valve has annular enlargement, with central malcoaptation. The tricuspid valve has moderate, centrally directed, regurgitation.  3. The left ventricle appears normal in size, thickness, and systolic function. The estimated left ventricular ejection fraction is 60%  4. A patent foramen ovale is present (tunnel length 34 mm). There is left-to-right shunting across the patent foramen ovale.      Impression:    1.  Severe MR with marked pulm Hypertension . 2/2 flail P3 scallop of MV,  and moderate TR,  s/p MITRAL VALVE REPAIR, TRICUSPID VALVE REPAIR, MAZE PROCEDURE, LEFT ATRIAL APPENDAGE AMPUTATION, PATENT FORAMEN OVALE CLOSURE.  4/4/24.      -PAS = 37 mm Hg  -CO = 7 l/min        2. Atrial Fibrillation.  Started 1/2024 in setting of covid infection.  Echo then showed severe MR.  STAN shows flail P3 leaflet of MV and severe MR    -s/p MAZE, AAP amputation      3. Hypertension     4. CKD,  Cr = 2.3 pre-op    5. Dyslipidemia     6. UGI bleeding while on DOAC, 2/2024      Recommend:    -Postop mitral valve repair tricuspid valve repair  Pain controlled  Anticipate repeat echo prior to discharge    -Paroxysmal atrial fibrillation.  Initially sinus now back in A-fib  Amiodarone started per surgical team  Avoid additional QT prolonging meds  Daily EKGs  Rate is controlled  Anticoagulate once bleed risk allows    -Weaned off epinephrine beta-blocker started    -Diuresed with IV Lasix  Watch renal function    -Hyperlipidemia started statin    Up in chair ambulate as tolerated  Progressing

## 2024-04-06 NOTE — PHYSICAL THERAPY NOTE
IP PT attempted, per RN , pt just returned to bed via lift and requesting therapy to return later. Will continue to follow and re-attempt as schedule permits.

## 2024-04-06 NOTE — PROGRESS NOTES
[facility]   [facility] Progress Note    Wagner Sheridan Patient Status:  Inpatient    1945 MRN [id number]   Location [facility] 6NE-A Attending Jason Hansen MD   Hosp Day # 2 PCP Edil Roy MD     Subjective:  Up in chair, reports minimal pain and no nausea.    Objective:  /62   Pulse 70   Temp 97.1 °F (36.2 °C) (Temporal)   Resp 20   Ht 182.9 cm (6')   Wt 130.4 kg (287 lb 7.7 oz)   SpO2 90%   BMI 38.99 kg/m²          Intake/Output:    Intake/Output Summary (Last 24 hours) at 2024 1313  Last data filed at 2024 1300  Gross per 24 hour   Intake 1436.7 ml   Output 1415 ml   Net 21.7 ml       Chest Tube Output: 260 ml in 12 hours/550 ml in 24 hours    Last 3 Weights   24 1000 130.4 kg (287 lb 7.7 oz)   24 0500 131.4 kg (289 lb 11 oz)   24 1531 124.7 kg (275 lb)   24 0630 127.9 kg (281 lb 15.5 oz)   24 0426 125.8 kg (277 lb 4.8 oz)   24 0435 125.9 kg (277 lb 9.6 oz)   24 0438 126.4 kg (278 lb 9.6 oz)   24 0322 126.3 kg (278 lb 8 oz)   24 1456 124.7 kg (274 lb 14.6 oz)   24 1456 122.5 kg (270 lb)           Allergies:  Allergies   Allergen Reactions    Albumin Human ANAPHYLAXIS    Ciprofloxacin HIVES    Clindamycin HIVES    Other ANAPHYLAXIS     Alcohol swab caps    Penicillins HIVES     Tolerated Ancef 24    Wasp Venom RESPIRATORY FAILURE    Wasp Venom Protein RESPIRATORY FAILURE    Benazepril Coughing    Chlorhexidine RASH and OTHER (SEE COMMENTS)     redness       Current Facility-Administered Medications   Medication Dose Route Frequency    metoprolol tartrate (Lopressor) tab 25 mg  25 mg Oral 2x Daily(Beta Blocker)    furosemide (Lasix) 10 mg/mL injection 40 mg  40 mg Intravenous BID (Diuretic)    ipratropium-albuterol (Duoneb) 0.5-2.5 (3) MG/3ML inhalation solution 3 mL  3 mL Nebulization Q4H PRN    insulin aspart (NovoLOG) 100 Units/mL FlexPen 1-5 Units  1-5 Units Subcutaneous TID AC and HS    HYDROcodone-acetaminophen  (Norco) 5-325 MG per tab 1 tablet  1 tablet Oral Q4H PRN    Or    HYDROcodone-acetaminophen (Norco) 5-325 MG per tab 2 tablet  2 tablet Oral Q4H PRN    amiodarone (Pacerone) tab 400 mg  400 mg Oral TID    Followed by    [START ON 4/8/2024] amiodarone (Pacerone) tab 400 mg  400 mg Oral Daily    sodium chloride 0.9% infusion   Intravenous Once    midazolam (Versed) 2 MG/2ML injection 1 mg  1 mg Intravenous Q5 Min PRN    dexmedeTOMIDine in sodium chloride 0.9% (Precedex) 400 mcg/100mL infusion premix  0.2-1.5 mcg/kg/hr Intravenous Continuous    atorvastatin (Lipitor) tab 20 mg  20 mg Oral Nightly    finasteride (Proscar) tab 5 mg  5 mg Oral Nightly    sodium chloride 0.9% infusion   Intravenous Continuous    morphINE PF 2 MG/ML injection 2 mg  2 mg Intravenous Q2H PRN    Or    morphINE PF 4 MG/ML injection 4 mg  4 mg Intravenous Q2H PRN    melatonin tab 3 mg  3 mg Oral Nightly PRN    polyethylene glycol (PEG 3350) (Miralax) 17 g oral packet 17 g  17 g Oral Daily PRN    sennosides (Senokot) tab 17.2 mg  17.2 mg Oral Nightly PRN    bisacodyl (Dulcolax) 10 MG rectal suppository 10 mg  10 mg Rectal Daily PRN    ondansetron (Zofran) 4 MG/2ML injection 4 mg  4 mg Intravenous Q6H PRN    DOBUTamine in dextrose 5% (Dobutrex) 500 mg/250mL infusion premix  2.5-20 mcg/kg/min Intravenous Continuous PRN    nitroGLYCERIN in dextrose 5% 50 mg/250mL infusion premix  5-300 mcg/min Intravenous Continuous PRN    norepinephrine (Levophed) 4 mg/250mL infusion premix  0.5-30 mcg/min Intravenous Continuous PRN    NitroPRUSSide (Nipride) 50 mg in dextrose 5% 250 mL infusion  0.1-4 mcg/kg/min Intravenous Continuous PRN    potassium chloride 20 mEq/100mL IVPB premix 20 mEq  20 mEq Intravenous PRN    Or    potassium chloride 40 mEq/100mL IVPB premix (central line) 40 mEq  40 mEq Intravenous PRN    calcium gluconate 3 g in sodium chloride 0.9% 100 mL IVPB  3 g Intravenous PRN    magnesium sulfate in dextrose 5% 1 g/100mL infusion premix 1 g  1 g  Intravenous PRN    magnesium sulfate in sterile water for injection 2 g/50mL IVPB premix 2 g  2 g Intravenous PRN    mupirocin (Bactroban) 2% nasal ointment 1 Application  1 Application Nasal BID    dextrose 5%-sodium chloride 0.45% infusion   mL/hr Intravenous Continuous    pantoprazole (Protonix) 40 mg in sodium chloride 0.9% PF 10 mL IV push  40 mg Intravenous QAM AC    Or    pantoprazole (Protonix) DR tab 40 mg  40 mg Oral QAM AC    sodium chloride 0.9% infusion   Intravenous Continuous    HYDROmorphone in sodium chloride 0.9% (Dilaudid) 20 mg/100mL PCA premix   Intravenous Continuous    naloxone (Narcan) 0.4 MG/ML injection 0.08 mg  0.08 mg Intravenous Q5 Min PRN    diphenhydrAMINE (Benadryl) 50 mg/mL  injection 12.5 mg  12.5 mg Intravenous Q4H PRN    glucose (Dex4) 15 GM/59ML oral liquid 15 g  15 g Oral Q15 Min PRN    Or    glucose (Glutose) 40% oral gel 15 g  15 g Oral Q15 Min PRN    Or    glucose-vitamin C (Dex-4) chewable tab 4 tablet  4 tablet Oral Q15 Min PRN    Or    dextrose 50% injection 50 mL  50 mL Intravenous Q15 Min PRN    Or    glucose (Dex4) 15 GM/59ML oral liquid 30 g  30 g Oral Q15 Min PRN    Or    glucose (Glutose) 40% oral gel 30 g  30 g Oral Q15 Min PRN    Or    glucose-vitamin C (Dex-4) chewable tab 8 tablet  8 tablet Oral Q15 Min PRN    EPINEPHrine (Adrenalin) 5 mg in sodium chloride 0.9% 250 mL infusion  1-10 mcg/min Intravenous Continuous       Labs:  Lab Results   Component Value Date    WBC 18.1 04/06/2024    HGB 8.5 04/06/2024    HCT 27.5 04/06/2024    .0 04/06/2024    CREATSERUM 3.17 04/06/2024    BUN 56 04/06/2024     04/06/2024    K 4.8 04/06/2024     04/06/2024    CO2 27.0 04/06/2024     04/06/2024    CA 9.2 04/06/2024           Physical Exam:    Neuro: Intact, A/O x 3  Lungs: CTA  Heart: S1, S2, Irregular, in afib, rate controlled  Abdomen: Soft, non tender, + BS  Extremities:Warm, dry, NG, + generalized edema  Pulses: Palpable  Incisions: Sternal  C/D/I       Assessment/Plan:  Patient Active Problem List   Diagnosis    Benign non-nodular prostatic hyperplasia without lower urinary tract symptoms    Hyperaldosteronism (HCC)    Essential hypertension    Acute idiopathic gout involving toe of left foot    Angioedema    Purpura (HCC)    Chronic renal disease, stage III (HCC)    Aortic atherosclerosis (HCC)    Community acquired pneumonia, unspecified laterality    Anemia       POD# 2 S/P MV repair with a 36mm ring, TV repair with a 28mm ring, BECCA amputaiton, PFO closure, and MAZE procedure  -HD stable, Epi weaned off overnight  -PAF, currently a-fib, UGI bleed while on DOAC, cardiology following   -Leukocytosis, likely reactive afebrile- monitor   -Acute post op blood loss anemia, expected- monitor   -TCP, likely 2/2 consumption- monitor   -Volume OL, start lasix today   -CKD, Cr 3.17, UOP starting to decrease- monitor. Hospitalist consulted renal   -Bowel regimen   -Pain management, dilaudid pca  -Lines, remove cordis  -Keep chest tubes for now  -Discontinue العراقي catheter  -Keep PW for now   -GI PPX: protonix  -DVT PPX: TEDs/SCDs  -Encourage IS/ambulation   -PT/OT/Cardiac rehab   -CCU monitoring        D/W Dr.Goodwin Mya BOJORQUEZ, RN  4/6/2024  1:13 PM

## 2024-04-06 NOTE — CONSULTS
[facility]  Report of Consultation    Wagner Sheridan Patient Status:  Inpatient    1945 MRN [id number]   Location [facility] 6NE-A Attending Jason Hansen MD   Hosp Day # 2 PCP Edil Roy MD     Reason for Consultation:  CEDRICK/CKD    History of Present Illness:  Wagner Sheridan is a a(n) 78 year old male with hx of CKD- stg 3- baseline Cr ~ 1.6-1.7, HTN- hsx of hyperaldosteronism, pHTN, MR/TR who is s/p Mitral and tricuspid valve repair + MAZE procedure/L atrial appendage ampuation and PFO closure POD # 2    Prior to OR patient  had noted increased fatigue and worsening LE fabrizio a  Cr pre-op was 2.32; up to 3.17 today  Has been getting intermittent diuretics w/ modest response post-op      History:  Past Medical History:   Diagnosis Date    Arrhythmia     BPH     Encounter for incision and drainage procedure 2021    High blood pressure     High cholesterol     Hyperaldosteronism (HCC)     Mitral regurgitation     Osteoarthrosis, unspecified whether generalized or localized, unspecified site     PMR (polymyalgia rheumatica) (HCC) 2012    off corticosteroids since     Retention of urine     Shortness of breath      Past Surgical History:   Procedure Laterality Date    COLONOSCOPY N/A 2024    Procedure: COLONOSCOPY;  Surgeon: Heath Mina DO;  Location:  ENDOSCOPY    KNEE SURGERY      OTHER SURGICAL HISTORY  09    [facility], Dr. Tavera    OTHER SURGICAL HISTORY  09    [facility] Dr. Tavera    OTHER SURGICAL HISTORY  11    [facility] - Dr. Tavera    OTHER SURGICAL HISTORY  13    PVP - Dr. Tavera    OTHER SURGICAL HISTORY  2/3/16     [facility]     OTHER SURGICAL HISTORY  2020    cysto- Dr. Tavera    SKIN SURGERY  10/01/2019    MMS/R nasal ala /BCC done by MM    TONSILLECTOMY      TOTAL KNEE REPLACEMENT Right 2015    Right total knee replacement 2015    TOTAL KNEE REPLACEMENT Left 2015     Left knee total replacement 2015      Family History    Problem Relation Age of Onset    Hypertension Father     Heart Disorder Father     Hypertension Mother     Diabetes Sister       reports that he has quit smoking. His smoking use included cigarettes. He has a 2.5 pack-year smoking history. He has never used smokeless tobacco. He reports current alcohol use. He reports that he does not use drugs.    Allergies:  Allergies   Allergen Reactions    Albumin Human ANAPHYLAXIS    Ciprofloxacin HIVES    Clindamycin HIVES    Other ANAPHYLAXIS     Alcohol swab caps    Penicillins HIVES     Tolerated Ancef 4/4/24    Wasp Venom RESPIRATORY FAILURE    Wasp Venom Protein RESPIRATORY FAILURE    Benazepril Coughing    Chlorhexidine RASH and OTHER (SEE COMMENTS)     redness       Current Medications:    Current Facility-Administered Medications:     metoprolol tartrate (Lopressor) tab 25 mg, 25 mg, Oral, 2x Daily(Beta Blocker)    furosemide (Lasix) 10 mg/mL injection 40 mg, 40 mg, Intravenous, BID (Diuretic)    ipratropium-albuterol (Duoneb) 0.5-2.5 (3) MG/3ML inhalation solution 3 mL, 3 mL, Nebulization, Q4H PRN    insulin aspart (NovoLOG) 100 Units/mL FlexPen 1-5 Units, 1-5 Units, Subcutaneous, TID AC and HS    HYDROcodone-acetaminophen (Norco) 5-325 MG per tab 1 tablet, 1 tablet, Oral, Q4H PRN **OR** HYDROcodone-acetaminophen (Norco) 5-325 MG per tab 2 tablet, 2 tablet, Oral, Q4H PRN    amiodarone (Pacerone) tab 400 mg, 400 mg, Oral, TID **FOLLOWED BY** [START ON 4/8/2024] amiodarone (Pacerone) tab 400 mg, 400 mg, Oral, Daily    sodium chloride 0.9% infusion, , Intravenous, Once    midazolam (Versed) 2 MG/2ML injection 1 mg, 1 mg, Intravenous, Q5 Min PRN    dexmedeTOMIDine in sodium chloride 0.9% (Precedex) 400 mcg/100mL infusion premix, 0.2-1.5 mcg/kg/hr, Intravenous, Continuous    atorvastatin (Lipitor) tab 20 mg, 20 mg, Oral, Nightly    finasteride (Proscar) tab 5 mg, 5 mg, Oral, Nightly    sodium chloride 0.9% infusion, , Intravenous, Continuous    morphINE PF 2 MG/ML  injection 2 mg, 2 mg, Intravenous, Q2H PRN **OR** morphINE PF 4 MG/ML injection 4 mg, 4 mg, Intravenous, Q2H PRN    melatonin tab 3 mg, 3 mg, Oral, Nightly PRN    polyethylene glycol (PEG 3350) (Miralax) 17 g oral packet 17 g, 17 g, Oral, Daily PRN    sennosides (Senokot) tab 17.2 mg, 17.2 mg, Oral, Nightly PRN    bisacodyl (Dulcolax) 10 MG rectal suppository 10 mg, 10 mg, Rectal, Daily PRN    ondansetron (Zofran) 4 MG/2ML injection 4 mg, 4 mg, Intravenous, Q6H PRN    DOBUTamine in dextrose 5% (Dobutrex) 500 mg/250mL infusion premix, 2.5-20 mcg/kg/min, Intravenous, Continuous PRN    nitroGLYCERIN in dextrose 5% 50 mg/250mL infusion premix, 5-300 mcg/min, Intravenous, Continuous PRN    norepinephrine (Levophed) 4 mg/250mL infusion premix, 0.5-30 mcg/min, Intravenous, Continuous PRN    NitroPRUSSide (Nipride) 50 mg in dextrose 5% 250 mL infusion, 0.1-4 mcg/kg/min, Intravenous, Continuous PRN    potassium chloride 20 mEq/100mL IVPB premix 20 mEq, 20 mEq, Intravenous, PRN **OR** potassium chloride 40 mEq/100mL IVPB premix (central line) 40 mEq, 40 mEq, Intravenous, PRN    calcium gluconate 3 g in sodium chloride 0.9% 100 mL IVPB, 3 g, Intravenous, PRN    magnesium sulfate in dextrose 5% 1 g/100mL infusion premix 1 g, 1 g, Intravenous, PRN    magnesium sulfate in sterile water for injection 2 g/50mL IVPB premix 2 g, 2 g, Intravenous, PRN    mupirocin (Bactroban) 2% nasal ointment 1 Application, 1 Application, Nasal, BID    dextrose 5%-sodium chloride 0.45% infusion,  mL/hr, Intravenous, Continuous    pantoprazole (Protonix) 40 mg in sodium chloride 0.9% PF 10 mL IV push, 40 mg, Intravenous, QAM AC **OR** pantoprazole (Protonix) DR tab 40 mg, 40 mg, Oral, QAM AC    sodium chloride 0.9% infusion, , Intravenous, Continuous    HYDROmorphone in sodium chloride 0.9% (Dilaudid) 20 mg/100mL PCA premix, , Intravenous, Continuous    naloxone (Narcan) 0.4 MG/ML injection 0.08 mg, 0.08 mg, Intravenous, Q5 Min PRN     diphenhydrAMINE (Benadryl) 50 mg/mL  injection 12.5 mg, 12.5 mg, Intravenous, Q4H PRN    glucose (Dex4) 15 GM/59ML oral liquid 15 g, 15 g, Oral, Q15 Min PRN **OR** glucose (Glutose) 40% oral gel 15 g, 15 g, Oral, Q15 Min PRN **OR** glucose-vitamin C (Dex-4) chewable tab 4 tablet, 4 tablet, Oral, Q15 Min PRN **OR** dextrose 50% injection 50 mL, 50 mL, Intravenous, Q15 Min PRN **OR** glucose (Dex4) 15 GM/59ML oral liquid 30 g, 30 g, Oral, Q15 Min PRN **OR** glucose (Glutose) 40% oral gel 30 g, 30 g, Oral, Q15 Min PRN **OR** glucose-vitamin C (Dex-4) chewable tab 8 tablet, 8 tablet, Oral, Q15 Min PRN    EPINEPHrine (Adrenalin) 5 mg in sodium chloride 0.9% 250 mL infusion, 1-10 mcg/min, Intravenous, Continuous  Home Medications:  No current outpatient medications on file.       Review of Systems:  See HPI; A total of 12 systems reviewed and otherwise unremarkable.    Physical Exam:  Vital signs: Blood pressure 100/59, pulse 66, temperature 97.1 °F (36.2 °C), temperature source Temporal, resp. rate 20, height 6' (1.829 m), weight 287 lb 7.7 oz (130.4 kg), SpO2 93%.  General: No acute distress. Alert and oriented x 3.  HEENT: Moist mucous membranes. EOM-I. PERRL  Neck: No lymphadenopathy.  No JVD. No carotid bruits.  Respiratory: decreased breath sounds B  Cardiovascular: S1, S2. Irregular   Abdomen: Soft, nontender, nondistended.  Positive bowel sounds. No rebound tenderness    Neurologic: No focal neurological deficits.  Musculoskeletal: + 2-3+ BLE edema   Integument: No lesions. No erythema.  Psychiatric: Appropriate mood and affect.    Laboratory:  Lab Results   Component Value Date    WBC 18.1 04/06/2024    HGB 8.5 04/06/2024    HCT 27.5 04/06/2024    .0 04/06/2024    CREATSERUM 3.17 04/06/2024    BUN 56 04/06/2024     04/06/2024    K 4.8 04/06/2024     04/06/2024    CO2 27.0 04/06/2024     04/06/2024    CA 9.2 04/06/2024    PGLU 185 04/06/2024          BUN (mg/dL)   Date Value   04/06/2024  56 (H)   04/05/2024 39 (H)   04/04/2024 34 (H)     Blood Urea Nitrogen (mg/dL)   Date Value   03/10/2022 29.0 (H)   06/17/2020 24.0 (H)   06/08/2019 22.0 (H)   06/20/2016 35 (H)   02/20/2015 27   02/13/2015 33 (H)     Creatinine, Serum (mg/dL)   Date Value   06/20/2016 1.82 (H)   02/20/2015 1.56 (H)   02/13/2015 1.66 (H)     Creatinine (mg/dL)   Date Value   04/06/2024 3.17 (H)   04/05/2024 2.42 (H)   04/04/2024 2.75 (H)   03/10/2022 1.57 (H)   06/17/2020 1.76 (H)   06/08/2019 1.61 (H)         Imaging:  Reviewed    Impression:  CEDRICK/CKD- baseline Cr ~ 1.7 related to HTN/age related nephrosclerosis  CEDRICK in setting of complex CV surgery  as noted above  Non-oliguric so far  - agree w/ volume optimization w/ IV diuretics- lasix 40 bid  - monitor labs/UOP  - check urine chem  Severe MR/TR- S/p MVR/TVR w/ maze procedure/L atrial appendage amputation and PFO closure  - per CV surgery  Afib  HTN- hx of hyperaldosteronism - currently running a bit low; holding arb/MRA; on BB  - weaned off epi this am       Thank you for allowing me to participate in this patient's care.  Please feel free to call me with any questions or concerns.    Edil Celis MD  4/6/2024  2:23 PM

## 2024-04-07 ENCOUNTER — APPOINTMENT (OUTPATIENT)
Dept: GENERAL RADIOLOGY | Facility: HOSPITAL | Age: 79
End: 2024-04-07
Attending: SURGERY
Payer: MEDICARE

## 2024-04-07 LAB
ANION GAP SERPL CALC-SCNC: 9 MMOL/L (ref 0–18)
ATRIAL RATE: 326 BPM
BUN BLD-MCNC: 80 MG/DL (ref 9–23)
CALCIUM BLD-MCNC: 9.5 MG/DL (ref 8.5–10.1)
CHLORIDE SERPL-SCNC: 112 MMOL/L (ref 98–112)
CO2 SERPL-SCNC: 23 MMOL/L (ref 21–32)
CREAT BLD-MCNC: 3.66 MG/DL
EGFRCR SERPLBLD CKD-EPI 2021: 16 ML/MIN/1.73M2 (ref 60–?)
ERYTHROCYTE [DISTWIDTH] IN BLOOD BY AUTOMATED COUNT: 16.9 %
GLUCOSE BLD-MCNC: 110 MG/DL (ref 70–99)
GLUCOSE BLD-MCNC: 115 MG/DL (ref 70–99)
GLUCOSE BLD-MCNC: 136 MG/DL (ref 70–99)
GLUCOSE BLD-MCNC: 219 MG/DL (ref 70–99)
HCT VFR BLD AUTO: 29.6 %
HGB BLD-MCNC: 8.9 G/DL
MAGNESIUM SERPL-MCNC: 2.8 MG/DL (ref 1.6–2.6)
MCH RBC QN AUTO: 25.9 PG (ref 26–34)
MCHC RBC AUTO-ENTMCNC: 30.1 G/DL (ref 31–37)
MCV RBC AUTO: 86.3 FL
OSMOLALITY SERPL CALC.SUM OF ELEC: 323 MOSM/KG (ref 275–295)
PLATELET # BLD AUTO: 137 10(3)UL (ref 150–450)
POTASSIUM SERPL-SCNC: 4.3 MMOL/L (ref 3.5–5.1)
Q-T INTERVAL: 550 MS
QRS DURATION: 92 MS
QTC CALCULATION (BEZET): 521 MS
R AXIS: 7 DEGREES
RBC # BLD AUTO: 3.43 X10(6)UL
SODIUM SERPL-SCNC: 144 MMOL/L (ref 136–145)
T AXIS: 9 DEGREES
VENTRICULAR RATE: 54 BPM
WBC # BLD AUTO: 14 X10(3) UL (ref 4–11)

## 2024-04-07 PROCEDURE — 99233 SBSQ HOSP IP/OBS HIGH 50: CPT | Performed by: INTERNAL MEDICINE

## 2024-04-07 PROCEDURE — 71045 X-RAY EXAM CHEST 1 VIEW: CPT | Performed by: SURGERY

## 2024-04-07 RX ORDER — ALBUMIN (HUMAN) 12.5 G/50ML
25 SOLUTION INTRAVENOUS EVERY 12 HOURS
Status: DISCONTINUED | OUTPATIENT
Start: 2024-04-07 | End: 2024-04-07

## 2024-04-07 NOTE — PROGRESS NOTES
[facility]   [facility] Progress Note    Wagner Sheridan Patient Status:  Inpatient    1945 MRN [id number]   Location [facility] 6NE-A Attending Jason Hansen MD   Hosp Day # 3 PCP Edil Roy MD     Subjective:  Up to chair with 2 assist. Feels better today, reports minimal pain.     Objective:  /87   Pulse 54   Temp 97.2 °F (36.2 °C) (Temporal)   Resp 18   Ht 182.9 cm (6')   Wt 130.3 kg (287 lb 4.2 oz)   SpO2 96%   BMI 38.96 kg/m²          Intake/Output:    Intake/Output Summary (Last 24 hours) at 2024 1151  Last data filed at 2024 1000  Gross per 24 hour   Intake 640 ml   Output 2210 ml   Net -1570 ml       Chest Tube Output: 40 ml in 12 hours overnight/1000 in 24 hours. Had 560 ml out this morning getting up to the chair    Last 3 Weights   24 0400 130.3 kg (287 lb 4.2 oz)   24 1000 130.4 kg (287 lb 7.7 oz)   24 0500 131.4 kg (289 lb 11 oz)   24 1531 124.7 kg (275 lb)   24 0630 127.9 kg (281 lb 15.5 oz)   24 0426 125.8 kg (277 lb 4.8 oz)   24 0435 125.9 kg (277 lb 9.6 oz)   24 0438 126.4 kg (278 lb 9.6 oz)   24 0322 126.3 kg (278 lb 8 oz)   24 1456 124.7 kg (274 lb 14.6 oz)   24 1456 122.5 kg (270 lb)           Allergies:  Allergies   Allergen Reactions    Albumin Human ANAPHYLAXIS    Ciprofloxacin HIVES    Clindamycin HIVES    Other ANAPHYLAXIS     Alcohol swab caps    Penicillins HIVES     Tolerated Ancef 24    Wasp Venom RESPIRATORY FAILURE    Wasp Venom Protein RESPIRATORY FAILURE    Benazepril Coughing    Chlorhexidine RASH and OTHER (SEE COMMENTS)     redness       Current Facility-Administered Medications   Medication Dose Route Frequency    [Held by provider] metoprolol tartrate (Lopressor) tab 25 mg  25 mg Oral 2x Daily(Beta Blocker)    furosemide (Lasix) 10 mg/mL injection 40 mg  40 mg Intravenous BID (Diuretic)    ipratropium-albuterol (Duoneb) 0.5-2.5 (3) MG/3ML inhalation solution 3 mL  3 mL  Nebulization Q4H PRN    insulin aspart (NovoLOG) 100 Units/mL FlexPen 1-5 Units  1-5 Units Subcutaneous TID AC and HS    HYDROcodone-acetaminophen (Norco) 5-325 MG per tab 1 tablet  1 tablet Oral Q4H PRN    Or    HYDROcodone-acetaminophen (Norco) 5-325 MG per tab 2 tablet  2 tablet Oral Q4H PRN    [Held by provider] amiodarone (Pacerone) tab 400 mg  400 mg Oral TID    sodium chloride 0.9% infusion   Intravenous Once    midazolam (Versed) 2 MG/2ML injection 1 mg  1 mg Intravenous Q5 Min PRN    dexmedeTOMIDine in sodium chloride 0.9% (Precedex) 400 mcg/100mL infusion premix  0.2-1.5 mcg/kg/hr Intravenous Continuous    atorvastatin (Lipitor) tab 20 mg  20 mg Oral Nightly    finasteride (Proscar) tab 5 mg  5 mg Oral Nightly    sodium chloride 0.9% infusion   Intravenous Continuous    morphINE PF 2 MG/ML injection 2 mg  2 mg Intravenous Q2H PRN    Or    morphINE PF 4 MG/ML injection 4 mg  4 mg Intravenous Q2H PRN    melatonin tab 3 mg  3 mg Oral Nightly PRN    polyethylene glycol (PEG 3350) (Miralax) 17 g oral packet 17 g  17 g Oral Daily PRN    sennosides (Senokot) tab 17.2 mg  17.2 mg Oral Nightly PRN    bisacodyl (Dulcolax) 10 MG rectal suppository 10 mg  10 mg Rectal Daily PRN    ondansetron (Zofran) 4 MG/2ML injection 4 mg  4 mg Intravenous Q6H PRN    DOBUTamine in dextrose 5% (Dobutrex) 500 mg/250mL infusion premix  2.5-20 mcg/kg/min Intravenous Continuous PRN    nitroGLYCERIN in dextrose 5% 50 mg/250mL infusion premix  5-300 mcg/min Intravenous Continuous PRN    norepinephrine (Levophed) 4 mg/250mL infusion premix  0.5-30 mcg/min Intravenous Continuous PRN    NitroPRUSSide (Nipride) 50 mg in dextrose 5% 250 mL infusion  0.1-4 mcg/kg/min Intravenous Continuous PRN    potassium chloride 20 mEq/100mL IVPB premix 20 mEq  20 mEq Intravenous PRN    Or    potassium chloride 40 mEq/100mL IVPB premix (central line) 40 mEq  40 mEq Intravenous PRN    calcium gluconate 3 g in sodium chloride 0.9% 100 mL IVPB  3 g Intravenous  PRN    magnesium sulfate in dextrose 5% 1 g/100mL infusion premix 1 g  1 g Intravenous PRN    magnesium sulfate in sterile water for injection 2 g/50mL IVPB premix 2 g  2 g Intravenous PRN    mupirocin (Bactroban) 2% nasal ointment 1 Application  1 Application Nasal BID    dextrose 5%-sodium chloride 0.45% infusion   mL/hr Intravenous Continuous    pantoprazole (Protonix) 40 mg in sodium chloride 0.9% PF 10 mL IV push  40 mg Intravenous QAM AC    Or    pantoprazole (Protonix) DR tab 40 mg  40 mg Oral QAM AC    sodium chloride 0.9% infusion   Intravenous Continuous    HYDROmorphone in sodium chloride 0.9% (Dilaudid) 20 mg/100mL PCA premix   Intravenous Continuous    naloxone (Narcan) 0.4 MG/ML injection 0.08 mg  0.08 mg Intravenous Q5 Min PRN    diphenhydrAMINE (Benadryl) 50 mg/mL  injection 12.5 mg  12.5 mg Intravenous Q4H PRN    glucose (Dex4) 15 GM/59ML oral liquid 15 g  15 g Oral Q15 Min PRN    Or    glucose (Glutose) 40% oral gel 15 g  15 g Oral Q15 Min PRN    Or    glucose-vitamin C (Dex-4) chewable tab 4 tablet  4 tablet Oral Q15 Min PRN    Or    dextrose 50% injection 50 mL  50 mL Intravenous Q15 Min PRN    Or    glucose (Dex4) 15 GM/59ML oral liquid 30 g  30 g Oral Q15 Min PRN    Or    glucose (Glutose) 40% oral gel 30 g  30 g Oral Q15 Min PRN    Or    glucose-vitamin C (Dex-4) chewable tab 8 tablet  8 tablet Oral Q15 Min PRN    EPINEPHrine (Adrenalin) 5 mg in sodium chloride 0.9% 250 mL infusion  1-10 mcg/min Intravenous Continuous       Labs:  Lab Results   Component Value Date    WBC 14.0 04/07/2024    HGB 8.9 04/07/2024    HCT 29.6 04/07/2024    .0 04/07/2024    CREATSERUM 3.66 04/07/2024    BUN 80 04/07/2024     04/07/2024    K 4.3 04/07/2024     04/07/2024    CO2 23.0 04/07/2024     04/07/2024    CA 9.5 04/07/2024         Physical Exam:    Neuro: Intact, A/O x 3  Lungs: CTA  Heart: RRR, S1, S2,  now is SB/junctional, + friction rub  Abdomen: Soft, non tender, +  BS  Extremities: Warm, dry, NG, + generalized edema  Pulses: Palpable  Incisions: Sternal C/D/I        Assessment/Plan:  Patient Active Problem List   Diagnosis    Benign non-nodular prostatic hyperplasia without lower urinary tract symptoms    Hyperaldosteronism (HCC)    Essential hypertension    Acute idiopathic gout involving toe of left foot    Angioedema    Purpura (HCC)    Chronic renal disease, stage III (HCC)    Aortic atherosclerosis (HCC)    Community acquired pneumonia, unspecified laterality    Anemia    Mitral valve insufficiency    CEDRICK (acute kidney injury) (HCC)    PFO (patent foramen ovale) (HCC)    Tricuspid valve insufficiency       POD#  3 S/P MV repair with a 36mm ring, TV repair with a 28mm ring, BECCA amputaiton, PFO closure, and MAZE procedure  -HD stable, off support  -PAF, currently SB vs junctional in the 50s, UGI bleed while on DOAC, cardiology following   -Leukocytosis, likely reactive afebrile- monitor   -Acute post op blood loss anemia, expected- monitor   -TCP, likely 2/2 consumption- monitor   -Volume OL, continue lasix    -CKD, Cr 3.17, UOP starting to decrease- monitor, renal following   -Bowel regimen   -De-lined yesterday  -Keep chest tubes for now  -[place] catheter removed, retention, straight cath'd overnight  -Keep PW for now   -GI PPX: protonix  -DVT PPX: TEDs/SCDs  -Encourage IS/ambulation   -PT/OT/Cardiac rehab   -CCU monitoring        D/W Dr. Naomi BOJORQUEZ, RN  4/7/2024  11:51 AM

## 2024-04-07 NOTE — PROGRESS NOTES
[facility]  Proress Note     Wagner Sheridan Patient Status:  Inpatient    1945 MRN [id number]   Location [facility] 6NE-A Attending Jason Hasnen MD   Hosp Day # 3 PCP Edil Roy MD     Chart reviewed  Pt denies any breathing difficulties  No n/v  Had straight cath overnight     Current Medications:    Current Facility-Administered Medications:     metoprolol tartrate (Lopressor) tab 25 mg, 25 mg, Oral, 2x Daily(Beta Blocker)    furosemide (Lasix) 10 mg/mL injection 40 mg, 40 mg, Intravenous, BID (Diuretic)    ipratropium-albuterol (Duoneb) 0.5-2.5 (3) MG/3ML inhalation solution 3 mL, 3 mL, Nebulization, Q4H PRN    insulin aspart (NovoLOG) 100 Units/mL FlexPen 1-5 Units, 1-5 Units, Subcutaneous, TID AC and HS    HYDROcodone-acetaminophen (Norco) 5-325 MG per tab 1 tablet, 1 tablet, Oral, Q4H PRN **OR** HYDROcodone-acetaminophen (Norco) 5-325 MG per tab 2 tablet, 2 tablet, Oral, Q4H PRN    amiodarone (Pacerone) tab 400 mg, 400 mg, Oral, TID **FOLLOWED BY** [START ON 2024] amiodarone (Pacerone) tab 400 mg, 400 mg, Oral, Daily    sodium chloride 0.9% infusion, , Intravenous, Once    midazolam (Versed) 2 MG/2ML injection 1 mg, 1 mg, Intravenous, Q5 Min PRN    dexmedeTOMIDine in sodium chloride 0.9% (Precedex) 400 mcg/100mL infusion premix, 0.2-1.5 mcg/kg/hr, Intravenous, Continuous    atorvastatin (Lipitor) tab 20 mg, 20 mg, Oral, Nightly    finasteride (Proscar) tab 5 mg, 5 mg, Oral, Nightly    sodium chloride 0.9% infusion, , Intravenous, Continuous    morphINE PF 2 MG/ML injection 2 mg, 2 mg, Intravenous, Q2H PRN **OR** morphINE PF 4 MG/ML injection 4 mg, 4 mg, Intravenous, Q2H PRN    melatonin tab 3 mg, 3 mg, Oral, Nightly PRN    polyethylene glycol (PEG 3350) (Miralax) 17 g oral packet 17 g, 17 g, Oral, Daily PRN    sennosides (Senokot) tab 17.2 mg, 17.2 mg, Oral, Nightly PRN    bisacodyl (Dulcolax) 10 MG rectal suppository 10 mg, 10 mg, Rectal, Daily PRN    ondansetron (Zofran) 4 MG/2ML  injection 4 mg, 4 mg, Intravenous, Q6H PRN    DOBUTamine in dextrose 5% (Dobutrex) 500 mg/250mL infusion premix, 2.5-20 mcg/kg/min, Intravenous, Continuous PRN    nitroGLYCERIN in dextrose 5% 50 mg/250mL infusion premix, 5-300 mcg/min, Intravenous, Continuous PRN    norepinephrine (Levophed) 4 mg/250mL infusion premix, 0.5-30 mcg/min, Intravenous, Continuous PRN    NitroPRUSSide (Nipride) 50 mg in dextrose 5% 250 mL infusion, 0.1-4 mcg/kg/min, Intravenous, Continuous PRN    potassium chloride 20 mEq/100mL IVPB premix 20 mEq, 20 mEq, Intravenous, PRN **OR** potassium chloride 40 mEq/100mL IVPB premix (central line) 40 mEq, 40 mEq, Intravenous, PRN    calcium gluconate 3 g in sodium chloride 0.9% 100 mL IVPB, 3 g, Intravenous, PRN    magnesium sulfate in dextrose 5% 1 g/100mL infusion premix 1 g, 1 g, Intravenous, PRN    magnesium sulfate in sterile water for injection 2 g/50mL IVPB premix 2 g, 2 g, Intravenous, PRN    mupirocin (Bactroban) 2% nasal ointment 1 Application, 1 Application, Nasal, BID    dextrose 5%-sodium chloride 0.45% infusion,  mL/hr, Intravenous, Continuous    pantoprazole (Protonix) 40 mg in sodium chloride 0.9% PF 10 mL IV push, 40 mg, Intravenous, QAM AC **OR** pantoprazole (Protonix) DR tab 40 mg, 40 mg, Oral, QAM AC    sodium chloride 0.9% infusion, , Intravenous, Continuous    HYDROmorphone in sodium chloride 0.9% (Dilaudid) 20 mg/100mL PCA premix, , Intravenous, Continuous    naloxone (Narcan) 0.4 MG/ML injection 0.08 mg, 0.08 mg, Intravenous, Q5 Min PRN    diphenhydrAMINE (Benadryl) 50 mg/mL  injection 12.5 mg, 12.5 mg, Intravenous, Q4H PRN    glucose (Dex4) 15 GM/59ML oral liquid 15 g, 15 g, Oral, Q15 Min PRN **OR** glucose (Glutose) 40% oral gel 15 g, 15 g, Oral, Q15 Min PRN **OR** glucose-vitamin C (Dex-4) chewable tab 4 tablet, 4 tablet, Oral, Q15 Min PRN **OR** dextrose 50% injection 50 mL, 50 mL, Intravenous, Q15 Min PRN **OR** glucose (Dex4) 15 GM/59ML oral liquid 30 g, 30 g,  Oral, Q15 Min PRN **OR** glucose (Glutose) 40% oral gel 30 g, 30 g, Oral, Q15 Min PRN **OR** glucose-vitamin C (Dex-4) chewable tab 8 tablet, 8 tablet, Oral, Q15 Min PRN    EPINEPHrine (Adrenalin) 5 mg in sodium chloride 0.9% 250 mL infusion, 1-10 mcg/min, Intravenous, Continuous  Home Medications:  No current outpatient medications on file.       Review of Systems:  See HPI; A total of 12 systems reviewed and otherwise unremarkable.    Physical Exam:  Vital signs: Blood pressure (!) 129/118, pulse 63, temperature 97.2 °F (36.2 °C), temperature source Temporal, resp. rate 20, height 6' (1.829 m), weight 287 lb 4.2 oz (130.3 kg), SpO2 96%.  General: No acute distress. Alert and oriented x 3.  HEENT: Moist mucous membranes. EOM-I. PERRL  Neck: No lymphadenopathy.  No JVD. No carotid bruits.  Respiratory: decreased breath sounds B  Cardiovascular: S1, S2. Irregular   Abdomen: Soft, nontender, nondistended.  Positive bowel sounds. No rebound tenderness    Neurologic: No focal neurological deficits.  Musculoskeletal: + 2-3+ BLE edema ; scrotal edema   Integument: No lesions. No erythema.  Psychiatric: Appropriate mood and affect.    Recent Labs     04/04/24  1341 04/05/24 0417 04/06/24 0411 04/07/24  0451   WBC 11.8* 16.4* 18.1* 14.0*   HGB 9.7* 9.0* 8.5* 8.9*   MCV 86.0 83.5 86.2 86.3   .0 147.0* 130.0* 137.0*   INR 1.41*  --   --   --        Recent Labs     04/04/24  1341 04/04/24  1742 04/05/24 0417 04/06/24  0411 04/07/24  0451   *  --  148* 145 144   K 2.8* 2.9* 3.9 4.8 4.3   *  --  116* 113* 112   CO2 25.0  --  27.0 27.0 23.0   BUN 34*  --  39* 56* 80*   CREATSERUM 2.75*  --  2.42* 3.17* 3.66*   CA 9.2  --  9.3 9.2 9.5   MG 2.7*  --  2.4  --  2.8*       No results for input(s): \"ALT\", \"AST\", \"ALB\", \"AMYLASE\", \"LIPASE\", \"LDH\" in the last 72 hours.    Invalid input(s): \"ALPHOS\", \"TBIL\", \"DBIL\", \"TPROT\"    Urine Na <5; urine cr 119      Imaging:  Reviewed    Impression:  CEDRICK/CKD- baseline Cr ~  1.7 related to HTN/age related nephrosclerosis  CEDRICK in setting of complex CV surgery  Urine chem c/w with decreased renal perfusion Shalini<5  Non-oliguric so far  - agree w/ volume optimization w/ IV diuretics- add diuril again today; may need to consider a gtt  - monitor labs/UOP  Severe MR/TR- S/p MVR/TVR w/ maze procedure/L atrial appendage amputation and PFO closure  - per CV surgery  Afib  HTN- hx of hyperaldosteronism -   holding arb/MRA; on BB  Anemia-monitor          Thank you for allowing me to participate in this patient's care.  Please feel free to call me with any questions or concerns.    Edil Celis MD  4/7/2024

## 2024-04-07 NOTE — PROGRESS NOTES
[facility] note    PCP: Edil Roy MD    Chief Complaint:  F/u s/p mitral valve repair    SUBJECTIVE:  Had been having urinary retention but was able to urinate this morning.  Otherwise denies sob, pain.    OBJECTIVE:  Temp:  [97.1 °F (36.2 °C)-98.3 °F (36.8 °C)] 97.2 °F (36.2 °C)  Pulse:  [54-83] 54  Resp:  [12-22] 20  BP: ()/() 103/87  SpO2:  [90 %-97 %] 96 %    Intake/Output:    Intake/Output Summary (Last 24 hours) at 4/7/2024 1012  Last data filed at 4/7/2024 1000  Gross per 24 hour   Intake 640 ml   Output 2160 ml   Net -1520 ml       Last 3 Weights   04/07/24 0400 287 lb 4.2 oz (130.3 kg)   04/06/24 1000 287 lb 7.7 oz (130.4 kg)   04/05/24 0500 289 lb 11 oz (131.4 kg)   02/20/24 1531 275 lb (124.7 kg)   02/28/24 0630 281 lb 15.5 oz (127.9 kg)   02/27/24 0426 277 lb 4.8 oz (125.8 kg)   02/26/24 0435 277 lb 9.6 oz (125.9 kg)   02/25/24 0438 278 lb 9.6 oz (126.4 kg)   02/24/24 0322 278 lb 8 oz (126.3 kg)   02/23/24 1456 274 lb 14.6 oz (124.7 kg)   02/07/24 1456 270 lb (122.5 kg)       Exam  Gen: No acute distress  HEENT: anicteric sclera, MMM  Pulm: Lungs clear, normal respiratory effort  CV: Heart with regular rate and rhythm, edematous throughout  Abd: Abdomen soft, nontender, nondistended, no organomegaly, bowel sounds present  Neuro: A&OX3, no focal deficits    Data Review:         Labs:     Recent Labs   Lab 04/01/24  1002 04/04/24  1341 04/05/24  0417 04/06/24  0411 04/07/24  0451   WBC 5.0 11.8* 16.4* 18.1* 14.0*   HGB 10.8* 9.7* 9.0* 8.5* 8.9*   MCV 89.1 86.0 83.5 86.2 86.3   .0 158.0 147.0* 130.0* 137.0*   NE 2.88  --  14.83*  --   --    LYMABS 1.23  --  0.44*  --   --    INR 1.15 1.41*  --   --   --        Recent Labs   Lab 04/01/24  1002 04/04/24  1341 04/04/24  1742 04/05/24  0417 04/06/24  0411 04/07/24  0451    150*  --  148* 145 144   K 3.9 2.8* 2.9* 3.9 4.8 4.3    114*  --  116* 113* 112   CO2 29.0 25.0  --  27.0 27.0 23.0   BUN 33* 34*  --  39* 56* 80*    CREATSERUM 2.32* 2.75*  --  2.42* 3.17* 3.66*   CA 10.2* 9.2  --  9.3 9.2 9.5   MG  --  2.7*  --  2.4  --  2.8*   * 137*  --  98 145* 115*       Recent Labs   Lab 04/01/24  1002   ALT 18   AST 15   ALB 3.5       No results for input(s): \"TROP\", \"CK\", \"PBNP\", \"PCT\" in the last 168 hours.    No results for input(s): \"CRP\", \"CIERRA\", \"LDH\", \"DDIMER\" in the last 168 hours.    Recent Labs   Lab 04/06/24  0643 04/06/24  1119 04/06/24  1647 04/06/24  2049 04/07/24  0747   PGLU 133* 185* 122* 158* 110*       Meds:   Scheduled Medication:   metoprolol tartrate  25 mg Oral 2x Daily(Beta Blocker)    furosemide  40 mg Intravenous BID (Diuretic)    insulin aspart  1-5 Units Subcutaneous TID AC and HS    amiodarone  400 mg Oral TID    Followed by    [START ON 4/8/2024] amiodarone  400 mg Oral Daily    sodium chloride   Intravenous Once    atorvastatin  20 mg Oral Nightly    finasteride  5 mg Oral Nightly    mupirocin  1 Application Nasal BID    pantoprazole  40 mg Intravenous QAM AC    Or    pantoprazole  40 mg Oral QAM AC     Continuous Infusing Medication:   dexmedetomidine Stopped (04/05/24 0300)    sodium chloride Stopped (04/06/24 0800)    DOBUTamine Stopped (04/05/24 0017)    nitroGLYCERIN in dextrose 5%      norepinephrine      NitroPRUSSide (Nipride) 50 mg in dextrose 5% 250 mL infusion      dextrose 5%-sodium chloride 0.45% Stopped (04/06/24 0800)    sodium chloride      HYDROmorphone in sodium chloride 0.9% Stopped (04/06/24 1644)    EPINEPHrine (Adrenalin) 5 mg in sodium chloride 0.9% 250 mL infusion Stopped (04/06/24 0240)     PRN Medication:ipratropium-albuterol, HYDROcodone-acetaminophen **OR** HYDROcodone-acetaminophen, midazolam, morphINE **OR** morphINE, melatonin, polyethylene glycol (PEG 3350), sennosides, bisacodyl, ondansetron, DOBUTamine, nitroGLYCERIN in dextrose 5%, norepinephrine, NitroPRUSSide (Nipride) 50 mg in dextrose 5% 250 mL infusion, potassium chloride **OR** potassium chloride, calcium  gluconate, magnesium sulfate in dextrose 5%, magnesium sulfate in sterile water for injection, naloxone, diphenhydrAMINE, glucose **OR** glucose **OR** glucose-vitamin C **OR** dextrose **OR** glucose **OR** glucose **OR** glucose-vitamin C       Microbiology:    No results found for this visit on 04/04/24.    Lab Results   Component Value Date    COVID19 Not Detected 04/01/2024    COVID19 Not Detected 01/09/2024    COVID19 Not Detected 08/04/2022        Assessment/Plan:     78-year-old male significant past medical history CKD stage III, severe MR, paroxysmal A-fib, BPH, hypertension, hyperaldosteronism, hyperlipidemia, pulmonary hypertension, moderate tricuspid regurgitation a is status post mitral valve repair, tricuspid valve repair, maze procedure and left atrial appendage amputation and PFO closure     # Severe MR s/p mitral valve repair, tricuspid valve repair, maze procedure and left atrial appendage amputation and PFO closure  -post op care per CV surgery- chest tube in place  -cards following  - iv diuresis     #afib  -has had some PAF, started on amio and has been on BB however currently more junctional alvino  -reviewed with cardiology and may need to stop amio, they will discuss with cv surg.  -monitor     #CEDRICK CKD 3; hypernatremia  -Na improved but Cr worsening and uop declining. Edematous  -IV lasix ordered,nephrology managing.     #HTN   #BPH  -resume finasteride     #postop hyperglycemia- ssi    Romina Alegria MD  [facility]  Pager: [phone]

## 2024-04-07 NOTE — PROGRESS NOTES
[facility] Consultation    Wagner Sheridan Patient Status:  Inpatient    1945 MRN [id number]   [facility] 6NE-A Attending Jason Hansen MD   Hosp Day # 3 PCP Edil Roy MD     Reason for Consultation:  post-op cardiology mgt      History of Present Illness: Wagner Sheridan is a a(n) 78 year old male.  He has hx of Dyslipidemia , Hypertension , CKD. Presented with resp distress in setting of covid infection, 2024.  Atrial Fibrillation occurred and he was started on eliquis.  His breathing is fairly comfortable with lasix.  TTE and STAN demonstrate severe MR with flail P3 scallop of MV and moderate TR.  Cath 24 showed:     -normal left ventricular size and systolic function with an ejection fraction of approximately 60% to 65%.     - right atrial mean pressure is 14, RV 48/8, PA 50/22 with a mean of 35, mean wedge pressure 24 with a V-wave of 40, /22, /67, mean of 89. Sonam cardiac output was 8.1     -The left main segment to the left coronary artery is patulous and ectatic without stenosis. The left circumflex artery is relatively large. It gives rise to a large obtuse marginal artery which travels all the way to the apex and then to some extent up the interventricular groove, supplying a portion of the distal anterior wall. It also gives rise to a left posterior descending artery, a small second obtuse marginal artery, and a posterior left ventricular branch. The left circumflex artery system shows minor nonobstructive plaquing. The LAD is normal caliber proximally, but then becomes quite diminutive in its middle one-third and does not reach the apex, as described above. The distal half of this LAD is severely diffusely diseased with areas of narrowing of up to 90%       He was admitted with anemia, 2024.  GI eval revealed: EGD, 24: moderate diffuse gastritis. 2 small ulcers  C-scope: 2 polyps removed    elective MITRAL VALVE REPAIR, TRICUSPID VALVE REPAIR, MAZE  PROCEDURE, LEFT ATRIAL APPENDAGE AMPUTATION, PATENT FORAMEN OVALE CLOSURE.     Extubated awake alert oriented  Pain is controlled no shortness of breath    History:  Past Medical History:   Diagnosis Date    Arrhythmia     BPH     Encounter for incision and drainage procedure 07/02/2021    High blood pressure     High cholesterol     Hyperaldosteronism (HCC)     Mitral regurgitation     Osteoarthrosis, unspecified whether generalized or localized, unspecified site     PMR (polymyalgia rheumatica) (HCC) 2012    off corticosteroids since 2013    Retention of urine     Shortness of breath      Past Surgical History:   Procedure Laterality Date    COLONOSCOPY N/A 2/27/2024    Procedure: COLONOSCOPY;  Surgeon: Heath Mina DO;  Location:  ENDOSCOPY    KNEE SURGERY      OTHER SURGICAL HISTORY  2/27/09    flow , Dr. Tavera    OTHER SURGICAL HISTORY  9/4/09    flow  Dr. Tavera    OTHER SURGICAL HISTORY  1/4/11    PNB - Dr. Tavera    OTHER SURGICAL HISTORY  1/17/13    PVP - Dr. Tavera    OTHER SURGICAL HISTORY  2/3/16     Flow      OTHER SURGICAL HISTORY  08/05/2020    cysto- Dr. Tavera    SKIN SURGERY  10/01/2019    MMS/R nasal ala /BCC done by MM    TONSILLECTOMY      TOTAL KNEE REPLACEMENT Right 02/25/2015    Right total knee replacement 02/25/2015    TOTAL KNEE REPLACEMENT Left 05/11/2015     Left knee total replacement 05/11/2015      Family History   Problem Relation Age of Onset    Hypertension Father     Heart Disorder Father     Hypertension Mother     Diabetes Sister          Allergies:  Allergies   Allergen Reactions    Albumin Human ANAPHYLAXIS    Ciprofloxacin HIVES    Clindamycin HIVES    Other ANAPHYLAXIS     Alcohol swab caps    Penicillins HIVES     Tolerated Ancef 4/4/24    Wasp Venom RESPIRATORY FAILURE    Wasp Venom Protein RESPIRATORY FAILURE    Benazepril Coughing    Chlorhexidine RASH and OTHER (SEE COMMENTS)     redness       Medications:   [Held by provider] metoprolol tartrate  25  mg Oral 2x Daily(Beta Blocker)    furosemide  40 mg Intravenous BID (Diuretic)    insulin aspart  1-5 Units Subcutaneous TID AC and HS    amiodarone  400 mg Oral TID    Followed by    [START ON 4/8/2024] amiodarone  400 mg Oral Daily    sodium chloride   Intravenous Once    atorvastatin  20 mg Oral Nightly    finasteride  5 mg Oral Nightly    mupirocin  1 Application Nasal BID    pantoprazole  40 mg Intravenous QAM AC    Or    pantoprazole  40 mg Oral QAM AC       Continuous Infusions:   dexmedetomidine Stopped (04/05/24 0300)    sodium chloride Stopped (04/06/24 0800)    DOBUTamine Stopped (04/05/24 0017)    nitroGLYCERIN in dextrose 5%      norepinephrine      NitroPRUSSide (Nipride) 50 mg in dextrose 5% 250 mL infusion      dextrose 5%-sodium chloride 0.45% Stopped (04/06/24 0800)    sodium chloride      HYDROmorphone in sodium chloride 0.9% Stopped (04/06/24 1644)    EPINEPHrine (Adrenalin) 5 mg in sodium chloride 0.9% 250 mL infusion Stopped (04/06/24 0240)       Social History:   reports that he has quit smoking. His smoking use included cigarettes. He has a 2.5 pack-year smoking history. He has never used smokeless tobacco. He reports current alcohol use. He reports that he does not use drugs.    Review of Systems:  All systems were reviewed and are negative except as described above in HPI.    Physical Exam:      Wt Readings from Last 3 Encounters:   04/07/24 287 lb 4.2 oz (130.3 kg)   02/28/24 281 lb 15.5 oz (127.9 kg)   02/07/24 270 lb (122.5 kg)       Vitals:    04/07/24 0700 04/07/24 0800 04/07/24 0900 04/07/24 1000   BP: 108/61 (!) 129/118 100/56 103/87   BP Location:  Right arm     Pulse: 54 63 54 54   Resp:  20  18   Temp:  97.2 °F (36.2 °C)     TempSrc:  Temporal     SpO2: 95% 96% 97% 96%   Weight:       Height:           Temp:  [97.1 °F (36.2 °C)-98.3 °F (36.8 °C)] 97.2 °F (36.2 °C)  Pulse:  [54-83] 54  Resp:  [12-22] 18  BP: ()/() 103/87  SpO2:  [90 %-97 %] 96 %    Temp: 97.2 °F (36.2  °C)  Pulse: 54  Resp: 18  BP: 103/87      General:   intubated  HEENT: No focal deficits.  Neck: No JVD, carotids 2+ no bruits.  Cardiac: Regular S1S2.  No S3, S4, rub, click.  No murmur.  Lungs: Clear to auscultation and percussion.  Abdomen: Soft, non-tender.   Extremities: No LE edema.  No clubbing or cyanosis  Neurologic:  sedated.  Skin: Warm and dry.     Labs:      HEM:  Recent Labs   Lab 04/01/24  1002 04/04/24  1341 04/05/24  0417 04/06/24  0411 04/07/24  0451   WBC 5.0 11.8* 16.4* 18.1* 14.0*   HGB 10.8* 9.7* 9.0* 8.5* 8.9*   HCT 35.1* 30.7* 27.8* 27.5* 29.6*   .0 158.0 147.0* 130.0* 137.0*       Chem:  Recent Labs   Lab 04/01/24  1002 04/04/24  1341 04/04/24  1742 04/05/24 0417 04/06/24 0411 04/07/24  0451    150*  --  148* 145 144   K 3.9 2.8* 2.9* 3.9 4.8 4.3    114*  --  116* 113* 112   CO2 29.0 25.0  --  27.0 27.0 23.0   BUN 33* 34*  --  39* 56* 80*   CREATSERUM 2.32* 2.75*  --  2.42* 3.17* 3.66*   MG  --  2.7*  --  2.4  --  2.8*   ALT 18  --   --   --   --   --    AST 15  --   --   --   --   --    ALB 3.5  --   --   --   --   --        Recent Labs   Lab 04/01/24  1002 04/04/24  1341   INR 1.15 1.41*                     Lab Results   Component Value Date    TROP < 0.046 12/27/2012         Invalid input(s): \"PBNPML\"                 Telemetry: NSR    Laboratories and Data:  Diagnostics:    EKG, 4/4/2024:  NSR, FAV, prolonged QT    CXR, 4/4/2024:      STAN, 1/2024:      CONCLUSIONS:   1. There is flail of the P3 scallop of the mitral valve. The mitral valve has severe, 4+ eccentric (laterally directed) regurgitation.   2. The tricuspid valve has annular enlargement, with central malcoaptation. The tricuspid valve has moderate, centrally directed, regurgitation.  3. The left ventricle appears normal in size, thickness, and systolic function. The estimated left ventricular ejection fraction is 60%  4. A patent foramen ovale is present (tunnel length 34 mm). There is left-to-right  shunting across the patent foramen ovale.      Impression:    1.  Severe MR with marked pulm Hypertension . 2/2 flail P3 scallop of MV, and moderate TR,  s/p MITRAL VALVE REPAIR, TRICUSPID VALVE REPAIR, MAZE PROCEDURE, LEFT ATRIAL APPENDAGE AMPUTATION, PATENT FORAMEN OVALE CLOSURE.  4/4/24.      -PAS = 37 mm Hg  -CO = 7 l/min        2. Atrial Fibrillation.  Started 1/2024 in setting of covid infection.  Echo then showed severe MR.  STAN shows flail P3 leaflet of MV and severe MR    -s/p MAZE, AAP amputation      3. Hypertension     4. CKD,  Cr = 2.3 pre-op    5. Dyslipidemia     6. UGI bleeding while on DOAC, 2/2024      Recommend:    -Postop mitral valve repair tricuspid valve repair  Pain controlled  Anticipate repeat echo prior to discharge    -Paroxysmal atrial fibrillation.  Initially sinus now back in A-fib 4/6  Amiodarone started per surgical team  Avoid additional QT prolonging meds  Review of telemetry and EKG very low amplitude P wave  There is increase in the QT interval--will hold amiodarone and reconsider dosing   Daily EKGs  Rate is controlled  Anticoagulate once bleed risk allows    -Weaned off epinephrine beta-blocker started    -Diuresed with IV Lasix  Watch renal function    -Hyperlipidemia started statin    Up in chair ambulate as tolerated  Progressing

## 2024-04-07 NOTE — PLAN OF CARE
Received patient this AM around 0730.  Currently SB on monitors. SBP maintaind .  Remains on RA.  Up to chair with 3x assist. Chest tube dump 560cc.  Able to ambulate short distances in room.  Chandana replaced per BRIAN Han due to continued retention.  Denies pain.  Family at bedside.

## 2024-04-08 ENCOUNTER — APPOINTMENT (OUTPATIENT)
Dept: GENERAL RADIOLOGY | Facility: HOSPITAL | Age: 79
End: 2024-04-08
Attending: THORACIC SURGERY (CARDIOTHORACIC VASCULAR SURGERY)
Payer: MEDICARE

## 2024-04-08 PROBLEM — N17.0 ATN (ACUTE TUBULAR NECROSIS): Status: ACTIVE | Noted: 2024-04-08

## 2024-04-08 PROBLEM — N17.0 ATN (ACUTE TUBULAR NECROSIS) (HCC): Status: ACTIVE | Noted: 2024-04-08

## 2024-04-08 LAB
ANION GAP SERPL CALC-SCNC: 6 MMOL/L (ref 0–18)
BASE EXCESS BLDA CALC-SCNC: 4 MMOL/L (ref ?–30)
BASE EXCESS BLDA CALC-SCNC: 6 MMOL/L (ref ?–30)
BUN BLD-MCNC: 89 MG/DL (ref 9–23)
CA-I BLDA-SCNC: 1.22 MMOL/L (ref 1.12–1.32)
CA-I BLDA-SCNC: 1.42 MMOL/L (ref 1.12–1.32)
CALCIUM BLD-MCNC: 9.3 MG/DL (ref 8.5–10.1)
CHLORIDE SERPL-SCNC: 111 MMOL/L (ref 98–112)
CO2 BLDA-SCNC: 30 MMOL/L (ref 22–32)
CO2 BLDA-SCNC: 31 MMOL/L (ref 22–32)
CO2 SERPL-SCNC: 28 MMOL/L (ref 21–32)
CREAT BLD-MCNC: 3.66 MG/DL
EGFRCR SERPLBLD CKD-EPI 2021: 16 ML/MIN/1.73M2 (ref 60–?)
ERYTHROCYTE [DISTWIDTH] IN BLOOD BY AUTOMATED COUNT: 16.9 %
GLUCOSE BLD-MCNC: 112 MG/DL (ref 70–99)
GLUCOSE BLD-MCNC: 116 MG/DL (ref 70–99)
GLUCOSE BLD-MCNC: 120 MG/DL (ref 70–99)
GLUCOSE BLD-MCNC: 136 MG/DL (ref 70–99)
GLUCOSE BLD-MCNC: 147 MG/DL (ref 70–99)
GLUCOSE BLDA-MCNC: 120 MG/DL (ref 70–99)
GLUCOSE BLDA-MCNC: 145 MG/DL (ref 70–99)
HCO3 BLDA-SCNC: 28.8 MEQ/L (ref 22–26)
HCO3 BLDA-SCNC: 29.4 MEQ/L (ref 22–26)
HCT VFR BLD AUTO: 27.6 %
HCT VFR BLDA CALC: 23 %
HCT VFR BLDA CALC: 26 %
HGB BLD-MCNC: 8.6 G/DL
ISTAT ACTIVATED CLOTTING TIME: 142 SECONDS (ref 74–137)
ISTAT ACTIVATED CLOTTING TIME: 460 SECONDS (ref 74–137)
ISTAT ACTIVATED CLOTTING TIME: 542 SECONDS (ref 74–137)
ISTAT ACTIVATED CLOTTING TIME: 687 SECONDS (ref 74–137)
ISTAT PATIENT TEMPERATURE: 30 DEGREE
ISTAT PATIENT TEMPERATURE: 37 DEGREE
MCH RBC QN AUTO: 26.4 PG (ref 26–34)
MCHC RBC AUTO-ENTMCNC: 31.2 G/DL (ref 31–37)
MCV RBC AUTO: 84.7 FL
OSMOLALITY SERPL CALC.SUM OF ELEC: 328 MOSM/KG (ref 275–295)
PCO2 BLDA: 33.6 MMHG (ref 35–45)
PCO2 BLDA: 38.4 MMHG (ref 35–45)
PH BLDA: 7.49 [PH] (ref 7.35–7.45)
PH BLDA: 7.51 [PH] (ref 7.35–7.45)
PLATELET # BLD AUTO: 138 10(3)UL (ref 150–450)
PO2 BLDA: 377 MMHG (ref 80–105)
PO2 BLDA: 389 MMHG (ref 80–105)
POTASSIUM SERPL-SCNC: 3.9 MMOL/L (ref 3.5–5.1)
RBC # BLD AUTO: 3.26 X10(6)UL
SAO2 % BLDA: 100 % (ref 92–100)
SAO2 % BLDA: 100 % (ref 92–100)
SODIUM BLDA-SCNC: 143 MMOL/L (ref 136–145)
SODIUM BLDA-SCNC: 143 MMOL/L (ref 136–145)
SODIUM BLDA-SCNC: 2.9 MMOL/L (ref 3.6–5.1)
SODIUM BLDA-SCNC: 4.9 MMOL/L (ref 3.6–5.1)
SODIUM SERPL-SCNC: 145 MMOL/L (ref 136–145)
WBC # BLD AUTO: 10.1 X10(3) UL (ref 4–11)

## 2024-04-08 PROCEDURE — 99233 SBSQ HOSP IP/OBS HIGH 50: CPT | Performed by: INTERNAL MEDICINE

## 2024-04-08 PROCEDURE — 71045 X-RAY EXAM CHEST 1 VIEW: CPT | Performed by: THORACIC SURGERY (CARDIOTHORACIC VASCULAR SURGERY)

## 2024-04-08 RX ORDER — ACETAMINOPHEN 500 MG
500 TABLET ORAL EVERY 6 HOURS PRN
Status: DISCONTINUED | OUTPATIENT
Start: 2024-04-08 | End: 2024-04-12

## 2024-04-08 RX ORDER — ACETAMINOPHEN 500 MG
1000 TABLET ORAL EVERY 6 HOURS PRN
Status: DISCONTINUED | OUTPATIENT
Start: 2024-04-08 | End: 2024-04-12

## 2024-04-08 NOTE — PROGRESS NOTES
Assumed care of pt at approximately 1930. VSS, SB on tele. RA while awake, 2L NC needed while sleeping. Chest tube and العراقي intact, see outputs. 90cc dump when getting up to chair in morning. See flowsheets for full assessments.

## 2024-04-08 NOTE — PHYSICAL THERAPY NOTE
PHYSICAL THERAPY TREATMENT NOTE - INPATIENT    Room Number: 6607/6607-A     Session: 2     Number of Visits to Meet Established Goals: 5  History related to current admission: Patient is a 78 year old male admitted on 4/4/2024 : S/p planned status post mitral valve repair, tricuspid valve repair, maze procedure and left atrial appendage amputation and PFO closure 4/4     Recent Admit  2/23-2/28/24: anemia, not seen by therapy  1/9-1/11/24: PNA > home        HOME SITUATION  [facility])   Home Layout: One level  Stairs to Enter : 1     Lives With: Spouse  Drives: Yes  Patient Owned Equipment: Rolling walker  Patient Regularly Uses: Glasses     Prior Level of [place]: indep, reports hx of 2 falls in the past 6 months - tripped over a rug, 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    Patient continues to function below baseline with transfers and gait.  Contributing factors to remaining limitations include decreased functional strength, decreased endurance/aerobic capacity, and decreased muscular endurance.  Next session anticipate patient to progress gait and stair negotiation.  Physical Therapy will continue to follow patient for duration of hospitalization.    Patient continues to benefit from continued skilled PT services: at discharge to promote functional independence and safety with additional support and return home with home health PT.    PLAN  PT Treatment Plan: Bed mobility;Body mechanics;Coordination;Endurance;Energy conservation;Patient education;Family education;Gait training;Range of motion;Neuromuscular re-educate;Strengthening;Transfer training;Balance training;Stair  training  Rehab Potential : Good  Frequency (Obs): 3-5x/week    CURRENT GOALS     Goal #1 Patient is able to demonstrate supine - sit EOB @ level: supervision      Goal #2 Patient is able to demonstrate transfers EOB to/from Chair/Wheelchair at assistance level: supervision      Goal #3 Patient is able to ambulate 150 feet with assist device: walker - rolling at assistance level: supervision      Goal #4 Patient will navigate 1 step with rail and SBA   Goal #5     Goal #6     Goal Comments: Goals established on 2024 all goals ongoing    SUBJECTIVE  \" I feel better\"     OBJECTIVE  Precautions: Sternal;Cardiac;Chest tube    WEIGHT BEARING RESTRICTION  Weight Bearing Restriction: None                PAIN ASSESSMENT   Ratin  Location: pt denies pain       BALANCE                                                                                                                       Static Sitting: Good  Dynamic Sitting: Good           Static Standing: Fair -  Dynamic Standing: Fair -    ACTIVITY TOLERANCE                         O2 WALK         AM-PAC '6-Clicks' INPATIENT SHORT FORM - BASIC MOBILITY  How much difficulty does the patient currently have...  Patient Difficulty: Turning over in bed (including adjusting bedclothes, sheets and blankets)?: None   Patient Difficulty: Sitting down on and standing up from a chair with arms (e.g., wheelchair, bedside commode, etc.): A Little   Patient Difficulty: Moving from lying on back to sitting on the side of the bed?: A Little   How much help from another person does the patient currently need...   Help from Another: Moving to and from a bed to a chair (including a wheelchair)?: A Little   Help from Another: Need to walk in hospital room?: A Little   Help from Another: Climbing 3-5 steps with a railing?: A Lot       AM-PAC Score:  Raw Score: 18   Approx Degree of Impairment: 46.58%   Standardized Score (AM-PAC Scale): 43.63   CMS Modifier (G-Code):  CK    FUNCTIONAL ABILITY STATUS  Gait Assessment   Functional Mobility/Gait Assessment  Gait Assistance: Contact guard assist  Distance (ft): 30  Assistive Device: Rolling walker  Pattern: Shuffle    Skilled Therapy Provided    Transfer Mobility:  Sit<>Stand: CGA   Stand<>Sit: CGA   Gait: CGA    Therapist's Comments: chair follow performed during session, SBP did drop from 100s to 90s from sitting to standing after ~ 15ft of ambulation with pt reporting inc dizziness, able to tolerate 15 additional feet prior to cuing from therapist to return to sitting       THERAPEUTIC EXERCISES  Lower Extremity Alternating marching  Ankle pumps  LAQ     Upper Extremity      Position      Repetitions   1   Sets   10     Patient End of Session: In bed;Needs met;Call light within reach;RN aware of session/findings;All patient questions and concerns addressed;Family present    PT Session Time: 23 minutes  Gait Trainin minutes  Therapeutic Activity:  minutes  Therapeutic Exercise:  minutes   Neuromuscular Re-education:  minutes

## 2024-04-08 NOTE — CM/SW NOTE
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 AVS updated.    DEWAYNE Mcfadden RN, CM  [phone]

## 2024-04-08 NOTE — PROGRESS NOTES
[facility] Hospitalist note    PCP: Edil Roy MD    Chief Complaint:  F/u s/p mitral valve repair    SUBJECTIVE:  Pt seen and examined.  Still c/o UE and LE edema.  Denies chest pain or SOB.      OBJECTIVE:  Temp:  [96.8 °F (36 °C)-98 °F (36.7 °C)] 96.8 °F (36 °C)  Pulse:  [53-86] 70  Resp:  [14-22] 18  BP: ()/(52-89) 123/80  SpO2:  [92 %-100 %] 98 %    Intake/Output:    Intake/Output Summary (Last 24 hours) at 4/8/2024 1502  Last data filed at 4/8/2024 1200  Gross per 24 hour   Intake 300 ml   Output 2800 ml   Net -2500 ml       Last 3 Weights   04/08/24 0600 286 lb 9.6 oz (130 kg)   04/07/24 0400 287 lb 4.2 oz (130.3 kg)   04/06/24 1000 287 lb 7.7 oz (130.4 kg)   04/05/24 0500 289 lb 11 oz (131.4 kg)   02/20/24 1531 275 lb (124.7 kg)   02/28/24 0630 281 lb 15.5 oz (127.9 kg)   02/27/24 0426 277 lb 4.8 oz (125.8 kg)   02/26/24 0435 277 lb 9.6 oz (125.9 kg)   02/25/24 0438 278 lb 9.6 oz (126.4 kg)   02/24/24 0322 278 lb 8 oz (126.3 kg)   02/23/24 1456 274 lb 14.6 oz (124.7 kg)   02/07/24 1456 270 lb (122.5 kg)       Exam  Gen: No acute distress  HEENT: anicteric sclera, MMM  Pulm: Lungs clear, normal respiratory effort  CV: Heart with regular rate and rhythm, edematous throughout  Abd: Abdomen soft, nontender, nondistended, no organomegaly, bowel sounds present  Neuro: A&OX3, no focal deficits  Ext:  2-3+ pitting edema.   : العراقي in place, christopher     Data Review:         Labs:     Recent Labs   Lab 04/04/24  1341 04/05/24  0417 04/06/24 0411 04/07/24 0451 04/08/24 0426   WBC 11.8* 16.4* 18.1* 14.0* 10.1   HGB 9.7* 9.0* 8.5* 8.9* 8.6*   MCV 86.0 83.5 86.2 86.3 84.7   .0 147.0* 130.0* 137.0* 138.0*   NE  --  14.83*  --   --   --    LYMABS  --  0.44*  --   --   --    INR 1.41*  --   --   --   --        Recent Labs   Lab 04/04/24  1341 04/04/24  1742 04/05/24 0417 04/06/24 0411 04/07/24 0451 04/08/24 0426   *  --  148* 145 144 145   K 2.8* 2.9* 3.9 4.8 4.3 3.9   *  --  116* 113* 112 111    CO2 25.0  --  27.0 27.0 23.0 28.0   BUN 34*  --  39* 56* 80* 89*   CREATSERUM 2.75*  --  2.42* 3.17* 3.66* 3.66*   CA 9.2  --  9.3 9.2 9.5 9.3   MG 2.7*  --  2.4  --  2.8*  --    *  --  98 145* 115* 116*       No results for input(s): \"ALT\", \"AST\", \"ALB\", \"AMYLASE\", \"LIPASE\" in the last 168 hours.    Invalid input(s): \"ALPHOS\", \"TBIL\", \"DBIL\", \"TPROT\"      No results for input(s): \"TROP\", \"CK\", \"PBNP\", \"PCT\" in the last 168 hours.    No results for input(s): \"CRP\", \"CIERRA\", \"LDH\", \"DDIMER\" in the last 168 hours.    Recent Labs   Lab 04/07/24  0747 04/07/24  1623 04/07/24  2105 04/08/24  0644 04/08/24  1055   PGLU 110* 219* 136* 112* 147*       Meds:   Scheduled Medication:   [Held by provider] metoprolol tartrate  25 mg Oral 2x Daily(Beta Blocker)    insulin aspart  1-5 Units Subcutaneous TID AC and HS    sodium chloride   Intravenous Once    atorvastatin  20 mg Oral Nightly    finasteride  5 mg Oral Nightly    mupirocin  1 Application Nasal BID    pantoprazole  40 mg Oral QAM AC     Continuous Infusing Medication:   bumetanide (Bumex) 12.5 mg in 50 mL infusion 0.5 mg/hr (04/08/24 1325)     PRN Medication:acetaminophen **OR** acetaminophen, ipratropium-albuterol, HYDROcodone-acetaminophen **OR** HYDROcodone-acetaminophen, morphINE **OR** morphINE, melatonin, polyethylene glycol (PEG 3350), sennosides, bisacodyl, ondansetron, calcium gluconate, glucose **OR** glucose **OR** glucose-vitamin C **OR** dextrose **OR** glucose **OR** glucose **OR** glucose-vitamin C       Microbiology:    No results found for this visit on 04/04/24.    Lab Results   Component Value Date    COVID19 Not Detected 04/01/2024    COVID19 Not Detected 01/09/2024    COVID19 Not Detected 08/04/2022        Assessment/Plan:     78-year-old male significant past medical history CKD stage III, severe MR, paroxysmal A-fib, BPH, hypertension, hyperaldosteronism, hyperlipidemia, pulmonary hypertension, moderate tricuspid regurgitation a is status  post mitral valve repair, tricuspid valve repair, maze procedure and left atrial appendage amputation and PFO closure     # Severe MR s/p mitral valve repair, tricuspid valve repair, maze procedure and left atrial appendage amputation and PFO closure  -post op care per CV surgery- chest tube in place  -cards following  - iv diuresis, now transitioned to bumex gtt      #afib  -has had some PAF, started on amio and has been on BB however currently more junctional alvino  -reviewed with cardiology and may need to stop amio, they will discuss with cv surg.  -monitor     #Acute kidney injury on CKD 3; hypernatremia  -Na improved but Cr worsening and uop declining. Edematous  -IV lasix ordered, now on bumex gtt,nephrology managing.     #HTN   #BPH  -resume finasteride     #postop hyperglycemia- ssi    POC d/w pt who agrees.     Ronny Batista Hospitalist  Pager: [phone]

## 2024-04-08 NOTE — PROGRESS NOTES
[facility] Cardiology Progress Note        Wagner Sheridan Patient Status:  Inpatient    1945 MRN [id number]   Location [facility] 6NE-A Attending Jason Hansen MD   Hosp Day # 4 PCP Edil Roy MD     Subjective:  The patient denies  chest pain and shortness of breath.      Medications:   [Held by provider] metoprolol tartrate  25 mg Oral 2x Daily(Beta Blocker)    furosemide  40 mg Intravenous BID (Diuretic)    insulin aspart  1-5 Units Subcutaneous TID AC and HS    sodium chloride   Intravenous Once    atorvastatin  20 mg Oral Nightly    finasteride  5 mg Oral Nightly    mupirocin  1 Application Nasal BID    pantoprazole  40 mg Intravenous QAM AC    Or    pantoprazole  40 mg Oral QAM AC       Continuous Infusions:          Allergies:  Allergies   Allergen Reactions    Albumin Human ANAPHYLAXIS    Ciprofloxacin HIVES    Clindamycin HIVES    Other ANAPHYLAXIS     Alcohol swab caps    Penicillins HIVES     Tolerated Ancef 24    Wasp Venom RESPIRATORY FAILURE    Wasp Venom Protein RESPIRATORY FAILURE    Benazepril Coughing    Chlorhexidine RASH and OTHER (SEE COMMENTS)     redness         Objective:        Intake/Output:      Intake/Output Summary (Last 24 hours) at 2024 1016  Last data filed at 2024 0900  Gross per 24 hour   Intake 660 ml   Output 3080 ml   Net -2420 ml     Wt Readings from Last 3 Encounters:   24 286 lb 9.6 oz (130 kg)   24 281 lb 15.5 oz (127.9 kg)   24 270 lb (122.5 kg)       Physical Exam:        Vitals:    24 0800 24 0855 24 0900 24 0905   BP: 107/57 108/58 94/52 110/60   BP Location: Left arm      Pulse: 59 75 71 80   Resp: 16      Temp: 97.1 °F (36.2 °C)      TempSrc: Temporal      SpO2: 98% 92% 93% 94%   Weight:       Height:           Temp:  [96.9 °F (36.1 °C)-98 °F (36.7 °C)] 97.1 °F (36.2 °C)  Pulse:  [53-80] 80  Resp:  [14-22] 16  BP: ()/(45-80) 110/60  SpO2:  [92 %-100 %] 94 %      Temp: 97.1 °F  (36.2 °C)  Pulse: 80  Resp: 16  BP: 110/60  General:  Appears comfortable  HEENT: No focal deficits.  Neck: No JVD, carotids 2+ no bruits.  Cardiac: Irregular S1S2.  No S3, S4, rub, click.  No murmur.  Lungs: Clear to auscultation and percussion.  Abdomen: Soft, non-tender.   Extremities: No LE edema.  No clubbing or cyanosis.    Neurologic: Alert and oriented, normal affect.  Skin: Warm and dry.           LABS:      HEM:  Recent Labs   Lab 04/04/24  1341 04/05/24  0417 04/06/24  0411 04/07/24  0451 04/08/24  0426   WBC 11.8* 16.4* 18.1* 14.0* 10.1   HGB 9.7* 9.0* 8.5* 8.9* 8.6*   HCT 30.7* 27.8* 27.5* 29.6* 27.6*   .0 147.0* 130.0* 137.0* 138.0*       Chem:  Recent Labs   Lab 04/04/24  1341 04/04/24  1742 04/05/24  0417 04/06/24  0411 04/07/24  0451 04/08/24  0426   *  --  148* 145 144 145   K 2.8* 2.9* 3.9 4.8 4.3 3.9   *  --  116* 113* 112 111   CO2 25.0  --  27.0 27.0 23.0 28.0   BUN 34*  --  39* 56* 80* 89*   CREATSERUM 2.75*  --  2.42* 3.17* 3.66* 3.66*   CA 9.2  --  9.3 9.2 9.5 9.3   MG 2.7*  --  2.4  --  2.8*  --    *  --  98 145* 115* 116*       No results for input(s): \"ALT\", \"AST\", \"ALB\", \"AMYLASE\", \"LIPASE\", \"LDH\" in the last 168 hours.    Invalid input(s): \"ALPHOS\", \"TBIL\", \"DBIL\", \"TPROT\"      Recent Labs   Lab 04/04/24  1341   PTT 33.0   INR 1.41*           Lab Results   Component Value Date    TROP < 0.046 12/27/2012         Invalid input(s): \"PBNPML\"                       Diagnostics:   Telemetry:Atrial Fibrillation. HR 70's    EKG, 4/4/2024:  NSR, FAV, prolonged QT     CXR, 4/4/2024:       STAN, 1/2024:       CONCLUSIONS:   1. There is flail of the P3 scallop of the mitral valve. The mitral valve has severe, 4+ eccentric (laterally directed) regurgitation.   2. The tricuspid valve has annular enlargement, with central malcoaptation. The tricuspid valve has moderate, centrally directed, regurgitation.  3. The left ventricle appears normal in size, thickness, and systolic  function. The estimated left ventricular ejection fraction is 60%  4. A patent foramen ovale is present (tunnel length 34 mm). There is left-to-right shunting across the patent foramen ovale.        Impression:     1.  Severe MR with marked pulm Hypertension . 2/2 flail P3 scallop of MV, and moderate TR,  s/p MITRAL VALVE REPAIR, TRICUSPID VALVE REPAIR, MAZE PROCEDURE, LEFT ATRIAL APPENDAGE AMPUTATION, PATENT FORAMEN OVALE CLOSURE.  4/4/24.     -currently on dobutamine, epi drip and nitric oxide  -stable rhythm  -PAS = 37 mm Hg  -CO = 7 l/min           2.  Paroxysmal Atrial Fibrillation. Started 1/2024 in setting of covid infection.  Echo then showed severe MR.  STAN shows flail P3 leaflet of MV and severe MR.      -s/p MAZE, AAP amputation  -currently in Atrial Fibrillation   -avoiding ac due to recent GI bleed as well as strategy to mitigate cardio-embolism utilizing AAP amputation  -would avoid AAT such as amio for now, artemio given long QT     3. Hypertension . Low -normal bp's     4. CKD,  Cr = 2.3 pre-op     5. Dyslipidemia      6. UGI bleeding while on DOAC, 2/2024        Recommend:     1.  Stable for transfer to telemetry  2.  HR control as needed  3. TTE  in am to evaluate MV, TV            Boy Hernandes MD

## 2024-04-08 NOTE — SLP NOTE
SPEECH DAILY NOTE - INPATIENT    ASSESSMENT & PLAN   ASSESSMENT  Pt seen for dysphagia tx to assess tolerance with recommended diet, ensure proper utilization of aspiration precautions and provide pt/family education.  Patient alert and up in bed, family at bedside. Patient reports his speech is back to normal and his tongue no longer feels heavy. Family agrees speech is back to normal but report his voice is intermittently gravelly. SLP noted this during conversation. Discussed continued recovery including improved strength and breath support should allow for improvement in voice. Discussed considering OP ENT apppointment post discharge should voice not return to baseline as patient continues recovery. They verbalized understanding and agreement. Patient with good overt tolerance of po, denying any coughing or choking with swallowing.     Diet Recommendations - Solids: Regular  Diet Recommendations - Liquids: Thin Liquids    Compensatory Strategies Recommended: Small bites and sips;Alternate consistencies (single sips)  Aspiration Precautions: Upright position;Slow rate;Small bites and sips  Medication Administration Recommendations: Whole in puree;Crushed in puree (as tolerated)    Patient Experiencing Pain: No                Treatment Plan  Treatment Plan/Recommendations: Dysphagia therapy                GOALS  Goal #1 The patient will tolerate regular consistency and thin liquids without overt signs or symptoms of aspiration with 100 % accuracy over 1-2 session(s).  Met   Goal #2 The patient/family/caregiver will demonstrate understanding and implementation of aspiration precautions and swallow strategies independently over 1-2 session(s).     Met     FOLLOW UP  Follow Up Needed (Documentation Required): No  SLP Follow-up Date: 04/08/24  Number of Visits to Meet Established Goals: 2    Session: 1    If you have any questions, please contact Demetrius Kothari MS CCC-SLP  Pager [phone]

## 2024-04-08 NOTE — PROGRESS NOTES
[facility]   part of [facility]     CV Surgery Progress Note    Wagner Sheridan Patient Status:  Inpatient    1945 MRN [id number]   Location [facility] 6NE-A Attending Jason Hansen MD   Hosp Day # 4 PCP Edil Roy MD     Subjective:  Patient seen and examined by Dr. Hansen. Patient reports feeling good. Denies any pain.       Objective:  /60   Pulse 80   Temp 97.1 °F (36.2 °C) (Temporal)   Resp 16   Ht 6' (1.829 m)   Wt 286 lb 9.6 oz (130 kg)   SpO2 94%   BMI 38.87 kg/m²     Intake/Output:    Intake/Output Summary (Last 24 hours) at 2024 0959  Last data filed at 2024 0900  Gross per 24 hour   Intake 660 ml   Output 3190 ml   Net -2530 ml       Labs:  Lab Results   Component Value Date    WBC 10.1 2024    RBC 3.26 2024    HGB 8.6 2024    HCT 27.6 2024    MCV 84.7 2024    MCH 26.4 2024    MCHC 31.2 2024    RDW 16.9 2024    .0 2024     Lab Results   Component Value Date     2024    K 3.9 2024     2024    CO2 28.0 2024    BUN 89 2024    CREATSERUM 3.66 2024     2024    CA 9.3 2024     Lab Results   Component Value Date    PT 13.6 2013    INR 1.41 (H) 2024    INR 1.15 2024    INR 1.34 (H) 2024       Physical Exam:  General: VSS, A&Ox3, In NAD  Neck: No JVD.   Heart: S1,S2 irregularly irregular ; Sternum Stable   Extremities: +generalized edema  Skin: sternotomy incision C/D/I  Neuro: no focal deficits     Assessment/Plan:   S/P MV repair with a 36mm ring, TV repair with a 28mm ring, BECCA amputaiton, PFO closure, and MAZE procedure POD#4  -HD stable, off support   -PAF, UGI bleed while on DOAC, cardiology following  -Leukocytosis, likely reactive, afebrile, resolved- monitor   -Acute post op blood loss anemia, expected, stable- monitor   -TCP, likely 2/2 consumption, stable- monitor   -Volume OL, improving- continue diuresis    -CEDRICK/CKD, Cr stable, good UOP- renal following   -Bowel regimen   -Pain management, prn tylenol or norco   -Discontinue chest tubes   -Urinary retention, العراقي re-inserted 4/7  -Keep PW for now   -GI PPX: protonix  -DVT PPX: TEDs/SCDs  -Encourage IS/ambulation   -PT/OT/Cardiac rehab   -CCU monitoring     -D/W Dr. Hansen/Naomi Ahmadi PA-C   Cardiothoracic Surgery   4/8/2024  10:07 AM

## 2024-04-08 NOTE — OCCUPATIONAL THERAPY NOTE
OCCUPATIONAL THERAPY TREATMENT NOTE - INPATIENT     Room Number: 6607/6607-A  Session: 1   Number of Visits to Meet Established Goals: 5    Presenting Problem: 4/4 S/p planned status post mitral valve repair, tricuspid valve repair, maze procedure and left atrial appendage amputation and PFO closure    Prior Level of Function: independent with ADL. Pt fell once within the past 3 months when he tripped over a big rug.   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 Lives at [facility].     ASSESSMENT   Patient demonstrates good  progress this session, goals remain in progress.    Patient continues to function below baseline with toileting, upper body dressing, lower body dressing, transfers, and dynamic standing balance.   Contributing factors to remaining limitations include decreased endurance.  Next session anticipate patient to progress toileting, upper body dressing, and lower body dressing.  Occupational Therapy will continue to follow patient for duration of hospitalization.    Patient continues to benefit from continued skilled OT services: at discharge to promote functional independence and safety with additional support and return home with home health OT.               History:  Patient is a 78 year old male admitted on 4/4/2024 with Presenting Problem: 4/4 S/p planned status post mitral valve repair, tricuspid valve repair, maze procedure and left atrial appendage amputation and PFO closure. Co-Morbidities : A-fib, HTN, R TKR 02/2015, gout. Admitted for the above noted procedure.  POD1 in the morning, stroke navigator was called, since pt was less responsive with fixed stare. Resolved after family arrived.       Recent admissions:  2/23 to 2/28/24 for anemia-> home  1/9 to 1/11/24 for COVID and A-fib-> home       WEIGHT BEARING RESTRICTION  Weight Bearing Restriction: None                Recommendations for nursing staff:   Transfers: cga  Toileting location: toilet    TREATMENT  SESSION:  Patient Start of Session: seated  FUNCTIONAL TRANSFER ASSESSMENT  Sit to Stand: Chair  Chair: Contact Guard Assist    BED MOBILITY  Supine to Sit : Not tested  Sit to Supine (OT): Not Tested    O2 SATURATIONS  Oxygen Therapy  SPO2% on Room Air at Rest: 96    EDUCATION PROVIDED  Patient: Role of Occupational Therapy; Functional Transfer Techniques; Surgical Precautions; Posture/Positioning; UE HEP for ROM  Patient's Response to Education: Returned Demonstration; Verbalized Understanding  Family/Caregiver: Role of Occupational Therapy; Plan of Care  Family/Caregiver's Response to Education: Verbalized Understanding      Equipment used: rw     Exercises:    Exercises Repetitions Comments   Scapular elevation     Scapular retraction     Shoulder rolls     Shoulder flexion     Shoulder abduction     Shoulder internal/external rotation     Forward punch     Elbow flexion 10    Elbow extension 10    Forearm pronation/supination     Wrist flexion/extension     Gross grasp/fist pumps 10    Ankle pumps 10    Knee extension     Marching       Therapist comments: Seated in the chair. Seated , no dizziness. Standing SBP 94.   Educated the pt about sternal precautions (avoiding lifting/pushing) and transfer sequencing, cga. This transfer was completed in preparation for toilet transfer. HR 86, room air 98%.  Rn provided chair follow. Educated the pt about 3 of arm post-cv surgery exercises. 10 reps each. See above table.     Patient End of Session: Up in chair;Needs met;Call light within reach;RN aware of session/findings;All patient questions and concerns addressed;Family present    SUBJECTIVE  \"I think I am good!\" When standing    PAIN ASSESSMENT  Ratin           OBJECTIVE  Precautions: Sternal;Cardiac;Chest tube    AM-PAC ‘6-Clicks’ Inpatient Daily Activity Short Form  -   Putting on and taking off regular lower body clothing?: A Lot  -   Bathing (including washing, rinsing, drying)?: A Lot  -   Toileting,  which includes using toilet, bedpan or urinal? : A Lot  -   Putting on and taking off regular upper body clothing?: A Little  -   Taking care of personal grooming such as brushing teeth?: A Little  -   Eating meals?: None    AM-PAC Score:  Score: 16  Approx Degree of Impairment: 53.32%  Standardized Score (AM-PAC Scale): 35.96    PLAN  OT Treatment Plan: Balance activities;Energy conservation/work simplification techniques;ADL training;Functional transfer training;UE strengthening/ROM;Patient/Family education;Patient/Family training;Equipment eval/education;Compensatory technique education  Rehab Potential : Good  Frequency: 3-5x/week    OT Goals:   Progressing 4/8  ADL Goals   Patient will perform grooming: with supervision and while standing at sink  Patient will perform upper body dressing:  with supervision  Patient will perform lower body dressing:  with supervision  Patient will perform toileting: with supervision     Functional Transfer Goals  Patient will transfer from sit to supine:  with supervision  Patient will transfer from supine to sit:  with supervision  Patient will transfer to toilet:  with supervision     UE Exercise Program Goal  Patient will be independent with bilateral AROM HEP (home exercise program). POST CV surgery HEP.      OT Session Time: 23 minutes  Self-Care Home Management:  minutes  Therapeutic Activity: 10 minutes  Neuromuscular Re-education:  minutes  Therapeutic Exercise: 8 minutes

## 2024-04-08 NOTE — PROGRESS NOTES
[facility]  Nephrology Progress Note    Wagner Sheridan Attending:  Jason Hansen MD       Assessment and Plan:    1) CEDRICK- ATN due to expected post-op fluid shifts, relative hypotension. UA bland; expect Cr to plateau in next 2-3 days. Edematous- transition to bumex gtt    2) CKD 3- baseline Cr < 2 mg/dl due to longstanding HTN / nephrosclerosis- no further w/u    3) s/p MV repair / TV repair with maze procedure / L atrial appendage amputation + PFO closure     4) Afib- started  in setting of COVID infection. Amio / BB per cards    5) HTN with hyperaldosteronism- holding usual ARB / MRA with above; on BB    6) Anemia- due to CKD / ? GI blood loss with gastritis / PUD  / above     D/W family at bedside.       Subjective:  Awake notes reviewed    Physical Exam:   /60   Pulse 80   Temp 97.1 °F (36.2 °C) (Temporal)   Resp 16   Ht 6' (1.829 m)   Wt 286 lb 9.6 oz (130 kg)   SpO2 94%   BMI 38.87 kg/m²   Temp (24hrs), Av.4 °F (36.3 °C), Min:96.9 °F (36.1 °C), Max:98 °F (36.7 °C)       Intake/Output Summary (Last 24 hours) at 2024 0943  Last data filed at 2024 0900  Gross per 24 hour   Intake 660 ml   Output 3190 ml   Net -2530 ml     Wt Readings from Last 3 Encounters:   24 286 lb 9.6 oz (130 kg)   24 281 lb 15.5 oz (127.9 kg)   24 270 lb (122.5 kg)     General: awake  HEENT: No scleral icterus, MMM  Neck: Supple, no OWEN or thyromegaly  Cardiac: Regular rate and rhythm, S1, S2 normal, no murmur or tub  Lungs: Decreased BS at bases bilaterally   Abdomen: Soft, non-tender. + bowel sounds, no palpable organomegaly  Extremities: Without clubbing, cyanosis; no edema  Neurologic: Cranial nerves grossly intact, moving all extremities  Skin: Warm and dry, no rashes       Labs:   Lab Results   Component Value Date    WBC 10.1 2024    HGB 8.6 2024    HCT 27.6 2024    .0 2024    CREATSERUM 3.66 2024    BUN 89 2024     2024     K 3.9 04/08/2024     04/08/2024    CO2 28.0 04/08/2024     04/08/2024    CA 9.3 04/08/2024    PGLU 112 04/08/2024       Imaging:  All imaging studies reviewed.    Meds:   Current Facility-Administered Medications   Medication Dose Route Frequency    [Held by provider] metoprolol tartrate (Lopressor) tab 25 mg  25 mg Oral 2x Daily(Beta Blocker)    furosemide (Lasix) 10 mg/mL injection 40 mg  40 mg Intravenous BID (Diuretic)    ipratropium-albuterol (Duoneb) 0.5-2.5 (3) MG/3ML inhalation solution 3 mL  3 mL Nebulization Q4H PRN    insulin aspart (NovoLOG) 100 Units/mL FlexPen 1-5 Units  1-5 Units Subcutaneous TID AC and HS    HYDROcodone-acetaminophen (Norco) 5-325 MG per tab 1 tablet  1 tablet Oral Q4H PRN    Or    HYDROcodone-acetaminophen (Norco) 5-325 MG per tab 2 tablet  2 tablet Oral Q4H PRN    sodium chloride 0.9% infusion   Intravenous Once    atorvastatin (Lipitor) tab 20 mg  20 mg Oral Nightly    finasteride (Proscar) tab 5 mg  5 mg Oral Nightly    morphINE PF 2 MG/ML injection 2 mg  2 mg Intravenous Q2H PRN    Or    morphINE PF 4 MG/ML injection 4 mg  4 mg Intravenous Q2H PRN    melatonin tab 3 mg  3 mg Oral Nightly PRN    polyethylene glycol (PEG 3350) (Miralax) 17 g oral packet 17 g  17 g Oral Daily PRN    sennosides (Senokot) tab 17.2 mg  17.2 mg Oral Nightly PRN    bisacodyl (Dulcolax) 10 MG rectal suppository 10 mg  10 mg Rectal Daily PRN    ondansetron (Zofran) 4 MG/2ML injection 4 mg  4 mg Intravenous Q6H PRN    calcium gluconate 3 g in sodium chloride 0.9% 100 mL IVPB  3 g Intravenous PRN    mupirocin (Bactroban) 2% nasal ointment 1 Application  1 Application Nasal BID    pantoprazole (Protonix) 40 mg in sodium chloride 0.9% PF 10 mL IV push  40 mg Intravenous QAM AC    Or    pantoprazole (Protonix) DR tab 40 mg  40 mg Oral QAM AC    glucose (Dex4) 15 GM/59ML oral liquid 15 g  15 g Oral Q15 Min PRN    Or    glucose (Glutose) 40% oral gel 15 g  15 g Oral Q15 Min PRN    Or     glucose-vitamin C (Dex-4) chewable tab 4 tablet  4 tablet Oral Q15 Min PRN    Or    dextrose 50% injection 50 mL  50 mL Intravenous Q15 Min PRN    Or    glucose (Dex4) 15 GM/59ML oral liquid 30 g  30 g Oral Q15 Min PRN    Or    glucose (Glutose) 40% oral gel 30 g  30 g Oral Q15 Min PRN    Or    glucose-vitamin C (Dex-4) chewable tab 8 tablet  8 tablet Oral Q15 Min PRN         Questions/concerns were discussed with patient and/or family by bedside.          Britta Persaud MD  4/8/2024  9:43 AM

## 2024-04-09 ENCOUNTER — APPOINTMENT (OUTPATIENT)
Dept: CV DIAGNOSTICS | Facility: HOSPITAL | Age: 79
End: 2024-04-09
Attending: INTERNAL MEDICINE
Payer: MEDICARE

## 2024-04-09 PROBLEM — I50.33 ACUTE ON CHRONIC HEART FAILURE WITH PRESERVED EJECTION FRACTION (HCC): Status: ACTIVE | Noted: 2024-04-09

## 2024-04-09 LAB
ANION GAP SERPL CALC-SCNC: 6 MMOL/L (ref 0–18)
ATRIAL RATE: 84 BPM
BUN BLD-MCNC: 85 MG/DL (ref 9–23)
CALCIUM BLD-MCNC: 9.2 MG/DL (ref 8.5–10.1)
CHLORIDE SERPL-SCNC: 109 MMOL/L (ref 98–112)
CO2 SERPL-SCNC: 31 MMOL/L (ref 21–32)
CREAT BLD-MCNC: 3.37 MG/DL
EGFRCR SERPLBLD CKD-EPI 2021: 18 ML/MIN/1.73M2 (ref 60–?)
ERYTHROCYTE [DISTWIDTH] IN BLOOD BY AUTOMATED COUNT: 16.6 %
GLUCOSE BLD-MCNC: 102 MG/DL (ref 70–99)
GLUCOSE BLD-MCNC: 119 MG/DL (ref 70–99)
GLUCOSE BLD-MCNC: 147 MG/DL (ref 70–99)
GLUCOSE BLD-MCNC: 182 MG/DL (ref 70–99)
GLUCOSE BLD-MCNC: 185 MG/DL (ref 70–99)
HCT VFR BLD AUTO: 27.6 %
HGB BLD-MCNC: 8.4 G/DL
MCH RBC QN AUTO: 25.9 PG (ref 26–34)
MCHC RBC AUTO-ENTMCNC: 30.4 G/DL (ref 31–37)
MCV RBC AUTO: 85.2 FL
OSMOLALITY SERPL CALC.SUM OF ELEC: 328 MOSM/KG (ref 275–295)
PLATELET # BLD AUTO: 158 10(3)UL (ref 150–450)
POTASSIUM SERPL-SCNC: 3 MMOL/L (ref 3.5–5.1)
POTASSIUM SERPL-SCNC: 3.6 MMOL/L (ref 3.5–5.1)
Q-T INTERVAL: 448 MS
QRS DURATION: 96 MS
QTC CALCULATION (BEZET): 500 MS
R AXIS: 30 DEGREES
RBC # BLD AUTO: 3.24 X10(6)UL
SODIUM SERPL-SCNC: 146 MMOL/L (ref 136–145)
T AXIS: 26 DEGREES
VENTRICULAR RATE: 75 BPM
WBC # BLD AUTO: 8.7 X10(3) UL (ref 4–11)

## 2024-04-09 PROCEDURE — 99233 SBSQ HOSP IP/OBS HIGH 50: CPT | Performed by: INTERNAL MEDICINE

## 2024-04-09 PROCEDURE — 93306 TTE W/DOPPLER COMPLETE: CPT | Performed by: INTERNAL MEDICINE

## 2024-04-09 RX ORDER — POTASSIUM CHLORIDE 20 MEQ/1
40 TABLET, EXTENDED RELEASE ORAL ONCE
Status: COMPLETED | OUTPATIENT
Start: 2024-04-09 | End: 2024-04-09

## 2024-04-09 NOTE — PROGRESS NOTES
[facility]  Nephrology Progress Note    Wagner Sheridan Attending:  Jason Hansen MD       Assessment and Plan:    1) CEDRICK- ATN due to expected post-op fluid shifts, relative hypotension- improving. Quite edematous -> continue bumex gtt x 24-48 hrs    2) CKD 3- baseline Cr < 2 mg/dl due to longstanding HTN / nephrosclerosis- no further w/u    3) s/p MV repair / TV repair with maze procedure / L atrial appendage amputation + PFO closure     4) Afib- started  in setting of COVID infection. Rate control per cards    5) HTN with hyperaldosteronism- holding usual ARB / MRA with above; on BB    6) Anemia- due to CKD / ? GI blood loss with gastritis / PUD  / above     D/W family at bedside. TIMO [place]. Mobilize. Tx to CTU      Subjective:  Awake doing well feeling better overall    Physical Exam:   /58 (BP Location: Left arm)   Pulse 79   Temp 97.9 °F (36.6 °C) (Temporal)   Resp 16   Ht 6' (1.829 m)   Wt 274 lb 3.2 oz (124.4 kg)   SpO2 98%   BMI 37.19 kg/m²   Temp (24hrs), Av.2 °F (36.2 °C), Min:96.8 °F (36 °C), Max:97.9 °F (36.6 °C)       Intake/Output Summary (Last 24 hours) at 2024 0923  Last data filed at 2024 0800  Gross per 24 hour   Intake 608.6 ml   Output 4775 ml   Net -4166.4 ml     Wt Readings from Last 3 Encounters:   24 274 lb 3.2 oz (124.4 kg)   24 281 lb 15.5 oz (127.9 kg)   24 270 lb (122.5 kg)     General: awake  HEENT: No scleral icterus, MMM  Neck: Supple, no OWEN or thyromegaly  Cardiac: Regular rate and rhythm, S1, S2 normal, no murmur or tub  Lungs: Decreased BS at bases bilaterally   Abdomen: Soft, non-tender. + bowel sounds, no palpable organomegaly  Extremities: Without clubbing, cyanosis; no edema  Neurologic: Cranial nerves grossly intact, moving all extremities  Skin: Warm and dry, no rashes       Labs:   Lab Results   Component Value Date    WBC 8.7 2024    HGB 8.4 2024    HCT 27.6 2024    .0 2024    CREATSERUM  3.37 04/09/2024    BUN 85 04/09/2024     04/09/2024    K 3.0 04/09/2024     04/09/2024    CO2 31.0 04/09/2024     04/09/2024    CA 9.2 04/09/2024    PGLU 119 04/09/2024       Imaging:  All imaging studies reviewed.    Meds:           Questions/concerns were discussed with patient and/or family by bedside.          Britta Persaud MD  4/9/2024  923 AM

## 2024-04-09 NOTE — PROGRESS NOTES
[facility] Hospitalist note    PCP: Edil Roy MD    Chief Complaint:  F/u s/p mitral valve repair    SUBJECTIVE:  Pt seen and examined.  Feels his LE's are less swollen.  Denies chest pain or SOB at rest.  No fevers.    OBJECTIVE:  Temp:  [96.8 °F (36 °C)-98.1 °F (36.7 °C)] 98.1 °F (36.7 °C)  Pulse:  [63-85] 78  Resp:  [16-20] 16  BP: ()/(51-80) 121/70  SpO2:  [92 %-100 %] 94 %    Intake/Output:    Intake/Output Summary (Last 24 hours) at 4/9/2024 1210  Last data filed at 4/9/2024 0800  Gross per 24 hour   Intake 408.6 ml   Output 4150 ml   Net -3741.4 ml       Last 3 Weights   04/09/24 0600 274 lb 3.2 oz (124.4 kg)   04/08/24 0600 286 lb 9.6 oz (130 kg)   04/07/24 0400 287 lb 4.2 oz (130.3 kg)   04/06/24 1000 287 lb 7.7 oz (130.4 kg)   04/05/24 0500 289 lb 11 oz (131.4 kg)   02/20/24 1531 275 lb (124.7 kg)   02/28/24 0630 281 lb 15.5 oz (127.9 kg)   02/27/24 0426 277 lb 4.8 oz (125.8 kg)   02/26/24 0435 277 lb 9.6 oz (125.9 kg)   02/25/24 0438 278 lb 9.6 oz (126.4 kg)   02/24/24 0322 278 lb 8 oz (126.3 kg)   02/23/24 1456 274 lb 14.6 oz (124.7 kg)   02/07/24 1456 270 lb (122.5 kg)       Exam  Gen: No acute distress  HEENT: anicteric sclera, MMM  Pulm: Lungs clear, normal respiratory effort  CV: Heart with regular rate and rhythm, edematous throughout  Abd: Abdomen soft, nontender, nondistended, no organomegaly, bowel sounds present  Neuro: A&OX3, no focal deficits  Ext:  2+ pitting edema - improving in LE's  : dulce maria in place, christopher     Data Review:         Labs:     Recent Labs   Lab 04/04/24  1341 04/05/24  0417 04/06/24  0411 04/07/24  0451 04/08/24  0426 04/09/24  0408   WBC 11.8* 16.4* 18.1* 14.0* 10.1 8.7   HGB 9.7* 9.0* 8.5* 8.9* 8.6* 8.4*   MCV 86.0 83.5 86.2 86.3 84.7 85.2   .0 147.0* 130.0* 137.0* 138.0* 158.0   NE  --  14.83*  --   --   --   --    LYMABS  --  0.44*  --   --   --   --    INR 1.41*  --   --   --   --   --        Recent Labs   Lab 04/04/24  1341 04/04/24  1742 04/05/24  0417  04/06/24  0411 04/07/24  0451 04/08/24  0426 04/09/24  0408   *  --  148* 145 144 145 146*   K 2.8*   < > 3.9 4.8 4.3 3.9 3.0*   *  --  116* 113* 112 111 109   CO2 25.0  --  27.0 27.0 23.0 28.0 31.0   BUN 34*  --  39* 56* 80* 89* 85*   CREATSERUM 2.75*  --  2.42* 3.17* 3.66* 3.66* 3.37*   CA 9.2  --  9.3 9.2 9.5 9.3 9.2   MG 2.7*  --  2.4  --  2.8*  --   --    *  --  98 145* 115* 116* 102*    < > = values in this interval not displayed.       No results for input(s): \"ALT\", \"AST\", \"ALB\", \"AMYLASE\", \"LIPASE\" in the last 168 hours.    Invalid input(s): \"ALPHOS\", \"TBIL\", \"DBIL\", \"TPROT\"      No results for input(s): \"TROP\", \"CK\", \"PBNP\", \"PCT\" in the last 168 hours.    No results for input(s): \"CRP\", \"CIERRA\", \"LDH\", \"DDIMER\" in the last 168 hours.    Recent Labs   Lab 04/08/24  1055 04/08/24  1655 04/08/24  2038 04/09/24  0630 04/09/24  1140   PGLU 147* 120* 136* 119* 185*       Meds:   Scheduled Medication:   potassium chloride  40 mEq Oral Once    [Held by provider] metoprolol tartrate  25 mg Oral 2x Daily(Beta Blocker)    insulin aspart  1-5 Units Subcutaneous TID AC and HS    sodium chloride   Intravenous Once    atorvastatin  20 mg Oral Nightly    finasteride  5 mg Oral Nightly    pantoprazole  40 mg Oral QAM AC     Continuous Infusing Medication:   bumetanide (Bumex) 12.5 mg in 50 mL infusion 0.5 mg/hr (04/09/24 1200)     PRN Medication:acetaminophen **OR** acetaminophen, ipratropium-albuterol, HYDROcodone-acetaminophen **OR** HYDROcodone-acetaminophen, morphINE **OR** morphINE, melatonin, polyethylene glycol (PEG 3350), sennosides, bisacodyl, ondansetron, calcium gluconate, glucose **OR** glucose **OR** glucose-vitamin C **OR** dextrose **OR** glucose **OR** glucose **OR** glucose-vitamin C       Microbiology:    No results found for this visit on 04/04/24.    Lab Results   Component Value Date    COVID19 Not Detected 04/01/2024    COVID19 Not Detected 01/09/2024    COVID19 Not Detected  08/04/2022        Assessment/Plan:     78-year-old male significant past medical history CKD stage III, severe MR, paroxysmal A-fib, BPH, hypertension, hyperaldosteronism, hyperlipidemia, pulmonary hypertension, moderate tricuspid regurgitation a is status post mitral valve repair, tricuspid valve repair, maze procedure and left atrial appendage amputation and PFO closure     # Severe MR s/p mitral valve repair, tricuspid valve repair, maze procedure and left atrial appendage amputation and PFO closure  -post op care per CV surgery- chest tube in place  -cards following  - iv diuresis, now transitioned to bumex gtt for next 24-48 hours.     #afib  -has had some PAF, started on amio and has been on BB however currently more junctional alvino  -reviewed with cardiology and may need to stop amio, they will discuss with cv surg.  -monitor     #Acute kidney injury on CKD 3; hypernatremia  -Na improved but Cr worsening and uop declining. Edematous  -IV lasix ordered, now on bumex gtt,nephrology managing.     #HTN   #BPH  -resume finasteride     #postop hyperglycemia- ssi    POC d/w pt who agrees.     Ronny Batista Hospitalist  Pager: [phone]

## 2024-04-09 NOTE — PROGRESS NOTES
[facility]   part of [facility]     CV Surgery Progress Note    Wagner Sheridan Patient Status:  Inpatient    1945 MRN [id number]   Location [facility] 6NE-A Attending Jason Hansen MD   Hosp Day # 5 PCP Edil Roy MD     Subjective:  Patient seen and examined by Dr. Salgado. Patient reports feeling good. Pain is controlled.     Tele: Afib    Objective:  BP (!) 89/51 (BP Location: Left arm)   Pulse 79   Temp 97.9 °F (36.6 °C) (Temporal)   Resp 16   Ht 6' (1.829 m)   Wt 274 lb 3.2 oz (124.4 kg)   SpO2 98%   BMI 37.19 kg/m²     Intake/Output:    Intake/Output Summary (Last 24 hours) at 2024 0938  Last data filed at 2024 0800  Gross per 24 hour   Intake 608.6 ml   Output 4775 ml   Net -4166.4 ml       Labs:  Lab Results   Component Value Date    WBC 8.7 2024    RBC 3.24 2024    HGB 8.4 2024    HCT 27.6 2024    MCV 85.2 2024    MCH 25.9 2024    MCHC 30.4 2024    RDW 16.6 2024    .0 2024     Lab Results   Component Value Date     2024    K 3.0 2024     2024    CO2 31.0 2024    BUN 85 2024    CREATSERUM 3.37 2024     2024    CA 9.2 2024     Lab Results   Component Value Date    PT 13.6 2013    INR 1.41 (H) 2024    INR 1.15 2024    INR 1.34 (H) 2024       Physical Exam:  General: VSS, A&Ox3, In NAD  Neck: No JVD.   Heart: S1,S2 irregularly irregular ; Sternum Stable   Extremities: warm, dry, +generalized edema  Skin: sternotomy incision C/D/I  Neuro: no focal deficits     Assessment/Plan:   S/P MV repair with a 36mm ring, TV repair with a 28mm ring, BECCA amputaiton, PFO closure, and MAZE procedure POD#5  -HD stable, off support   -Echo ordered per cardiology   -PAF, UGI bleed while on DOAC, cardiology following  -Leukocytosis, likely reactive, afebrile, resolved- monitor   -Acute post op blood loss anemia, expected, stable- monitor    -TCP, likely 2/2 consumption, stable- monitor   -Volume OL, improving- bumex gtt per renal   -CEDRICK/CKD, Cr down to 3.37, good UOP- renal following   -Bowel regimen   -Pain management, prn tylenol or norco   -Chest tubes removed  -Urinary retention, العراقي re-inserted 4/7  -Discontinue PW  -GI PPX: protonix  -DVT PPX: TEDs/SCDs  -Encourage IS/ambulation   -PT/OT/Cardiac rehab   -CCU monitoring     -D/W Dr. Hansen/Naomi Ahmadi PA-C   Cardiothoracic Surgery   4/9/2024  9:38 AM

## 2024-04-09 NOTE — PROGRESS NOTES
[facility] Cardiology Progress Note        Wagner Sheridan Patient Status:  Inpatient    1945 MRN [id number]   [facility] 6NE-A Attending Jason Hansen MD   Hosp Day # 5 PCP Edil Roy MD     Subjective:  The patient denies  chest pain and shortness of breath.  Started on bumex drip by renal    Medications:   potassium chloride  40 mEq Oral Once    [Held by provider] metoprolol tartrate  25 mg Oral 2x Daily(Beta Blocker)    insulin aspart  1-5 Units Subcutaneous TID AC and HS    sodium chloride   Intravenous Once    atorvastatin  20 mg Oral Nightly    finasteride  5 mg Oral Nightly    pantoprazole  40 mg Oral QAM AC       Continuous Infusions:   bumetanide (Bumex) 12.5 mg in 50 mL infusion 0.5 mg/hr (24 0907)           Allergies:  Allergies   Allergen Reactions    Albumin Human ANAPHYLAXIS    Ciprofloxacin HIVES    Clindamycin HIVES    Other ANAPHYLAXIS     Alcohol swab caps    Penicillins HIVES     Tolerated Ancef 24    Wasp Venom RESPIRATORY FAILURE    Wasp Venom Protein RESPIRATORY FAILURE    Benazepril Coughing    Chlorhexidine RASH and OTHER (SEE COMMENTS)     redness         Objective:        Intake/Output:      Intake/Output Summary (Last 24 hours) at 2024 1109  Last data filed at 2024 0800  Gross per 24 hour   Intake 608.6 ml   Output 4775 ml   Net -4166.4 ml     Wt Readings from Last 3 Encounters:   24 274 lb 3.2 oz (124.4 kg)   24 281 lb 15.5 oz (127.9 kg)   24 270 lb (122.5 kg)       Physical Exam:        Vitals:    24 0700 24 0709 24 0800 24 0920   BP: 112/59  124/58 (!) 89/51   BP Location:   Left arm Left arm   Pulse: 81  79    Resp:   16    Temp:   97.9 °F (36.6 °C)    TempSrc:   Temporal    SpO2: 94% 98% 98%    Weight:       Height:           Temp:  [96.8 °F (36 °C)-97.9 °F (36.6 °C)] 97.9 °F (36.6 °C)  Pulse:  [63-86] 79  Resp:  [16-20] 16  BP: ()/(51-80) 89/51  SpO2:  [92 %-100 %] 98  %      Temp: 97.9 °F (36.6 °C)  Pulse: 79  Resp: 16  BP: 89/51  General:  Appears comfortable  HEENT: No focal deficits.  Neck: No JVD, carotids 2+ no bruits.  Cardiac: Irregular S1S2.  No S3, S4, rub, click.  No murmur.  Lungs: Clear to auscultation and percussion.  Abdomen: Soft, non-tender.   Extremities: 1+ UE and  LE edema.  No clubbing or cyanosis.    Neurologic: Alert and oriented, normal affect.  Skin: Warm and dry.           LABS:      HEM:  Recent Labs   Lab 04/05/24  0417 04/06/24  0411 04/07/24  0451 04/08/24  0426 04/09/24  0408   WBC 16.4* 18.1* 14.0* 10.1 8.7   HGB 9.0* 8.5* 8.9* 8.6* 8.4*   HCT 27.8* 27.5* 29.6* 27.6* 27.6*   .0* 130.0* 137.0* 138.0* 158.0       Chem:  Recent Labs   Lab 04/04/24  1341 04/04/24  1742 04/05/24  0417 04/06/24  0411 04/07/24  0451 04/08/24  0426 04/09/24  0408   *  --  148* 145 144 145 146*   K 2.8*   < > 3.9 4.8 4.3 3.9 3.0*   *  --  116* 113* 112 111 109   CO2 25.0  --  27.0 27.0 23.0 28.0 31.0   BUN 34*  --  39* 56* 80* 89* 85*   CREATSERUM 2.75*  --  2.42* 3.17* 3.66* 3.66* 3.37*   CA 9.2  --  9.3 9.2 9.5 9.3 9.2   MG 2.7*  --  2.4  --  2.8*  --   --    *  --  98 145* 115* 116* 102*    < > = values in this interval not displayed.       No results for input(s): \"ALT\", \"AST\", \"ALB\", \"AMYLASE\", \"LIPASE\", \"LDH\" in the last 168 hours.    Invalid input(s): \"ALPHOS\", \"TBIL\", \"DBIL\", \"TPROT\"      Recent Labs   Lab 04/04/24  1341   PTT 33.0   INR 1.41*           Lab Results   Component Value Date    TROP < 0.046 12/27/2012         Invalid input(s): \"PBNPML\"                       Diagnostics:   Telemetry:Atrial Fibrillation. HR 70's    EKG, 4/4/2024:  NSR, FAV, prolonged QT     CXR, 4/4/2024:       STAN, 1/2024:       CONCLUSIONS:   1. There is flail of the P3 scallop of the mitral valve. The mitral valve has severe, 4+ eccentric (laterally directed) regurgitation.   2. The tricuspid valve has annular enlargement, with central malcoaptation. The  tricuspid valve has moderate, centrally directed, regurgitation.  3. The left ventricle appears normal in size, thickness, and systolic function. The estimated left ventricular ejection fraction is 60%  4. A patent foramen ovale is present (tunnel length 34 mm). There is left-to-right shunting across the patent foramen ovale.        Impression:     1.  Severe MR with marked pulm Hypertension . 2/2 flail P3 scallop of MV, and moderate TR,  s/p MITRAL VALVE REPAIR, TRICUSPID VALVE REPAIR, MAZE PROCEDURE, LEFT ATRIAL APPENDAGE AMPUTATION, PATENT FORAMEN OVALE CLOSURE.  4/4/24.     -currently on dobutamine, epi drip and nitric oxide  -stable rhythm  -PAS = 37 mm Hg  -CO = 7 l/min           2.  Paroxysmal Atrial Fibrillation. Started 1/2024 in setting of covid infection.  Echo then showed severe MR.  STAN shows flail P3 leaflet of MV and severe MR.      -s/p MAZE, AAP amputation  -currently in Atrial Fibrillation   -avoiding ac due to recent GI bleed as well as strategy to mitigate cardio-embolism utilizing AAP amputation  -would avoid AAT such as amio for now, artemio given long QT     3. Hypertension . Low -normal bp's     4. CKD,  Cr = 2.3 pre-op.  Rise in Cr to 3.6 alyce-op. Seems to have peaked. Now on bumex drip     5. Dyslipidemia      6. UGI bleeding while on DOAC, 2/2024            Recommend:     1.  Stable for transfer to telemetry  2.  HR control as needed  3. TTE  in today to evaluate MV, TV            Boy Hernandes MD

## 2024-04-09 NOTE — PLAN OF CARE
Received care at approximately 1930. A&Ox4. Room air while awake, 2L nasal cannula while asleep. SBP within ordered parameters. Bumex gtt infusing per order. [place] intact with yellow/ clear output. Pacer wires in place, taped to abd. No complaints of pain.

## 2024-04-09 NOTE — OCCUPATIONAL THERAPY NOTE
OCCUPATIONAL THERAPY TREATMENT NOTE - INPATIENT     Room Number: 6607/6607-A  Session: 2   Number of Visits to Meet Established Goals: 5    Presenting Problem: 4/4 S/p planned status post mitral valve repair, tricuspid valve repair, maze procedure and left atrial appendage amputation and PFO closure    Prior Level of Function: independent with ADL. Pt fell once within the past 3 months when he tripped over a big rug.   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 Lives at [facility].     ASSESSMENT   Patient demonstrates good  progress this session, goals remain in progress.     Patient continues to function below baseline with toileting, upper body dressing, lower body dressing, transfers, and dynamic standing balance.   Contributing factors to remaining limitations include decreased endurance.  Next session anticipate patient to progress toileting, upper body dressing, and lower body dressing.  Occupational Therapy will continue to follow patient for duration of hospitalization.     Patient continues to benefit from continued skilled OT services: at discharge to promote functional independence and safety with additional support and return home with home health OT.            History:  Patient is a 78 year old male admitted on 4/4/2024 with Presenting Problem: 4/4 S/p planned status post mitral valve repair, tricuspid valve repair, maze procedure and left atrial appendage amputation and PFO closure. Co-Morbidities : A-fib, HTN, R TKR 02/2015, gout. Admitted for the above noted procedure.  POD1 in the morning, stroke navigator was called, since pt was less responsive with fixed stare. Resolved after family arrived.       Recent admissions:  2/23 to 2/28/24 for anemia-> home  1/9 to 1/11/24 for COVID and A-fib-> home     WEIGHT BEARING RESTRICTION  Weight Bearing Restriction: None                Recommendations for nursing staff:   Transfers: cga  Toileting location: toilet    TREATMENT  SESSION:  Patient Start of Session: seated  FUNCTIONAL TRANSFER ASSESSMENT  Sit to Stand: Chair  Chair: Contact Guard Assist    BED MOBILITY  Supine to Sit : Not tested  Sit to Supine (OT): Not Tested      EDUCATION PROVIDED  Patient: Role of Occupational Therapy; Surgical Precautions; Posture/Positioning; Compensatory ADL Techniques; Proper Body Mechanics; UE HEP for ROM  Patient's Response to Education: Verbalized Understanding; Returned Demonstration  Family/Caregiver: Role of Occupational Therapy; Plan of Care  Family/Caregiver's Response to Education: Verbalized Understanding      Equipment used: rw  Demonstrates functional use,      Exercises:    Exercises Repetitions Comments   Scapular elevation     Scapular retraction 10    Shoulder rolls     Shoulder flexion 10    Shoulder abduction     Shoulder internal/external rotation     Forward punch     Elbow flexion 10    Elbow extension 10    Forearm pronation/supination     Wrist flexion/extension     Gross grasp/fist pumps 10    Ankle pumps 10    Knee extension     Marching       Therapist comments: motivated. Up in the chair with family.  Educated the pt and his family about UB dressing sequencing using compensatory approach.  Provided demonstration.  Also, educated the pt about all arm and chest post CV surgery exercise program. Used CV binder as a reference. See above.  CGA to stand and to ambulate with PT.   Patient End of Session: Up in chair;Call light within reach;Needs met;RN aware of session/findings;All patient questions and concerns addressed;Family present    SUBJECTIVE  \"I can do that\" about all exercises    PAIN ASSESSMENT  Ratin           OBJECTIVE  Precautions: Sternal;Cardiac;Chest tube    AM-PAC ‘6-Clicks’ Inpatient Daily Activity Short Form  -   Putting on and taking off regular lower body clothing?: A Little  -   Bathing (including washing, rinsing, drying)?: A Little  -   Toileting, which includes using toilet, bedpan or urinal? : A  Little  -   Putting on and taking off regular upper body clothing?: A Little  -   Taking care of personal grooming such as brushing teeth?: None  -   Eating meals?: None    AM-PAC Score:  Score: 20  Approx Degree of Impairment: 38.32%  Standardized Score (AM-PAC Scale): 42.03    PLAN  OT Treatment Plan: Balance activities;Energy conservation/work simplification techniques;ADL training;Functional transfer training;UE strengthening/ROM;Patient/Family education;Patient/Family training;Equipment eval/education;Compensatory technique education  Rehab Potential : Good  Frequency: 3-5x/week    OT Goals:   Progressing 4/9  ADL Goals   Patient will perform grooming: with supervision and while standing at sink  Patient will perform upper body dressing:  with supervision  Patient will perform lower body dressing:  with supervision  Patient will perform toileting: with supervision     Functional Transfer Goals  Patient will transfer from sit to supine:  with supervision  Patient will transfer from supine to sit:  with supervision  Patient will transfer to toilet:  with supervision     UE Exercise Program Goal  Patient will be independent with bilateral AROM HEP (home exercise program). POST CV surgery HEP.        OT Session Time: 23 minutes  Self-Care Home Management: 8 minutes  Therapeutic Activity: 6 minutes  Neuromuscular Re-education:  minutes  Therapeutic Exercise: 8 minutes

## 2024-04-09 NOTE — PLAN OF CARE
Received pt 0730. Pt alert and oriented x4, neurologically intact. Pt on RA, lungs diminished with an infrequent non-productive but congested cough. Pt in afib, SBP stable. Pt with generalized pitting edema and palpable pulses. Pt denies pain. Pt PW removed, no complications. Pt walks in halls x3 with gait belt and walker. Pt العراقي removed at ~0830 per Dr Persaud, external male catheter placed with post removal void of 350. Pt then claiming not being able to start void despite pressure, bladder scan and straight cathed per protocol, 700ml removed. External cathter replaced and pt since voids 400ml. Pt updated on plan of care.

## 2024-04-09 NOTE — PHYSICAL THERAPY NOTE
PHYSICAL THERAPY TREATMENT NOTE - INPATIENT    Room Number: 6607/6607-A     Session: 3     Number of Visits to Meet Established Goals: 5  History related to current admission: Patient is a 78 year old male admitted on 4/4/2024 : S/p planned status post mitral valve repair, tricuspid valve repair, maze procedure and left atrial appendage amputation and PFO closure 4/4     Recent Admit  2/23-2/28/24: anemia, not seen by therapy  1/9-1/11/24: PNA > home        HOME SITUATION  [facility] of [facility]   Home Layout: One level  Stairs to Enter : 1     Lives With: Spouse  Drives: Yes  Patient Owned Equipment: Rolling walker  Patient Regularly Uses: Glasses     Prior Level of [place]: indep, reports hx of 2 falls in the past 6 months - tripped over a rug, 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 Continues to be limited by dizziness associated with hypotension.     Patient continues to function below baseline with bed mobility, transfers, gait, and stair negotiation.  Contributing factors to remaining limitations include decreased functional strength, decreased endurance/aerobic capacity, decreased muscular endurance, and dizziness associated with hypotension .  Next session anticipate patient to progress gait and stair negotiation.  Physical Therapy will continue to follow patient for duration of hospitalization.    Patient continues to benefit from continued skilled PT services: at discharge to promote functional independence and safety with additional support and return home with home health PT.    PLAN  PT Treatment Plan: Bed mobility;Body mechanics;Coordination;Endurance;Energy conservation;Patient  education;Family education;Gait training;Range of motion;Neuromuscular re-educate;Strengthening;Transfer training;Balance training;Stair training  Rehab Potential : Good  Frequency (Obs): 3-5x/week    CURRENT GOALS     Goal #1 Patient is able to demonstrate supine - sit EOB @ level: supervision      Goal #2 Patient is able to demonstrate transfers EOB to/from Chair/Wheelchair at assistance level: supervision      Goal #3 Patient is able to ambulate 150 feet with assist device: walker - rolling at assistance level: supervision      Goal #4 Patient will navigate 1 step with rail and SBA   Goal #5     Goal #6     Goal Comments: Goals established on 2024 all goals ongoing     SUBJECTIVE  \"I'm ready to do this\"     OBJECTIVE  Precautions: Sternal;Cardiac;Chest tube    WEIGHT BEARING RESTRICTION  Weight Bearing Restriction: None                PAIN ASSESSMENT   Ratin  Location: pt denies pain       BALANCE                                                                                                                       Static Sitting: Good  Dynamic Sitting: Good           Static Standing: Fair -  Dynamic Standing: Fair -    ACTIVITY TOLERANCE           BP: (!) 89/51  BP Location: Left arm  BP Method: Automatic  Patient Position: Standing    O2 WALK         AM-PAC '6-Clicks' INPATIENT SHORT FORM - BASIC MOBILITY  How much difficulty does the patient currently have...  Patient Difficulty: Turning over in bed (including adjusting bedclothes, sheets and blankets)?: None   Patient Difficulty: Sitting down on and standing up from a chair with arms (e.g., wheelchair, bedside commode, etc.): A Little   Patient Difficulty: Moving from lying on back to sitting on the side of the bed?: None   How much help from another person does the patient currently need...   Help from Another: Moving to and from a bed to a chair (including a wheelchair)?: A Little   Help from Another: Need to walk in hospital room?: A Little    Help from Another: Climbing 3-5 steps with a railing?: A Lot       AM-PAC Score:  Raw Score: 19   Approx Degree of Impairment: 41.77%   Standardized Score (AM-PAC Scale): 45.44   CMS Modifier (G-Code): CK    FUNCTIONAL ABILITY STATUS  Gait Assessment   Functional Mobility/Gait Assessment  Gait Assistance: Contact guard assist  Distance (ft): 60  Assistive Device: Rolling walker  Pattern: Shuffle    Skilled Therapy Provided    Transfer Mobility:  Sit<>Stand: CGA   Stand<>Sit: CGA   Gait: CGA    Therapist's Comments: pt able to tolerate increased ambulation distance today, continues to require chair follow 2/2 dizziness associated with hypotension, BP initially 113/72 sitting in bedside chair, 89/51 in standing with pt reporting dizziness, 135/82 in bedside chair sitting at end of session. Educated on EC and pacing techniques with x3 standing rest breaks during session. Educated on post op exercises as noted below.       THERAPEUTIC EXERCISES  Lower Extremity Alternating marching  Ankle pumps  LAQ     Upper Extremity      Position Sitting     Repetitions   10   Sets   1     Patient End of Session: Up in chair;Call light within reach;Needs met;RN aware of session/findings;All patient questions and concerns addressed;Alarm set    PT Session Time: 33 minutes  Gait Trainin minutes  Therapeutic Activity:  minutes  Therapeutic Exercise: 13 minutes   Neuromuscular Re-education:  minutes

## 2024-04-10 PROBLEM — R60.1 ANASARCA: Status: ACTIVE | Noted: 2024-04-10

## 2024-04-10 PROBLEM — E87.0 HYPERNATREMIA: Status: ACTIVE | Noted: 2024-04-10

## 2024-04-10 LAB
ANION GAP SERPL CALC-SCNC: 7 MMOL/L (ref 0–18)
ATRIAL RATE: 88 BPM
BUN BLD-MCNC: 82 MG/DL (ref 9–23)
CALCIUM BLD-MCNC: 9.3 MG/DL (ref 8.5–10.1)
CHLORIDE SERPL-SCNC: 109 MMOL/L (ref 98–112)
CO2 SERPL-SCNC: 33 MMOL/L (ref 21–32)
CREAT BLD-MCNC: 2.81 MG/DL
EGFRCR SERPLBLD CKD-EPI 2021: 22 ML/MIN/1.73M2 (ref 60–?)
ERYTHROCYTE [DISTWIDTH] IN BLOOD BY AUTOMATED COUNT: 16.7 %
GLUCOSE BLD-MCNC: 110 MG/DL (ref 70–99)
GLUCOSE BLD-MCNC: 149 MG/DL (ref 70–99)
GLUCOSE BLD-MCNC: 157 MG/DL (ref 70–99)
GLUCOSE BLD-MCNC: 160 MG/DL (ref 70–99)
GLUCOSE BLD-MCNC: 378 MG/DL (ref 70–99)
GLUCOSE BLD-MCNC: 98 MG/DL (ref 70–99)
HCT VFR BLD AUTO: 26.4 %
HGB BLD-MCNC: 8.6 G/DL
MCH RBC QN AUTO: 26.7 PG (ref 26–34)
MCHC RBC AUTO-ENTMCNC: 32.6 G/DL (ref 31–37)
MCV RBC AUTO: 82 FL
OSMOLALITY SERPL CALC.SUM OF ELEC: 333 MOSM/KG (ref 275–295)
PLATELET # BLD AUTO: 174 10(3)UL (ref 150–450)
POTASSIUM SERPL-SCNC: 3.2 MMOL/L (ref 3.5–5.1)
Q-T INTERVAL: 448 MS
QRS DURATION: 102 MS
QTC CALCULATION (BEZET): 542 MS
R AXIS: 7 DEGREES
RBC # BLD AUTO: 3.22 X10(6)UL
SODIUM SERPL-SCNC: 149 MMOL/L (ref 136–145)
T AXIS: 20 DEGREES
VENTRICULAR RATE: 88 BPM
WBC # BLD AUTO: 7.8 X10(3) UL (ref 4–11)

## 2024-04-10 PROCEDURE — 99233 SBSQ HOSP IP/OBS HIGH 50: CPT | Performed by: INTERNAL MEDICINE

## 2024-04-10 RX ORDER — POTASSIUM CHLORIDE 20 MEQ/1
40 TABLET, EXTENDED RELEASE ORAL EVERY 4 HOURS
Qty: 6 TABLET | Refills: 0 | Status: COMPLETED | OUTPATIENT
Start: 2024-04-10 | End: 2024-04-10

## 2024-04-10 RX ORDER — EPLERENONE 25 MG/1
25 TABLET, FILM COATED ORAL DAILY
Status: DISCONTINUED | OUTPATIENT
Start: 2024-04-10 | End: 2024-04-12

## 2024-04-10 NOTE — PLAN OF CARE
Received pt at 0730. Pt alert and oriented x4, neurologically intact. Pt on RA, lungs clear/diminished, cough no longer heard today. Pt in afib, rate controlled, SBP stable. Pt with +2 pitting edema, edema in hands/arms is improved compared to yesterday. Pt walks in halls x3 with walker and gait belt. Pt with external male cathter, great urine output. Pt on bumex gtt and potassium replaced per Dr Persaud. Pt updated on plan of care.

## 2024-04-10 NOTE — PROGRESS NOTES
[facility]   part of [facility]     CV Surgery Progress Note    Wagner Sheridan Patient Status:  Inpatient    1945 MRN [id number]   Location [facility] 6NE-A Attending Jason Hansen MD   Hosp Day # 6 PCP Edil Roy MD     Subjective:  Patient reports feeling great. Denies any pain or dyspnea. Ambulating well. Passing gas, no BM yet.     Tele: Afib    Objective:  /55 (BP Location: Left arm)   Pulse 73   Temp 96.8 °F (36 °C) (Temporal)   Resp 16   Ht 6' (1.829 m)   Wt 270 lb 8 oz (122.7 kg)   SpO2 96%   BMI 36.69 kg/m²     Intake/Output:    Intake/Output Summary (Last 24 hours) at 4/10/2024 0908  Last data filed at 4/10/2024 0700  Gross per 24 hour   Intake 544.4 ml   Output 4250 ml   Net -3705.6 ml       Labs:  Lab Results   Component Value Date    WBC 7.8 04/10/2024    RBC 3.22 04/10/2024    HGB 8.6 04/10/2024    HCT 26.4 04/10/2024    MCV 82.0 04/10/2024    MCH 26.7 04/10/2024    MCHC 32.6 04/10/2024    RDW 16.7 04/10/2024    .0 04/10/2024     Lab Results   Component Value Date     04/10/2024    K 3.2 04/10/2024     04/10/2024    CO2 33.0 04/10/2024    BUN 82 04/10/2024    CREATSERUM 2.81 04/10/2024    GLU 98 04/10/2024    CA 9.3 04/10/2024     Lab Results   Component Value Date    PT 13.6 2013    INR 1.41 (H) 2024    INR 1.15 2024    INR 1.34 (H) 2024       Physical Exam:  General: VSS, A&Ox3, In NAD  Neck: No JVD.   Heart: S1,S2 irregularly irregular ; Sternum Stable   Lungs: clear anteriorly   Extremities: warm, dry, +generalized edema  Skin: sternotomy incision C/D/I  Neuro: no focal deficits     Assessment/Plan:   S/P MV repair with a 36mm ring, TV repair with a 28mm ring, BECCA amputaiton, PFO closure, and MAZE procedure POD#6  -HD stable, off support   -Echo reviewed  -PAF, UGI bleed while on DOAC, cardiology following  -Leukocytosis, likely reactive, afebrile, resolved- monitor   -Acute post op blood loss anemia, expected, stable-  monitor   -TCP, likely 2/2 consumption, resolved- monitor   -Volume OL, improving- bumex gtt per renal   -CEDRICK/CKD, Cr down to 2.81, good UOP- renal following   -Bowel regimen   -Pain management, prn tylenol or norco   -Chest tubes and PW removed  -Urinary retention, العراقي re-inserted 4/7- removed 4/9, voiding on own   -GI PPX: protonix  -DVT PPX: TEDs/SCDs  -Encourage IS/ambulation   -PT/OT/Cardiac rehab   -Ok to transfer to CTU     -D/W Dr. Hansen/Naomi Ahmadi PA-C   Cardiothoracic Surgery   4/10/2024  9:09 AM

## 2024-04-10 NOTE — PROGRESS NOTES
[facility] Cardiology Progress Note        Wagner Sheridan Patient Status:  Inpatient    1945 MRN [id number]   [facility] 6NE-A Attending Jason Hansen MD   Hosp Day # 6 PCP Edil Roy MD     Subjective:  The patient denies  chest pain and shortness of breath.  Still on bumex drip    Medications:   [Held by provider] metoprolol tartrate  25 mg Oral 2x Daily(Beta Blocker)    insulin aspart  1-5 Units Subcutaneous TID AC and HS    sodium chloride   Intravenous Once    atorvastatin  20 mg Oral Nightly    finasteride  5 mg Oral Nightly    pantoprazole  40 mg Oral QAM AC       Continuous Infusions:   bumetanide (Bumex) 12.5 mg in 50 mL infusion 0.5 mg/hr (24 8012)           Allergies:  Allergies   Allergen Reactions    Albumin Human ANAPHYLAXIS    Ciprofloxacin HIVES    Clindamycin HIVES    Other ANAPHYLAXIS     Alcohol swab caps    Penicillins HIVES     Tolerated Ancef 24    Wasp Venom RESPIRATORY FAILURE    Wasp Venom Protein RESPIRATORY FAILURE    Benazepril Coughing    Chlorhexidine RASH and OTHER (SEE COMMENTS)     redness         Objective:        Intake/Output:      Intake/Output Summary (Last 24 hours) at 4/10/2024 0745  Last data filed at 4/10/2024 0700  Gross per 24 hour   Intake 544.4 ml   Output 5250 ml   Net -4705.6 ml     Wt Readings from Last 3 Encounters:   04/10/24 270 lb 8 oz (122.7 kg)   24 281 lb 15.5 oz (127.9 kg)   24 270 lb (122.5 kg)       Physical Exam:        Vitals:    04/10/24 0400 04/10/24 0500 04/10/24 0600 04/10/24 0700   BP: 116/58 107/65 108/62 109/64   BP Location: Left arm      Pulse: 78 80 77 86   Resp: 20 18 18    Temp: 97.5 °F (36.4 °C)      TempSrc: Temporal      SpO2: 96% 95% 95% 95%   Weight:   270 lb 8 oz (122.7 kg)    Height:           Temp:  [97.5 °F (36.4 °C)-98.1 °F (36.7 °C)] 97.5 °F (36.4 °C)  Pulse:  [62-91] 86  Resp:  [12-20] 18  BP: ()/(51-98) 109/64  SpO2:  [91 %-99 %] 95 %      Temp: 97.5 °F (36.4  °C)  Pulse: 86  Resp: 18  BP: 109/64  General:  Appears comfortable  HEENT: No focal deficits.  Neck: No JVD, carotids 2+ no bruits.  Cardiac: Irregular S1S2.  No S3, S4, rub, click.  No murmur.  Lungs: Clear to auscultation and percussion.  Abdomen: Soft, non-tender.   Extremities:  2-3+ bilateral  LE edema.  No clubbing or cyanosis.    Neurologic: Alert and oriented, normal affect.  Skin: Warm and dry.           LABS:      HEM:  Recent Labs   Lab 04/06/24  0411 04/07/24  0451 04/08/24  0426 04/09/24  0408 04/10/24  0427   WBC 18.1* 14.0* 10.1 8.7 7.8   HGB 8.5* 8.9* 8.6* 8.4* 8.6*   HCT 27.5* 29.6* 27.6* 27.6* 26.4*   .0* 137.0* 138.0* 158.0 174.0       Chem:  Recent Labs   Lab 04/04/24  1341 04/04/24  1742 04/05/24  0417 04/06/24  0411 04/07/24  0451 04/08/24  0426 04/09/24 0408 04/09/24  1918 04/10/24  0427   *  --  148* 145 144 145 146*  --  149*   K 2.8*   < > 3.9 4.8 4.3 3.9 3.0* 3.6 3.2*   *  --  116* 113* 112 111 109  --  109   CO2 25.0  --  27.0 27.0 23.0 28.0 31.0  --  33.0*   BUN 34*  --  39* 56* 80* 89* 85*  --  82*   CREATSERUM 2.75*  --  2.42* 3.17* 3.66* 3.66* 3.37*  --  2.81*   CA 9.2  --  9.3 9.2 9.5 9.3 9.2  --  9.3   MG 2.7*  --  2.4  --  2.8*  --   --   --   --    *  --  98 145* 115* 116* 102*  --  98    < > = values in this interval not displayed.       No results for input(s): \"ALT\", \"AST\", \"ALB\", \"AMYLASE\", \"LIPASE\", \"LDH\" in the last 168 hours.    Invalid input(s): \"ALPHOS\", \"TBIL\", \"DBIL\", \"TPROT\"      Recent Labs   Lab 04/04/24  1341   PTT 33.0   INR 1.41*           Lab Results   Component Value Date    TROP < 0.046 12/27/2012         Invalid input(s): \"PBNPML\"                       Diagnostics:   Telemetry:Atrial Fibrillation. HR 70's    EKG, 4/4/2024:  NSR, FAV, prolonged QT     CXR, 4/4/2024:       STAN, 1/2024:       CONCLUSIONS:   1. There is flail of the P3 scallop of the mitral valve. The mitral valve has severe, 4+ eccentric (laterally directed)  regurgitation.   2. The tricuspid valve has annular enlargement, with central malcoaptation. The tricuspid valve has moderate, centrally directed, regurgitation.  3. The left ventricle appears normal in size, thickness, and systolic function. The estimated left ventricular ejection fraction is 60%  4. A patent foramen ovale is present (tunnel length 34 mm). There is left-to-right shunting across the patent foramen ovale.       Echo, 4/10/24:    Conclusions:     1. Left ventricle: The cavity size was normal. Wall thickness was mildly      increased. Systolic function was normal. The estimated ejection fraction      was 55-60%, by biplane method of disks. Wall motion is normal; there are      no regional wall motion abnormalities. Unable to assess LV diastolic      function due to heart rhythm.   2. Left atrium: The atrium was dilated. The left atrial volume was      moderately increased.   3. Aortic valve: There was mild regurgitation. The peak systolic velocity      was 1.56m/sec. The mean systolic gradient was 5mm Hg. The valve area      (VTI) was 3.59cm^2. The valve area (VTI) index was 1.45cm^2/m^2.   4. Aortic root: The aortic root was 4.0cm diameter. The aortic root was      mildly dilated.   5. Ascending aorta: The ascending aorta was 3.9cm diameter. The ascending      aorta was mildly dilated.   6. Mitral valve: The average mean diastolic gradient was 5mm Hg.   7. Tricuspid valve: The average mean diastolic gradient was 2mm Hg.   *      Impression:     1.  Severe MR with marked pulm Hypertension . 2/2 flail P3 scallop of MV, and moderate TR,  s/p MITRAL VALVE REPAIR, TRICUSPID VALVE REPAIR, MAZE PROCEDURE, LEFT ATRIAL APPENDAGE AMPUTATION, PATENT FORAMEN OVALE CLOSURE.  4/4/24.     -currently on dobutamine, epi drip and nitric oxide  -stable rhythm  -PAS = 37 mm Hg  -CO = 7 l/min           2.  Paroxysmal Atrial Fibrillation. Started 1/2024 in setting of covid infection.  Echo then showed severe MR.  STAN shows  flail P3 leaflet of MV and severe MR.      -s/p MAZE, AAP amputation  -currently in Atrial Fibrillation   -avoiding ac due to recent GI bleed as well as strategy to mitigate cardio-embolism utilizing AAP amputation  -would avoid AAT such as amio for now, artemio given long QT     3. Hypertension . Low -normal bp's     4. CKD,  Cr = 2.3 pre-op.  Rise in Cr to 3.6 alyce-op. Seems to have peaked. Now on bumex drip    -now polyuric.  Net out = > 10L  -still with marked LE edema     5. Dyslipidemia      6. UGI bleeding while on DOAC, 2/2024            Recommend:     1.  Stable for transfer to telemetry  2.  HR control as needed  3. Continue diuretics. Per renal  4. Lopressor. (On hold for soft bp's)            Boy Hernandes MD

## 2024-04-10 NOTE — PHYSICAL THERAPY NOTE
PHYSICAL THERAPY TREATMENT NOTE - INPATIENT    Room Number: 6607/6607-A       Session:  3    Number of Visits to Meet Established Goals: 5  History related to current admission: Patient is a 78 year old male admitted on 4/4/2024 : S/p planned status post mitral valve repair, tricuspid valve repair, maze procedure and left atrial appendage amputation and PFO closure 4/4     Recent Admit  2/23-2/28/24: anemia, not seen by therapy  1/9-1/11/24: PNA > home        HOME SITUATION  [facility])   Home Layout: One level  Stairs to Enter : 1     Lives With: Spouse  Drives: Yes  Patient Owned Equipment: Rolling walker  Patient Regularly Uses: Glasses     Prior Level of Sharkey: indep, reports hx of 2 falls in the past 6 months - tripped over a rug, 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 Continues to be limited by dizziness associated with hypotension.     Patient continues to function below baseline with bed mobility, transfers, gait, and stair negotiation.  Contributing factors to remaining limitations include decreased functional strength, decreased endurance/aerobic capacity, decreased muscular endurance, and dizziness associated with hypotension .  Next session anticipate patient to progress gait and stair negotiation.  Physical Therapy will continue to follow patient for duration of hospitalization.    Patient continues to benefit from continued skilled PT services: at discharge to promote functional independence and safety with additional support and return home with home health PT.    PLAN  PT Treatment Plan: Bed mobility;Body mechanics;Coordination;Endurance;Energy conservation;Patient  education;Family education;Gait training;Range of motion;Neuromuscular re-educate;Strengthening;Transfer training;Balance training;Stair training  Rehab Potential : Good  Frequency (Obs): 3-5x/week    CURRENT GOALS     Goal #1 Patient is able to demonstrate supine - sit EOB @ level: supervision      Goal #2 Patient is able to demonstrate transfers EOB to/from Chair/Wheelchair at assistance level: supervision      Goal #3 Patient is able to ambulate 150 feet with assist device: walker - rolling at assistance level: supervision      Goal #4 Patient will navigate 1 step with rail and SBA   Goal #5 Patient will navigate 1 stoop step with RW (wants to access back patio)   Goal #6     Goal Comments: Goals established on 2024    4/10/2024 all goals ongoing     SUBJECTIVE  \"I am trying my best\"    OBJECTIVE  Precautions: Sternal;Cardiac    WEIGHT BEARING RESTRICTION  Weight Bearing Restriction: None                PAIN ASSESSMENT   Ratin  Location: pt denies pain       BALANCE                                                                                                                       Static Sitting: Good  Dynamic Sitting: Good           Static Standing: Fair -  Dynamic Standing: Fair -    ACTIVITY TOLERANCE                         O2 WALK         AM-PAC '6-Clicks' INPATIENT SHORT FORM - BASIC MOBILITY  How much difficulty does the patient currently have...  Patient Difficulty: Turning over in bed (including adjusting bedclothes, sheets and blankets)?: None   Patient Difficulty: Sitting down on and standing up from a chair with arms (e.g., wheelchair, bedside commode, etc.): A Little   Patient Difficulty: Moving from lying on back to sitting on the side of the bed?: None   How much help from another person does the patient currently need...   Help from Another: Moving to and from a bed to a chair (including a wheelchair)?: A Little   Help from Another: Need to walk in hospital room?: A Little   Help from  Another: Climbing 3-5 steps with a railing?: A Lot       AM-PAC Score:  Raw Score: 19   Approx Degree of Impairment: 41.77%   Standardized Score (AM-PAC Scale): 45.44   CMS Modifier (G-Code): CK    FUNCTIONAL ABILITY STATUS  Gait Assessment   Functional Mobility/Gait Assessment  Gait Assistance: Minimum assistance  Distance (ft): 25  Assistive Device: Rolling walker  Pattern: Shuffle (unable to stay close to RW)    Skilled Therapy Provided    Transfer Mobility:  Sit<>Stand: CGA   Stand<>Sit: CGA   Gait: CGA    Therapist's Comments: pt denied dizziness with ambulation - able to stand to rw x 4 min during urination.  Reviewed CV binder therex 4/10/2024.      Writer had discussion with spouse and daughter about home safety and application for ramp to enter.     THERAPEUTIC EXERCISES  Lower Extremity Alternating marching  Ankle pumps  LAQ     Upper Extremity      Position Sitting     Repetitions   10   Sets   1     Patient End of Session: Up in chair;Needs met;RN aware of session/findings;Call light within reach;All patient questions and concerns addressed;Alarm set;Family present    PT Session Time: 23 minutes  Gait Training: 15 minutes  Therapeutic Activity: 8 minutes  Therapeutic Exercise: 0 minutes   Neuromuscular Re-education: 0 minutes

## 2024-04-10 NOTE — PLAN OF CARE
Received care at approximately 1930. A&Ox4. A-fib on tele, rates controlled in 80's. SBP within ordered parameters. Room air while awake, 2L nasal cannula while asleep. Bumex gtt infusing per order. Voiding clear/yellow output via external cath. No complaints of pain.     POC discussed with patient.

## 2024-04-10 NOTE — PROGRESS NOTES
[facility] Hospitalist note    PCP: Edil Roy MD    Chief Complaint:  F/u s/p mitral valve repair    SUBJECTIVE:  Pt seen and examined.  Denies chest pain or SOB.  No F/C, N/V. Feels LE's are less swollen.     OBJECTIVE:  Temp:  [96.8 °F (36 °C)-98 °F (36.7 °C)] 97.5 °F (36.4 °C)  Pulse:  [] 90  Resp:  [12-20] 16  BP: (101-128)/(51-98) 122/65  SpO2:  [91 %-98 %] 95 %    Intake/Output:    Intake/Output Summary (Last 24 hours) at 4/10/2024 1429  Last data filed at 4/10/2024 1200  Gross per 24 hour   Intake 544.4 ml   Output 4600 ml   Net -4055.6 ml       Last 3 Weights   04/10/24 0600 270 lb 8 oz (122.7 kg)   04/09/24 0600 274 lb 3.2 oz (124.4 kg)   04/08/24 0600 286 lb 9.6 oz (130 kg)   04/07/24 0400 287 lb 4.2 oz (130.3 kg)   04/06/24 1000 287 lb 7.7 oz (130.4 kg)   04/05/24 0500 289 lb 11 oz (131.4 kg)   02/20/24 1531 275 lb (124.7 kg)   02/28/24 0630 281 lb 15.5 oz (127.9 kg)   02/27/24 0426 277 lb 4.8 oz (125.8 kg)   02/26/24 0435 277 lb 9.6 oz (125.9 kg)   02/25/24 0438 278 lb 9.6 oz (126.4 kg)   02/24/24 0322 278 lb 8 oz (126.3 kg)   02/23/24 1456 274 lb 14.6 oz (124.7 kg)   02/07/24 1456 270 lb (122.5 kg)       Exam  Gen: No acute distress  HEENT: anicteric sclera, MMM  Pulm: Lungs clear, normal respiratory effort  CV: Heart with regular rate and rhythm, edematous throughout  Abd: Abdomen soft, nontender, nondistended, no organomegaly, bowel sounds present  Neuro: A&OX3, no focal deficits  Ext:  2+ pitting edema - improving in LE's  : العراقي in place, christopher     Data Review:         Labs:     Recent Labs   Lab 04/04/24  1341 04/05/24  0417 04/06/24  0411 04/07/24  0451 04/08/24  0426 04/09/24  0408 04/10/24  0427   WBC 11.8* 16.4* 18.1* 14.0* 10.1 8.7 7.8   HGB 9.7* 9.0* 8.5* 8.9* 8.6* 8.4* 8.6*   MCV 86.0 83.5 86.2 86.3 84.7 85.2 82.0   .0 147.0* 130.0* 137.0* 138.0* 158.0 174.0   NE  --  14.83*  --   --   --   --   --    LYMABS  --  0.44*  --   --   --   --   --    INR 1.41*  --   --   --   --    --   --        Recent Labs   Lab 04/04/24  1341 04/04/24  1742 04/05/24  0417 04/06/24  0411 04/07/24  0451 04/08/24  0426 04/09/24  0408 04/09/24  1918 04/10/24  0427   *  --  148* 145 144 145 146*  --  149*   K 2.8*   < > 3.9 4.8 4.3 3.9 3.0* 3.6 3.2*   *  --  116* 113* 112 111 109  --  109   CO2 25.0  --  27.0 27.0 23.0 28.0 31.0  --  33.0*   BUN 34*  --  39* 56* 80* 89* 85*  --  82*   CREATSERUM 2.75*  --  2.42* 3.17* 3.66* 3.66* 3.37*  --  2.81*   CA 9.2  --  9.3 9.2 9.5 9.3 9.2  --  9.3   MG 2.7*  --  2.4  --  2.8*  --   --   --   --    *  --  98 145* 115* 116* 102*  --  98    < > = values in this interval not displayed.       No results for input(s): \"ALT\", \"AST\", \"ALB\", \"AMYLASE\", \"LIPASE\" in the last 168 hours.    Invalid input(s): \"ALPHOS\", \"TBIL\", \"DBIL\", \"TPROT\"      No results for input(s): \"TROP\", \"CK\", \"PBNP\", \"PCT\" in the last 168 hours.    No results for input(s): \"CRP\", \"CIERRA\", \"LDH\", \"DDIMER\" in the last 168 hours.    Recent Labs   Lab 04/09/24  1140 04/09/24  1644 04/09/24  2023 04/10/24  0645 04/10/24  1116   PGLU 185* 147* 182* 110* 160*       Meds:   Scheduled Medication:   potassium chloride  40 mEq Oral q4h    eplerenone  25 mg Oral Daily    metoprolol tartrate  12.5 mg Oral 2x Daily(Beta Blocker)    insulin aspart  1-5 Units Subcutaneous TID AC and HS    sodium chloride   Intravenous Once    atorvastatin  20 mg Oral Nightly    finasteride  5 mg Oral Nightly    pantoprazole  40 mg Oral QAM AC     Continuous Infusing Medication:   bumetanide (Bumex) 12.5 mg in 50 mL infusion 0.5 mg/hr (04/09/24 9701)     PRN Medication:  acetaminophen **OR** acetaminophen    ipratropium-albuterol    HYDROcodone-acetaminophen **OR** HYDROcodone-acetaminophen    morphINE **OR** morphINE    melatonin    polyethylene glycol (PEG 3350)    sennosides    bisacodyl    ondansetron    calcium gluconate    glucose **OR** glucose **OR** glucose-vitamin C **OR** dextrose **OR** glucose **OR** glucose  **OR** glucose-vitamin C       Microbiology:    No results found for this visit on 04/04/24.    Lab Results   Component Value Date    COVID19 Not Detected 04/01/2024    COVID19 Not Detected 01/09/2024    COVID19 Not Detected 08/04/2022        Assessment/Plan:     78-year-old male significant past medical history CKD stage III, severe MR, paroxysmal A-fib, BPH, hypertension, hyperaldosteronism, hyperlipidemia, pulmonary hypertension, moderate tricuspid regurgitation a is status post mitral valve repair, tricuspid valve repair, maze procedure and left atrial appendage amputation and PFO closure     # Severe MR s/p mitral valve repair, tricuspid valve repair, maze procedure and left atrial appendage amputation and PFO closure  -post op care per CV surgery- chest tube in place  -cards following  - iv diuresis, now transitioned to bumex gtt for next 24-48 hours.     #afib  -has had some PAF, started on amio and has been on BB however currently more junctional alvino  -reviewed with cardiology and may need to stop amio, they will discuss with cv surg.  -monitor     #Acute kidney injury on CKD 3; hypernatremia  -Na improved but Cr worsening and uop declining. Edematous  -IV lasix ordered, now on bumex gtt,nephrology managing.     #HTN   #BPH  -resume finasteride     #postop hyperglycemia- ssi    #H pylori infection - cont PPI per GI.  Pt had been prescribed flagyl 500 mg po TID x 14 days and tetracycline 250 mg po daily x 14 days but this was stopped by his PCP on 3/11/24.  Pt to f/u with PCP and GI regarding the need to resume the abx. D/w pt and his wife who agree.    POC d/w pt who agrees.     Ronny Batista Hospitalist  Pager: [phone]

## 2024-04-10 NOTE — PROGRESS NOTES
[facility]  Nephrology Progress Note    Wagner Sheridan Attending:  Jason Hansen MD       Assessment and Plan:    1) CEDRICK- ATN due to expected post-op fluid shifts, relative hypotension- improving. Diuresing well 4-5 L daily; continue bumex gtt as renal function tolerates    2) CKD 3- baseline Cr < 2 mg/dl due to longstanding HTN / nephrosclerosis- no further w/u    3) s/p MV repair / TV repair with maze procedure / L atrial appendage amputation + PFO closure     4) Afib- started  in setting of COVID infection. Rate control per cards    5) HTN with hyperaldosteronism- holding usual ARB with above; on BB. Resume lower dose eplerenone    6) Anemia- due to CKD / ? GI blood loss with gastritis / PUD  / above     D/W family at bedside.      Subjective:  Awake doing well feeling better overall up in chair in good spirits    Physical Exam:   /55 (BP Location: Left arm)   Pulse 73   Temp 96.8 °F (36 °C) (Temporal)   Resp 16   Ht 6' (1.829 m)   Wt 270 lb 8 oz (122.7 kg)   SpO2 96%   BMI 36.69 kg/m²   Temp (24hrs), Av.6 °F (36.4 °C), Min:96.8 °F (36 °C), Max:98.1 °F (36.7 °C)       Intake/Output Summary (Last 24 hours) at 4/10/2024 0943  Last data filed at 4/10/2024 0700  Gross per 24 hour   Intake 544.4 ml   Output 4250 ml   Net -3705.6 ml     Wt Readings from Last 3 Encounters:   04/10/24 270 lb 8 oz (122.7 kg)   24 281 lb 15.5 oz (127.9 kg)   24 270 lb (122.5 kg)     General: awake  HEENT: No scleral icterus, MMM  Neck: Supple, no OWEN or thyromegaly  Cardiac: Regular rate and rhythm, S1, S2 normal, no murmur or tub  Lungs: Decreased BS at bases bilaterally   Abdomen: Soft, non-tender. + bowel sounds, no palpable organomegaly  Extremities: Without clubbing, cyanosis; no edema  Neurologic: Cranial nerves grossly intact, moving all extremities  Skin: Warm and dry, no rashes       Labs:   Lab Results   Component Value Date    WBC 7.8 04/10/2024    HGB 8.6 04/10/2024    HCT 26.4  04/10/2024    .0 04/10/2024    CREATSERUM 2.81 04/10/2024    BUN 82 04/10/2024     04/10/2024    K 3.2 04/10/2024     04/10/2024    CO2 33.0 04/10/2024    GLU 98 04/10/2024    CA 9.3 04/10/2024    PGLU 110 04/10/2024       Imaging:  All imaging studies reviewed.    Meds:   Current Facility-Administered Medications   Medication Dose Route Frequency    acetaminophen (Tylenol Extra Strength) tab 500 mg  500 mg Oral Q6H PRN    Or    acetaminophen (Tylenol Extra Strength) tab 1,000 mg  1,000 mg Oral Q6H PRN    bumetanide (Bumex) 12.5 mg in 50 mL infusion  0.5 mg/hr Intravenous Continuous    [Held by provider] metoprolol tartrate (Lopressor) tab 25 mg  25 mg Oral 2x Daily(Beta Blocker)    ipratropium-albuterol (Duoneb) 0.5-2.5 (3) MG/3ML inhalation solution 3 mL  3 mL Nebulization Q4H PRN    insulin aspart (NovoLOG) 100 Units/mL FlexPen 1-5 Units  1-5 Units Subcutaneous TID AC and HS    HYDROcodone-acetaminophen (Norco) 5-325 MG per tab 1 tablet  1 tablet Oral Q4H PRN    Or    HYDROcodone-acetaminophen (Norco) 5-325 MG per tab 2 tablet  2 tablet Oral Q4H PRN    sodium chloride 0.9% infusion   Intravenous Once    atorvastatin (Lipitor) tab 20 mg  20 mg Oral Nightly    finasteride (Proscar) tab 5 mg  5 mg Oral Nightly    morphINE PF 2 MG/ML injection 2 mg  2 mg Intravenous Q2H PRN    Or    morphINE PF 4 MG/ML injection 4 mg  4 mg Intravenous Q2H PRN    melatonin tab 3 mg  3 mg Oral Nightly PRN    polyethylene glycol (PEG 3350) (Miralax) 17 g oral packet 17 g  17 g Oral Daily PRN    sennosides (Senokot) tab 17.2 mg  17.2 mg Oral Nightly PRN    bisacodyl (Dulcolax) 10 MG rectal suppository 10 mg  10 mg Rectal Daily PRN    ondansetron (Zofran) 4 MG/2ML injection 4 mg  4 mg Intravenous Q6H PRN    calcium gluconate 3 g in sodium chloride 0.9% 100 mL IVPB  3 g Intravenous PRN    pantoprazole (Protonix) DR tab 40 mg  40 mg Oral QAM AC    glucose (Dex4) 15 GM/59ML oral liquid 15 g  15 g Oral Q15 Min PRN    Or     glucose (Glutose) 40% oral gel 15 g  15 g Oral Q15 Min PRN    Or    glucose-vitamin C (Dex-4) chewable tab 4 tablet  4 tablet Oral Q15 Min PRN    Or    dextrose 50% injection 50 mL  50 mL Intravenous Q15 Min PRN    Or    glucose (Dex4) 15 GM/59ML oral liquid 30 g  30 g Oral Q15 Min PRN    Or    glucose (Glutose) 40% oral gel 30 g  30 g Oral Q15 Min PRN    Or    glucose-vitamin C (Dex-4) chewable tab 8 tablet  8 tablet Oral Q15 Min PRN           Questions/concerns were discussed with patient and/or family by bedside.          Britta Persaud MD  4/10/2024  943 AM

## 2024-04-11 LAB
ANION GAP SERPL CALC-SCNC: 5 MMOL/L (ref 0–18)
ATRIAL RATE: 120 BPM
BUN BLD-MCNC: 69 MG/DL (ref 9–23)
CALCIUM BLD-MCNC: 9.2 MG/DL (ref 8.5–10.1)
CHLORIDE SERPL-SCNC: 110 MMOL/L (ref 98–112)
CO2 SERPL-SCNC: 33 MMOL/L (ref 21–32)
CREAT BLD-MCNC: 2.21 MG/DL
EGFRCR SERPLBLD CKD-EPI 2021: 30 ML/MIN/1.73M2 (ref 60–?)
ERYTHROCYTE [DISTWIDTH] IN BLOOD BY AUTOMATED COUNT: 16.9 %
GLUCOSE BLD-MCNC: 101 MG/DL (ref 70–99)
GLUCOSE BLD-MCNC: 103 MG/DL (ref 70–99)
GLUCOSE BLD-MCNC: 113 MG/DL (ref 70–99)
GLUCOSE BLD-MCNC: 133 MG/DL (ref 70–99)
GLUCOSE BLD-MCNC: 98 MG/DL (ref 70–99)
HCT VFR BLD AUTO: 27.8 %
HGB BLD-MCNC: 8.7 G/DL
MCH RBC QN AUTO: 26 PG (ref 26–34)
MCHC RBC AUTO-ENTMCNC: 31.3 G/DL (ref 31–37)
MCV RBC AUTO: 83.2 FL
OSMOLALITY SERPL CALC.SUM OF ELEC: 326 MOSM/KG (ref 275–295)
PLATELET # BLD AUTO: 201 10(3)UL (ref 150–450)
POTASSIUM SERPL-SCNC: 3.5 MMOL/L (ref 3.5–5.1)
Q-T INTERVAL: 452 MS
QRS DURATION: 92 MS
QTC CALCULATION (BEZET): 568 MS
R AXIS: 49 DEGREES
RBC # BLD AUTO: 3.34 X10(6)UL
SODIUM SERPL-SCNC: 148 MMOL/L (ref 136–145)
T AXIS: 32 DEGREES
VENTRICULAR RATE: 95 BPM
WBC # BLD AUTO: 7.5 X10(3) UL (ref 4–11)

## 2024-04-11 PROCEDURE — 99233 SBSQ HOSP IP/OBS HIGH 50: CPT | Performed by: INTERNAL MEDICINE

## 2024-04-11 RX ORDER — POTASSIUM CHLORIDE 20 MEQ/1
40 TABLET, EXTENDED RELEASE ORAL EVERY 4 HOURS
Qty: 4 TABLET | Refills: 0 | Status: COMPLETED | OUTPATIENT
Start: 2024-04-11 | End: 2024-04-11

## 2024-04-11 NOTE — PROGRESS NOTES
[facility]   part of [facility] with [facility]     CV Surgery Progress Note    Wagner Sheridan Patient Status:  Inpatient    1945 MRN [id number]   Location [facility] 6NE-A Attending Jason Hansen MD   Hosp Day # 7 PCP Edil Roy MD     Subjective:  Patient reports feeling good. Offers no current complaints.     Tele: Afib    Objective:  /68 (BP Location: Left arm)   Pulse 84   Temp 97.5 °F (36.4 °C) (Temporal)   Resp 16   Ht 6' (1.829 m)   Wt 271 lb (122.9 kg)   SpO2 91%   BMI 36.75 kg/m²     Intake/Output:    Intake/Output Summary (Last 24 hours) at 2024 0922  Last data filed at 2024 0632  Gross per 24 hour   Intake 279.9 ml   Output 3700 ml   Net -3420.1 ml       Labs:  Lab Results   Component Value Date    WBC 7.5 2024    RBC 3.34 2024    HGB 8.7 2024    HCT 27.8 2024    MCV 83.2 2024    MCH 26.0 2024    MCHC 31.3 2024    RDW 16.9 2024    .0 2024     Lab Results   Component Value Date     2024    K 3.5 2024     2024    CO2 33.0 2024    BUN 69 2024    CREATSERUM 2.21 2024    GLU 98 2024    CA 9.2 2024     Lab Results   Component Value Date    PT 13.6 2013    INR 1.41 (H) 2024    INR 1.15 2024    INR 1.34 (H) 2024       Physical Exam:  General: VSS, A&Ox3, In NAD  Neck: No JVD.   Heart: S1,S2 irregularly irregular ; Sternum Stable   Lungs: clear anteriorly   Extremities: warm, dry, +generalized edema  Skin: sternotomy incision C/D/I  Neuro: no focal deficits     Assessment/Plan:   S/P MV repair with a 36mm ring, TV repair with a 28mm ring, BECCA amputaiton, PFO closure, and MAZE procedure POD#7  -HD stable, off support   -Echo reviewed  -AFib,rates controlled- UGI bleed while on DOAC, cardiology following  -Leukocytosis, likely reactive, afebrile, resolved- monitor   -Acute post op blood loss anemia, expected, stable- monitor   -TCP, likely 2/2  consumption, resolved- monitor   -Volume OL, improving- bumex gtt/diuril per renal   -CEDRICK/CKD, Cr down to 2.21, good UOP- renal following   -Bowel regimen   -Pain management, prn tylenol or norco   -Chest tubes and PW removed  -Urinary retention, العراقي re-inserted 4/7- removed 4/9, voiding on own   -GI PPX: protonix  -DVT PPX: TEDs/SCDs  -Encourage IS/ambulation   -PT/OT/Cardiac rehab   -Ok to transfer to CTU     -D/W Dr. Hansen/Naomi Ahmadi PA-C   Cardiothoracic Surgery   4/11/2024  9:24 AM

## 2024-04-11 NOTE — DIETARY NOTE
[facility]   part of [facility]   CLINICAL NUTRITION    Wagner Sheridan     Admitting diagnosis:  MITRAL AND TRICUSPID REGURGITATION, ATRIALA FIBRILLATION, CORONARY ARTERY DISEASE, PATENT FORAMEN OVALE    Ht: 182.9 cm (6')  Wt: 122.9 kg (271 lb).   Body mass index is 36.75 kg/m².  IBW: 80.9 kg    Wt Readings from Last 6 Encounters:   04/11/24 122.9 kg (271 lb)   02/28/24 127.9 kg (281 lb 15.5 oz)   02/07/24 122.5 kg (270 lb)   01/29/24 122.5 kg (270 lb)   01/09/24 130.5 kg (287 lb 11.2 oz)   10/06/22 127 kg (280 lb)        Labs/Meds reviewed    Diet:       Procedures    Cardiac diet Cardiac; Is Patient on Accuchecks? Yes     Percent Meals Eaten (last 3 days)       Date/Time Percent Meals Eaten (%)    04/08/24 0800 100 %    04/08/24 1830 100 %          Pt chart reviewed d/t LOS.  Patient reports good appetite at this time.  Nursing notes reports Percent Meals Eaten (%): 100 % intake for last meal.  Tolerating po diet without diarrhea, emesis.   No significant weight changes noted.     PMH includes HTN. Pt p/w mitral valve insufficiency. POD # 7 for MV repair. Pt previously tolerating diet well - 100% per EMR.  Has ONS per pt request.  No n/v/d. No BM since admission- intervention offered and declined per RN notes.  Non-significant wt changes noted per EMR review.      Patient is at low nutrition risk at this time.    Please consult if patient status changes or nutrition issues arise.    Francheska Ramirez RD, LDN, [facility]  Clinical Dietitian  Phone [phone]

## 2024-04-11 NOTE — PLAN OF CARE
Assumed care at approximately 1130. Pt alert, oriented x4. Oxygen saturation adequate on room air. Lung sounds clear bilaterally. Tele: Atrial fibrillation, rates controlled. Midsternal incision clean, dry, intact, approximated with steri strips. Continent. Denies pain. Ambulates x1 walker. Plan of care: QID, incision care, daily EKG, ambulate in halls, intake and output. Pt updated on plan of care. Questions answered.     Problem: CARDIOVASCULAR - ADULT  Goal: Maintains optimal cardiac output and hemodynamic stability  Description: INTERVENTIONS:  - Monitor vital signs, rhythm, and trends  - Monitor for bleeding, hypotension and signs of decreased cardiac output  - Evaluate effectiveness of vasoactive medications to optimize hemodynamic stability  - Monitor arterial and/or venous puncture sites for bleeding and/or hematoma  - Assess quality of pulses, skin color and temperature  - Assess for signs of decreased coronary artery perfusion - ex. Angina  - Evaluate fluid balance, assess for edema, trend weights  Outcome: Progressing     Problem: SKIN/TISSUE INTEGRITY - ADULT  Goal: Incision(s), wounds(s) or drain site(s) healing without S/S of infection  Description: INTERVENTIONS:  - Assess and document risk factors for pressure ulcer development  - Assess and document skin integrity  - Assess and document dressing/incision, wound bed, drain sites and surrounding tissue  - Implement wound care per orders  - Initiate isolation precautions as appropriate  - Initiate Pressure Ulcer prevention bundle as indicated  Outcome: Progressing     Problem: Patient/Family Goals  Goal: Patient/Family Long Term Goal  Description: Patient's Long Term Goal: To go home    Interventions:  - early and continuous discharge education  - check sites for s/s infection  - early ambulationm  - See additional Care Plan goals for specific interventions  Outcome: Progressing  Goal: Patient/Family Short Term Goal  Description: Patient's Short Term  Goal: Pain management    Interventions:   - Assess pain q4h and prn  - prn analgesics  - non pharm interentions  - See additional Care Plan goals for specific interventions  Outcome: Progressing

## 2024-04-11 NOTE — OCCUPATIONAL THERAPY NOTE
OCCUPATIONAL THERAPY TREATMENT NOTE - INPATIENT     Room Number: 6607/6607-A  Session: 3   Number of Visits to Meet Established Goals: 5    Presenting Problem: 4/4 S/p planned status post mitral valve repair, tricuspid valve repair, maze procedure and left atrial appendage amputation and PFO closure    Prior Level of Function: independent with ADL Pt fell once within the past 3 months when he tripped over a big rug.   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 Lives at [facility]. ADDENDUM on 4/11: per wife, she has been providing min A with LB dressing (socks, guiding pants over legs, shoes) prior to this admission.     ASSESSMENT   Patient demonstrates excellent  progress this session, goals remain in progress.     Patient continues to function below baseline with toileting, upper body dressing, lower body dressing, transfers, and dynamic standing balance. Contributing factors to remaining limitations include decreased endurance.  Next session anticipate patient to progress toileting, upper body dressing, and lower body dressing.  Occupational Therapy will continue to follow patient for duration of hospitalization.     Patient continues to benefit from continued skilled OT services: at discharge to promote functional independence and safety with additional support and return home with home health OT.            History:  Patient is a 78 year old male admitted on 4/4/2024 with Presenting Problem: 4/4 S/p planned status post mitral valve repair, tricuspid valve repair, maze procedure and left atrial appendage amputation and PFO closure. Co-Morbidities : A-fib, HTN, R TKR 02/2015, gout. Admitted for the above noted procedure.  POD1 in the morning, stroke navigator was called, since pt was less responsive with fixed stare. Resolved after family arrived.       Recent admissions:  2/23 to 2/28/24 for anemia-> home  1/9 to 1/11/24 for COVID and A-fib-> home    WEIGHT BEARING RESTRICTION  Weight Bearing  Restriction: None                Recommendations for nursing staff:   Transfers: sba  Toileting location: toilet    TREATMENT SESSION:  Patient Start of Session: seated  FUNCTIONAL TRANSFER ASSESSMENT  Sit to Stand: Chair  Chair: Stand-by Assist    BED MOBILITY  Supine to Sit : Not tested  Sit to Supine (OT): Not Tested      EDUCATION PROVIDED  Patient: Role of Occupational Therapy; Functional Transfer Techniques; Surgical Precautions; Posture/Positioning; Compensatory ADL Techniques; Proper Body Mechanics  Patient's Response to Education: Returned Demonstration; Verbalized Understanding  Family/Caregiver: Role of Occupational Therapy; Plan of Care  Family/Caregiver's Response to Education: Verbalized Understanding      Equipment used: rw    Exercises:    Exercises Repetitions Comments   Scapular elevation     Scapular retraction     Shoulder rolls     Shoulder flexion 10    Shoulder abduction     Shoulder internal/external rotation     Forward punch     Elbow flexion 10    Elbow extension 10    Forearm pronation/supination     Wrist flexion/extension     Gross grasp/fist pumps 10    Ankle pumps 10    Knee extension     Marching         Therapist comments: pleasant, pt was already seated in the chair.  Educated the pt about LB dressing sequencing. Per wife, she has been helping him with socks, guiding pants over legs, and shoes prior to this admission. Min A to guide brief over legs, sba to stand, sba dynamic balance while pt pulled brief up to waist.   SBA standing while donning gown as a robe.  During seated rest breaks in the hallway (pt was ambulating him), pt completed B UE post-cv exercises. See table above.    Patient End of Session: Up in chair;Needs met;With  staff;Call light within reach;All patient questions and concerns addressed;Family present    SUBJECTIVE  \"This is good\" About today's session.    PAIN ASSESSMENT  Ratin           OBJECTIVE  Precautions: Sternal;Cardiac    AM-PAC ‘6-Clicks’  Inpatient Daily Activity Short Form  -   Putting on and taking off regular lower body clothing?: A Lot  -   Bathing (including washing, rinsing, drying)?: A Lot  -   Toileting, which includes using toilet, bedpan or urinal? : A Lot  -   Putting on and taking off regular upper body clothing?: A Little  -   Taking care of personal grooming such as brushing teeth?: A Little  -   Eating meals?: None    AM-PAC Score:  Score: 16  Approx Degree of Impairment: 53.32%  Standardized Score (AM-PAC Scale): 35.96    PLAN  OT Treatment Plan: Balance activities;Energy conservation/work simplification techniques;ADL training;Functional transfer training;UE strengthening/ROM;Patient/Family education;Patient/Family training;Equipment eval/education;Compensatory technique education  Rehab Potential : Good  Frequency: 3-5x/week    OT Goals:   Progressing 4/11  ADL Goals   Patient will perform grooming: with supervision and while standing at sink  Patient will perform upper body dressing:  with supervision  Patient will perform lower body dressing:  with supervision  Patient will perform toileting: with supervision     Functional Transfer Goals  Patient will transfer from sit to supine:  with supervision  Patient will transfer from supine to sit:  with supervision  Patient will transfer to toilet:  with supervision     UE Exercise Program Goal  Patient will be independent with bilateral AROM HEP (home exercise program). POST CV surgery HEP.       OT Session Time: 24 minutes  Self-Care Home Management: 10 minutes  Therapeutic Activity: 8 minutes  Neuromuscular Re-education:  minutes  Therapeutic Exercise: 5 minutes

## 2024-04-11 NOTE — PROGRESS NOTES
[facility] Cardiology Progress Note        Wagner Sheridan Patient Status:  Inpatient    1945 MRN [id number]   [facility] 6NE-A Attending Jason Hansen MD   Hosp Day # 7 PCP Edil Roy MD         Impression:  1.  Severe MR with marked pulm Hypertension . 2/2 flail P3 scallop of MV, and moderate TR,  s/p MITRAL VALVE REPAIR, TRICUSPID VALVE REPAIR, MAZE PROCEDURE, LEFT ATRIAL APPENDAGE AMPUTATION, PATENT FORAMEN OVALE CLOSURE.  24.      2.  Paroxysmal Atrial Fibrillation. Started 2024 in setting of covid infection.  Echo then showed severe MR, STAN shows flail P3 leaflet of MV and severe MR.   -avoiding ac due to recent GI bleed as well as strategy to mitigate cardio-embolism utilizing AAP amputation  -would avoid AAT such as amio for now, artemio given long QT     3. Hypertension . Low -normal bp's     4. CKD,  Cr = 2.3 pre-op.  Rise in Cr to 3.6 alyce-op. Seems to have peaked. Now on bumex drip    -now polyuric.  Net out = > 10L  -still with marked LE edema     5. Dyslipidemia      6. UGI bleeding while on DOAC, 2024            Recommend:     1. rate controlled AF: low dose metoprolol 12.5mg bid. off AC since . Hg 8s, stable. no evidence of bleeding.  consider restart DOAC, attenuated dose; ok per CV surgery.  2. good turn around re volume status on bumex gtt, diuril.  Cr improving (post op ATN),  per nephrology.  3. ok for telemetry transfer.         Subjective:  No acute issues overnight.  AF on telemetry, rate controlled.      Medications:   potassium chloride  40 mEq Oral q4h    eplerenone  25 mg Oral Daily    metoprolol tartrate  12.5 mg Oral 2x Daily(Beta Blocker)    insulin aspart  1-5 Units Subcutaneous TID AC and HS    sodium chloride   Intravenous Once    atorvastatin  20 mg Oral Nightly    finasteride  5 mg Oral Nightly    pantoprazole  40 mg Oral QAM AC       Continuous Infusions:   bumetanide (Bumex) 12.5 mg in 50 mL infusion 0.5 mg/hr (24  0400)           Allergies:  Allergies   Allergen Reactions    Albumin Human ANAPHYLAXIS    Ciprofloxacin HIVES    Clindamycin HIVES    Other ANAPHYLAXIS     Alcohol swab caps    Penicillins HIVES     Tolerated Ancef 4/4/24    Wasp Venom RESPIRATORY FAILURE    Wasp Venom Protein RESPIRATORY FAILURE    Benazepril Coughing    Chlorhexidine RASH and OTHER (SEE COMMENTS)     redness         Objective:        Intake/Output:      Intake/Output Summary (Last 24 hours) at 4/11/2024 1142  Last data filed at 4/11/2024 0926  Gross per 24 hour   Intake 279.9 ml   Output 4450 ml   Net -4170.1 ml     Wt Readings from Last 3 Encounters:   04/11/24 271 lb (122.9 kg)   02/28/24 281 lb 15.5 oz (127.9 kg)   02/07/24 270 lb (122.5 kg)       Physical Exam:        Vitals:    04/11/24 0632 04/11/24 0800 04/11/24 1000 04/11/24 1105   BP:  118/65 (!) 101/91 92/63   BP Location:  Left arm  Right arm   Pulse:  86 85 103   Resp:  16  16   Temp:  97.8 °F (36.6 °C)  97.6 °F (36.4 °C)   TempSrc:  Temporal  Oral   SpO2:  95% 95% 100%   Weight: 271 lb (122.9 kg)      Height:           Temp:  [96.8 °F (36 °C)-98 °F (36.7 °C)] 97.6 °F (36.4 °C)  Pulse:  [] 103  Resp:  [16] 16  BP: ()/(61-91) 92/63  SpO2:  [91 %-100 %] 100 %      Temp: 97.6 °F (36.4 °C)  Pulse: 103  Resp: 16  BP: 92/63  General:  Appears comfortable  HEENT: No focal deficits.  Neck: No JVD, carotids 2+ no bruits.  Cardiac: Irregular S1S2.  No S3, S4, rub, click.  No murmur.  Lungs: Clear to auscultation and percussion.  Abdomen: Soft, non-tender.   Extremities:  2-3+ bilateral  LE edema.  No clubbing or cyanosis.    Neurologic: Alert and oriented, normal affect.  Skin: Warm and dry.           LABS:      HEM:  Recent Labs   Lab 04/07/24  0451 04/08/24  0426 04/09/24  0408 04/10/24  0427 04/11/24  0411   WBC 14.0* 10.1 8.7 7.8 7.5   HGB 8.9* 8.6* 8.4* 8.6* 8.7*   HCT 29.6* 27.6* 27.6* 26.4* 27.8*   .0* 138.0* 158.0 174.0 201.0       Chem:  Recent Labs   Lab  04/04/24  1341 04/04/24  1742 04/05/24  0417 04/06/24  0411 04/07/24  0451 04/08/24  0426 04/09/24  0408 04/09/24  1918 04/10/24  0427 04/11/24  0411   *  --  148*   < > 144 145 146*  --  149* 148*   K 2.8*   < > 3.9   < > 4.3 3.9 3.0* 3.6 3.2* 3.5   *  --  116*   < > 112 111 109  --  109 110   CO2 25.0  --  27.0   < > 23.0 28.0 31.0  --  33.0* 33.0*   BUN 34*  --  39*   < > 80* 89* 85*  --  82* 69*   CREATSERUM 2.75*  --  2.42*   < > 3.66* 3.66* 3.37*  --  2.81* 2.21*   CA 9.2  --  9.3   < > 9.5 9.3 9.2  --  9.3 9.2   MG 2.7*  --  2.4  --  2.8*  --   --   --   --   --    *  --  98   < > 115* 116* 102*  --  98 98    < > = values in this interval not displayed.       No results for input(s): \"ALT\", \"AST\", \"ALB\", \"AMYLASE\", \"LIPASE\", \"LDH\" in the last 168 hours.    Invalid input(s): \"ALPHOS\", \"TBIL\", \"DBIL\", \"TPROT\"      Recent Labs   Lab 04/04/24  1341   PTT 33.0   INR 1.41*           Lab Results   Component Value Date    TROP < 0.046 12/27/2012         Invalid input(s): \"PBNPML\"                       Diagnostics:   Telemetry:Atrial Fibrillation. HR 70's    EKG, 4/4/2024:  NSR, FAV, prolonged QT     CXR, 4/4/2024:       STAN, 1/2024:       CONCLUSIONS:   1. There is flail of the P3 scallop of the mitral valve. The mitral valve has severe, 4+ eccentric (laterally directed) regurgitation.   2. The tricuspid valve has annular enlargement, with central malcoaptation. The tricuspid valve has moderate, centrally directed, regurgitation.  3. The left ventricle appears normal in size, thickness, and systolic function. The estimated left ventricular ejection fraction is 60%  4. A patent foramen ovale is present (tunnel length 34 mm). There is left-to-right shunting across the patent foramen ovale.       Echo, 4/10/24:    Conclusions:     1. Left ventricle: The cavity size was normal. Wall thickness was mildly      increased. Systolic function was normal. The estimated ejection fraction      was 55-60%, by  biplane method of disks. Wall motion is normal; there are      no regional wall motion abnormalities. Unable to assess LV diastolic      function due to heart rhythm.   2. Left atrium: The atrium was dilated. The left atrial volume was      moderately increased.   3. Aortic valve: There was mild regurgitation. The peak systolic velocity      was 1.56m/sec. The mean systolic gradient was 5mm Hg. The valve area      (VTI) was 3.59cm^2. The valve area (VTI) index was 1.45cm^2/m^2.   4. Aortic root: The aortic root was 4.0cm diameter. The aortic root was      mildly dilated.   5. Ascending aorta: The ascending aorta was 3.9cm diameter. The ascending      aorta was mildly dilated.   6. Mitral valve: The average mean diastolic gradient was 5mm Hg.   7. Tricuspid valve: The average mean diastolic gradient was 2mm Hg.   *

## 2024-04-11 NOTE — PROGRESS NOTES
.[facility] Hospitalist note    PCP: Edil Roy MD    Chief Complaint:  F/u s/p mitral valve repair    SUBJECTIVE:  Pt seen and examined.  States his LE's feel less swollen.  Continues to have good UO.  No fevers.     OBJECTIVE:  Temp:  [96.8 °F (36 °C)-98 °F (36.7 °C)] 97.6 °F (36.4 °C)  Pulse:  [] 103  Resp:  [16] 16  BP: ()/(61-91) 92/63  SpO2:  [91 %-100 %] 100 %    Intake/Output:    Intake/Output Summary (Last 24 hours) at 4/11/2024 1248  Last data filed at 4/11/2024 0926  Gross per 24 hour   Intake 279.9 ml   Output 3750 ml   Net -3470.1 ml       Last 3 Weights   04/11/24 0632 271 lb (122.9 kg)   04/10/24 0600 270 lb 8 oz (122.7 kg)   04/09/24 0600 274 lb 3.2 oz (124.4 kg)   04/08/24 0600 286 lb 9.6 oz (130 kg)   04/07/24 0400 287 lb 4.2 oz (130.3 kg)   04/06/24 1000 287 lb 7.7 oz (130.4 kg)   04/05/24 0500 289 lb 11 oz (131.4 kg)   02/20/24 1531 275 lb (124.7 kg)   02/28/24 0630 281 lb 15.5 oz (127.9 kg)   02/27/24 0426 277 lb 4.8 oz (125.8 kg)   02/26/24 0435 277 lb 9.6 oz (125.9 kg)   02/25/24 0438 278 lb 9.6 oz (126.4 kg)   02/24/24 0322 278 lb 8 oz (126.3 kg)   02/23/24 1456 274 lb 14.6 oz (124.7 kg)   02/07/24 1456 270 lb (122.5 kg)       Exam  Gen: No acute distress  HEENT: anicteric sclera, MMM  Pulm: Lungs clear, normal respiratory effort  CV: Heart with regular rate and rhythm, edematous throughout  Abd: Abdomen soft, nontender, nondistended, no organomegaly, bowel sounds present  Neuro: A&OX3, no focal deficits  Ext:  2+ pitting edema - improving in LE's  : العراقي in place, christopher     Data Review:         Labs:     Recent Labs   Lab 04/04/24  1341 04/05/24  0417 04/06/24  0411 04/07/24  0451 04/08/24  0426 04/09/24  0408 04/10/24  0427 04/11/24  0411   WBC 11.8* 16.4*   < > 14.0* 10.1 8.7 7.8 7.5   HGB 9.7* 9.0*   < > 8.9* 8.6* 8.4* 8.6* 8.7*   MCV 86.0 83.5   < > 86.3 84.7 85.2 82.0 83.2   .0 147.0*   < > 137.0* 138.0* 158.0 174.0 201.0   NE  --  14.83*  --   --   --   --   --   --     LYMABS  --  0.44*  --   --   --   --   --   --    INR 1.41*  --   --   --   --   --   --   --     < > = values in this interval not displayed.       Recent Labs   Lab 04/04/24  1341 04/04/24  1742 04/05/24 0417 04/06/24  0411 04/07/24  0451 04/08/24  0426 04/09/24  0408 04/09/24  1918 04/10/24  0427 04/11/24  0411   *  --  148*   < > 144 145 146*  --  149* 148*   K 2.8*   < > 3.9   < > 4.3 3.9 3.0* 3.6 3.2* 3.5   *  --  116*   < > 112 111 109  --  109 110   CO2 25.0  --  27.0   < > 23.0 28.0 31.0  --  33.0* 33.0*   BUN 34*  --  39*   < > 80* 89* 85*  --  82* 69*   CREATSERUM 2.75*  --  2.42*   < > 3.66* 3.66* 3.37*  --  2.81* 2.21*   CA 9.2  --  9.3   < > 9.5 9.3 9.2  --  9.3 9.2   MG 2.7*  --  2.4  --  2.8*  --   --   --   --   --    *  --  98   < > 115* 116* 102*  --  98 98    < > = values in this interval not displayed.       No results for input(s): \"ALT\", \"AST\", \"ALB\", \"AMYLASE\", \"LIPASE\" in the last 168 hours.    Invalid input(s): \"ALPHOS\", \"TBIL\", \"DBIL\", \"TPROT\"      No results for input(s): \"TROP\", \"CK\", \"PBNP\", \"PCT\" in the last 168 hours.    No results for input(s): \"CRP\", \"CIERRA\", \"LDH\", \"DDIMER\" in the last 168 hours.    Recent Labs   Lab 04/10/24  1116 04/10/24  1558 04/10/24  1600 04/10/24  2029 04/11/24  0625   PGLU 160* 378* 149* 157* 103*       Meds:   Scheduled Medication:   potassium chloride  40 mEq Oral q4h    eplerenone  25 mg Oral Daily    metoprolol tartrate  12.5 mg Oral 2x Daily(Beta Blocker)    insulin aspart  1-5 Units Subcutaneous TID AC and HS    sodium chloride   Intravenous Once    atorvastatin  20 mg Oral Nightly    finasteride  5 mg Oral Nightly    pantoprazole  40 mg Oral QAM AC     Continuous Infusing Medication:   bumetanide (Bumex) 12.5 mg in 50 mL infusion 0.5 mg/hr (04/11/24 0400)     PRN Medication:  acetaminophen **OR** acetaminophen    ipratropium-albuterol    HYDROcodone-acetaminophen **OR** HYDROcodone-acetaminophen    morphINE **OR** morphINE     melatonin    polyethylene glycol (PEG 3350)    sennosides    bisacodyl    ondansetron    calcium gluconate    glucose **OR** glucose **OR** glucose-vitamin C **OR** dextrose **OR** glucose **OR** glucose **OR** glucose-vitamin C       Microbiology:    No results found for this visit on 04/04/24.    Lab Results   Component Value Date    COVID19 Not Detected 04/01/2024    COVID19 Not Detected 01/09/2024    COVID19 Not Detected 08/04/2022        Assessment/Plan:     78-year-old male significant past medical history CKD stage III, severe MR, paroxysmal A-fib, BPH, hypertension, hyperaldosteronism, hyperlipidemia, pulmonary hypertension, moderate tricuspid regurgitation a is status post mitral valve repair, tricuspid valve repair, maze procedure and left atrial appendage amputation and PFO closure     # Severe MR s/p mitral valve repair, tricuspid valve repair, maze procedure and left atrial appendage amputation and PFO closure  -post op care per CV surgery- chest tube in place  -cards following  - iv diuresis, now transitioned to bumex gtt for next 24-48 hours.  Diuril added.     #afib  -has had some PAF, started on amio and has been on BB however currently more junctional alvino  -reviewed with cardiology and may need to stop amio, they will discuss with cv surg.  -monitor     #Acute kidney injury on CKD 3; hypernatremia  -Na improved but Cr worsening and uop declining. Edematous  -IV lasix ordered, now on bumex gtt,nephrology managing.     #HTN   #BPH  -resume finasteride     #postop hyperglycemia- ssi    #H pylori infection - cont PPI per GI.  Pt had been prescribed flagyl 500 mg po TID x 14 days and tetracycline 250 mg po daily x 14 days but this was stopped by his PCP on 3/11/24.  Pt to f/u with PCP and GI regarding the need to resume the abx. D/w pt and his wife who agree.    POC d/w pt who agrees.     Ronny Batista Hospitalist  Pager: [phone]

## 2024-04-11 NOTE — PROGRESS NOTES
[facility]  Nephrology Progress Note    Wagner Sheridan Attending:  Jason Hansen MD       Assessment and Plan:    1) CEDRICK- ATN due to expected post-op fluid shifts, relative hypotension- improving daily. At least 20# over baseline -> continue bumex gtt; add diuril     2) CKD 3- baseline Cr < 2 mg/dl due to longstanding HTN / nephrosclerosis- no further w/u    3) s/p MV repair / TV repair with maze procedure / L atrial appendage amputation + PFO closure     4) Afib- started  in setting of COVID infection. Rate control per cards    5) HTN with hyperaldosteronism- holding usual ARB with above; on BB. Resume lower dose eplerenone    6) Anemia- due to CKD / ? GI blood loss with gastritis / PUD  / above     D/W family at bedside. Doing very well overall      Subjective:  Awake doing well feeling better overall up in chair in good spirits    Physical Exam:   /68 (BP Location: Left arm)   Pulse 84   Temp 97.5 °F (36.4 °C) (Temporal)   Resp 16   Ht 6' (1.829 m)   Wt 271 lb (122.9 kg)   SpO2 91%   BMI 36.75 kg/m²   Temp (24hrs), Av.5 °F (36.4 °C), Min:96.8 °F (36 °C), Max:98 °F (36.7 °C)       Intake/Output Summary (Last 24 hours) at 2024 0913  Last data filed at 2024 0632  Gross per 24 hour   Intake 279.9 ml   Output 3700 ml   Net -3420.1 ml     Wt Readings from Last 3 Encounters:   24 271 lb (122.9 kg)   24 281 lb 15.5 oz (127.9 kg)   24 270 lb (122.5 kg)     General: awake  HEENT: No scleral icterus, MMM  Neck: Supple, no OWEN or thyromegaly  Cardiac: Regular rate and rhythm, S1, S2 normal, no murmur or tub  Lungs: Decreased BS at bases bilaterally   Abdomen: Soft, non-tender. + bowel sounds, no palpable organomegaly  Extremities: Without clubbing, cyanosis; no edema  Neurologic: Cranial nerves grossly intact, moving all extremities  Skin: Warm and dry, no rashes       Labs:   Lab Results   Component Value Date    WBC 7.5 2024    HGB 8.7 2024    HCT 27.8  04/11/2024    .0 04/11/2024    CREATSERUM 2.21 04/11/2024    BUN 69 04/11/2024     04/11/2024    K 3.5 04/11/2024     04/11/2024    CO2 33.0 04/11/2024    GLU 98 04/11/2024    CA 9.2 04/11/2024    PGLU 103 04/11/2024       Imaging:  All imaging studies reviewed.    Meds:   Current Facility-Administered Medications   Medication Dose Route Frequency    eplerenone (Inspra) tab 25 mg  25 mg Oral Daily    metoprolol tartrate (Lopressor) partial tab 12.5 mg  12.5 mg Oral 2x Daily(Beta Blocker)    acetaminophen (Tylenol Extra Strength) tab 500 mg  500 mg Oral Q6H PRN    Or    acetaminophen (Tylenol Extra Strength) tab 1,000 mg  1,000 mg Oral Q6H PRN    bumetanide (Bumex) 12.5 mg in 50 mL infusion  0.5 mg/hr Intravenous Continuous    ipratropium-albuterol (Duoneb) 0.5-2.5 (3) MG/3ML inhalation solution 3 mL  3 mL Nebulization Q4H PRN    insulin aspart (NovoLOG) 100 Units/mL FlexPen 1-5 Units  1-5 Units Subcutaneous TID AC and HS    HYDROcodone-acetaminophen (Norco) 5-325 MG per tab 1 tablet  1 tablet Oral Q4H PRN    Or    HYDROcodone-acetaminophen (Norco) 5-325 MG per tab 2 tablet  2 tablet Oral Q4H PRN    sodium chloride 0.9% infusion   Intravenous Once    atorvastatin (Lipitor) tab 20 mg  20 mg Oral Nightly    finasteride (Proscar) tab 5 mg  5 mg Oral Nightly    morphINE PF 2 MG/ML injection 2 mg  2 mg Intravenous Q2H PRN    Or    morphINE PF 4 MG/ML injection 4 mg  4 mg Intravenous Q2H PRN    melatonin tab 3 mg  3 mg Oral Nightly PRN    polyethylene glycol (PEG 3350) (Miralax) 17 g oral packet 17 g  17 g Oral Daily PRN    sennosides (Senokot) tab 17.2 mg  17.2 mg Oral Nightly PRN    bisacodyl (Dulcolax) 10 MG rectal suppository 10 mg  10 mg Rectal Daily PRN    ondansetron (Zofran) 4 MG/2ML injection 4 mg  4 mg Intravenous Q6H PRN    calcium gluconate 3 g in sodium chloride 0.9% 100 mL IVPB  3 g Intravenous PRN    pantoprazole (Protonix) DR tab 40 mg  40 mg Oral QAM AC    glucose (Dex4) 15 GM/59ML oral  liquid 15 g  15 g Oral Q15 Min PRN    Or    glucose (Glutose) 40% oral gel 15 g  15 g Oral Q15 Min PRN    Or    glucose-vitamin C (Dex-4) chewable tab 4 tablet  4 tablet Oral Q15 Min PRN    Or    dextrose 50% injection 50 mL  50 mL Intravenous Q15 Min PRN    Or    glucose (Dex4) 15 GM/59ML oral liquid 30 g  30 g Oral Q15 Min PRN    Or    glucose (Glutose) 40% oral gel 30 g  30 g Oral Q15 Min PRN    Or    glucose-vitamin C (Dex-4) chewable tab 8 tablet  8 tablet Oral Q15 Min PRN           Questions/concerns were discussed with patient and/or family by bedside.          Britta Persaud MD  4/11/2024  913 AM

## 2024-04-11 NOTE — PHYSICAL THERAPY NOTE
PHYSICAL THERAPY TREATMENT NOTE - INPATIENT    Room Number: 6607/6607-A       Session:  4    Number of Visits to Meet Established Goals: 5  History related to current admission: Patient is a 78 year old male admitted on 4/4/2024 : S/p planned status post mitral valve repair, tricuspid valve repair, maze procedure and left atrial appendage amputation and PFO closure 4/4     Recent Admit  2/23-2/28/24: anemia, not seen by therapy  1/9-1/11/24: PNA > home        HOME SITUATION  [facility]   Home Layout: One level  Stairs to Enter : 1     Lives With: Spouse  Drives: Yes  Patient Owned Equipment: Rolling walker  Patient Regularly Uses: Glasses     Prior Level of [place]: indep, reports hx of 2 falls in the past 6 months - tripped over a rug, 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     Patient continues to function below baseline with bed mobility, transfers, gait, and stair negotiation.  Contributing factors to remaining limitations include decreased functional strength, decreased endurance/aerobic capacity, decreased muscular endurance, and dizziness associated with hypotension .  Next session anticipate patient to progress gait and stair negotiation.  Physical Therapy will continue to follow patient for duration of hospitalization.    Patient continues to benefit from continued skilled PT services: at discharge to promote functional independence and safety with additional support and return home with home health PT.    PLAN  PT Treatment Plan: Bed mobility;Body mechanics;Coordination;Endurance;Energy conservation;Patient education;Family education;Gait training;Range of  motion;Neuromuscular re-educate;Strengthening;Transfer training;Balance training;Stair training  Rehab Potential : Good  Frequency (Obs): 3-5x/week    CURRENT GOALS     Goal #1 Patient is able to demonstrate supine - sit EOB @ level: supervision      Goal #2 Patient is able to demonstrate transfers EOB to/from Chair/Wheelchair at assistance level: supervision      Goal #3 Patient is able to ambulate 150 feet with assist device: walker - rolling at assistance level: supervision      Goal #4 Patient will navigate 1 step with rail and SBA   Goal #5 Patient will navigate 1 stoop step with RW (wants to access back patio)   Goal #6     Goal Comments: Goals established on 2024 all goals ongoing     SUBJECTIVE  \"Oh great, you're back\"    OBJECTIVE  Precautions: Sternal;Cardiac    WEIGHT BEARING RESTRICTION  Weight Bearing Restriction: None                PAIN ASSESSMENT   Ratin  Location: pt denies pain       BALANCE                                                                                                                       Static Sitting: Good  Dynamic Sitting: Good           Static Standing: Fair -  Dynamic Standing: Fair -    ACTIVITY TOLERANCE                         O2 WALK         AM-PAC '6-Clicks' INPATIENT SHORT FORM - BASIC MOBILITY  How much difficulty does the patient currently have...  Patient Difficulty: Turning over in bed (including adjusting bedclothes, sheets and blankets)?: None   Patient Difficulty: Sitting down on and standing up from a chair with arms (e.g., wheelchair, bedside commode, etc.): A Little   Patient Difficulty: Moving from lying on back to sitting on the side of the bed?: None   How much help from another person does the patient currently need...   Help from Another: Moving to and from a bed to a chair (including a wheelchair)?: A Little   Help from Another: Need to walk in hospital room?: A Little   Help from Another: Climbing 3-5 steps with a railing?: A Lot        AM-PAC Score:  Raw Score: 19   Approx Degree of Impairment: 41.77%   Standardized Score (AM-PAC Scale): 45.44   CMS Modifier (G-Code): CK    FUNCTIONAL ABILITY STATUS  Gait Assessment   Functional Mobility/Gait Assessment  Gait Assistance: Contact guard assist  Distance (ft): 25 x 5  Assistive Device: Rolling walker  Pattern: Shuffle (unable to stay close to RW)    Skilled Therapy Provided    Transfer Mobility:  Sit<>Stand: CGA   Stand<>Sit: CGA   Gait: CGA    Therapist's Comments: multiple bouts of ambulation - measured 23\" elevated seat height to emulate chair surface height at home.     Writer had discussion with spouse and daughter about home safety and application for ramp to enter.     THERAPEUTIC EXERCISES  Lower Extremity Alternating marching  Ankle pumps  LAQ     Upper Extremity      Position Sitting     Repetitions   10   Sets   1     Patient End of Session: Up in chair;Needs met;Call light within reach;RN aware of session/findings;All patient questions and concerns addressed    PT Session Time: 23 minutes  Gait Training: 15 minutes  Therapeutic Activity: 8 minutes  Therapeutic Exercise: 0 minutes   Neuromuscular Re-education: 0 minutes

## 2024-04-11 NOTE — PLAN OF CARE
Assumed care of pt around 1930. A+Ox4. Denies pain at this time. Afib, rates controlled. Sternal incision painted with betadine. Edematous. Bumex gtt per orders. External urinary catheter in place. No BM since post sx, bowel interventions offered, pt declined at this time and said he will start them tomorrow. Transfer orders.

## 2024-04-12 VITALS
DIASTOLIC BLOOD PRESSURE: 65 MMHG | HEART RATE: 98 BPM | SYSTOLIC BLOOD PRESSURE: 103 MMHG | WEIGHT: 263.88 LBS | BODY MASS INDEX: 35.74 KG/M2 | HEIGHT: 72 IN | TEMPERATURE: 98 F | RESPIRATION RATE: 18 BRPM | OXYGEN SATURATION: 100 %

## 2024-04-12 LAB
ANION GAP SERPL CALC-SCNC: 6 MMOL/L (ref 0–18)
BUN BLD-MCNC: 63 MG/DL (ref 9–23)
CALCIUM BLD-MCNC: 9.6 MG/DL (ref 8.5–10.1)
CHLORIDE SERPL-SCNC: 106 MMOL/L (ref 98–112)
CO2 SERPL-SCNC: 35 MMOL/L (ref 21–32)
CREAT BLD-MCNC: 2.33 MG/DL
EGFRCR SERPLBLD CKD-EPI 2021: 28 ML/MIN/1.73M2 (ref 60–?)
ERYTHROCYTE [DISTWIDTH] IN BLOOD BY AUTOMATED COUNT: 17.1 %
GLUCOSE BLD-MCNC: 107 MG/DL (ref 70–99)
GLUCOSE BLD-MCNC: 112 MG/DL (ref 70–99)
GLUCOSE BLD-MCNC: 115 MG/DL (ref 70–99)
HCT VFR BLD AUTO: 26 %
HGB BLD-MCNC: 8.4 G/DL
MCH RBC QN AUTO: 26.3 PG (ref 26–34)
MCHC RBC AUTO-ENTMCNC: 32.3 G/DL (ref 31–37)
MCV RBC AUTO: 81.3 FL
OSMOLALITY SERPL CALC.SUM OF ELEC: 322 MOSM/KG (ref 275–295)
PLATELET # BLD AUTO: 213 10(3)UL (ref 150–450)
POTASSIUM SERPL-SCNC: 3.1 MMOL/L (ref 3.5–5.1)
RBC # BLD AUTO: 3.2 X10(6)UL
SODIUM SERPL-SCNC: 147 MMOL/L (ref 136–145)
WBC # BLD AUTO: 10.2 X10(3) UL (ref 4–11)

## 2024-04-12 PROCEDURE — 99233 SBSQ HOSP IP/OBS HIGH 50: CPT | Performed by: INTERNAL MEDICINE

## 2024-04-12 RX ORDER — TORSEMIDE 20 MG/1
40 TABLET ORAL DAILY
Qty: 60 TABLET | Refills: 11 | Status: SHIPPED | OUTPATIENT
Start: 2024-04-12 | End: 2024-04-12

## 2024-04-12 RX ORDER — EPLERENONE 25 MG/1
25 TABLET, FILM COATED ORAL DAILY
Qty: 30 TABLET | Refills: 11 | Status: SHIPPED | OUTPATIENT
Start: 2024-04-13

## 2024-04-12 RX ORDER — PANTOPRAZOLE SODIUM 40 MG/1
40 TABLET, DELAYED RELEASE ORAL
Qty: 30 TABLET | Refills: 11 | Status: SHIPPED | OUTPATIENT
Start: 2024-04-13 | End: 2024-04-12

## 2024-04-12 RX ORDER — TORSEMIDE 20 MG/1
40 TABLET ORAL DAILY
Qty: 60 TABLET | Refills: 11 | Status: SHIPPED | OUTPATIENT
Start: 2024-04-12

## 2024-04-12 RX ORDER — EPLERENONE 25 MG/1
25 TABLET, FILM COATED ORAL DAILY
Qty: 30 TABLET | Refills: 11 | Status: SHIPPED | OUTPATIENT
Start: 2024-04-13 | End: 2024-04-12

## 2024-04-12 RX ORDER — POTASSIUM CHLORIDE 20 MEQ/1
40 TABLET, EXTENDED RELEASE ORAL EVERY 4 HOURS
Status: COMPLETED | OUTPATIENT
Start: 2024-04-12 | End: 2024-04-12

## 2024-04-12 RX ORDER — PANTOPRAZOLE SODIUM 40 MG/1
40 TABLET, DELAYED RELEASE ORAL
Qty: 30 TABLET | Refills: 11 | Status: SHIPPED | OUTPATIENT
Start: 2024-04-13

## 2024-04-12 RX ORDER — POTASSIUM CHLORIDE 1500 MG/1
20 TABLET, EXTENDED RELEASE ORAL DAILY
Qty: 30 TABLET | Refills: 110 | Status: SHIPPED | OUTPATIENT
Start: 2024-04-12

## 2024-04-12 NOTE — PLAN OF CARE
Pt discharged home. IV removed. Tele dc'd and returned to monitor tech. Follow up instructions provided and discussed. Pt and family verbalized understanding. Rx scripts given. Discussed adverse reactions and side effects of all new medications and provided appropriate handouts. Pt and family verbalized understanding. Pt wheeled down by staff with all belongings. Pt left denying complaints of pain, malaise, or cardiac symptoms. All needs met by staff.

## 2024-04-12 NOTE — PROGRESS NOTES
[facility] Cardiology Progress Note        Wagner Sheridan Patient Status:  Inpatient    1945 MRN [id number]   Formerly [facility] 6NE-A Attending Jason Hansen MD   Hosp Day # 8 PCP Edil Roy MD         Impression:  1.  Severe MR with marked pulm Hypertension . 2/2 flail P3 scallop of MV, and moderate TR,  s/p MITRAL VALVE REPAIR, TRICUSPID VALVE REPAIR, MAZE PROCEDURE, LEFT ATRIAL APPENDAGE AMPUTATION, PATENT FORAMEN OVALE CLOSURE.  24.      2.  Paroxysmal Atrial Fibrillation. Started 2024 in setting of covid infection.  Echo then showed severe MR, STAN shows flail P3 leaflet of MV and severe MR.   -avoiding ac due to recent GI bleed as well as strategy to mitigate cardio-embolism utilizing AAP amputation--> will re-address with Dr. Wiley outpatient.  -would avoid AAT such as amio for now, artemio given long QT     3. Hypertension . Low -normal bp's     4. CKD,  Cr = 2.3 pre-op.  Rise in Cr to 3.6 alyce-op. Seems to have peaked. Now on bumex drip    -now polyuric.  Net out = > 10L  -still with marked LE edema     5. Dyslipidemia      6. UGI bleeding while on DOAC, 2024            Recommend:     1. rate controlled AF: low dose metoprolol 12.5mg bid. off AC since GIB . Hg 8s, stable. no evidence of bleeding. Discussed re-trial of attenuated dose apixaban with pt/family, they prefer to hold for now and re-discuss with Dr. Hernandes in close pt f/u.    2. great turn around re volume status on bumex gtt, diuril.  Cr improved (post op ATN).  home on lower dose eplerenone/loop diuretic- per Dr. Persaud.  3. f/u with Dr. Wiley scheduled next wk.  ok for dc from cv standpoint.       Subjective:  No acute issues overnight.  AF on telemetry, rate controlled.      Medications:   potassium chloride  40 mEq Oral q4h    eplerenone  25 mg Oral Daily    metoprolol tartrate  12.5 mg Oral 2x Daily(Beta Blocker)    insulin aspart  1-5 Units Subcutaneous TID AC and HS    sodium  chloride   Intravenous Once    atorvastatin  20 mg Oral Nightly    finasteride  5 mg Oral Nightly    pantoprazole  40 mg Oral QAM AC       Continuous Infusions:   bumetanide (Bumex) 12.5 mg in 50 mL infusion 0.5 mg/hr (04/11/24 1853)           Allergies:  Allergies   Allergen Reactions    Albumin Human ANAPHYLAXIS    Ciprofloxacin HIVES    Clindamycin HIVES    Other ANAPHYLAXIS     Alcohol swab caps    Penicillins HIVES     Tolerated Ancef 4/4/24    Wasp Venom RESPIRATORY FAILURE    Wasp Venom Protein RESPIRATORY FAILURE    Benazepril Coughing    Chlorhexidine RASH and OTHER (SEE COMMENTS)     redness         Objective:        Intake/Output:      Intake/Output Summary (Last 24 hours) at 4/12/2024 1119  Last data filed at 4/12/2024 0600  Gross per 24 hour   Intake 142.5 ml   Output 1800 ml   Net -1657.5 ml     Wt Readings from Last 3 Encounters:   04/12/24 263 lb 14.4 oz (119.7 kg)   02/28/24 281 lb 15.5 oz (127.9 kg)   02/07/24 270 lb (122.5 kg)       Physical Exam:        Vitals:    04/11/24 2004 04/11/24 2100 04/11/24 2330 04/12/24 0415   BP:    108/54   BP Location:    Right arm   Pulse: 88 103  96   Resp: (!) 28 (!) 28  14   Temp:   97.4 °F (36.3 °C) 97.6 °F (36.4 °C)   TempSrc:   Oral Oral   SpO2: 98% 96%  97%   Weight:    263 lb 14.4 oz (119.7 kg)   Height:           Temp:  [97.4 °F (36.3 °C)-97.7 °F (36.5 °C)] 97.6 °F (36.4 °C)  Pulse:  [] 96  Resp:  [14-28] 14  BP: (106-114)/(54-60) 108/54  SpO2:  [95 %-98 %] 97 %      Temp: 97.6 °F (36.4 °C)  Pulse: 96  Resp: 14  BP: 108/54  General:  Appears comfortable  HEENT: No focal deficits.  Neck: No JVD, carotids 2+ no bruits.  Cardiac: Irregular S1S2.  No S3, S4, rub, click.  No murmur.  Lungs: Clear to auscultation and percussion.  Abdomen: Soft, non-tender.   Extremities:  2-3+ bilateral  LE edema.  No clubbing or cyanosis.    Neurologic: Alert and oriented, normal affect.  Skin: Warm and dry.           LABS:      HEM:  Recent Labs   Lab 04/08/24  8088  04/09/24  0408 04/10/24  0427 04/11/24  0411 04/12/24  0445   WBC 10.1 8.7 7.8 7.5 10.2   HGB 8.6* 8.4* 8.6* 8.7* 8.4*   HCT 27.6* 27.6* 26.4* 27.8* 26.0*   .0* 158.0 174.0 201.0 213.0       Chem:  Recent Labs   Lab 04/07/24  0451 04/08/24  0426 04/09/24  0408 04/09/24  1918 04/10/24  0427 04/11/24  0411 04/12/24  0445    145 146*  --  149* 148* 147*   K 4.3 3.9 3.0* 3.6 3.2* 3.5 3.1*    111 109  --  109 110 106   CO2 23.0 28.0 31.0  --  33.0* 33.0* 35.0*   BUN 80* 89* 85*  --  82* 69* 63*   CREATSERUM 3.66* 3.66* 3.37*  --  2.81* 2.21* 2.33*   CA 9.5 9.3 9.2  --  9.3 9.2 9.6   MG 2.8*  --   --   --   --   --   --    * 116* 102*  --  98 98 107*       No results for input(s): \"ALT\", \"AST\", \"ALB\", \"AMYLASE\", \"LIPASE\", \"LDH\" in the last 168 hours.    Invalid input(s): \"ALPHOS\", \"TBIL\", \"DBIL\", \"TPROT\"      No results for input(s): \"PT\", \"PTT\", \"INR\" in the last 168 hours.          Lab Results   Component Value Date    TROP < 0.046 12/27/2012         Invalid input(s): \"PBNPML\"                       Diagnostics:   Telemetry:Atrial Fibrillation. HR 70's    EKG, 4/4/2024:  NSR, FAV, prolonged QT     CXR, 4/4/2024:       STAN, 1/2024:       CONCLUSIONS:   1. There is flail of the P3 scallop of the mitral valve. The mitral valve has severe, 4+ eccentric (laterally directed) regurgitation.   2. The tricuspid valve has annular enlargement, with central malcoaptation. The tricuspid valve has moderate, centrally directed, regurgitation.  3. The left ventricle appears normal in size, thickness, and systolic function. The estimated left ventricular ejection fraction is 60%  4. A patent foramen ovale is present (tunnel length 34 mm). There is left-to-right shunting across the patent foramen ovale.       Echo, 4/10/24:    Conclusions:     1. Left ventricle: The cavity size was normal. Wall thickness was mildly      increased. Systolic function was normal. The estimated ejection fraction      was 55-60%, by  biplane method of disks. Wall motion is normal; there are      no regional wall motion abnormalities. Unable to assess LV diastolic      function due to heart rhythm.   2. Left atrium: The atrium was dilated. The left atrial volume was      moderately increased.   3. Aortic valve: There was mild regurgitation. The peak systolic velocity      was 1.56m/sec. The mean systolic gradient was 5mm Hg. The valve area      (VTI) was 3.59cm^2. The valve area (VTI) index was 1.45cm^2/m^2.   4. Aortic root: The aortic root was 4.0cm diameter. The aortic root was      mildly dilated.   5. Ascending aorta: The ascending aorta was 3.9cm diameter. The ascending      aorta was mildly dilated.   6. Mitral valve: The average mean diastolic gradient was 5mm Hg.   7. Tricuspid valve: The average mean diastolic gradient was 2mm Hg.   *

## 2024-04-12 NOTE — PLAN OF CARE
Problem: CARDIOVASCULAR - ADULT  Goal: Maintains optimal cardiac output and hemodynamic stability  Description: INTERVENTIONS:  - Monitor vital signs, rhythm, and trends  - Monitor for bleeding, hypotension and signs of decreased cardiac output  - Evaluate effectiveness of vasoactive medications to optimize hemodynamic stability  - Monitor arterial and/or venous puncture sites for bleeding and/or hematoma  - Assess quality of pulses, skin color and temperature  - Assess for signs of decreased coronary artery perfusion - ex. Angina  - Evaluate fluid balance, assess for edema, trend weights  Outcome: Progressing     Problem: SKIN/TISSUE INTEGRITY - ADULT  Goal: Incision(s), wounds(s) or drain site(s) healing without S/S of infection  Description: INTERVENTIONS:  - Assess and document risk factors for pressure ulcer development  - Assess and document skin integrity  - Assess and document dressing/incision, wound bed, drain sites and surrounding tissue  - Implement wound care per orders  - Initiate isolation precautions as appropriate  - Initiate Pressure Ulcer prevention bundle as indicated  Outcome: Progressing     Problem: Patient/Family Goals  Goal: Patient/Family Long Term Goal  Description: Patient's Long Term Goal: To go home    Interventions:  - early and continuous discharge education  - check sites for s/s infection  - early ambulationm  - See additional Care Plan goals for specific interventions  Outcome: Progressing  Goal: Patient/Family Short Term Goal  Description: Patient's Short Term Goal: Pain management    Interventions:   - Assess pain q4h and prn  - prn analgesics  - non pharm interentions  - See additional Care Plan goals for specific interventions  Outcome: Progressing

## 2024-04-12 NOTE — PLAN OF CARE
Assumed care at 0730. Pt alert, oriented x4. Oxygen saturation adequate on room air. Lung sounds clear, diminished bilaterally. Tele: Afib. Midsternal incision clean, dry, intact, open to air, painted with betadine. Steri strips over incision. Continent of bowel, no bowel movement since surgery. Continent of bladder, urinary frequency/urgency with Bumex gtt infusing per order. Ambulates x1 walker, chair follow in the halls. Plan of care: QID, Bumex gtt per order, IV push diuril and potassium replacement per renal, ambulate. Pt updated on plan of care. Questions answered.       Problem: CARDIOVASCULAR - ADULT  Goal: Maintains optimal cardiac output and hemodynamic stability  Description: INTERVENTIONS:  - Monitor vital signs, rhythm, and trends  - Monitor for bleeding, hypotension and signs of decreased cardiac output  - Evaluate effectiveness of vasoactive medications to optimize hemodynamic stability  - Monitor arterial and/or venous puncture sites for bleeding and/or hematoma  - Assess quality of pulses, skin color and temperature  - Assess for signs of decreased coronary artery perfusion - ex. Angina  - Evaluate fluid balance, assess for edema, trend weights  Outcome: Progressing     Problem: SKIN/TISSUE INTEGRITY - ADULT  Goal: Incision(s), wounds(s) or drain site(s) healing without S/S of infection  Description: INTERVENTIONS:  - Assess and document risk factors for pressure ulcer development  - Assess and document skin integrity  - Assess and document dressing/incision, wound bed, drain sites and surrounding tissue  - Implement wound care per orders  - Initiate isolation precautions as appropriate  - Initiate Pressure Ulcer prevention bundle as indicated  Outcome: Progressing     Problem: Patient/Family Goals  Goal: Patient/Family Long Term Goal  Description: Patient's Long Term Goal: To go home    Interventions:  - early and continuous discharge education  - check sites for s/s infection  - early  ambulationm  - See additional Care Plan goals for specific interventions  Outcome: Progressing  Goal: Patient/Family Short Term Goal  Description: Patient's Short Term Goal: Pain management    Interventions:   - Assess pain q4h and prn  - prn analgesics  - non pharm interentions  - See additional Care Plan goals for specific interventions  Outcome: Progressing

## 2024-04-12 NOTE — PROGRESS NOTES
[facility]  Nephrology Progress Note    Wagner Sheridan Attending:  Jason Hansen MD       Assessment and Plan:    1) CEDRICK- ATN due to expected post-op fluid shifts, relative hypotension- improving daily. Diuresed 24# since Mon.     2) CKD 3- baseline Cr < 2 mg/dl due to longstanding HTN / nephrosclerosis- no further w/u    3) s/p MV repair / TV repair with maze procedure / L atrial appendage amputation + PFO closure     4) Afib- started  in setting of COVID infection. Rate control per cards    5) HTN with hyperaldosteronism- holding usual ARB with above; dc home on lower dose BB / eplerenone / NO amlodipine     6) Anemia- due to CKD / ? GI blood loss with gastritis / PUD  / above     D/W family at bedside. Possible dc home today on torsemide / lower dose eplerenone. To send weight diary via [employer] weekly       Subjective:  Awake doing well feeling better overall up in chair in good spirits    Physical Exam:   /54 (BP Location: Right arm)   Pulse 96   Temp 97.6 °F (36.4 °C) (Oral)   Resp 14   Ht 6' (1.829 m)   Wt 263 lb 14.4 oz (119.7 kg)   SpO2 97%   BMI 35.79 kg/m²   Temp (24hrs), Av.6 °F (36.4 °C), Min:97.4 °F (36.3 °C), Max:97.7 °F (36.5 °C)       Intake/Output Summary (Last 24 hours) at 2024 0847  Last data filed at 2024 0600  Gross per 24 hour   Intake 142.5 ml   Output 2950 ml   Net -2807.5 ml     Wt Readings from Last 3 Encounters:   24 263 lb 14.4 oz (119.7 kg)   24 281 lb 15.5 oz (127.9 kg)   24 270 lb (122.5 kg)     General: awake  HEENT: No scleral icterus, MMM  Neck: Supple, no OWEN or thyromegaly  Cardiac: Regular rate and rhythm, S1, S2 normal, no murmur or tub  Lungs: Decreased BS at bases bilaterally   Abdomen: Soft, non-tender. + bowel sounds, no palpable organomegaly  Extremities: Without clubbing, cyanosis; no edema  Neurologic: Cranial nerves grossly intact, moving all extremities  Skin: Warm and dry, no rashes       Labs:   Lab Results    Component Value Date    WBC 10.2 04/12/2024    HGB 8.4 04/12/2024    HCT 26.0 04/12/2024    .0 04/12/2024    CREATSERUM 2.33 04/12/2024    BUN 63 04/12/2024     04/12/2024    K 3.1 04/12/2024     04/12/2024    CO2 35.0 04/12/2024     04/12/2024    CA 9.6 04/12/2024    PGLU 112 04/12/2024       Imaging:  All imaging studies reviewed.    Meds:   Current Facility-Administered Medications   Medication Dose Route Frequency    apixaban (Eliquis) tab 2.5 mg  2.5 mg Oral BID    eplerenone (Inspra) tab 25 mg  25 mg Oral Daily    metoprolol tartrate (Lopressor) partial tab 12.5 mg  12.5 mg Oral 2x Daily(Beta Blocker)    acetaminophen (Tylenol Extra Strength) tab 500 mg  500 mg Oral Q6H PRN    Or    acetaminophen (Tylenol Extra Strength) tab 1,000 mg  1,000 mg Oral Q6H PRN    bumetanide (Bumex) 12.5 mg in 50 mL infusion  0.5 mg/hr Intravenous Continuous    ipratropium-albuterol (Duoneb) 0.5-2.5 (3) MG/3ML inhalation solution 3 mL  3 mL Nebulization Q4H PRN    insulin aspart (NovoLOG) 100 Units/mL FlexPen 1-5 Units  1-5 Units Subcutaneous TID AC and HS    HYDROcodone-acetaminophen (Norco) 5-325 MG per tab 1 tablet  1 tablet Oral Q4H PRN    Or    HYDROcodone-acetaminophen (Norco) 5-325 MG per tab 2 tablet  2 tablet Oral Q4H PRN    sodium chloride 0.9% infusion   Intravenous Once    atorvastatin (Lipitor) tab 20 mg  20 mg Oral Nightly    finasteride (Proscar) tab 5 mg  5 mg Oral Nightly    morphINE PF 2 MG/ML injection 2 mg  2 mg Intravenous Q2H PRN    Or    morphINE PF 4 MG/ML injection 4 mg  4 mg Intravenous Q2H PRN    melatonin tab 3 mg  3 mg Oral Nightly PRN    polyethylene glycol (PEG 3350) (Miralax) 17 g oral packet 17 g  17 g Oral Daily PRN    sennosides (Senokot) tab 17.2 mg  17.2 mg Oral Nightly PRN    bisacodyl (Dulcolax) 10 MG rectal suppository 10 mg  10 mg Rectal Daily PRN    ondansetron (Zofran) 4 MG/2ML injection 4 mg  4 mg Intravenous Q6H PRN    calcium gluconate 3 g in sodium chloride  0.9% 100 mL IVPB  3 g Intravenous PRN    pantoprazole (Protonix) DR tab 40 mg  40 mg Oral QAM AC    glucose (Dex4) 15 GM/59ML oral liquid 15 g  15 g Oral Q15 Min PRN    Or    glucose (Glutose) 40% oral gel 15 g  15 g Oral Q15 Min PRN    Or    glucose-vitamin C (Dex-4) chewable tab 4 tablet  4 tablet Oral Q15 Min PRN    Or    dextrose 50% injection 50 mL  50 mL Intravenous Q15 Min PRN    Or    glucose (Dex4) 15 GM/59ML oral liquid 30 g  30 g Oral Q15 Min PRN    Or    glucose (Glutose) 40% oral gel 30 g  30 g Oral Q15 Min PRN    Or    glucose-vitamin C (Dex-4) chewable tab 8 tablet  8 tablet Oral Q15 Min PRN           Questions/concerns were discussed with patient and/or family by bedside.          Britta Persaud MD  4/12/2024  847 AM

## 2024-04-12 NOTE — CM/SW NOTE
04/11/24 1100   Discharge disposition   Expected discharge disposition Home-Health   Post Acute Care Provider Residential   Discharge transportation Private car     Noted pt discharging today.  Notified [facility] liaison[facility]ad[facility] of discharge today    / to remain available for support and/or discharge planning.     Aniyah PATA MSN, RN CTL/  [id number]

## 2024-04-12 NOTE — PROGRESS NOTES
[facility]   part of [facility]     CV Surgery Progress Note    Wagner Sheridan Patient Status:  Inpatient    1945 MRN [id number]   Location [facility] 6NE-A Attending Jason Hansen MD   Hosp Day # 8 PCP Edil Roy MD     Subjective:  Patient reports feeling great, ready to go home.     Tele: Afib    Objective:  /54 (BP Location: Right arm)   Pulse 96   Temp 97.6 °F (36.4 °C) (Oral)   Resp 14   Ht 6' (1.829 m)   Wt 263 lb 14.4 oz (119.7 kg)   SpO2 97%   BMI 35.79 kg/m²     Intake/Output:    Intake/Output Summary (Last 24 hours) at 2024 1002  Last data filed at 2024 0600  Gross per 24 hour   Intake 142.5 ml   Output 2200 ml   Net -2057.5 ml       Labs:  Lab Results   Component Value Date    WBC 10.2 2024    RBC 3.20 2024    HGB 8.4 2024    HCT 26.0 2024    MCV 81.3 2024    MCH 26.3 2024    MCHC 32.3 2024    RDW 17.1 2024    .0 2024     Lab Results   Component Value Date     2024    K 3.1 2024     2024    CO2 35.0 2024    BUN 63 2024    CREATSERUM 2.33 2024     2024    CA 9.6 2024     Lab Results   Component Value Date    PT 13.6 2013    INR 1.41 (H) 2024    INR 1.15 2024    INR 1.34 (H) 2024       Physical Exam:  General: VSS, A&Ox3, In NAD  Neck: No JVD.   Heart: S1,S2 irregularly irregular ; Sternum Stable   Lungs: clear anteriorly   Extremities: warm, dry, +generalized edema  Skin: sternotomy incision C/D/I  Neuro: no focal deficits     Assessment/Plan:   S/P MV repair with a 36mm ring, TV repair with a 28mm ring, BECCA amputaiton, PFO closure, and MAZE procedure POD#8  -HD stable, off support   -Echo reviewed  -AFib, rates controlled- UGI bleed while on DOAC, cardiology following  -Leukocytosis, likely reactive, afebrile, resolved- monitor   -Acute post op blood loss anemia, expected, stable- monitor   -TCP, likely 2/2  consumption, resolved- monitor   -Volume OL, improving- diuresis per renal   -CEDRICK/CKD, Cr stable good UOP- renal following   -Bowel regimen   -Pain management, prn tylenol or norco   -Chest tubes and PW removed  -Urinary retention, العراقي re-inserted 4/7- removed 4/9, voiding on own   -GI PPX: protonix  -DVT PPX: TEDs/SCDs  -Encourage IS/ambulation   -PT/OT/Cardiac rehab   -Ok to discharge home today from surgical standpoint if ok with other services     -D/W Dr. Hansen/Naomi Ahmadi PA-C   Cardiothoracic Surgery   4/12/2024  10:03 AM

## 2024-04-12 NOTE — PHYSICAL THERAPY NOTE
PHYSICAL THERAPY TREATMENT NOTE - INPATIENT    Room Number: 6607/6607-A       Session:  5    Number of Visits to Meet Established Goals: 5  History related to current admission: Patient is a 78 year old male admitted on 4/4/2024 : S/p planned status post mitral valve repair, tricuspid valve repair, maze procedure and left atrial appendage amputation and PFO closure 4/4     Recent Admit  2/23-2/28/24: anemia, not seen by therapy  1/9-1/11/24: PNA > home        HOME SITUATION  [facility]   Home Layout: One level  Stairs to Enter : 1     Lives With: Spouse  Drives: Yes  Patient Owned Equipment: Rolling walker  Patient Regularly Uses: Glasses     Prior Level of [place]: indep, reports hx of 2 falls in the past 6 months - tripped over a rug, 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     Patient continues to function near baseline with bed mobility, transfers, gait, and stair negotiation.  Contributing factors to remaining limitations include decreased functional strength, decreased endurance/aerobic capacity, and decreased muscular endurance.  Next session anticipate patient to progress gait and stair negotiation.  Physical Therapy will continue to follow patient for duration of hospitalization.    Patient continues to benefit from continued skilled PT services: at discharge to promote functional independence and safety with additional support and return home with home health PT.    PLAN  PT Treatment Plan: Bed mobility;Body mechanics;Coordination;Endurance;Energy conservation;Patient education;Family education;Gait training;Range of motion;Neuromuscular re-educate;Strengthening;Transfer  training;Balance training;Stair training  Rehab Potential : Good  Frequency (Obs): 3-5x/week    CURRENT GOALS     Goal #1 Patient is able to demonstrate supine - sit EOB @ level: supervision      Goal #2 Patient is able to demonstrate transfers EOB to/from Chair/Wheelchair at assistance level: supervision      Goal #3 Patient is able to ambulate 150 feet with assist device: walker - rolling at assistance level: supervision      Goal #4 Patient will navigate 1 step with rail and SBA   Goal #5 Patient will navigate 1 stoop step with RW (wants to access back patio)   Goal #6     Goal Comments: Goals established on 2024 all goals ongoing     SUBJECTIVE  \"I'm ready!\"     OBJECTIVE  Precautions: Sternal;Cardiac    WEIGHT BEARING RESTRICTION  Weight Bearing Restriction: None                PAIN ASSESSMENT   Ratin  Location: pt denies pain       BALANCE                                                                                                                       Static Sitting: Good  Dynamic Sitting: Good           Static Standing: Fair -  Dynamic Standing: Fair -    ACTIVITY TOLERANCE                         O2 WALK         AM-PAC '6-Clicks' INPATIENT SHORT FORM - BASIC MOBILITY  How much difficulty does the patient currently have...  Patient Difficulty: Turning over in bed (including adjusting bedclothes, sheets and blankets)?: None   Patient Difficulty: Sitting down on and standing up from a chair with arms (e.g., wheelchair, bedside commode, etc.): A Little   Patient Difficulty: Moving from lying on back to sitting on the side of the bed?: None   How much help from another person does the patient currently need...   Help from Another: Moving to and from a bed to a chair (including a wheelchair)?: A Little   Help from Another: Need to walk in hospital room?: A Little   Help from Another: Climbing 3-5 steps with a railing?: A Little       AM-PAC Score:  Raw Score: 20   Approx Degree of  Impairment: 35.83%   Standardized Score (AM-PAC Scale): 47.67   CMS Modifier (G-Code): CJ    FUNCTIONAL ABILITY STATUS  Gait Assessment   Functional Mobility/Gait Assessment  Gait Assistance: Contact guard assist  Distance (ft): 15,50,50  Assistive Device: Rolling walker  Pattern: Shuffle  Stairs: Stoop/curb  Stoop/Curb: 3    Skilled Therapy Provided    Transfer Mobility:  Sit<>Stand: CGA   Stand<>Sit: CGA   Gait: CGA    Therapist's Comments: family present throughout session, all questions addressed -pt educated on stoop training performed x3 with cuing for placement of RW, performed static standing for endurance 5 minutes x2, able to significantly increase ambulation distance prior to requiring rest break with cuing for postural re-ed (does well with cuing for elbow extension), placement of RW     Re-emphasized education on ramp      Patient End of Session: Up in chair;Needs met;Call light within reach;RN aware of session/findings;All patient questions and concerns addressed    PT Session Time: 48 minutes  Gait Trainin minutes  Therapeutic Activity: 15 minutes  Therapeutic Exercise:  minutes   Neuromuscular Re-education: 0 minutes

## 2024-04-12 NOTE — OCCUPATIONAL THERAPY NOTE
OCCUPATIONAL THERAPY TREATMENT NOTE - INPATIENT     Room Number: 8611/8611-A  Session: 4   Number of Visits to Meet Established Goals: 5    Presenting Problem: 4/4 S/p planned status post mitral valve repair, tricuspid valve repair, maze procedure and left atrial appendage amputation and PFO closure    Prior Level of Function: independent with ADL Pt fell once within the past 3 months when he tripped over a big rug.   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 [facility]. ADDENDUM on 4/11: per wife, she has been providing min A with LB dressing (socks, guiding pants over legs, shoes) prior to this admission.     ASSESSMENT   Patient demonstrates excellent  progress this session,. Patient demonstrates excellent progress this session, goals  met .  Occupational Therapy will discharge patient at this time as all goals have been met.    Patient would benefit from home OT at discharge.        History:  Patient is a 78 year old male admitted on 4/4/2024 with Presenting Problem: 4/4 S/p planned status post mitral valve repair, tricuspid valve repair, maze procedure and left atrial appendage amputation and PFO closure. Co-Morbidities : A-fib, HTN, R TKR 02/2015, gout. Admitted for the above noted procedure.  POD1 in the morning, stroke navigator was called, since pt was less responsive with fixed stare. Resolved after family arrived.        WEIGHT BEARING RESTRICTION  Weight Bearing Restriction: None                Recommendations for nursing staff:   Transfers: supervision  Toileting location: toilet    TREATMENT SESSION:  Patient Start of Session: on toilet  FUNCTIONAL TRANSFER ASSESSMENT  Sit to Stand: Chair  Chair: Supervision  Toilet Transfer: Supervision    BED MOBILITY  Supine to Sit : Not tested  Sit to Supine (OT): Not Tested         EDUCATION PROVIDED  Patient: Plan of Care; Role of Occupational Therapy; Functional Transfer Techniques; Surgical Precautions; Posture/Positioning;  Compensatory ADL Techniques  Patient's Response to Education: Verbalized Understanding  Family/Caregiver: Role of Occupational Therapy; Plan of Care  Family/Caregiver's Response to Education: Verbalized Understanding      Equipment used: rw       Exercises:    Exercises Repetitions Comments   Scapular elevation     Scapular retraction     Shoulder rolls 10    Shoulder flexion     Shoulder abduction     Shoulder internal/external rotation     Forward punch     Elbow flexion 10    Elbow extension 10    Forearm pronation/supination     Wrist flexion/extension     Gross grasp/fist pumps 10    Ankle pumps 10    Knee extension     Marching       Therapist comments: Seated on the toilet. Per RN, pt has been sitting on the toilet for about 45 minutes.  After standing from the toilet, pt completed marching in place exercise with PT.  Min A with pull up brief management, supervision standing balance. Educated the pt about importance of keeping RW close to reach. MD arrived. Stood for about 5 minutes, supervision.   Pt completed UE exercises, see above.   Pt and family verbalized understanding about UB dressing sequencing.   Patient End of Session: Up in chair;Needs met;Call light within reach;RN aware of session/findings;All patient questions and concerns addressed;Family present    SUBJECTIVE  'I hope I get to go home today.\"    PAIN ASSESSMENT  Ratin           OBJECTIVE  Precautions: Sternal;Cardiac    AM-PAC ‘6-Clicks’ Inpatient Daily Activity Short Form  -   Putting on and taking off regular lower body clothing?: A Lot  -   Bathing (including washing, rinsing, drying)?: A Lot  -   Toileting, which includes using toilet, bedpan or urinal? : A Little  -   Putting on and taking off regular upper body clothing?: A Little  -   Taking care of personal grooming such as brushing teeth?: None  -   Eating meals?: None    AM-PAC Score:  Score: 18  Approx Degree of Impairment: 46.65%  Standardized Score (AM-PAC Scale):  38.66    PLAN  OT Treatment Plan: Balance activities;Energy conservation/work simplification techniques;ADL training;Functional transfer training;UE strengthening/ROM;Patient/Family education;Patient/Family training;Equipment eval/education;Compensatory technique education  Rehab Potential : Good  Frequency: 3-5x/week    OT Goals:     ADL Goals   Patient will perform grooming: with supervision and while standing at sink  Patient will perform upper body dressing:  with supervision  Patient will perform lower body dressing:  with supervision  Patient will perform toileting: with supervision     Functional Transfer Goals  Patient will transfer from sit to supine:  with supervision  Patient will transfer from supine to sit:  with supervision  Patient will transfer to toilet:  with supervision     UE Exercise Program Goal  Patient will be independent with bilateral AROM HEP (home exercise program). POST CV surgery HEP.    OT Session Time: 38 minutes  Self-Care Home Management: 12 minutes  Therapeutic Activity: 15 minutes

## 2024-04-12 NOTE — PROGRESS NOTES
.[facility]ist note    PCP: Edil Roy MD    Chief Complaint:  F/u s/p mitral valve repair    SUBJECTIVE:  Pt seen and examined.  Feeling much better, LE's less swollen, looking forward to going home.      OBJECTIVE:  Temp:  [97.4 °F (36.3 °C)-97.7 °F (36.5 °C)] 97.6 °F (36.4 °C)  Pulse:  [] 96  Resp:  [14-28] 14  BP: ()/(54-63) 108/54  SpO2:  [95 %-100 %] 97 %    Intake/Output:    Intake/Output Summary (Last 24 hours) at 4/12/2024 1049  Last data filed at 4/12/2024 0600  Gross per 24 hour   Intake 142.5 ml   Output 1800 ml   Net -1657.5 ml       Last 3 Weights   04/12/24 0415 263 lb 14.4 oz (119.7 kg)   04/11/24 0632 271 lb (122.9 kg)   04/10/24 0600 270 lb 8 oz (122.7 kg)   04/09/24 0600 274 lb 3.2 oz (124.4 kg)   04/08/24 0600 286 lb 9.6 oz (130 kg)   04/07/24 0400 287 lb 4.2 oz (130.3 kg)   04/06/24 1000 287 lb 7.7 oz (130.4 kg)   04/05/24 0500 289 lb 11 oz (131.4 kg)   02/20/24 1531 275 lb (124.7 kg)   02/28/24 0630 281 lb 15.5 oz (127.9 kg)   02/27/24 0426 277 lb 4.8 oz (125.8 kg)   02/26/24 0435 277 lb 9.6 oz (125.9 kg)   02/25/24 0438 278 lb 9.6 oz (126.4 kg)   02/24/24 0322 278 lb 8 oz (126.3 kg)   02/23/24 1456 274 lb 14.6 oz (124.7 kg)   02/07/24 1456 270 lb (122.5 kg)       Exam  Gen: No acute distress  HEENT: anicteric sclera, MMM  Pulm: Lungs clear, normal respiratory effort  CV: Heart with regular rate and rhythm, edematous throughout  Abd: Abdomen soft, nontender, nondistended, no organomegaly, bowel sounds present  Neuro: A&OX3, no focal deficits  Ext:  2+ pitting edema - improving in LE's  : العراقي in place, christopher     Data Review:         Labs:     Recent Labs   Lab 04/08/24  0426 04/09/24  0408 04/10/24  0427 04/11/24  0411 04/12/24  0445   WBC 10.1 8.7 7.8 7.5 10.2   HGB 8.6* 8.4* 8.6* 8.7* 8.4*   MCV 84.7 85.2 82.0 83.2 81.3   .0* 158.0 174.0 201.0 213.0       Recent Labs   Lab 04/07/24  0451 04/08/24  0426 04/09/24  0408 04/09/24  1918 04/10/24  0427 04/11/24  0411  04/12/24  0445    145 146*  --  149* 148* 147*   K 4.3 3.9 3.0* 3.6 3.2* 3.5 3.1*    111 109  --  109 110 106   CO2 23.0 28.0 31.0  --  33.0* 33.0* 35.0*   BUN 80* 89* 85*  --  82* 69* 63*   CREATSERUM 3.66* 3.66* 3.37*  --  2.81* 2.21* 2.33*   CA 9.5 9.3 9.2  --  9.3 9.2 9.6   MG 2.8*  --   --   --   --   --   --    * 116* 102*  --  98 98 107*       No results for input(s): \"ALT\", \"AST\", \"ALB\", \"AMYLASE\", \"LIPASE\" in the last 168 hours.    Invalid input(s): \"ALPHOS\", \"TBIL\", \"DBIL\", \"TPROT\"      No results for input(s): \"TROP\", \"CK\", \"PBNP\", \"PCT\" in the last 168 hours.    No results for input(s): \"CRP\", \"CIERRA\", \"LDH\", \"DDIMER\" in the last 168 hours.    Recent Labs   Lab 04/11/24  0625 04/11/24  1256 04/11/24  1833 04/11/24  2113 04/12/24  0515   PGLU 103* 101* 133* 113* 112*       Meds:   Scheduled Medication:   potassium chloride  40 mEq Oral q4h    eplerenone  25 mg Oral Daily    metoprolol tartrate  12.5 mg Oral 2x Daily(Beta Blocker)    insulin aspart  1-5 Units Subcutaneous TID AC and HS    sodium chloride   Intravenous Once    atorvastatin  20 mg Oral Nightly    finasteride  5 mg Oral Nightly    pantoprazole  40 mg Oral QAM AC     Continuous Infusing Medication:   bumetanide (Bumex) 12.5 mg in 50 mL infusion 0.5 mg/hr (04/11/24 7093)     PRN Medication:  acetaminophen **OR** acetaminophen    ipratropium-albuterol    HYDROcodone-acetaminophen **OR** HYDROcodone-acetaminophen    morphINE **OR** morphINE    melatonin    polyethylene glycol (PEG 3350)    sennosides    bisacodyl    ondansetron    calcium gluconate    glucose **OR** glucose **OR** glucose-vitamin C **OR** dextrose **OR** glucose **OR** glucose **OR** glucose-vitamin C       Microbiology:    No results found for this visit on 04/04/24.    Lab Results   Component Value Date    COVID19 Not Detected 04/01/2024    COVID19 Not Detected 01/09/2024    COVID19 Not Detected 08/04/2022        Assessment/Plan:     78-year-old male  significant past medical history CKD stage III, severe MR, paroxysmal A-fib, BPH, hypertension, hyperaldosteronism, hyperlipidemia, pulmonary hypertension, moderate tricuspid regurgitation a is status post mitral valve repair, tricuspid valve repair, maze procedure and left atrial appendage amputation and PFO closure     # Severe MR s/p mitral valve repair, tricuspid valve repair, maze procedure and left atrial appendage amputation and PFO closure  -post op care per CV surgery- chest tube in place  -cards following  - iv diuresis, now transitioned to bumex gtt for next 24-48 hours.  Diuril added, transition to po torsemide/lower dose eplerenone per renal.      #afib  -has had some PAF, started on amio and has been on BB however currently more junctional alvino  -reviewed with cardiology, amio stopped  -monitor     #Acute kidney injury on CKD 3; hypernatremia  -Na improved but Cr worsening and uop declining. Edematous  -IV lasix ordered, now on bumex gtt,nephrology managing, change to po today      #HTN   #BPH  -resume finasteride     #postop hyperglycemia- ssi    #H pylori infection - cont PPI per GI.  Pt had been prescribed flagyl 500 mg po TID x 14 days and tetracycline 250 mg po daily x 14 days but this was stopped by his PCP on 3/11/24.  Pt to f/u with PCP and GI regarding the need to resume the abx. D/w pt and his wife who agree.    POC d/w pt and his spouse who agrees.     Ronny Batista Hospitalist  Pager: [phone]

## 2024-04-13 LAB
ATRIAL RATE: 104 BPM
Q-T INTERVAL: 336 MS
QRS DURATION: 92 MS
QTC CALCULATION (BEZET): 422 MS
R AXIS: 16 DEGREES
T AXIS: 18 DEGREES
VENTRICULAR RATE: 95 BPM

## 2024-04-15 ENCOUNTER — TELEPHONE (OUTPATIENT)
Dept: CARDIOLOGY UNIT | Facility: HOSPITAL | Age: 79
End: 2024-04-15

## 2024-04-15 NOTE — PAYOR COMM NOTE
--------------  DISCHARGE REVIEW    Payor: [employer]  Subscriber #:  [id number]  Authorization Number: [id number]    Admit date: 4/4/24  Admit time:   5:29 AM  Discharge Date: 4/12/2024  6:54 PM     Admitting Physician: Jason Hansen MD  Attending Physician:  No att. providers found  Primary Care Physician: Edil Roy MD            Impression:  1.  Severe MR with marked pulm Hypertension . 2/2 flail P3 scallop of MV, and moderate TR,  s/p MITRAL VALVE REPAIR, TRICUSPID VALVE REPAIR, MAZE PROCEDURE, LEFT ATRIAL APPENDAGE AMPUTATION, PATENT FORAMEN OVALE CLOSURE.  4/4/24.      2.  Paroxysmal Atrial Fibrillation. Started 1/2024 in setting of covid infection.  Echo then showed severe MR, STAN shows flail P3 leaflet of MV and severe MR.   -avoiding ac due to recent GI bleed as well as strategy to mitigate cardio-embolism utilizing AAP amputation--> will re-address with Dr. Wiley outpatient.  -would avoid AAT such as amio for now, artemio given long QT     3. Hypertension . Low -normal bp's     4. CKD,  Cr = 2.3 pre-op.  Rise in Cr to 3.6 alyce-op. Seems to have peaked. Now on bumex drip     -now polyuric.  Net out = > 10L  -still with marked LE edema     5. Dyslipidemia      6. UGI bleeding while on DOAC, 2/2024              Recommend:     1. rate controlled AF: low dose metoprolol 12.5mg bid. off AC since GIB 2/24. Hg 8s, stable. no evidence of bleeding. Discussed re-trial of attenuated dose apixaban with pt/family, they prefer to hold for now and re-discuss with Dr. Hernandes in close pt f/u.    2. great turn around re volume status on bumex gtt, diuril.  Cr improved (post op ATN).  home on lower dose eplerenone/loop diuretic- per Dr. Persaud.  3. f/u with Dr. Wiley scheduled next wk.  ok for dc from cv standpoint.        Subjective:  No acute issues overnight.  AF on telemetry, rate controlled.       Medications:  Scheduled Medications    potassium chloride  40 mEq Oral q4h    eplerenone  25 mg Oral  Daily    metoprolol tartrate  12.5 mg Oral 2x Daily(Beta Blocker)    insulin aspart  1-5 Units Subcutaneous TID AC and HS    sodium chloride   Intravenous Once    atorvastatin  20 mg Oral Nightly    finasteride  5 mg Oral Nightly    pantoprazole  40 mg Oral QAM AC            Continuous Infusions:  Medication Infusions    bumetanide (Bumex) 12.5 mg in 50 mL infusion 0.5 mg/hr (04/11/24 1853)                  Allergies:  Allergies         Allergies   Allergen Reactions    Albumin Human ANAPHYLAXIS    Ciprofloxacin HIVES    Clindamycin HIVES    Other ANAPHYLAXIS       Alcohol swab caps    Penicillins HIVES       Tolerated Ancef 4/4/24    Wasp Venom RESPIRATORY FAILURE    Wasp Venom Protein RESPIRATORY FAILURE    Benazepril Coughing    Chlorhexidine RASH and OTHER (SEE COMMENTS)       redness               Objective:           Intake/Output:        Intake/Output Summary (Last 24 hours) at 4/12/2024 1119  Last data filed at 4/12/2024 0600      Gross per 24 hour   Intake 142.5 ml   Output 1800 ml   Net -1657.5 ml          Wt Readings from Last 3 Encounters:   04/12/24 263 lb 14.4 oz (119.7 kg)   02/28/24 281 lb 15.5 oz (127.9 kg)   02/07/24 270 lb (122.5 kg)         Physical Exam:           Vitals          Vitals:     04/11/24 2004 04/11/24 2100 04/11/24 2330 04/12/24 0415   BP:       108/54   BP Location:       Right arm   Pulse: 88 103   96   Resp: (!) 28 (!) 28   14   Temp:     97.4 °F (36.3 °C) 97.6 °F (36.4 °C)   TempSrc:     Oral Oral   SpO2: 98% 96%   97%   Weight:       263 lb 14.4 oz (119.7 kg)   Height:                    Temp:  [97.4 °F (36.3 °C)-97.7 °F (36.5 °C)] 97.6 °F (36.4 °C)  Pulse:  [] 96  Resp:  [14-28] 14  BP: (106-114)/(54-60) 108/54  SpO2:  [95 %-98 %] 97 %        Temp: 97.6 °F (36.4 °C)  Pulse: 96  Resp: 14  BP: 108/54  General:  Appears comfortable  HEENT: No focal deficits.  Neck: No JVD, carotids 2+ no bruits.  Cardiac: Irregular S1S2.  No S3, S4, rub, click.  No murmur.  Lungs: Clear to  auscultation and percussion.  Abdomen: Soft, non-tender.   Extremities:  2-3+ bilateral  LE edema.  No clubbing or cyanosis.    Neurologic: Alert and oriented, normal affect.  Skin: Warm and dry.               LABS:        HEM:          Recent Labs   Lab 04/08/24  0426 04/09/24  0408 04/10/24  0427 04/11/24  0411 04/12/24  0445   WBC 10.1 8.7 7.8 7.5 10.2   HGB 8.6* 8.4* 8.6* 8.7* 8.4*   HCT 27.6* 27.6* 26.4* 27.8* 26.0*   .0* 158.0 174.0 201.0 213.0         Chem:            Recent Labs   Lab 04/07/24  0451 04/08/24  0426 04/09/24  0408 04/09/24  1918 04/10/24  0427 04/11/24  0411 04/12/24  0445    145 146*  --  149* 148* 147*   K 4.3 3.9 3.0* 3.6 3.2* 3.5 3.1*    111 109  --  109 110 106   CO2 23.0 28.0 31.0  --  33.0* 33.0* 35.0*   BUN 80* 89* 85*  --  82* 69* 63*   CREATSERUM 3.66* 3.66* 3.37*  --  2.81* 2.21* 2.33*   CA 9.5 9.3 9.2  --  9.3 9.2 9.6   MG 2.8*  --   --   --   --   --   --    * 116* 102*  --  98 98 107*         No results for input(s): \"ALT\", \"AST\", \"ALB\", \"AMYLASE\", \"LIPASE\", \"LDH\" in the last 168 hours.     Invalid input(s): \"ALPHOS\", \"TBIL\", \"DBIL\", \"TPROT\"        No results for input(s): \"PT\", \"PTT\", \"INR\" in the last 168 hours.                    Lab Results   Component Value Date     TROP < 0.046 12/27/2012            Invalid input(s): \"PBNPML\"                             Diagnostics:   Telemetry:Atrial Fibrillation. HR 70's     EKG, 4/4/2024:  NSR, FAV, prolonged QT     CXR, 4/4/2024:       STAN, 1/2024:       CONCLUSIONS:   1. There is flail of the P3 scallop of the mitral valve. The mitral valve has severe, 4+ eccentric (laterally directed) regurgitation.   2. The tricuspid valve has annular enlargement, with central malcoaptation. The tricuspid valve has moderate, centrally directed, regurgitation.  3. The left ventricle appears normal in size, thickness, and systolic function. The estimated left ventricular ejection fraction is 60%  4. A patent foramen ovale is  present (tunnel length 34 mm). There is left-to-right shunting across the patent foramen ovale.        Echo, 4/10/24:     Conclusions:     1. Left ventricle: The cavity size was normal. Wall thickness was mildly      increased. Systolic function was normal. The estimated ejection fraction      was 55-60%, by biplane method of disks. Wall motion is normal; there are      no regional wall motion abnormalities. Unable to assess LV diastolic      function due to heart rhythm.   2. Left atrium: The atrium was dilated. The left atrial volume was      moderately increased.   3. Aortic valve: There was mild regurgitation. The peak systolic velocity      was 1.56m/sec. The mean systolic gradient was 5mm Hg. The valve area      (VTI) was 3.59cm^2. The valve area (VTI) index was 1.45cm^2/m^2.   4. Aortic root: The aortic root was 4.0cm diameter. The aortic root was      mildly dilated.   5. Ascending aorta: The ascending aorta was 3.9cm diameter. The ascending      aorta was mildly dilated.   6. Mitral valve: The average mean diastolic gradient was 5mm Hg.   7. Tricuspid valve: The average mean diastolic gradient was 2mm Hg.   *                   Electronically signed by Adal Duarte MD at 4/12/2024 11:22 AM

## 2024-04-15 NOTE — PROGRESS NOTES
Follow Up Phone Call    1. How are you doing now that you are home?     2. Have there been any changes in your wound/incision since going home?     3. Is your pain manageable at home?     4. Are you following the walking routine given to you in the hospital?     5. Are you continuing to use your incentive spirometer?     6. Do you have your appointments for Chest Xray?                                                                Primary MD?                                                                Cardiologist?                                                                Dr. Hansen?                                                               Cardiac Rehab?      7. Do you have any other questions or concerns today?       Patient currently at an appointment with his primary care ANA Roy.       Svetlana MAYEN RN  4/15/2024  11:17 AM

## 2024-04-16 ENCOUNTER — PATIENT MESSAGE (OUTPATIENT)
Dept: NEPHROLOGY | Facility: CLINIC | Age: 79
End: 2024-04-16

## 2024-04-16 DIAGNOSIS — I50.33 ACUTE ON CHRONIC HEART FAILURE WITH PRESERVED EJECTION FRACTION (HCC): ICD-10-CM

## 2024-04-16 DIAGNOSIS — N18.30 STAGE 3 CHRONIC KIDNEY DISEASE, UNSPECIFIED WHETHER STAGE 3A OR 3B CKD (HCC): Primary | ICD-10-CM

## 2024-04-17 NOTE — TELEPHONE ENCOUNTER
From: Allison GEE  To: Wagner Sheridan  Sent: 4/16/2024 9:19 AM CDT  Subject: Good Morning    Good Morning Wagner,       How may we help you ?

## 2024-04-22 DIAGNOSIS — N18.30 STAGE 3 CHRONIC KIDNEY DISEASE, UNSPECIFIED WHETHER STAGE 3A OR 3B CKD (HCC): Primary | ICD-10-CM

## 2024-04-22 RX ORDER — TORSEMIDE 20 MG/1
TABLET ORAL
Qty: 540 TABLET | Refills: 1 | Status: SHIPPED | OUTPATIENT
Start: 2024-04-22

## 2024-04-22 RX ORDER — TORSEMIDE 20 MG/1
20 TABLET ORAL DAILY
COMMUNITY
End: 2024-04-22

## 2024-04-24 ENCOUNTER — LAB ENCOUNTER (OUTPATIENT)
Dept: LAB | Age: 79
End: 2024-04-24
Attending: INTERNAL MEDICINE
Payer: MEDICARE

## 2024-04-26 ENCOUNTER — HOSPITAL ENCOUNTER (OUTPATIENT)
Dept: GENERAL RADIOLOGY | Facility: HOSPITAL | Age: 79
Discharge: HOME | End: 2024-04-26
Attending: THORACIC SURGERY (CARDIOTHORACIC VASCULAR SURGERY)
Payer: MEDICARE

## 2024-04-26 DIAGNOSIS — J90 PLEURAL EFFUSION: ICD-10-CM

## 2024-04-26 PROCEDURE — 71048 X-RAY EXAM CHEST 4+ VIEWS: CPT | Performed by: THORACIC SURGERY (CARDIOTHORACIC VASCULAR SURGERY)

## 2024-05-06 ENCOUNTER — LAB ENCOUNTER (OUTPATIENT)
Dept: LAB | Age: 79
End: 2024-05-06
Attending: INTERNAL MEDICINE
Payer: MEDICARE

## 2024-05-06 DIAGNOSIS — I50.33 ACUTE ON CHRONIC HEART FAILURE WITH PRESERVED EJECTION FRACTION (HCC): ICD-10-CM

## 2024-05-06 DIAGNOSIS — N18.30 STAGE 3 CHRONIC KIDNEY DISEASE, UNSPECIFIED WHETHER STAGE 3A OR 3B CKD (HCC): ICD-10-CM

## 2024-05-06 LAB
ANION GAP SERPL CALC-SCNC: 9 MMOL/L (ref 0–18)
BUN BLD-MCNC: 42 MG/DL (ref 9–23)
CALCIUM BLD-MCNC: 10.1 MG/DL (ref 8.5–10.1)
CHLORIDE SERPL-SCNC: 102 MMOL/L (ref 98–112)
CO2 SERPL-SCNC: 31 MMOL/L (ref 21–32)
CREAT BLD-MCNC: 2.72 MG/DL
EGFRCR SERPLBLD CKD-EPI 2021: 23 ML/MIN/1.73M2 (ref 60–?)
FASTING STATUS PATIENT QL REPORTED: NO
GLUCOSE BLD-MCNC: 94 MG/DL (ref 70–99)
OSMOLALITY SERPL CALC.SUM OF ELEC: 304 MOSM/KG (ref 275–295)
POTASSIUM SERPL-SCNC: 3.7 MMOL/L (ref 3.5–5.1)
SODIUM SERPL-SCNC: 142 MMOL/L (ref 136–145)

## 2024-05-06 PROCEDURE — 36415 COLL VENOUS BLD VENIPUNCTURE: CPT

## 2024-05-06 PROCEDURE — 80048 BASIC METABOLIC PNL TOTAL CA: CPT

## 2024-05-07 DIAGNOSIS — N18.30 STAGE 3 CHRONIC KIDNEY DISEASE, UNSPECIFIED WHETHER STAGE 3A OR 3B CKD (HCC): Primary | ICD-10-CM

## 2024-05-07 RX ORDER — BUMETANIDE 2 MG/1
2 TABLET ORAL 2 TIMES DAILY
Qty: 180 TABLET | Refills: 1 | Status: SHIPPED | OUTPATIENT
Start: 2024-05-07

## 2024-05-13 ENCOUNTER — HOSPITAL ENCOUNTER (OUTPATIENT)
Dept: GENERAL RADIOLOGY | Age: 79
Discharge: HOME | End: 2024-05-13
Attending: THORACIC SURGERY (CARDIOTHORACIC VASCULAR SURGERY)

## 2024-05-13 ENCOUNTER — LAB ENCOUNTER (OUTPATIENT)
Dept: LAB | Age: 79
End: 2024-05-13
Attending: THORACIC SURGERY (CARDIOTHORACIC VASCULAR SURGERY)

## 2024-05-13 DIAGNOSIS — J91.8 PLEURAL EFFUSION IN OTHER CONDITIONS CLASSIFIED ELSEWHERE: ICD-10-CM

## 2024-05-13 DIAGNOSIS — N18.30 STAGE 3 CHRONIC KIDNEY DISEASE, UNSPECIFIED WHETHER STAGE 3A OR 3B CKD (HCC): ICD-10-CM

## 2024-05-13 DIAGNOSIS — I50.33 ACUTE ON CHRONIC HEART FAILURE WITH PRESERVED EJECTION FRACTION (HCC): ICD-10-CM

## 2024-05-13 LAB
ANION GAP SERPL CALC-SCNC: 9 MMOL/L (ref 0–18)
BUN BLD-MCNC: 45 MG/DL (ref 9–23)
CALCIUM BLD-MCNC: 10.4 MG/DL (ref 8.5–10.1)
CHLORIDE SERPL-SCNC: 102 MMOL/L (ref 98–112)
CO2 SERPL-SCNC: 28 MMOL/L (ref 21–32)
CREAT BLD-MCNC: 2.71 MG/DL
EGFRCR SERPLBLD CKD-EPI 2021: 23 ML/MIN/1.73M2 (ref 60–?)
FASTING STATUS PATIENT QL REPORTED: NO
GLUCOSE BLD-MCNC: 120 MG/DL (ref 70–99)
OSMOLALITY SERPL CALC.SUM OF ELEC: 301 MOSM/KG (ref 275–295)
POTASSIUM SERPL-SCNC: 4.2 MMOL/L (ref 3.5–5.1)
SODIUM SERPL-SCNC: 139 MMOL/L (ref 136–145)

## 2024-05-13 PROCEDURE — 36415 COLL VENOUS BLD VENIPUNCTURE: CPT

## 2024-05-13 PROCEDURE — 80048 BASIC METABOLIC PNL TOTAL CA: CPT

## 2024-05-13 PROCEDURE — 71048 X-RAY EXAM CHEST 4+ VIEWS: CPT | Performed by: THORACIC SURGERY (CARDIOTHORACIC VASCULAR SURGERY)

## 2024-05-21 ENCOUNTER — APPOINTMENT (OUTPATIENT)
Dept: CARDIAC REHAB | Facility: HOSPITAL | Age: 79
End: 2024-05-21
Attending: INTERNAL MEDICINE
Payer: MEDICARE

## 2024-05-21 DIAGNOSIS — N18.30 STAGE 3 CHRONIC KIDNEY DISEASE, UNSPECIFIED WHETHER STAGE 3A OR 3B CKD (HCC): ICD-10-CM

## 2024-05-21 RX ORDER — BUMETANIDE 2 MG/1
TABLET ORAL
Qty: 270 TABLET | Refills: 1 | Status: SHIPPED | OUTPATIENT
Start: 2024-05-21

## 2024-05-21 RX ORDER — BUMETANIDE 2 MG/1
TABLET ORAL
Qty: 270 TABLET | Refills: 1 | Status: SHIPPED | OUTPATIENT
Start: 2024-05-21 | End: 2024-05-21

## 2024-05-22 ENCOUNTER — ORDER TRANSCRIPTION (OUTPATIENT)
Dept: CARDIAC REHAB | Facility: HOSPITAL | Age: 79
End: 2024-05-22

## 2024-05-22 DIAGNOSIS — Z98.890 S/P MVR (MITRAL VALVE REPAIR): Primary | ICD-10-CM

## 2024-05-23 ENCOUNTER — CARDPULM VISIT (OUTPATIENT)
Dept: CARDIAC REHAB | Facility: HOSPITAL | Age: 79
End: 2024-05-23
Attending: INTERNAL MEDICINE
Payer: MEDICARE

## 2024-05-28 ENCOUNTER — APPOINTMENT (OUTPATIENT)
Dept: CARDIAC REHAB | Facility: HOSPITAL | Age: 79
End: 2024-05-28
Attending: INTERNAL MEDICINE
Payer: MEDICARE

## 2024-05-29 ENCOUNTER — LAB ENCOUNTER (OUTPATIENT)
Dept: LAB | Age: 79
End: 2024-05-29
Attending: INTERNAL MEDICINE

## 2024-05-29 DIAGNOSIS — N18.30 STAGE 3 CHRONIC KIDNEY DISEASE, UNSPECIFIED WHETHER STAGE 3A OR 3B CKD (HCC): ICD-10-CM

## 2024-05-29 DIAGNOSIS — I50.33 ACUTE ON CHRONIC HEART FAILURE WITH PRESERVED EJECTION FRACTION (HCC): ICD-10-CM

## 2024-05-29 LAB
ANION GAP SERPL CALC-SCNC: 7 MMOL/L (ref 0–18)
BUN BLD-MCNC: 34 MG/DL (ref 9–23)
CALCIUM BLD-MCNC: 10.3 MG/DL (ref 8.5–10.1)
CHLORIDE SERPL-SCNC: 103 MMOL/L (ref 98–112)
CO2 SERPL-SCNC: 30 MMOL/L (ref 21–32)
CREAT BLD-MCNC: 2.4 MG/DL
EGFRCR SERPLBLD CKD-EPI 2021: 27 ML/MIN/1.73M2 (ref 60–?)
FASTING STATUS PATIENT QL REPORTED: NO
GLUCOSE BLD-MCNC: 98 MG/DL (ref 70–99)
OSMOLALITY SERPL CALC.SUM OF ELEC: 298 MOSM/KG (ref 275–295)
POTASSIUM SERPL-SCNC: 3.9 MMOL/L (ref 3.5–5.1)
SODIUM SERPL-SCNC: 140 MMOL/L (ref 136–145)

## 2024-05-29 PROCEDURE — 80048 BASIC METABOLIC PNL TOTAL CA: CPT

## 2024-05-29 PROCEDURE — 36415 COLL VENOUS BLD VENIPUNCTURE: CPT

## 2024-05-30 ENCOUNTER — CARDPULM VISIT (OUTPATIENT)
Dept: CARDIAC REHAB | Facility: HOSPITAL | Age: 79
End: 2024-05-30
Attending: INTERNAL MEDICINE
Payer: MEDICARE

## 2024-05-30 PROCEDURE — 93798 PHYS/QHP OP CAR RHAB W/ECG: CPT

## 2024-06-03 ENCOUNTER — APPOINTMENT (OUTPATIENT)
Dept: CARDIAC REHAB | Facility: HOSPITAL | Age: 79
End: 2024-06-03
Attending: INTERNAL MEDICINE
Payer: MEDICARE

## 2024-06-03 PROCEDURE — 93798 PHYS/QHP OP CAR RHAB W/ECG: CPT

## 2024-06-04 ENCOUNTER — APPOINTMENT (OUTPATIENT)
Dept: CARDIAC REHAB | Facility: HOSPITAL | Age: 79
End: 2024-06-04
Attending: INTERNAL MEDICINE
Payer: MEDICARE

## 2024-06-05 ENCOUNTER — CARDPULM VISIT (OUTPATIENT)
Dept: CARDIAC REHAB | Facility: HOSPITAL | Age: 79
End: 2024-06-05
Attending: INTERNAL MEDICINE
Payer: MEDICARE

## 2024-06-05 PROCEDURE — 93798 PHYS/QHP OP CAR RHAB W/ECG: CPT

## 2024-06-06 ENCOUNTER — APPOINTMENT (OUTPATIENT)
Dept: CARDIAC REHAB | Facility: HOSPITAL | Age: 79
End: 2024-06-06
Attending: INTERNAL MEDICINE
Payer: MEDICARE

## 2024-06-07 ENCOUNTER — CARDPULM VISIT (OUTPATIENT)
Dept: CARDIAC REHAB | Facility: HOSPITAL | Age: 79
End: 2024-06-07
Attending: INTERNAL MEDICINE
Payer: MEDICARE

## 2024-06-07 PROCEDURE — 93798 PHYS/QHP OP CAR RHAB W/ECG: CPT

## 2024-06-10 ENCOUNTER — CARDPULM VISIT (OUTPATIENT)
Dept: CARDIAC REHAB | Facility: HOSPITAL | Age: 79
End: 2024-06-10
Attending: INTERNAL MEDICINE
Payer: MEDICARE

## 2024-06-10 PROCEDURE — 93798 PHYS/QHP OP CAR RHAB W/ECG: CPT

## 2024-06-11 ENCOUNTER — APPOINTMENT (OUTPATIENT)
Dept: CARDIAC REHAB | Facility: HOSPITAL | Age: 79
End: 2024-06-11
Attending: INTERNAL MEDICINE
Payer: MEDICARE

## 2024-06-12 ENCOUNTER — CARDPULM VISIT (OUTPATIENT)
Dept: CARDIAC REHAB | Facility: HOSPITAL | Age: 79
End: 2024-06-12
Attending: INTERNAL MEDICINE
Payer: MEDICARE

## 2024-06-12 ENCOUNTER — LAB ENCOUNTER (OUTPATIENT)
Dept: LAB | Age: 79
End: 2024-06-12
Attending: INTERNAL MEDICINE
Payer: MEDICARE

## 2024-06-12 DIAGNOSIS — I50.33 ACUTE ON CHRONIC HEART FAILURE WITH PRESERVED EJECTION FRACTION (HCC): ICD-10-CM

## 2024-06-12 DIAGNOSIS — N18.30 STAGE 3 CHRONIC KIDNEY DISEASE, UNSPECIFIED WHETHER STAGE 3A OR 3B CKD (HCC): ICD-10-CM

## 2024-06-12 LAB
ANION GAP SERPL CALC-SCNC: 8 MMOL/L (ref 0–18)
BUN BLD-MCNC: 38 MG/DL (ref 9–23)
CALCIUM BLD-MCNC: 10.3 MG/DL (ref 8.5–10.1)
CHLORIDE SERPL-SCNC: 101 MMOL/L (ref 98–112)
CO2 SERPL-SCNC: 29 MMOL/L (ref 21–32)
CREAT BLD-MCNC: 2.59 MG/DL
EGFRCR SERPLBLD CKD-EPI 2021: 24 ML/MIN/1.73M2 (ref 60–?)
FASTING STATUS PATIENT QL REPORTED: NO
GLUCOSE BLD-MCNC: 110 MG/DL (ref 70–99)
OSMOLALITY SERPL CALC.SUM OF ELEC: 296 MOSM/KG (ref 275–295)
POTASSIUM SERPL-SCNC: 3.9 MMOL/L (ref 3.5–5.1)
SODIUM SERPL-SCNC: 138 MMOL/L (ref 136–145)

## 2024-06-12 PROCEDURE — 36415 COLL VENOUS BLD VENIPUNCTURE: CPT

## 2024-06-12 PROCEDURE — 93798 PHYS/QHP OP CAR RHAB W/ECG: CPT

## 2024-06-12 PROCEDURE — 80048 BASIC METABOLIC PNL TOTAL CA: CPT

## 2024-06-13 ENCOUNTER — APPOINTMENT (OUTPATIENT)
Dept: CARDIAC REHAB | Facility: HOSPITAL | Age: 79
End: 2024-06-13
Attending: INTERNAL MEDICINE
Payer: MEDICARE

## 2024-06-14 ENCOUNTER — CARDPULM VISIT (OUTPATIENT)
Dept: CARDIAC REHAB | Facility: HOSPITAL | Age: 79
End: 2024-06-14
Attending: INTERNAL MEDICINE
Payer: MEDICARE

## 2024-06-14 PROCEDURE — 93798 PHYS/QHP OP CAR RHAB W/ECG: CPT

## 2024-06-17 ENCOUNTER — APPOINTMENT (OUTPATIENT)
Dept: CARDIAC REHAB | Facility: HOSPITAL | Age: 79
End: 2024-06-17
Attending: INTERNAL MEDICINE
Payer: MEDICARE

## 2024-06-18 ENCOUNTER — APPOINTMENT (OUTPATIENT)
Dept: CARDIAC REHAB | Facility: HOSPITAL | Age: 79
End: 2024-06-18
Attending: INTERNAL MEDICINE
Payer: MEDICARE

## 2024-06-19 ENCOUNTER — CARDPULM VISIT (OUTPATIENT)
Dept: CARDIAC REHAB | Facility: HOSPITAL | Age: 79
End: 2024-06-19
Attending: INTERNAL MEDICINE
Payer: MEDICARE

## 2024-06-19 PROCEDURE — 93798 PHYS/QHP OP CAR RHAB W/ECG: CPT

## 2024-06-21 ENCOUNTER — CARDPULM VISIT (OUTPATIENT)
Dept: CARDIAC REHAB | Facility: HOSPITAL | Age: 79
End: 2024-06-21
Attending: INTERNAL MEDICINE
Payer: MEDICARE

## 2024-06-21 PROCEDURE — 93798 PHYS/QHP OP CAR RHAB W/ECG: CPT

## 2024-06-24 ENCOUNTER — CARDPULM VISIT (OUTPATIENT)
Dept: CARDIAC REHAB | Facility: HOSPITAL | Age: 79
End: 2024-06-24
Attending: INTERNAL MEDICINE
Payer: MEDICARE

## 2024-06-24 PROCEDURE — 93798 PHYS/QHP OP CAR RHAB W/ECG: CPT

## 2024-06-26 ENCOUNTER — CARDPULM VISIT (OUTPATIENT)
Dept: CARDIAC REHAB | Facility: HOSPITAL | Age: 79
End: 2024-06-26
Attending: INTERNAL MEDICINE
Payer: MEDICARE

## 2024-06-26 ENCOUNTER — LAB ENCOUNTER (OUTPATIENT)
Dept: LAB | Age: 79
End: 2024-06-26
Attending: INTERNAL MEDICINE

## 2024-06-26 DIAGNOSIS — N18.30 STAGE 3 CHRONIC KIDNEY DISEASE, UNSPECIFIED WHETHER STAGE 3A OR 3B CKD (HCC): ICD-10-CM

## 2024-06-26 DIAGNOSIS — I50.33 ACUTE ON CHRONIC HEART FAILURE WITH PRESERVED EJECTION FRACTION (HCC): ICD-10-CM

## 2024-06-26 LAB
ANION GAP SERPL CALC-SCNC: 8 MMOL/L (ref 0–18)
BUN BLD-MCNC: 38 MG/DL (ref 9–23)
CALCIUM BLD-MCNC: 10.4 MG/DL (ref 8.5–10.1)
CHLORIDE SERPL-SCNC: 105 MMOL/L (ref 98–112)
CO2 SERPL-SCNC: 27 MMOL/L (ref 21–32)
CREAT BLD-MCNC: 2.55 MG/DL
EGFRCR SERPLBLD CKD-EPI 2021: 25 ML/MIN/1.73M2 (ref 60–?)
FASTING STATUS PATIENT QL REPORTED: NO
GLUCOSE BLD-MCNC: 106 MG/DL (ref 70–99)
OSMOLALITY SERPL CALC.SUM OF ELEC: 299 MOSM/KG (ref 275–295)
POTASSIUM SERPL-SCNC: 4.5 MMOL/L (ref 3.5–5.1)
SODIUM SERPL-SCNC: 140 MMOL/L (ref 136–145)

## 2024-06-26 PROCEDURE — 93798 PHYS/QHP OP CAR RHAB W/ECG: CPT

## 2024-06-26 PROCEDURE — 80048 BASIC METABOLIC PNL TOTAL CA: CPT

## 2024-06-26 PROCEDURE — 36415 COLL VENOUS BLD VENIPUNCTURE: CPT

## 2024-06-28 ENCOUNTER — CARDPULM VISIT (OUTPATIENT)
Dept: CARDIAC REHAB | Facility: HOSPITAL | Age: 79
End: 2024-06-28
Attending: INTERNAL MEDICINE
Payer: MEDICARE

## 2024-06-28 PROCEDURE — 93798 PHYS/QHP OP CAR RHAB W/ECG: CPT

## 2024-07-01 ENCOUNTER — CARDPULM VISIT (OUTPATIENT)
Dept: CARDIAC REHAB | Facility: HOSPITAL | Age: 79
End: 2024-07-01
Attending: INTERNAL MEDICINE
Payer: MEDICARE

## 2024-07-01 PROCEDURE — 93798 PHYS/QHP OP CAR RHAB W/ECG: CPT

## 2024-07-03 ENCOUNTER — CARDPULM VISIT (OUTPATIENT)
Dept: CARDIAC REHAB | Facility: HOSPITAL | Age: 79
End: 2024-07-03
Attending: INTERNAL MEDICINE
Payer: MEDICARE

## 2024-07-03 PROCEDURE — 93798 PHYS/QHP OP CAR RHAB W/ECG: CPT

## 2024-07-05 ENCOUNTER — APPOINTMENT (OUTPATIENT)
Dept: CARDIAC REHAB | Facility: HOSPITAL | Age: 79
End: 2024-07-05
Attending: INTERNAL MEDICINE
Payer: MEDICARE

## 2024-07-08 ENCOUNTER — CARDPULM VISIT (OUTPATIENT)
Dept: CARDIAC REHAB | Facility: HOSPITAL | Age: 79
End: 2024-07-08
Attending: INTERNAL MEDICINE
Payer: MEDICARE

## 2024-07-08 PROCEDURE — 93798 PHYS/QHP OP CAR RHAB W/ECG: CPT

## 2024-07-10 ENCOUNTER — CARDPULM VISIT (OUTPATIENT)
Dept: CARDIAC REHAB | Facility: HOSPITAL | Age: 79
End: 2024-07-10
Attending: INTERNAL MEDICINE
Payer: MEDICARE

## 2024-07-10 PROCEDURE — 93798 PHYS/QHP OP CAR RHAB W/ECG: CPT

## 2024-07-11 ENCOUNTER — PATIENT MESSAGE (OUTPATIENT)
Dept: NEPHROLOGY | Facility: CLINIC | Age: 79
End: 2024-07-11

## 2024-07-11 ENCOUNTER — LAB ENCOUNTER (OUTPATIENT)
Dept: LAB | Age: 79
End: 2024-07-11
Attending: INTERNAL MEDICINE
Payer: MEDICARE

## 2024-07-11 DIAGNOSIS — I50.33 ACUTE ON CHRONIC HEART FAILURE WITH PRESERVED EJECTION FRACTION (HCC): ICD-10-CM

## 2024-07-11 DIAGNOSIS — E83.52 HYPERCALCEMIA: Primary | ICD-10-CM

## 2024-07-11 DIAGNOSIS — N17.9 AKI (ACUTE KIDNEY INJURY) (HCC): ICD-10-CM

## 2024-07-11 DIAGNOSIS — N18.30 STAGE 3 CHRONIC KIDNEY DISEASE, UNSPECIFIED WHETHER STAGE 3A OR 3B CKD (HCC): ICD-10-CM

## 2024-07-11 LAB
ANION GAP SERPL CALC-SCNC: 11 MMOL/L (ref 0–18)
BUN BLD-MCNC: 31 MG/DL (ref 9–23)
CALCIUM BLD-MCNC: 10.7 MG/DL (ref 8.5–10.1)
CHLORIDE SERPL-SCNC: 103 MMOL/L (ref 98–112)
CO2 SERPL-SCNC: 27 MMOL/L (ref 21–32)
CREAT BLD-MCNC: 2.03 MG/DL
EGFRCR SERPLBLD CKD-EPI 2021: 33 ML/MIN/1.73M2 (ref 60–?)
FASTING STATUS PATIENT QL REPORTED: YES
GLUCOSE BLD-MCNC: 88 MG/DL (ref 70–99)
OSMOLALITY SERPL CALC.SUM OF ELEC: 298 MOSM/KG (ref 275–295)
POTASSIUM SERPL-SCNC: 4.3 MMOL/L (ref 3.5–5.1)
SODIUM SERPL-SCNC: 141 MMOL/L (ref 136–145)

## 2024-07-11 PROCEDURE — 36415 COLL VENOUS BLD VENIPUNCTURE: CPT

## 2024-07-11 PROCEDURE — 80048 BASIC METABOLIC PNL TOTAL CA: CPT

## 2024-07-12 ENCOUNTER — CARDPULM VISIT (OUTPATIENT)
Dept: CARDIAC REHAB | Facility: HOSPITAL | Age: 79
End: 2024-07-12
Attending: INTERNAL MEDICINE
Payer: MEDICARE

## 2024-07-12 ENCOUNTER — OFFICE VISIT (OUTPATIENT)
Dept: WOUND CARE | Facility: HOSPITAL | Age: 79
End: 2024-07-12
Attending: INTERNAL MEDICINE
Payer: MEDICARE

## 2024-07-12 VITALS
TEMPERATURE: 97 F | WEIGHT: 229 LBS | HEART RATE: 70 BPM | DIASTOLIC BLOOD PRESSURE: 59 MMHG | HEIGHT: 72 IN | SYSTOLIC BLOOD PRESSURE: 102 MMHG | RESPIRATION RATE: 12 BRPM | BODY MASS INDEX: 31.02 KG/M2

## 2024-07-12 DIAGNOSIS — R23.8 SLOUGHING OF WOUND: ICD-10-CM

## 2024-07-12 DIAGNOSIS — E66.9 OBESITY (BMI 30-39.9): ICD-10-CM

## 2024-07-12 DIAGNOSIS — L97.222 NON-PRESSURE CHRONIC ULCER OF LEFT CALF WITH FAT LAYER EXPOSED (HCC): ICD-10-CM

## 2024-07-12 DIAGNOSIS — N18.32 STAGE 3B CHRONIC KIDNEY DISEASE (HCC): ICD-10-CM

## 2024-07-12 DIAGNOSIS — L97.212 NON-PRESSURE CHRONIC ULCER OF RIGHT CALF WITH FAT LAYER EXPOSED (HCC): Primary | ICD-10-CM

## 2024-07-12 DIAGNOSIS — I50.22 CHRONIC SYSTOLIC HEART FAILURE (HCC): ICD-10-CM

## 2024-07-12 DIAGNOSIS — I87.333 IDIOPATHIC CHRONIC VENOUS HYPERTENSION OF BOTH LOWER EXTREMITIES WITH ULCER AND INFLAMMATION (HCC): ICD-10-CM

## 2024-07-12 PROCEDURE — 99214 OFFICE O/P EST MOD 30 MIN: CPT

## 2024-07-12 PROCEDURE — 29581 APPL MULTLAYER CMPRN SYS LEG: CPT

## 2024-07-12 PROCEDURE — 93798 PHYS/QHP OP CAR RHAB W/ECG: CPT

## 2024-07-12 RX ORDER — FERROUS SULFATE 325(65) MG
325 TABLET, DELAYED RELEASE (ENTERIC COATED) ORAL
COMMUNITY

## 2024-07-12 NOTE — PATIENT INSTRUCTIONS
Wound Cleaning and Dressings:    Shower with protection - use shower boot    DRESSINGS: zinc barrier cream periwound / HF transfer / kerramax  Change dressing twice weekly.     Compression Therapy : coflex-2  Compression Therapy Instructions:  1.  Okay to wear overnight        2.  Avoid prolonged standing in one place.  It is better to have your calf muscles moving       to pump fluid out of the legs.    3.  Elevate leg(s) above the level of the heart when sitting or as much as possible.    4.  Take your diuretics as directed by your provider.  Do not skip doses or change doses      unless instructed to do so by your provider.    5. Do not get leg(s) with compression wrap wet. If wraps are too tight as indicated        By pain, numbness/tingling or discoloration of toes remove wrap completely       and call the   wound center.     Miscellaneous Instructions:  Supplement with a daily multivitamin   Low salt diet  Increase protein intake / consider protein supplements - see below  Elevate extremities at all times when sitting / laying down.    DIETARY MODIFICATIONS TO HELP WITH WOUND HEALING:    Protein: Meats, beans, eggs, milk and yogurt particularly Greek yogurt), tofu, soy nuts, soy protein products    Vitamin C: Citrus fruits and juices, strawberries, tomatoes, tomato juice, peppers, baked potatoes, spinach, broccoli, cauliflower, Seibert sprouts, cabbage    Vitamin A: Dark green, leafy vegetables, orange or yellow vegetables, cantaloupe, fortified dairy products, liver, fortified cereals    Zinc: Fortified cereals, red meats, seafood    Consider Mir by [employer] (These are essential branch chain amino acids that help with tissue building and wound healing) and take 2 packets/day. you can order online at [employer] or [email or web address]    ADDITIONAL REMINDERS:    The treatment plan has been discussed at length with you and your provider. Follow all instructions carefully, it is very important. If you do not follow all  instructions, you are at  risk of your wound not healing, infection, possible loss of limb and even end of life.  Please call the clinic during regular business hours ( 7:30 AM - 5:30 PM) if you notice increased bleeding, redness, warmth, pain or pus like drainage or start running a fever greater than 100.3.    For after hour emergencies, please call your primary physician or go to the nearest emergency room.

## 2024-07-12 NOTE — TELEPHONE ENCOUNTER
From: Britta Persaud  To: Wagner Sheridan  Sent: 7/11/2024 4:53 PM CDT  Subject: Labs    Mr. Z-    Your labs look great, kidney function is significantly better. Calcium level is a touch high- are you taking any calcium or vitamin D supplements? How is your weight and swelling?    FAZAL Persaud

## 2024-07-12 NOTE — PROGRESS NOTES
Weekly Wound Education Note    Teaching Provided To: Patient;Family  Training Topics: Cleasing and general instructions;Compression;Discharge instructions;Dressing;Edema control  Training Method: Explain/Verbal  Training Response: Patient responds and understands;Reinforcement needed        Notes: Initial visit for wounds to BLE. Pt unsure on how wounds started, has been treating with triad paste. Currently going to cardiac rehadb 3 times a week. Hydrofera transfer, ABD pad, conforming gauze, tape, and coflex 2 layer 30-40mmhg - BLE. Compression wrap information sheet provided today, educated on importance of comprssion - elevate legs and keep wraps dry. RN visit monday and provider visit in 1 week.

## 2024-07-12 NOTE — PROGRESS NOTES
[facility] CONSULTATION NOTE  PAPITO LINARES MD  7/12/2024    Subjective   Wagner Sheridan is a 78 year old male.    Chief Complaint   Patient presents with    Wound Care     Pt arrives to the wound clinic for bilateral leg wounds. Lesions began appearing on lower legs spontaneous, some beginning as blisters. Has been applying triad cream to areas.     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   They have been open for more than a month - has bilateral pedal edema - related to CKD and CHF - under care of nephrologist Dr. Persaud and [facility] - also follows up with CHF clinic regularly - has cardiac rehab 3 times a week - he had open heart surgery - April 2024 - complex mitral valve repair / tricuspid valve repair / MAZE procedure - doing well postop.     Denies redness periwound / purulent drainage / malodor / fever.     Diabetes status: no  Last A1c value was 5.3% done 2/23/2024.      Smoker status: former - QUIT    Past Medical history, Surgical history, Social history, Family history reviewed with patient.   Medications reviewed.   Epic chart notes including provider notes, labs, imaging etc. Reviewed.   [facility] care everywhere queried and results reviewed.     Past Medical Hx:  Past Medical History:    Arrhythmia    BPH    Encounter for incision and drainage procedure    High blood pressure    High cholesterol    Hyperaldosteronism (HCC)    Mitral regurgitation    Osteoarthrosis, unspecified whether generalized or localized, unspecified site    PMR (polymyalgia rheumatica) (HCC)    off corticosteroids since 2013    Retention of urine    Shortness of breath     Past Surgical Hx:  Past Surgical History:   Procedure Laterality Date    Colonoscopy N/A 2/27/2024    Procedure: COLONOSCOPY;  Surgeon: Heath Mina DO;  Location:  ENDOSCOPY    Knee surgery      Other surgical history  2/27/09    flow , Dr. Tavera    Other surgical history  9/4/09    flow  Dr. Tavera     Other surgical history  1/4/11    PNB - Dr. Tavera    Other surgical history  1/17/13    PVP - Dr. Tavera    Other surgical history  2/3/16     [facility]     Other surgical history  08/05/2020    cysto- Dr. Tavera    Skin surgery  10/01/2019    MMS/R nasal ala /BCC done by [facility]    Tonsillectomy      Total knee replacement Right 02/25/2015    Right total knee replacement 02/25/2015    Total knee replacement Left 05/11/2015     Left knee total replacement 05/11/2015      Problem List:  Patient Active Problem List   Diagnosis    Benign non-nodular prostatic hyperplasia without lower urinary tract symptoms    Hyperaldosteronism (HCC)    Primary hypertension    Acute idiopathic gout involving toe of left foot    Angioedema    Purpura (HCC)    Chronic renal disease, stage III (HCC)    Aortic atherosclerosis (HCC)    Community acquired pneumonia, unspecified laterality    Anemia    Mitral valve insufficiency    CEDRICK (acute kidney injury) (HCC)    PFO (patent foramen ovale) (HCC)    Tricuspid valve insufficiency    ATN (acute tubular necrosis) (HCC)    Acute on chronic heart failure with preserved ejection fraction (HCC)    Hypernatremia    Anasarca     Social History:  Social History     Socioeconomic History    Marital status:    Tobacco Use    Smoking status: Former     Current packs/day: 0.50     Average packs/day: 0.5 packs/day for 5.0 years (2.5 ttl pk-yrs)     Types: Cigarettes    Smokeless tobacco: Never    Tobacco comments:     in college   Vaping Use    Vaping status: Never Used   Substance and Sexual Activity    Alcohol use: Yes     Alcohol/week: 0.0 standard drinks of alcohol     Comment: rare    Drug use: No   Other Topics Concern    Exercise Yes    Seat Belt Yes     Social Determinants of Health     Food Insecurity: No Food Insecurity (4/5/2024)    Food Insecurity     Food Insecurity: Never true   Transportation Needs: No Transportation Needs (4/5/2024)    Transportation Needs     Lack of  Transportation: No   Housing Stability: Low Risk  (4/5/2024)    Housing Stability     Housing Instability: No     Family History:  Family History   Problem Relation Age of Onset    Hypertension Father     Heart Disorder Father     Hypertension Mother     Diabetes Sister      Allergies:  Allergies   Allergen Reactions    Albumin Human ANAPHYLAXIS    Ciprofloxacin HIVES    Clindamycin HIVES    Other ANAPHYLAXIS     Alcohol swab caps    Penicillins HIVES     Tolerated Ancef 4/4/24    Wasp Venom RESPIRATORY FAILURE    Wasp Venom Protein RESPIRATORY FAILURE    Benazepril Coughing    Chlorhexidine RASH and OTHER (SEE COMMENTS)     redness     Current Meds:  Current Outpatient Medications   Medication Sig Dispense Refill    ferrous sulfate 325 (65 FE) MG Oral Tab EC Take 1 tablet (325 mg total) by mouth 3 (three) times daily with meals.      bumetanide 2 MG Oral Tab Take 2 tabs in the morning and 1 tab in the evening 270 tablet 1    metoprolol tartrate 25 MG Oral Tab Take 0.5 tablets (12.5 mg total) by mouth 2x Daily(Beta Blocker). 60 tablet 0    eplerenone 25 MG Oral Tab Take 1 tablet (25 mg total) by mouth daily. 30 tablet 11    atorvastatin 20 MG Oral Tab Take 1 tablet (20 mg total) by mouth nightly.      finasteride 5 MG Oral Tab Take 1 tablet (5 mg total) by mouth at bedtime.      EPINEPHrine (EPIPEN 2-JAZ) 0.3 MG/0.3ML Injection Solution Auto-injector USE AS DIRECTED 2 each 5    Potassium Chloride ER 20 MEQ Oral Tab CR Take 20 mcg by mouth daily. 30 tablet 110    pantoprazole 40 MG Oral Tab EC Take 1 tablet (40 mg total) by mouth every morning before breakfast. (Patient not taking: Reported on 7/12/2024) 30 tablet 11     Tobacco Counseling:  Counseling given: Not Answered  Tobacco comments: in college       REVIEW OF SYSTEMS:   CONSTITUTIONAL:  Denies unusual weight gain/loss, fever, chills, or fatigue.  EENT:  Eyes:  Denies eye pain, visual loss, blurred vision, double vision or yellow sclerae.   CARDIOVASCULAR:   Denies chest pain, chest pressure, chest discomfort, palpitations, dyspnea on exertion or at rest.  RESPIRATORY:  Denies shortness of breath, wheezing, cough or sputum.  GASTROINTESTINAL:  Denies abdominal pain, nausea, vomiting, constipation, diarrhea, or blood in stool.  MUSCULOSKELETAL:  Denies weakness  NEUROLOGICAL:  Denies headache, seizures, dizziness, syncope      Objective   Objective  Physical Exam    Wound Assessment  Wound 04/04/24 Sternum (Active)       Wound 04/08/24 Abdomen Medial;Upper (Active)       Wound 07/12/24 #1 Leg Left (Active)   Wound Image    07/12/24 0955   Drainage Amount CORRINA 07/12/24 0955   Wound Length (cm) 8.7 cm 07/12/24 0955   Wound Width (cm) 17 cm 07/12/24 0955   Wound Surface Area (cm^2) 147.9 cm^2 07/12/24 0955   Wound Depth (cm) 0.1 cm 07/12/24 0955   Wound Volume (cm^3) 14.79 cm^3 07/12/24 0955   Margins Well-defined edges 07/12/24 0955   Non-staged Wound Description Full thickness 07/12/24 0955   Delmy-wound Assessment Dry;Edema;Hemosiderin staining 07/12/24 0955   Wound Granulation Tissue Firm;Pink 07/12/24 0955   Wound Bed Granulation (%) 30 % 07/12/24 0955   Wound Bed Epithelium (%) 70 % 07/12/24 0955   Wound Odor None 07/12/24 0955   Shape cluster 07/12/24 0955   Tunneling? No 07/12/24 0955   Undermining? No 07/12/24 0955   Sinus Tracts? No 07/12/24 0955       Wound 07/12/24 #2 Leg Right (Active)   Wound Image   07/12/24 0954   Drainage Amount CORRINA 07/12/24 0954   Wound Length (cm) 13.1 cm 07/12/24 0954   Wound Width (cm) 2.5 cm 07/12/24 0954   Wound Surface Area (cm^2) 32.75 cm^2 07/12/24 0954   Wound Depth (cm) 0.1 cm 07/12/24 0954   Wound Volume (cm^3) 3.275 cm^3 07/12/24 0954   Margins Well-defined edges 07/12/24 0954   Non-staged Wound Description Full thickness 07/12/24 0954   Delmy-wound Assessment Dry;Edema;Hemosiderin staining 07/12/24 0954   Wound Granulation Tissue Firm;Pink 07/12/24 0954   Wound Bed Granulation (%) 40 % 07/12/24 0954   Wound Bed Epithelium (%) 60  % 07/12/24 0954   Wound Odor None 07/12/24 0954   Shape cluster 07/12/24 0954   Tunneling? No 07/12/24 0954   Undermining? No 07/12/24 0954   Sinus Tracts? No 07/12/24 0954       Compression Wrap 07/12/24 Leg Anterior;Left (Active)   Response to Treatment Well tolerated 07/12/24 1031   Compression Layers Multilayer 07/12/24 1031   Compression Product Type Coflex 07/12/24 1031   Dressing Applied Yes 07/12/24 1031   Compression Wrap Location Toes to Knee 07/12/24 1031   Compression Wrap Status Clean;Dry;Intact 07/12/24 1031       Compression Wrap 07/12/24 Leg Anterior;Right (Active)   Response to Treatment Well tolerated 07/12/24 1030   Compression Layers Multilayer 07/12/24 1030   Compression Product Type Coflex 07/12/24 1030   Dressing Applied Yes 07/12/24 1030   Compression Wrap Location Toes to Knee 07/12/24 1030   Compression Wrap Status Clean;Dry;Intact 07/12/24 1030          PHYSICAL EXAM:   /59   Pulse 70   Temp 97.1 °F (36.2 °C)   Resp 12   Ht 72\"   Wt 229 lb (103.9 kg)   BMI 31.06 kg/m²  Estimated body mass index is 31.06 kg/m² as calculated from the following:    Height as of this encounter: 72\".    Weight as of this encounter: 229 lb (103.9 kg).   Vital signs reviewed.Appears stated age, well groomed.  Physical Exam:  GEN:  Patient is alert, awake and oriented, well developed, well nourished, no apparent distress.  EXTREMITIES: strong DP b/l - doppler      Assessment   Assessment    Encounter Diagnosis  1. Non-pressure chronic ulcer of right calf with fat layer exposed (HCC)    2. Non-pressure chronic ulcer of left calf with fat layer exposed (HCC)    3. Idiopathic chronic venous hypertension of both lower extremities with ulcer and inflammation (HCC)    4. Sloughing of wound    5. Obesity (BMI 30-39.9)    6. Stage 3b chronic kidney disease (HCC)    7. Chronic systolic heart failure (HCC)        Problem List  Patient Active Problem List   Diagnosis    Benign non-nodular prostatic hyperplasia  without lower urinary tract symptoms    Hyperaldosteronism (HCC)    Primary hypertension    Acute idiopathic gout involving toe of left foot    Angioedema    Purpura (HCC)    Chronic renal disease, stage III (HCC)    Aortic atherosclerosis (HCC)    Community acquired pneumonia, unspecified laterality    Anemia    Mitral valve insufficiency    CEDRICK (acute kidney injury) (HCC)    PFO (patent foramen ovale) (HCC)    Tricuspid valve insufficiency    ATN (acute tubular necrosis) (HCC)    Acute on chronic heart failure with preserved ejection fraction (HCC)    Hypernatremia    Anasarca       Plan    Start absorptive dressings and compression wraps.   Low salt diet  Leg elevation.   Return for nurse visit on Monday and see me in one week    PROCEDURES:     Coflex -2 compression wrap application bilateral.     Patient Instructions     Wound Cleaning and Dressings:    Shower with protection - use shower boot    DRESSINGS: zinc barrier cream periwound / HF transfer / kerramax  Change dressing twice weekly.     Compression Therapy : coflex-2  Compression Therapy Instructions:  1.  Okay to wear overnight        2.  Avoid prolonged standing in one place.  It is better to have your calf muscles moving       to pump fluid out of the legs.    3.  Elevate leg(s) above the level of the heart when sitting or as much as possible.    4.  Take your diuretics as directed by your provider.  Do not skip doses or change doses      unless instructed to do so by your provider.    5. Do not get leg(s) with compression wrap wet. If wraps are too tight as indicated        By pain, numbness/tingling or discoloration of toes remove wrap completely       and call the   wound center.     Miscellaneous Instructions:  Supplement with a daily multivitamin   Low salt diet  Increase protein intake / consider protein supplements - see below  Elevate extremities at all times when sitting / laying down.    DIETARY MODIFICATIONS TO HELP WITH WOUND  HEALING:    Protein: Meats, beans, eggs, milk and yogurt particularly Greek yogurt), tofu, soy nuts, soy protein products    Vitamin C: Citrus fruits and juices, strawberries, tomatoes, tomato juice, peppers, baked potatoes, spinach, broccoli, cauliflower, [place] sprouts, cabbage    Vitamin A: Dark green, leafy vegetables, orange or yellow vegetables, cantaloupe, fortified dairy products, liver, fortified cereals    Zinc: Fortified cereals, red meats, seafood    Consider Mir by Alea (These are essential branch chain amino acids that help with tissue building and wound healing) and take 2 packets/day. you can order online at [employer] or [employer]    ADDITIONAL REMINDERS:    The treatment plan has been discussed at length with you and your provider. Follow all instructions carefully, it is very important. If you do not follow all instructions, you are at  risk of your wound not healing, infection, possible loss of limb and even end of life.  Please call the clinic during regular business hours ( 7:30 AM - 5:30 PM) if you notice increased bleeding, redness, warmth, pain or pus like drainage or start running a fever greater than 100.3.    For after hour emergencies, please call your primary physician or go to the nearest emergency room.      Patient/Caregiver Education: There are no barriers to learning. Medical education for above diagnosis given.   Answered all questions.    Outcome: Patient verbalizes understanding. Patient is notified to call with any questions, complications, allergies, or worsening or changing symptoms.  Patient is to call with any side effects or complications as a result of the treatments today.      DOCUMENTATION OF TIME SPENT: Code selection for this visit was based on time spent : 55 min on date of service in preparing to see the patient, obtaining and/or reviewing separately obtained history, performing a medically appropriate examination, counseling and educating the  patient/family/caregiver, ordering medications or testing, referring and communicating with other healthcare providers, documenting clinical information in the E HR, independently interpreting results and communicating results to the patient/family/caregiver and care coordination with the patient's other providers.    Followup: Return in about 1 week (around 7/19/2024) for RN visit Monday 7/15, 1 week Dr. Sosa.      Note to Patient:  The 21st Century Cures Act makes medical notes like these available to patients in the interest of transparency. However, be advised this is a medical document and is intended as [id number] communication; it is written in medical language and may appear blunt, direct, or contain abbreviations or verbiage that are unfamiliar. Medical documents are intended to carry relevant information, facts as evident, and the clinical opinion of the practitioner.    Also, please note that this report has been produced using speech recognition software and may contain errors related to that system including, but not limited to, errors in grammar, punctuation, and spelling, as well as words and phrases that possibly may have been recognized inappropriately.  If there are any questions or concerns, contact the dictating provider for clarification.      Paty Sosa MD  7/12/2024  10:31 AM

## 2024-07-15 ENCOUNTER — OFFICE VISIT (OUTPATIENT)
Dept: WOUND CARE | Facility: HOSPITAL | Age: 79
End: 2024-07-15
Attending: INTERNAL MEDICINE
Payer: MEDICARE

## 2024-07-15 ENCOUNTER — CARDPULM VISIT (OUTPATIENT)
Dept: CARDIAC REHAB | Facility: HOSPITAL | Age: 79
End: 2024-07-15
Attending: INTERNAL MEDICINE
Payer: MEDICARE

## 2024-07-15 VITALS
RESPIRATION RATE: 16 BRPM | HEART RATE: 112 BPM | SYSTOLIC BLOOD PRESSURE: 104 MMHG | DIASTOLIC BLOOD PRESSURE: 57 MMHG | TEMPERATURE: 97 F

## 2024-07-15 DIAGNOSIS — L97.212 NON-PRESSURE CHRONIC ULCER OF RIGHT CALF WITH FAT LAYER EXPOSED (HCC): Primary | ICD-10-CM

## 2024-07-15 DIAGNOSIS — I87.333 IDIOPATHIC CHRONIC VENOUS HYPERTENSION OF BOTH LOWER EXTREMITIES WITH ULCER AND INFLAMMATION (HCC): ICD-10-CM

## 2024-07-15 DIAGNOSIS — L97.222 NON-PRESSURE CHRONIC ULCER OF LEFT CALF WITH FAT LAYER EXPOSED (HCC): ICD-10-CM

## 2024-07-15 PROCEDURE — 93798 PHYS/QHP OP CAR RHAB W/ECG: CPT

## 2024-07-15 PROCEDURE — 29581 APPL MULTLAYER CMPRN SYS LEG: CPT

## 2024-07-15 NOTE — PROGRESS NOTES
Chief Complaint   Patient presents with    Wound Care     Patient is here for a wound care nurse visit for wounds to both legs. He denies any pain or new wound concerns.           Current Outpatient Medications:     ferrous sulfate 325 (65 FE) MG Oral Tab EC, Take 1 tablet (325 mg total) by mouth 3 (three) times daily with meals., Disp: , Rfl:     bumetanide 2 MG Oral Tab, Take 2 tabs in the morning and 1 tab in the evening, Disp: 270 tablet, Rfl: 1    Potassium Chloride ER 20 MEQ Oral Tab CR, Take 20 mcg by mouth daily., Disp: 30 tablet, Rfl: 110    metoprolol tartrate 25 MG Oral Tab, Take 0.5 tablets (12.5 mg total) by mouth 2x Daily(Beta Blocker)., Disp: 60 tablet, Rfl: 0    eplerenone 25 MG Oral Tab, Take 1 tablet (25 mg total) by mouth daily., Disp: 30 tablet, Rfl: 11    pantoprazole 40 MG Oral Tab EC, Take 1 tablet (40 mg total) by mouth every morning before breakfast. (Patient not taking: Reported on 7/12/2024), Disp: 30 tablet, Rfl: 11    atorvastatin 20 MG Oral Tab, Take 1 tablet (20 mg total) by mouth nightly., Disp: , Rfl:     finasteride 5 MG Oral Tab, Take 1 tablet (5 mg total) by mouth at bedtime., Disp: , Rfl:     EPINEPHrine (EPIPEN 2-JAZ) 0.3 MG/0.3ML Injection Solution Auto-injector, USE AS DIRECTED, Disp: 2 each, Rfl: 5    Allergies   Allergen Reactions    Albumin Human ANAPHYLAXIS    Ciprofloxacin HIVES    Clindamycin HIVES    Other ANAPHYLAXIS     Alcohol swab caps    Penicillins HIVES     Tolerated Ancef 4/4/24    Wasp Venom RESPIRATORY FAILURE    Wasp Venom Protein RESPIRATORY FAILURE    Benazepril Coughing    Chlorhexidine RASH and OTHER (SEE COMMENTS)     redness          HISTORY:     Past medical, surgical, family and social history updated where appropriate.    PHYSICAL EXAM:   /57   Pulse 112   Temp 97.3 °F (36.3 °C)   Resp 16        Vital signs reviewed.      Calf  Point of Measurement - Left Calf: 36  Point of Measurement - Right Calf: 36  Left Calf from:: Heel  Calf Left cm::  42.8  Right Calf from:: Heel  Right Calf cm:: 41.5    Ankle  Point of Measurement - Left Ankle: 11  Point of Measurement - Right Ankle: 11  Left Ankle from:: Heel  Left Ankle cm:: 28.3     Right Ankle from:: Heel  Right Ankle cm:: 27.5       Wound 04/04/24 Sternum (Active)   Date First Assessed: 04/04/24   Primary Wound Type: Incision  Location: Sternum      Assessments 4/4/2024  4:53 PM 4/12/2024  8:49 AM   Site Assessment Unable to assess Clean;Dry;Intact   Closure -- Approximated   Drainage Amount -- None   Treatments -- Betadine   Dressing -- Open to air   Dressing Status Clean;Dry;Intact --       No associated orders.       Wound 04/08/24 Abdomen Medial;Upper (Active)   Date First Assessed/Time First Assessed: 04/08/24 1020   Primary Wound Type: Incision  Location: Abdomen  Wound Location Orientation: Medial;Upper  Wound Description (Comments): arthur tube sites      Assessments 4/8/2024 10:20 AM 4/12/2024  8:49 AM   Site Assessment Clean;Dry;Intact Unable to assess   Closure Other (Comment) --   Dressing 4x4s;ABD Pad;Vaseline gauze Vaseline gauze;Gauze;Tegaderm   Dressing Status -- Clean;Dry;Intact       No associated orders.       Wound 07/12/24 #1 Leg Left (Active)   Date First Assessed/Time First Assessed: 07/12/24 0942    Wound Number (Wound Clinic Only): #1  Primary Wound Type: Venous Ulcer  Location: Leg  Wound Location Orientation: Left      Assessments 7/12/2024  9:55 AM 7/15/2024  9:40 AM   Wound Image         Drainage Amount Unable to assess Moderate   Drainage Description -- Serosanguineous   Treatments Compression Compression   Wound Length (cm) 8.7 cm 8.1 cm   Wound Width (cm) 17 cm 15.5 cm   Wound Surface Area (cm^2) 147.9 cm^2 125.55 cm^2   Wound Depth (cm) 0.1 cm 0.1 cm   Wound Volume (cm^3) 14.79 cm^3 12.555 cm^3   Wound Healing % -- 15   Margins Well-defined edges Well-defined edges   Non-staged Wound Description Full thickness Full thickness   Delmy-wound Assessment Dry;Edema;Hemosiderin  staining Dry;Edema;Hemosiderin staining   Wound Granulation Tissue Firm;Pink Firm;Red   Wound Bed Granulation (%) 30 % 40 %   Wound Bed Epithelium (%) 70 % 60 %   Wound Odor None None   Shape cluster cluster   Tunneling? No --   Undermining? No No   Sinus Tracts? No No       No associated orders.       Wound 07/12/24 #2 Leg Right (Active)   Date First Assessed/Time First Assessed: 07/12/24 0943    Wound Number (Wound Clinic Only): #2  Primary Wound Type: Venous Ulcer  Location: Leg  Wound Location Orientation: Right      Assessments 7/12/2024  9:54 AM 7/15/2024  9:42 AM   Wound Image       Drainage Amount Unable to assess Moderate   Drainage Description -- Serosanguineous   Treatments Compression Compression   Wound Length (cm) 13.1 cm 12.8 cm   Wound Width (cm) 2.5 cm 1.9 cm   Wound Surface Area (cm^2) 32.75 cm^2 24.32 cm^2   Wound Depth (cm) 0.1 cm 0.1 cm   Wound Volume (cm^3) 3.275 cm^3 2.432 cm^3   Wound Healing % -- 26   Margins Well-defined edges Well-defined edges   Non-staged Wound Description Full thickness Full thickness   Delmy-wound Assessment Dry;Edema;Hemosiderin staining Edema;Hemosiderin staining   Wound Granulation Tissue Firm;Pink Firm;Pink   Wound Bed Granulation (%) 40 % 40 %   Wound Bed Epithelium (%) 60 % 60 %   Wound Odor None None   Shape cluster clustered   Tunneling? No No   Undermining? No No   Sinus Tracts? No No       No associated orders.       Compression Wrap 07/12/24 Leg Anterior;Left (Active)   Placement Date: 07/12/24   Location: Leg  Wound Location Orientation: Anterior;Left      Assessments 7/12/2024 10:31 AM 7/15/2024  9:58 AM   Response to Treatment Well tolerated Well tolerated   Compression Layers Multilayer Multilayer   Compression Product Type Coflex Coflex   Dressing Applied Yes Yes   Compression Wrap Location Toes to Knee Toes to Knee   Compression Wrap Status Clean;Dry;Intact Clean;Dry;Intact       No associated orders.       Compression Wrap 07/12/24 Leg Anterior;Right  (Active)   Placement Date: 07/12/24   Location: Leg  Wound Location Orientation: Anterior;Right      Assessments 7/12/2024 10:30 AM 7/15/2024  9:58 AM   Response to Treatment Well tolerated Well tolerated   Compression Layers Multilayer Multilayer   Compression Product Type Coflex Coflex   Dressing Applied Yes Yes   Compression Wrap Location Toes to Knee Toes to Knee   Compression Wrap Status Clean;Dry;Intact Clean;Dry;Intact       No associated orders.          ASSESSMENT AND PLAN:        Risks, benefits, and alternatives of current treatment plan discussed in detail.  Questions and concerns addressed. Red flags to RTC or ED reviewed.  Patient (or parent) agrees to plan.      No follow-ups on file.  Weekly Wound Education Note    Teaching Provided To: Patient;Family  Training Topics: Cleasing and general instructions;Compression;Discharge instructions;Dressing;Edema control  Training Method: Explain/Verbal  Training Response: Patient responds and understands;Reinforcement needed        Notes: Here for RN visit.    Here for RN visit, wounds improving. Wraps tolerated well, edema measurements slightly decrease. Continue with hydrofera transfer, kerramax/ABD pad, conforming gauze, tape, coflex 2 30-40mmhg - BLE. Plan to order compression garments at next provider visit, scheduled for this Friday 7/19/24.      Yadira ROJAS RN   7/15/2024  10:07 AM

## 2024-07-17 ENCOUNTER — CARDPULM VISIT (OUTPATIENT)
Dept: CARDIAC REHAB | Facility: HOSPITAL | Age: 79
End: 2024-07-17
Attending: INTERNAL MEDICINE
Payer: MEDICARE

## 2024-07-17 PROCEDURE — 93798 PHYS/QHP OP CAR RHAB W/ECG: CPT

## 2024-07-19 ENCOUNTER — CARDPULM VISIT (OUTPATIENT)
Dept: CARDIAC REHAB | Facility: HOSPITAL | Age: 79
End: 2024-07-19
Attending: INTERNAL MEDICINE
Payer: MEDICARE

## 2024-07-19 ENCOUNTER — OFFICE VISIT (OUTPATIENT)
Dept: WOUND CARE | Facility: HOSPITAL | Age: 79
End: 2024-07-19
Attending: INTERNAL MEDICINE
Payer: MEDICARE

## 2024-07-19 VITALS
DIASTOLIC BLOOD PRESSURE: 52 MMHG | RESPIRATION RATE: 14 BRPM | TEMPERATURE: 98 F | HEART RATE: 64 BPM | SYSTOLIC BLOOD PRESSURE: 98 MMHG

## 2024-07-19 DIAGNOSIS — N18.32 STAGE 3B CHRONIC KIDNEY DISEASE (HCC): ICD-10-CM

## 2024-07-19 DIAGNOSIS — E66.9 OBESITY (BMI 30-39.9): ICD-10-CM

## 2024-07-19 DIAGNOSIS — R23.8 SLOUGHING OF WOUND: ICD-10-CM

## 2024-07-19 DIAGNOSIS — L97.212 NON-PRESSURE CHRONIC ULCER OF RIGHT CALF WITH FAT LAYER EXPOSED (HCC): Primary | ICD-10-CM

## 2024-07-19 DIAGNOSIS — L97.222 NON-PRESSURE CHRONIC ULCER OF LEFT CALF WITH FAT LAYER EXPOSED (HCC): ICD-10-CM

## 2024-07-19 DIAGNOSIS — I87.333 IDIOPATHIC CHRONIC VENOUS HYPERTENSION OF BOTH LOWER EXTREMITIES WITH ULCER AND INFLAMMATION (HCC): ICD-10-CM

## 2024-07-19 DIAGNOSIS — I50.22 CHRONIC SYSTOLIC HEART FAILURE (HCC): ICD-10-CM

## 2024-07-19 PROCEDURE — 29581 APPL MULTLAYER CMPRN SYS LEG: CPT

## 2024-07-19 PROCEDURE — 93798 PHYS/QHP OP CAR RHAB W/ECG: CPT

## 2024-07-19 NOTE — PATIENT INSTRUCTIONS
Wound Cleaning and Dressings:    Shower with protection - use shower boot  DRESSINGS: zinc barrier cream periwound / HF transfer / kerramax  Change dressing weekly.     Compression Therapy : coflex-2  Compression Therapy Instructions:  1.  Okay to wear overnight        2.  Avoid prolonged standing in one place.  It is better to have your calf muscles moving       to pump fluid out of the legs.    3.  Elevate leg(s) above the level of the heart when sitting or as much as possible.    4.  Take your diuretics as directed by your provider.  Do not skip doses or change doses      unless instructed to do so by your provider.    5. Do not get leg(s) with compression wrap wet. If wraps are too tight as indicated        By pain, numbness/tingling or discoloration of toes remove wrap completely       and call the   wound center.     Miscellaneous Instructions:  Supplement with a daily multivitamin   Low salt diet  Increase protein intake / consider protein supplements - see below  Elevate extremities at all times when sitting / laying down.    DIETARY MODIFICATIONS TO HELP WITH WOUND HEALING:    Protein: Meats, beans, eggs, milk and yogurt particularly Greek yogurt), tofu, soy nuts, soy protein products    Vitamin C: Citrus fruits and juices, strawberries, tomatoes, tomato juice, peppers, baked potatoes, spinach, broccoli, cauliflower, [place] sprouts, cabbage    Vitamin A: Dark green, leafy vegetables, orange or yellow vegetables, cantaloupe, fortified dairy products, liver, fortified cereals    Zinc: Fortified cereals, red meats, seafood    Consider Mir by [employer] (These are essential branch chain amino acids that help with tissue building and wound healing) and take 2 packets/day. you can order online at [employer] or [employer]    ADDITIONAL REMINDERS:    The treatment plan has been discussed at length with you and your provider. Follow all instructions carefully, it is very important. If you do not follow all  instructions, you are at  risk of your wound not healing, infection, possible loss of limb and even end of life.  Please call the clinic during regular business hours ( 7:30 AM - 5:30 PM) if you notice increased bleeding, redness, warmth, pain or pus like drainage or start running a fever greater than 100.3.    For after hour emergencies, please call your primary physician or go to the nearest emergency room.

## 2024-07-19 NOTE — PROGRESS NOTES
Weekly Wound Education Note    Teaching Provided To: Patient  Training Topics: Cleasing and general instructions;Compression;Discharge instructions;Dressing;Edema control  Training Method: Explain/Verbal  Training Response: Patient responds and understands;Reinforcement needed        Notes: Improving. Endoform, hydrofera transfer, ABD pad, conforming gauze, tape, coflex 2 30-40mmhg - BLE. Ordered compression garments this visit.

## 2024-07-19 NOTE — PROGRESS NOTES
[place] [facility] PROGRESS NOTE  PAPITO LINARES MD  7/19/2024    Chief Complaint:   Chief Complaint   Patient presents with    Wound Recheck     Pt here for follow up arrives with compression wraps to bilateral legs. He reports tolerating wraps well       HPI:   Subjective   Wagner Sheridan is a 78 year old male coming in for a follow-up visit.    HPI    Wounds all  improved. Dimensions better - tissue quality better.   No s/o infection  Edema down  Tolerating wraps ok.     Review of Systems  Negative except HPI   Denies chest pain / SOB / palpitations  Denies fever.     Allergies  Allergies   Allergen Reactions    Albumin Human ANAPHYLAXIS    Ciprofloxacin HIVES    Clindamycin HIVES    Other ANAPHYLAXIS     Alcohol swab caps    Penicillins HIVES     Tolerated Ancef 4/4/24    Wasp Venom RESPIRATORY FAILURE    Wasp Venom Protein RESPIRATORY FAILURE    Benazepril Coughing    Chlorhexidine RASH and OTHER (SEE COMMENTS)     redness       Current Meds:  Current Outpatient Medications   Medication Sig Dispense Refill    ferrous sulfate 325 (65 FE) MG Oral Tab EC Take 1 tablet (325 mg total) by mouth 3 (three) times daily with meals.      bumetanide 2 MG Oral Tab Take 2 tabs in the morning and 1 tab in the evening 270 tablet 1    Potassium Chloride ER 20 MEQ Oral Tab CR Take 20 mcg by mouth daily. 30 tablet 110    metoprolol tartrate 25 MG Oral Tab Take 0.5 tablets (12.5 mg total) by mouth 2x Daily(Beta Blocker). 60 tablet 0    eplerenone 25 MG Oral Tab Take 1 tablet (25 mg total) by mouth daily. 30 tablet 11    pantoprazole 40 MG Oral Tab EC Take 1 tablet (40 mg total) by mouth every morning before breakfast. (Patient not taking: Reported on 7/12/2024) 30 tablet 11    atorvastatin 20 MG Oral Tab Take 1 tablet (20 mg total) by mouth nightly.      finasteride 5 MG Oral Tab Take 1 tablet (5 mg total) by mouth at bedtime.      EPINEPHrine (EPIPEN 2-JAZ) 0.3 MG/0.3ML Injection Solution Auto-injector USE AS DIRECTED 2 each 5          EXAM:   Objective   Objective    Physical Exam    Vital Signs  Vitals:    07/19/24 0700   BP: 98/52   Pulse: 64   Resp: 14   Temp: 97.6 °F (36.4 °C)       Wound Assessment  Wound 07/12/24 #1 Leg Left (Active)   Wound Image    07/19/24 0951   Drainage Amount Small 07/19/24 0951   Drainage Description Sanguineous 07/19/24 0951   Treatments Compression 07/15/24 0940   Wound Length (cm) 7.5 cm 07/19/24 0951   Wound Width (cm) 14.8 cm 07/19/24 0951   Wound Surface Area (cm^2) 111 cm^2 07/19/24 0951   Wound Depth (cm) 0.1 cm 07/19/24 0951   Wound Volume (cm^3) 11.1 cm^3 07/19/24 0951   Wound Healing % 25 07/19/24 0951   Margins Well-defined edges 07/19/24 0951   Non-staged Wound Description Full thickness 07/19/24 0951   Delmy-wound Assessment Dry;Edema;Hemosiderin staining 07/19/24 0951   Wound Granulation Tissue Red;Firm 07/19/24 0951   Wound Bed Granulation (%) 30 % 07/19/24 0951   Wound Bed Epithelium (%) 70 % 07/19/24 0951   Wound Odor None 07/19/24 0951   Shape cluster 07/19/24 0951   Tunneling? No 07/19/24 0951   Undermining? No 07/19/24 0951   Sinus Tracts? No 07/19/24 0951       Wound 07/12/24 #2 Leg Right (Active)   Wound Image   07/19/24 0949   Drainage Amount Small 07/19/24 0949   Drainage Description Serosanguineous 07/19/24 0949   Treatments Compression 07/15/24 0942   Wound Length (cm) 4.9 cm 07/19/24 0949   Wound Width (cm) 1.2 cm 07/19/24 0949   Wound Surface Area (cm^2) 5.88 cm^2 07/19/24 0949   Wound Depth (cm) 0.1 cm 07/19/24 0949   Wound Volume (cm^3) 0.588 cm^3 07/19/24 0949   Wound Healing % 82 07/19/24 0949   Margins Well-defined edges 07/19/24 0949   Non-staged Wound Description Full thickness 07/19/24 0949   Delmy-wound Assessment Edema;Hemosiderin staining 07/19/24 0949   Wound Granulation Tissue Red;Pink;Firm 07/19/24 0949   Wound Bed Granulation (%) 50 % 07/19/24 0949   Wound Bed Epithelium (%) 50 % 07/19/24 0949   Wound Odor None 07/19/24 0949   Shape clustered 07/15/24 0942    Tunneling? No 07/15/24 0942   Undermining? No 07/15/24 0942   Sinus Tracts? No 07/15/24 0942       Compression Wrap 07/12/24 Leg Anterior;Left (Active)   Response to Treatment Well tolerated 07/15/24 0958   Compression Layers Multilayer 07/15/24 0958   Compression Product Type Coflex 07/15/24 0958   Dressing Applied Yes 07/15/24 0958   Compression Wrap Location Toes to Knee 07/15/24 0958   Compression Wrap Status Clean;Dry;Intact 07/15/24 0958       Compression Wrap 07/12/24 Leg Anterior;Right (Active)   Response to Treatment Well tolerated 07/15/24 0958   Compression Layers Multilayer 07/15/24 0958   Compression Product Type Coflex 07/15/24 0958   Dressing Applied Yes 07/15/24 0958   Compression Wrap Location Toes to Knee 07/15/24 0958   Compression Wrap Status Clean;Dry;Intact 07/15/24 0958           ASSESSMENT AND PLAN:     Assessment   Assessment    Encounter Diagnosis  1. Non-pressure chronic ulcer of right calf with fat layer exposed (HCC)    2. Non-pressure chronic ulcer of left calf with fat layer exposed (HCC)    3. Idiopathic chronic venous hypertension of both lower extremities with ulcer and inflammation (HCC)    4. Sloughing of wound    5. Obesity (BMI 30-39.9)    6. Stage 3b chronic kidney disease (HCC)    7. Chronic systolic heart failure (HCC)        Problem List  Patient Active Problem List   Diagnosis    Benign non-nodular prostatic hyperplasia without lower urinary tract symptoms    Hyperaldosteronism (HCC)    Primary hypertension    Acute idiopathic gout involving toe of left foot    Angioedema    Purpura (HCC)    Chronic renal disease, stage III (HCC)    Aortic atherosclerosis (HCC)    Community acquired pneumonia, unspecified laterality    Anemia    Mitral valve insufficiency    CEDRICK (acute kidney injury) (HCC)    PFO (patent foramen ovale) (HCC)    Tricuspid valve insufficiency    ATN (acute tubular necrosis) (HCC)    Acute on chronic heart failure with preserved ejection fraction (HCC)     Hypernatremia    Anasarca         MANAGEMENT    Start collagen  Continue HF transfer / kerramax.   Continue wraps - once weekly from now.   Dressing change weekly.   See in a few weeks.     PROCEDURES:    Callus removal of toe right foot done.     Compression wrap application after dressing change    Patient Instructions     Wound Cleaning and Dressings:    Shower with protection - use shower boot  DRESSINGS: zinc barrier cream periwound / HF transfer / kerramax  Change dressing weekly.     Compression Therapy : coflex-2  Compression Therapy Instructions:  1.  Okay to wear overnight        2.  Avoid prolonged standing in one place.  It is better to have your calf muscles moving       to pump fluid out of the legs.    3.  Elevate leg(s) above the level of the heart when sitting or as much as possible.    4.  Take your diuretics as directed by your provider.  Do not skip doses or change doses      unless instructed to do so by your provider.    5. Do not get leg(s) with compression wrap wet. If wraps are too tight as indicated        By pain, numbness/tingling or discoloration of toes remove wrap completely       and call the   wound center.     Miscellaneous Instructions:  Supplement with a daily multivitamin   Low salt diet  Increase protein intake / consider protein supplements - see below  Elevate extremities at all times when sitting / laying down.    DIETARY MODIFICATIONS TO HELP WITH WOUND HEALING:    Protein: Meats, beans, eggs, milk and yogurt particularly Greek yogurt), tofu, soy nuts, soy protein products    Vitamin C: Citrus fruits and juices, strawberries, tomatoes, tomato juice, peppers, baked potatoes, spinach, broccoli, cauliflower, [place] sprouts, cabbage    Vitamin A: Dark green, leafy vegetables, orange or yellow vegetables, cantaloupe, fortified dairy products, liver, fortified cereals    Zinc: Fortified cereals, red meats, seafood    Consider Mir by [employer] (These are essential branch  chain amino acids that help with tissue building and wound healing) and take 2 packets/day. you can order online at [employer] or [facility]    ADDITIONAL REMINDERS:    The treatment plan has been discussed at length with you and your provider. Follow all instructions carefully, it is very important. If you do not follow all instructions, you are at  risk of your wound not healing, infection, possible loss of limb and even end of life.  Please call the clinic during regular business hours ( 7:30 AM - 5:30 PM) if you notice increased bleeding, redness, warmth, pain or pus like drainage or start running a fever greater than 100.3.    For after hour emergencies, please call your primary physician or go to the nearest emergency room.    Patient/Caregiver Education: There are no barriers to learning. Medical education for above diagnosis given.   Answered all questions.    Outcome: Patient verbalizes understanding. Patient is notified to call with any questions, complications, allergies, or worsening or changing symptoms.  Patient is to call with any side effects or complications as a result of the treatments today.      DOCUMENTATION OF TIME SPENT: Code selection for this visit was based on time spent : 30 min on date of service in preparing to see the patient, obtaining and/or reviewing separately obtained history, performing a medically appropriate examination, counseling and educating the patient/family/caregiver, ordering medications or testing, referring and communicating with other healthcare providers, documenting clinical information in the E HR, independently interpreting results and communicating results to the patient/family/caregiver and care coordination with the patient's other providers.    Followup: Return in about 4 weeks (around 8/16/2024), or nurse visit weekly, for Wound followup.      Note to Patient:  The 21st Century Cures Act makes medical notes like these available to patients in the interest of  transparency. However, be advised this is a medical document and is intended as [id number] communication; it is written in medical language and may appear blunt, direct, or contain abbreviations or verbiage that are unfamiliar. Medical documents are intended to carry relevant information, facts as evident, and the clinical opinion of the practitioner.    Also, please note that this report has been produced using speech recognition software and may contain errors related to that system including, but not limited to, errors in grammar, punctuation, and spelling, as well as words and phrases that possibly may have been recognized inappropriately.  If there are any questions or concerns, contact the dictating provider for clarification.      Paty Sosa MD  7/19/2024  10:12 AM

## 2024-07-22 ENCOUNTER — CARDPULM VISIT (OUTPATIENT)
Dept: CARDIAC REHAB | Facility: HOSPITAL | Age: 79
End: 2024-07-22
Attending: INTERNAL MEDICINE
Payer: MEDICARE

## 2024-07-22 PROCEDURE — 93798 PHYS/QHP OP CAR RHAB W/ECG: CPT

## 2024-07-24 ENCOUNTER — CARDPULM VISIT (OUTPATIENT)
Dept: CARDIAC REHAB | Facility: HOSPITAL | Age: 79
End: 2024-07-24
Attending: INTERNAL MEDICINE
Payer: MEDICARE

## 2024-07-24 ENCOUNTER — LAB ENCOUNTER (OUTPATIENT)
Dept: LAB | Age: 79
End: 2024-07-24
Attending: INTERNAL MEDICINE
Payer: MEDICARE

## 2024-07-24 DIAGNOSIS — E83.52 HYPERCALCEMIA: ICD-10-CM

## 2024-07-24 DIAGNOSIS — N18.30 STAGE 3 CHRONIC KIDNEY DISEASE, UNSPECIFIED WHETHER STAGE 3A OR 3B CKD (HCC): ICD-10-CM

## 2024-07-24 DIAGNOSIS — N17.9 AKI (ACUTE KIDNEY INJURY) (HCC): ICD-10-CM

## 2024-07-24 LAB
ANION GAP SERPL CALC-SCNC: 6 MMOL/L (ref 0–18)
BUN BLD-MCNC: 38 MG/DL (ref 9–23)
CALCIUM BLD-MCNC: 10.9 MG/DL (ref 8.7–10.4)
CHLORIDE SERPL-SCNC: 103 MMOL/L (ref 98–112)
CO2 SERPL-SCNC: 27 MMOL/L (ref 21–32)
CREAT BLD-MCNC: 1.94 MG/DL
EGFRCR SERPLBLD CKD-EPI 2021: 35 ML/MIN/1.73M2 (ref 60–?)
FASTING STATUS PATIENT QL REPORTED: NO
GLUCOSE BLD-MCNC: 97 MG/DL (ref 70–99)
OSMOLALITY SERPL CALC.SUM OF ELEC: 291 MOSM/KG (ref 275–295)
POTASSIUM SERPL-SCNC: 4.6 MMOL/L (ref 3.5–5.1)
PTH-INTACT SERPL-MCNC: 95.1 PG/ML (ref 18.5–88)
SODIUM SERPL-SCNC: 136 MMOL/L (ref 136–145)

## 2024-07-24 PROCEDURE — 83970 ASSAY OF PARATHORMONE: CPT

## 2024-07-24 PROCEDURE — 36415 COLL VENOUS BLD VENIPUNCTURE: CPT

## 2024-07-24 PROCEDURE — 80048 BASIC METABOLIC PNL TOTAL CA: CPT

## 2024-07-24 PROCEDURE — 93798 PHYS/QHP OP CAR RHAB W/ECG: CPT

## 2024-07-26 ENCOUNTER — CARDPULM VISIT (OUTPATIENT)
Dept: CARDIAC REHAB | Facility: HOSPITAL | Age: 79
End: 2024-07-26
Attending: INTERNAL MEDICINE
Payer: MEDICARE

## 2024-07-26 ENCOUNTER — OFFICE VISIT (OUTPATIENT)
Dept: WOUND CARE | Facility: HOSPITAL | Age: 79
End: 2024-07-26
Attending: INTERNAL MEDICINE
Payer: MEDICARE

## 2024-07-26 VITALS
DIASTOLIC BLOOD PRESSURE: 63 MMHG | SYSTOLIC BLOOD PRESSURE: 108 MMHG | RESPIRATION RATE: 16 BRPM | TEMPERATURE: 98 F | HEART RATE: 55 BPM

## 2024-07-26 DIAGNOSIS — L97.222 NON-PRESSURE CHRONIC ULCER OF LEFT CALF WITH FAT LAYER EXPOSED (HCC): ICD-10-CM

## 2024-07-26 DIAGNOSIS — I87.333 IDIOPATHIC CHRONIC VENOUS HYPERTENSION OF BOTH LOWER EXTREMITIES WITH ULCER AND INFLAMMATION (HCC): ICD-10-CM

## 2024-07-26 DIAGNOSIS — L97.212 NON-PRESSURE CHRONIC ULCER OF RIGHT CALF WITH FAT LAYER EXPOSED (HCC): Primary | ICD-10-CM

## 2024-07-26 PROCEDURE — 93798 PHYS/QHP OP CAR RHAB W/ECG: CPT

## 2024-07-26 PROCEDURE — 29581 APPL MULTLAYER CMPRN SYS LEG: CPT

## 2024-07-26 NOTE — PROGRESS NOTES
Chief Complaint   Patient presents with    Wound Care     Patient is here for a RN visit. He denies any pain to the wounds.            Current Outpatient Medications:     ferrous sulfate 325 (65 FE) MG Oral Tab EC, Take 1 tablet (325 mg total) by mouth 3 (three) times daily with meals., Disp: , Rfl:     bumetanide 2 MG Oral Tab, Take 2 tabs in the morning and 1 tab in the evening, Disp: 270 tablet, Rfl: 1    Potassium Chloride ER 20 MEQ Oral Tab CR, Take 20 mcg by mouth daily., Disp: 30 tablet, Rfl: 110    metoprolol tartrate 25 MG Oral Tab, Take 0.5 tablets (12.5 mg total) by mouth 2x Daily(Beta Blocker)., Disp: 60 tablet, Rfl: 0    eplerenone 25 MG Oral Tab, Take 1 tablet (25 mg total) by mouth daily., Disp: 30 tablet, Rfl: 11    pantoprazole 40 MG Oral Tab EC, Take 1 tablet (40 mg total) by mouth every morning before breakfast. (Patient not taking: Reported on 7/12/2024), Disp: 30 tablet, Rfl: 11    atorvastatin 20 MG Oral Tab, Take 1 tablet (20 mg total) by mouth nightly., Disp: , Rfl:     finasteride 5 MG Oral Tab, Take 1 tablet (5 mg total) by mouth at bedtime., Disp: , Rfl:     EPINEPHrine (EPIPEN 2-JAZ) 0.3 MG/0.3ML Injection Solution Auto-injector, USE AS DIRECTED, Disp: 2 each, Rfl: 5    Allergies   Allergen Reactions    Albumin Human ANAPHYLAXIS    Ciprofloxacin HIVES    Clindamycin HIVES    Other ANAPHYLAXIS     Alcohol swab caps    Penicillins HIVES     Tolerated Ancef 4/4/24    Wasp Venom RESPIRATORY FAILURE    Wasp Venom Protein RESPIRATORY FAILURE    Benazepril Coughing    Chlorhexidine RASH and OTHER (SEE COMMENTS)     redness          HISTORY:     Past medical, surgical, family and social history updated where appropriate.    PHYSICAL EXAM:   /63   Pulse 55   Temp 97.8 °F (36.6 °C)   Resp 16        Vital signs reviewed.      Calf  Point of Measurement - Left Calf: 36  Point of Measurement - Right Calf: 36  Left Calf from:: Heel  Calf Left cm:: 40.6  Right Calf from:: Heel  Right Calf cm::  41    Ankle  Point of Measurement - Left Ankle: 11  Point of Measurement - Right Ankle: 11  Left Ankle from:: Heel  Left Ankle cm:: 25.5     Right Ankle from:: Heel  Right Ankle cm:: 25       Wound 07/12/24 #1 Leg Left (Active)   Date First Assessed/Time First Assessed: 07/12/24 0942    Wound Number (Wound Clinic Only): #1  Primary Wound Type: Venous Ulcer  Location: Leg  Wound Location Orientation: Left      Assessments 7/12/2024  9:55 AM 7/26/2024  9:41 AM   Wound Image        Drainage Amount Unable to assess None   Treatments Compression Compression   Wound Length (cm) 8.7 cm 0.1 cm   Wound Width (cm) 17 cm 0.1 cm   Wound Surface Area (cm^2) 147.9 cm^2 0.01 cm^2   Wound Depth (cm) 0.1 cm 0 cm   Wound Volume (cm^3) 14.79 cm^3 0 cm^3   Wound Healing % -- 100   Margins Well-defined edges Well-defined edges   Non-staged Wound Description Full thickness Full thickness   Delmy-wound Assessment Dry;Edema;Hemosiderin staining Dry;Edema;Hemosiderin staining   Wound Granulation Tissue Firm;Pink Firm;Pink   Wound Bed Granulation (%) 30 % 100 %   Wound Bed Epithelium (%) 70 % --   Wound Odor None None   Shape cluster --   Tunneling? No No   Undermining? No No   Sinus Tracts? No No       No associated orders.       Wound 07/12/24 #2 Leg Right (Active)   Date First Assessed/Time First Assessed: 07/12/24 0943    Wound Number (Wound Clinic Only): #2  Primary Wound Type: Venous Ulcer  Location: Leg  Wound Location Orientation: Right      Assessments 7/12/2024  9:54 AM 7/26/2024  9:39 AM   Wound Image       Drainage Amount Unable to assess None   Treatments Compression Compression   Wound Length (cm) 13.1 cm 0.1 cm   Wound Width (cm) 2.5 cm 0.1 cm   Wound Surface Area (cm^2) 32.75 cm^2 0.01 cm^2   Wound Depth (cm) 0.1 cm 0 cm   Wound Volume (cm^3) 3.275 cm^3 0 cm^3   Wound Healing % -- 100   Margins Well-defined edges Well-defined edges   Non-staged Wound Description Full thickness Full thickness   Delmy-wound Assessment  Dry;Edema;Hemosiderin staining Edema;Hemosiderin staining   Wound Granulation Tissue Firm;Pink Firm;Pink   Wound Bed Granulation (%) 40 % 100 %   Wound Bed Epithelium (%) 60 % --   Wound Odor None None   Shape cluster --   Tunneling? No No   Undermining? No No   Sinus Tracts? No No       No associated orders.       Compression Wrap 07/12/24 Leg Anterior;Left (Active)   Placement Date: 07/12/24   Location: Leg  Wound Location Orientation: Anterior;Left      Assessments 7/12/2024 10:31 AM 7/26/2024 10:05 AM   Response to Treatment Well tolerated Well tolerated   Compression Layers Multilayer Multilayer   Compression Product Type Coflex Coflex   Dressing Applied Yes Yes   Compression Wrap Location Toes to Knee Toes to Knee   Compression Wrap Status Clean;Dry;Intact Clean;Dry;Intact       No associated orders.       Compression Wrap 07/12/24 Leg Anterior;Right (Active)   Placement Date: 07/12/24   Location: Leg  Wound Location Orientation: Anterior;Right      Assessments 7/12/2024 10:30 AM 7/26/2024 10:05 AM   Response to Treatment Well tolerated Well tolerated   Compression Layers Multilayer Multilayer   Compression Product Type Coflex Coflex   Dressing Applied Yes Yes   Compression Wrap Location Toes to Knee Toes to Knee   Compression Wrap Status Clean;Dry;Intact Clean;Dry;Intact       No associated orders.          ASSESSMENT AND PLAN:        Risks, benefits, and alternatives of current treatment plan discussed in detail.  Questions and concerns addressed. Red flags to RTC or ED reviewed.  Patient (or parent) agrees to plan.      No follow-ups on file.  Weekly Wound Education Note    Teaching Provided To: Patient  Training Topics: Cleasing and general instructions;Compression;Discharge instructions;Dressing;Edema control  Training Method: Explain/Verbal  Training Response: Patient responds and understands;Reinforcement needed        Notes: Here for RN visit.    Here for RN visit, wound much improved - areas fragilly  healed. Edema measurements slightly decrease, wraps tolerated well. Pt states he has his compression garments at home, advised to bring next visit. Silver foam and coflex 2 30-40mmhg - BLE. Pt scheduled 8/2/24.    Yadira ROJAS RN   7/26/2024  10:08 AM

## 2024-07-29 ENCOUNTER — CARDPULM VISIT (OUTPATIENT)
Dept: CARDIAC REHAB | Facility: HOSPITAL | Age: 79
End: 2024-07-29
Attending: INTERNAL MEDICINE
Payer: MEDICARE

## 2024-07-29 PROCEDURE — 93798 PHYS/QHP OP CAR RHAB W/ECG: CPT

## 2024-07-31 ENCOUNTER — CARDPULM VISIT (OUTPATIENT)
Dept: CARDIAC REHAB | Facility: HOSPITAL | Age: 79
End: 2024-07-31
Attending: INTERNAL MEDICINE
Payer: MEDICARE

## 2024-07-31 ENCOUNTER — APPOINTMENT (OUTPATIENT)
Dept: WOUND CARE | Facility: HOSPITAL | Age: 79
End: 2024-07-31
Attending: INTERNAL MEDICINE
Payer: MEDICARE

## 2024-07-31 PROCEDURE — 93798 PHYS/QHP OP CAR RHAB W/ECG: CPT

## 2024-08-02 ENCOUNTER — OFFICE VISIT (OUTPATIENT)
Dept: WOUND CARE | Facility: HOSPITAL | Age: 79
End: 2024-08-02
Attending: INTERNAL MEDICINE
Payer: MEDICARE

## 2024-08-02 ENCOUNTER — CARDPULM VISIT (OUTPATIENT)
Dept: CARDIAC REHAB | Facility: HOSPITAL | Age: 79
End: 2024-08-02
Attending: INTERNAL MEDICINE
Payer: MEDICARE

## 2024-08-02 VITALS
HEART RATE: 62 BPM | DIASTOLIC BLOOD PRESSURE: 57 MMHG | RESPIRATION RATE: 16 BRPM | TEMPERATURE: 99 F | SYSTOLIC BLOOD PRESSURE: 111 MMHG

## 2024-08-02 DIAGNOSIS — L97.212 NON-PRESSURE CHRONIC ULCER OF RIGHT CALF WITH FAT LAYER EXPOSED (HCC): Primary | ICD-10-CM

## 2024-08-02 DIAGNOSIS — L97.222 NON-PRESSURE CHRONIC ULCER OF LEFT CALF WITH FAT LAYER EXPOSED (HCC): ICD-10-CM

## 2024-08-02 DIAGNOSIS — I87.333 IDIOPATHIC CHRONIC VENOUS HYPERTENSION OF BOTH LOWER EXTREMITIES WITH ULCER AND INFLAMMATION (HCC): ICD-10-CM

## 2024-08-02 PROCEDURE — 99213 OFFICE O/P EST LOW 20 MIN: CPT

## 2024-08-02 PROCEDURE — 93798 PHYS/QHP OP CAR RHAB W/ECG: CPT

## 2024-08-02 NOTE — PROGRESS NOTES
Chief Complaint   Patient presents with    Wound Care     RN visit for wounds to BLE. Wraps tolerated well. Denies or concerns at this time.            Current Outpatient Medications:     ferrous sulfate 325 (65 FE) MG Oral Tab EC, Take 1 tablet (325 mg total) by mouth 3 (three) times daily with meals., Disp: , Rfl:     bumetanide 2 MG Oral Tab, Take 2 tabs in the morning and 1 tab in the evening, Disp: 270 tablet, Rfl: 1    Potassium Chloride ER 20 MEQ Oral Tab CR, Take 20 mcg by mouth daily., Disp: 30 tablet, Rfl: 110    metoprolol tartrate 25 MG Oral Tab, Take 0.5 tablets (12.5 mg total) by mouth 2x Daily(Beta Blocker)., Disp: 60 tablet, Rfl: 0    eplerenone 25 MG Oral Tab, Take 1 tablet (25 mg total) by mouth daily., Disp: 30 tablet, Rfl: 11    pantoprazole 40 MG Oral Tab EC, Take 1 tablet (40 mg total) by mouth every morning before breakfast. (Patient not taking: Reported on 7/12/2024), Disp: 30 tablet, Rfl: 11    atorvastatin 20 MG Oral Tab, Take 1 tablet (20 mg total) by mouth nightly., Disp: , Rfl:     finasteride 5 MG Oral Tab, Take 1 tablet (5 mg total) by mouth at bedtime., Disp: , Rfl:     EPINEPHrine (EPIPEN 2-JAZ) 0.3 MG/0.3ML Injection Solution Auto-injector, USE AS DIRECTED, Disp: 2 each, Rfl: 5    Allergies   Allergen Reactions    Albumin Human ANAPHYLAXIS    Ciprofloxacin HIVES    Clindamycin HIVES    Other ANAPHYLAXIS     Alcohol swab caps    Penicillins HIVES     Tolerated Ancef 4/4/24    Wasp Venom RESPIRATORY FAILURE    Wasp Venom Protein RESPIRATORY FAILURE    Benazepril Coughing    Chlorhexidine RASH and OTHER (SEE COMMENTS)     redness          HISTORY:     Past medical, surgical, family and social history updated where appropriate.    PHYSICAL EXAM:   /57   Pulse 62   Temp 98.6 °F (37 °C)   Resp 16        Vital signs reviewed.      Calf  Point of Measurement - Left Calf: 36  Point of Measurement - Right Calf: 36  Left Calf from:: Heel  Calf Left cm:: 40.5  Right Calf from::  Heel  Right Calf cm:: 38.8    Ankle  Point of Measurement - Left Ankle: 11  Point of Measurement - Right Ankle: 11  Left Ankle from:: Heel  Left Ankle cm:: 25.5     Right Ankle from:: Heel  Right Ankle cm:: 24.1               ASSESSMENT AND PLAN:        Risks, benefits, and alternatives of current treatment plan discussed in detail.  Questions and concerns addressed. Red flags to RTC or ED reviewed.  Patient (or parent) agrees to plan.      No follow-ups on file.  Weekly Wound Education Note    Teaching Provided To: Patient  Training Topics: Cleasing and general instructions;Compression;Discharge instructions;Edema control;Skin care;Foot care  Training Method: Explain/Verbal  Training Response: Patient responds and understands;Reinforcement needed        Notes: Here for RN visit.    Here for RN visit, wounds healed. Spoke to provider, received verbal order - if patient is healed, ok transition into own compression garments and discharge from wound clinic. Pt was transitioned into liner, spandagrip E, and velcro wrap - BLE. Educated patient/spouse on how to apply compression garments. Ok to shower and remove compression at bedtime and to reapply in the morning as soon as he woke up. Pt to use spandagrip E with with compression garments for 1 week.   Pt discharged from wound care clinic, return as needed.       Yadira ROJAS RN   8/2/2024  12:05 PM

## 2024-08-05 ENCOUNTER — CARDPULM VISIT (OUTPATIENT)
Dept: CARDIAC REHAB | Facility: HOSPITAL | Age: 79
End: 2024-08-05
Attending: INTERNAL MEDICINE
Payer: MEDICARE

## 2024-08-05 PROCEDURE — 93798 PHYS/QHP OP CAR RHAB W/ECG: CPT

## 2024-08-07 ENCOUNTER — APPOINTMENT (OUTPATIENT)
Dept: WOUND CARE | Facility: HOSPITAL | Age: 79
End: 2024-08-07
Attending: INTERNAL MEDICINE
Payer: MEDICARE

## 2024-08-07 ENCOUNTER — APPOINTMENT (OUTPATIENT)
Dept: CARDIAC REHAB | Facility: HOSPITAL | Age: 79
End: 2024-08-07
Attending: INTERNAL MEDICINE
Payer: MEDICARE

## 2024-08-08 ENCOUNTER — LAB ENCOUNTER (OUTPATIENT)
Dept: LAB | Age: 79
End: 2024-08-08
Attending: INTERNAL MEDICINE
Payer: MEDICARE

## 2024-08-08 DIAGNOSIS — N18.30 STAGE 3 CHRONIC KIDNEY DISEASE, UNSPECIFIED WHETHER STAGE 3A OR 3B CKD (HCC): ICD-10-CM

## 2024-08-08 DIAGNOSIS — I50.33 ACUTE ON CHRONIC HEART FAILURE WITH PRESERVED EJECTION FRACTION (HCC): ICD-10-CM

## 2024-08-08 LAB
ANION GAP SERPL CALC-SCNC: 7 MMOL/L (ref 0–18)
BUN BLD-MCNC: 37 MG/DL (ref 9–23)
CALCIUM BLD-MCNC: 11.4 MG/DL (ref 8.7–10.4)
CHLORIDE SERPL-SCNC: 101 MMOL/L (ref 98–112)
CO2 SERPL-SCNC: 28 MMOL/L (ref 21–32)
CREAT BLD-MCNC: 2.37 MG/DL
EGFRCR SERPLBLD CKD-EPI 2021: 27 ML/MIN/1.73M2 (ref 60–?)
FASTING STATUS PATIENT QL REPORTED: YES
GLUCOSE BLD-MCNC: 85 MG/DL (ref 70–99)
OSMOLALITY SERPL CALC.SUM OF ELEC: 290 MOSM/KG (ref 275–295)
POTASSIUM SERPL-SCNC: 4.4 MMOL/L (ref 3.5–5.1)
SODIUM SERPL-SCNC: 136 MMOL/L (ref 136–145)

## 2024-08-08 PROCEDURE — 36415 COLL VENOUS BLD VENIPUNCTURE: CPT

## 2024-08-08 PROCEDURE — 80048 BASIC METABOLIC PNL TOTAL CA: CPT

## 2024-08-09 ENCOUNTER — APPOINTMENT (OUTPATIENT)
Dept: CARDIAC REHAB | Facility: HOSPITAL | Age: 79
End: 2024-08-09
Attending: INTERNAL MEDICINE
Payer: MEDICARE

## 2024-08-09 ENCOUNTER — APPOINTMENT (OUTPATIENT)
Dept: WOUND CARE | Facility: HOSPITAL | Age: 79
End: 2024-08-09
Attending: INTERNAL MEDICINE
Payer: MEDICARE

## 2024-08-12 ENCOUNTER — APPOINTMENT (OUTPATIENT)
Dept: CARDIAC REHAB | Facility: HOSPITAL | Age: 79
End: 2024-08-12
Attending: INTERNAL MEDICINE
Payer: MEDICARE

## 2024-08-14 ENCOUNTER — APPOINTMENT (OUTPATIENT)
Dept: WOUND CARE | Facility: HOSPITAL | Age: 79
End: 2024-08-14
Attending: INTERNAL MEDICINE
Payer: MEDICARE

## 2024-08-14 ENCOUNTER — APPOINTMENT (OUTPATIENT)
Dept: CARDIAC REHAB | Facility: HOSPITAL | Age: 79
End: 2024-08-14
Attending: INTERNAL MEDICINE
Payer: MEDICARE

## 2024-08-16 ENCOUNTER — CARDPULM VISIT (OUTPATIENT)
Dept: CARDIAC REHAB | Facility: HOSPITAL | Age: 79
End: 2024-08-16
Attending: INTERNAL MEDICINE
Payer: MEDICARE

## 2024-08-16 PROCEDURE — 93798 PHYS/QHP OP CAR RHAB W/ECG: CPT

## 2024-08-19 ENCOUNTER — APPOINTMENT (OUTPATIENT)
Dept: CARDIAC REHAB | Facility: HOSPITAL | Age: 79
End: 2024-08-19
Attending: INTERNAL MEDICINE
Payer: MEDICARE

## 2024-08-19 PROCEDURE — 93798 PHYS/QHP OP CAR RHAB W/ECG: CPT

## 2024-08-21 ENCOUNTER — CARDPULM VISIT (OUTPATIENT)
Dept: CARDIAC REHAB | Facility: HOSPITAL | Age: 79
End: 2024-08-21
Attending: INTERNAL MEDICINE
Payer: MEDICARE

## 2024-08-21 PROCEDURE — 93798 PHYS/QHP OP CAR RHAB W/ECG: CPT

## 2024-08-22 ENCOUNTER — LAB ENCOUNTER (OUTPATIENT)
Dept: LAB | Age: 79
End: 2024-08-22
Attending: INTERNAL MEDICINE
Payer: MEDICARE

## 2024-08-22 DIAGNOSIS — I50.33 ACUTE ON CHRONIC HEART FAILURE WITH PRESERVED EJECTION FRACTION (HCC): ICD-10-CM

## 2024-08-22 DIAGNOSIS — N18.30 STAGE 3 CHRONIC KIDNEY DISEASE, UNSPECIFIED WHETHER STAGE 3A OR 3B CKD (HCC): ICD-10-CM

## 2024-08-22 LAB
ANION GAP SERPL CALC-SCNC: 6 MMOL/L (ref 0–18)
BUN BLD-MCNC: 43 MG/DL (ref 9–23)
CALCIUM BLD-MCNC: 10.5 MG/DL (ref 8.7–10.4)
CHLORIDE SERPL-SCNC: 105 MMOL/L (ref 98–112)
CO2 SERPL-SCNC: 27 MMOL/L (ref 21–32)
CREAT BLD-MCNC: 2.52 MG/DL
EGFRCR SERPLBLD CKD-EPI 2021: 25 ML/MIN/1.73M2 (ref 60–?)
GLUCOSE BLD-MCNC: 106 MG/DL (ref 70–99)
OSMOLALITY SERPL CALC.SUM OF ELEC: 297 MOSM/KG (ref 275–295)
POTASSIUM SERPL-SCNC: 4.6 MMOL/L (ref 3.5–5.1)
SODIUM SERPL-SCNC: 138 MMOL/L (ref 136–145)

## 2024-08-22 PROCEDURE — 80048 BASIC METABOLIC PNL TOTAL CA: CPT

## 2024-08-22 PROCEDURE — 36415 COLL VENOUS BLD VENIPUNCTURE: CPT

## 2024-08-23 ENCOUNTER — CARDPULM VISIT (OUTPATIENT)
Dept: CARDIAC REHAB | Facility: HOSPITAL | Age: 79
End: 2024-08-23
Attending: INTERNAL MEDICINE
Payer: MEDICARE

## 2024-08-23 PROCEDURE — 93798 PHYS/QHP OP CAR RHAB W/ECG: CPT

## 2024-08-26 ENCOUNTER — CARDPULM VISIT (OUTPATIENT)
Dept: CARDIAC REHAB | Facility: HOSPITAL | Age: 79
End: 2024-08-26
Attending: INTERNAL MEDICINE
Payer: MEDICARE

## 2024-08-26 PROCEDURE — 93798 PHYS/QHP OP CAR RHAB W/ECG: CPT

## 2024-08-28 ENCOUNTER — APPOINTMENT (OUTPATIENT)
Dept: WOUND CARE | Facility: HOSPITAL | Age: 79
End: 2024-08-28
Attending: INTERNAL MEDICINE
Payer: MEDICARE

## 2024-08-28 ENCOUNTER — CARDPULM VISIT (OUTPATIENT)
Dept: CARDIAC REHAB | Facility: HOSPITAL | Age: 79
End: 2024-08-28
Attending: INTERNAL MEDICINE
Payer: MEDICARE

## 2024-08-28 PROCEDURE — 93798 PHYS/QHP OP CAR RHAB W/ECG: CPT

## 2024-08-30 ENCOUNTER — CARDPULM VISIT (OUTPATIENT)
Dept: CARDIAC REHAB | Facility: HOSPITAL | Age: 79
End: 2024-08-30
Attending: INTERNAL MEDICINE
Payer: MEDICARE

## 2024-08-30 PROCEDURE — 93798 PHYS/QHP OP CAR RHAB W/ECG: CPT

## 2024-09-04 ENCOUNTER — CARDPULM VISIT (OUTPATIENT)
Dept: CARDIAC REHAB | Facility: HOSPITAL | Age: 79
End: 2024-09-04
Attending: INTERNAL MEDICINE
Payer: MEDICARE

## 2024-09-04 PROCEDURE — 93798 PHYS/QHP OP CAR RHAB W/ECG: CPT

## 2024-09-06 ENCOUNTER — CARDPULM VISIT (OUTPATIENT)
Dept: CARDIAC REHAB | Facility: HOSPITAL | Age: 79
End: 2024-09-06
Attending: INTERNAL MEDICINE
Payer: MEDICARE

## 2024-09-06 PROCEDURE — 93798 PHYS/QHP OP CAR RHAB W/ECG: CPT

## 2024-09-13 RX ORDER — EPLERENONE 50 MG/1
50 TABLET, FILM COATED ORAL 2 TIMES DAILY
COMMUNITY

## 2024-09-17 ENCOUNTER — ANESTHESIA EVENT (OUTPATIENT)
Dept: SURGERY | Facility: HOSPITAL | Age: 79
End: 2024-09-17
Payer: MEDICARE

## 2024-09-19 NOTE — ANESTHESIA PREPROCEDURE EVALUATION
PRE-OP EVALUATION    Patient Name: Wagner Sheridan    Admit Diagnosis: SKIN ULCER OF TOE OF RIGHT FOOT WITH FAT LAYER EXPOSED, HAMMER TOE OF RIGHT FOOT    Pre-op Diagnosis: SKIN ULCER OF TOE OF RIGHT FOOT WITH FAT LAYER EXPOSED, HAMMER TOE OF RIGHT FOOT    PARTIAL TOE AMPUTATION THIRD DIGIT RIGHT FOOT    Anesthesia Procedure: PARTIAL TOE AMPUTATION THIRD DIGIT RIGHT FOOT (Right)    Surgeons and Role:     * Ksenia Ram DPM - Primary    Pre-op vitals reviewed.  Temp: 97.3 °F (36.3 °C)  Pulse: 55  Resp: 17  BP: 111/58  SpO2: 98 %  Body mass index is 31.33 kg/m².    Current medications reviewed.  Hospital Medications:   acetaminophen (Tylenol Extra Strength) tab 1,000 mg  1,000 mg Oral Once    lactated ringers infusion   Intravenous Continuous    ceFAZolin (Ancef) 2g in 10mL IV syringe premix  2 g Intravenous Once       Outpatient Medications:     Medications Prior to Admission   Medication Sig Dispense Refill Last Dose    eplerenone 50 MG Oral Tab Take 1 tablet (50 mg total) by mouth 2 (two) times daily.   9/20/2024 at 0400    ferrous sulfate 325 (65 FE) MG Oral Tab EC Take 1 tablet (325 mg total) by mouth daily with breakfast.   9/20/2024    bumetanide 2 MG Oral Tab Take 2 tabs in the morning and 1 tab in the evening (Patient taking differently: 0.5 tablets (1 mg total) BID (Diuretic).) 270 tablet 1 9/19/2024    metoprolol tartrate 25 MG Oral Tab Take 0.5 tablets (12.5 mg total) by mouth 2x Daily(Beta Blocker). (Patient taking differently: Take 2 tablets (50 mg total) by mouth 2 (two) times daily.) 60 tablet 0 9/20/2024 at 0400    atorvastatin 20 MG Oral Tab Take 1 tablet (20 mg total) by mouth nightly.   9/19/2024    finasteride 5 MG Oral Tab Take 1 tablet (5 mg total) by mouth at bedtime.   9/19/2024    EPINEPHrine (EPIPEN 2-JAZ) 0.3 MG/0.3ML Injection Solution Auto-injector USE AS DIRECTED 2 each 5 More than a month       Allergies: Albumin human, Ciprofloxacin, Clindamycin, Other, Penicillins, Wasp  venom, Wasp venom protein, Benazepril, and Chlorhexidine      Anesthesia Evaluation    Patient summary reviewed.    Anesthetic Complications  (-) history of anesthetic complications         GI/Hepatic/Renal             (+) chronic renal disease and CRI                   Cardiovascular  Comment: Note from latest cardiology visit reviewed.  H/o mitral valve repair and tricuspid valve repair 4/2024  Preserved LV function on echo  Prior h/o atrial fibrillation, NSR now    ECG reviewed.            (+) hypertension             (+) valvular problems/murmurs        (+) CHF                Endo/Other  Comment: Polymyalgia rheumatica                                Pulmonary                           Neuro/Psych                              Right foot hammer toe with fat layer exposed for partial amputation of right 3rd toe        Past Surgical History:   Procedure Laterality Date    Anesth,open heart surgery  04/05/2024    Mitral valve repair, tricuspid repair,PFO closure etc    Colonoscopy N/A 02/27/2024    Procedure: COLONOSCOPY;  Surgeon: Heath Mina DO;  Location:  ENDOSCOPY    Knee surgery      Other surgical history  02/27/2009    flow , Dr. Tavera    Other surgical history  09/04/2009    [facility] Dr. Tavera    Other surgical history  01/04/2011    [facility] - Dr. Tavera    Other surgical history  01/17/2013    PVP - Dr. Tavera    Other surgical history  02/03/2016    Flow      Other surgical history  08/05/2020    cysto- Dr. Tavera    Skin surgery  10/01/2019    MMS/R nasal ala /BCC done by [facility]    Tonsillectomy      Total knee replacement Right 02/25/2015    Right total knee replacement 02/25/2015    Total knee replacement Left 05/11/2015    Left knee total replacement 05/11/2015      Social History     Socioeconomic History    Marital status:    Tobacco Use    Smoking status: Former     Current packs/day: 0.50     Average packs/day: 0.5 packs/day for 5.0 years (2.5 ttl pk-yrs)     Types: Cigarettes     Smokeless tobacco: Never    Tobacco comments:     in college   Vaping Use    Vaping status: Never Used   Substance and Sexual Activity    Alcohol use: Yes     Alcohol/week: 0.0 standard drinks of alcohol     Comment: rare    Drug use: No   Other Topics Concern    Exercise Yes    Seat Belt Yes     History   Drug Use No     Available pre-op labs reviewed.     Lab Results   Component Value Date     08/22/2024    K 4.6 08/22/2024     08/22/2024    CO2 27.0 08/22/2024    BUN 43 (H) 08/22/2024    CREATSERUM 2.52 (H) 08/22/2024     (H) 08/22/2024    CA 10.5 (H) 08/22/2024            Airway      Mallampati: III  Mouth opening: >3 FB  TM distance: > 6 cm  Neck ROM: full Cardiovascular    Cardiovascular exam normal.  Rhythm: regular  Rate: normal  (-) murmur   Dental    Dentition appears grossly intact         Pulmonary    Pulmonary exam normal.  Breath sounds clear to auscultation bilaterally.               Other findings              ASA: 2   Plan: MAC and general  NPO status verified and patient meets guidelines.        Comment: Plan for MAC. A detailed discussion of the risks and benefits of the proposed anesthetic was had in the preoperative area, including risk of conversion to a general anesthetic, nausea/vomiting, dental damage, sore throat and allergic/ adverse reactions to medications administered. The possibility of needing to convert to general anesthesia if necessary was discussed. Questions answered and patient wishes to proceed. Consent signed.     Plan/risks discussed with: patient and spouse                Present on Admission:  **None**

## 2024-09-20 ENCOUNTER — HOSPITAL ENCOUNTER (OUTPATIENT)
Facility: HOSPITAL | Age: 79
Setting detail: HOSPITAL OUTPATIENT SURGERY
Discharge: HOME | End: 2024-09-20
Attending: PODIATRIST | Admitting: PODIATRIST
Payer: MEDICARE

## 2024-09-20 ENCOUNTER — ANESTHESIA (OUTPATIENT)
Dept: SURGERY | Facility: HOSPITAL | Age: 79
End: 2024-09-20
Payer: MEDICARE

## 2024-09-20 VITALS
RESPIRATION RATE: 18 BRPM | OXYGEN SATURATION: 100 % | HEIGHT: 72 IN | BODY MASS INDEX: 31.29 KG/M2 | SYSTOLIC BLOOD PRESSURE: 143 MMHG | TEMPERATURE: 97 F | DIASTOLIC BLOOD PRESSURE: 75 MMHG | HEART RATE: 55 BPM | WEIGHT: 231 LBS

## 2024-09-20 PROCEDURE — 87075 CULTR BACTERIA EXCEPT BLOOD: CPT | Performed by: PODIATRIST

## 2024-09-20 PROCEDURE — 88304 TISSUE EXAM BY PATHOLOGIST: CPT | Performed by: PODIATRIST

## 2024-09-20 PROCEDURE — 0Y6T0Z3 DETACHMENT AT RIGHT 3RD TOE, LOW, OPEN APPROACH: ICD-10-PCS | Performed by: PODIATRIST

## 2024-09-20 PROCEDURE — 87176 TISSUE HOMOGENIZATION CULTR: CPT | Performed by: PODIATRIST

## 2024-09-20 PROCEDURE — 87070 CULTURE OTHR SPECIMN AEROBIC: CPT | Performed by: PODIATRIST

## 2024-09-20 PROCEDURE — 87205 SMEAR GRAM STAIN: CPT | Performed by: PODIATRIST

## 2024-09-20 PROCEDURE — 88311 DECALCIFY TISSUE: CPT | Performed by: PODIATRIST

## 2024-09-20 RX ORDER — EPHEDRINE SULFATE 50 MG/ML
INJECTION INTRAVENOUS AS NEEDED
Status: DISCONTINUED | OUTPATIENT
Start: 2024-09-20 | End: 2024-09-20 | Stop reason: SURG

## 2024-09-20 RX ORDER — ACETAMINOPHEN 500 MG
1000 TABLET ORAL ONCE AS NEEDED
Status: DISCONTINUED | OUTPATIENT
Start: 2024-09-20 | End: 2024-09-20

## 2024-09-20 RX ORDER — ONDANSETRON 2 MG/ML
4 INJECTION INTRAMUSCULAR; INTRAVENOUS EVERY 6 HOURS PRN
Status: DISCONTINUED | OUTPATIENT
Start: 2024-09-20 | End: 2024-09-20

## 2024-09-20 RX ORDER — HYDROCODONE BITARTRATE AND ACETAMINOPHEN 5; 325 MG/1; MG/1
1 TABLET ORAL ONCE AS NEEDED
Status: DISCONTINUED | OUTPATIENT
Start: 2024-09-20 | End: 2024-09-20

## 2024-09-20 RX ORDER — MEPERIDINE HYDROCHLORIDE 25 MG/ML
12.5 INJECTION INTRAMUSCULAR; INTRAVENOUS; SUBCUTANEOUS AS NEEDED
Status: DISCONTINUED | OUTPATIENT
Start: 2024-09-20 | End: 2024-09-20

## 2024-09-20 RX ORDER — HYDROMORPHONE HYDROCHLORIDE 1 MG/ML
0.4 INJECTION, SOLUTION INTRAMUSCULAR; INTRAVENOUS; SUBCUTANEOUS EVERY 5 MIN PRN
OUTPATIENT
Start: 2024-09-20 | End: 2024-09-20

## 2024-09-20 RX ORDER — SODIUM CHLORIDE, SODIUM LACTATE, POTASSIUM CHLORIDE, CALCIUM CHLORIDE 600; 310; 30; 20 MG/100ML; MG/100ML; MG/100ML; MG/100ML
INJECTION, SOLUTION INTRAVENOUS CONTINUOUS
Status: DISCONTINUED | OUTPATIENT
Start: 2024-09-20 | End: 2024-09-20

## 2024-09-20 RX ORDER — LIDOCAINE HYDROCHLORIDE 10 MG/ML
INJECTION, SOLUTION EPIDURAL; INFILTRATION; INTRACAUDAL; PERINEURAL AS NEEDED
Status: DISCONTINUED | OUTPATIENT
Start: 2024-09-20 | End: 2024-09-20 | Stop reason: SURG

## 2024-09-20 RX ORDER — ACETAMINOPHEN 500 MG
1000 TABLET ORAL ONCE
Status: DISCONTINUED | OUTPATIENT
Start: 2024-09-20 | End: 2024-09-20 | Stop reason: HOSPADM

## 2024-09-20 RX ORDER — NALOXONE HYDROCHLORIDE 0.4 MG/ML
0.08 INJECTION, SOLUTION INTRAMUSCULAR; INTRAVENOUS; SUBCUTANEOUS AS NEEDED
Status: DISCONTINUED | OUTPATIENT
Start: 2024-09-20 | End: 2024-09-20

## 2024-09-20 RX ORDER — BUPIVACAINE HYDROCHLORIDE 5 MG/ML
INJECTION, SOLUTION EPIDURAL; INTRACAUDAL AS NEEDED
Status: DISCONTINUED | OUTPATIENT
Start: 2024-09-20 | End: 2024-09-20 | Stop reason: HOSPADM

## 2024-09-20 RX ORDER — HYDROMORPHONE HYDROCHLORIDE 1 MG/ML
0.2 INJECTION, SOLUTION INTRAMUSCULAR; INTRAVENOUS; SUBCUTANEOUS EVERY 5 MIN PRN
OUTPATIENT
Start: 2024-09-20 | End: 2024-09-20

## 2024-09-20 RX ADMIN — EPHEDRINE SULFATE 10 MG: 50 INJECTION INTRAVENOUS at 08:35:00

## 2024-09-20 RX ADMIN — SODIUM CHLORIDE, SODIUM LACTATE, POTASSIUM CHLORIDE, CALCIUM CHLORIDE: 600; 310; 30; 20 INJECTION, SOLUTION INTRAVENOUS at 08:10:00

## 2024-09-20 RX ADMIN — SODIUM CHLORIDE, SODIUM LACTATE, POTASSIUM CHLORIDE, CALCIUM CHLORIDE: 600; 310; 30; 20 INJECTION, SOLUTION INTRAVENOUS at 08:46:00

## 2024-09-20 RX ADMIN — LIDOCAINE HYDROCHLORIDE 50 MG: 10 INJECTION, SOLUTION EPIDURAL; INFILTRATION; INTRACAUDAL; PERINEURAL at 08:14:00

## 2024-09-20 NOTE — ANESTHESIA PROCEDURE NOTES
Airway  Date/Time: 9/20/2024 8:16 AM  Urgency: elective      General Information and Staff    Patient location during procedure: OR  Anesthesiologist: Kermit Kate MD  Resident/CRNA: Cholo Riley CRNA  Performed: CRNA   Performed by: Cholo Riley CRNA  Authorized by: Kermit Kate MD      Indications and Patient Condition  Indications for airway management: anesthesia  Sedation level: deep  Preoxygenated: yes  Patient position: sniffing  Mask difficulty assessment: 1 - vent by mask    Final Airway Details  Final airway type: supraglottic airway      Successful airway: classic  Size 4       Number of attempts at approach: 1

## 2024-09-20 NOTE — OPERATIVE REPORT
Pre-operative Diagnosis: osteomyelitis 3rd digit, right foot    Post-operative Diagnosis: Same as pre op dx    Procedure: Amputation 3rd digit, right foot at the distal interphalangeal joint    Surgeon: Ksenia Crowe DPM    Anesthesia: Monitored Local Anesthesia with Sedation    Hemostasis:   Pneumatic ankle tourniquet @ 250mmHg    Estimated Blood Loss:   Minimal    Materials:  3-0 nylon    Injectables:  Preoperative: 10 mL 0.5% Marcaine plain       Pathology: Distal phalange for C&S    Complications:  None.     Procedure in Detail:   The patient was brought to the operating room, placed on the operating table in a supine position. A time out was called in the presence of the surgeon, anesthesiologist and operating room staff to verify the pt's name, , location of the procedure and procedure to be performed. All present were in agreement. 2G of Ancef were administered to the patient prior to the operation as antibiotic prophylaxis. A well padded pneumatic ankle tourniquet was placed upon the patient's right ankle in the supramalleolar position. After adequate IV sedation was achieved local anesthetic block was then given to the patient in a digital block type to the left foot using 10 cc of 0.5% marcaine plain. The right foot was then scrubbed, prepped and draped in the usual aseptic manner.    Once adequate anesthesia was verified, an esmarch bandage was then utilized to exsanguinate the right foot to the level of the tourniquet which was inflated to 250mmHg. Attention was directed to the dorsal aspect of the 3rd digit, right foot. A fishmouth incision was made about the middle phalange of the 3rd digit. The incision was carried down to bone, taking care to protect all neurovascular structures. The distal phalanx was removed in total with all soft tissue attached, passed off the surgical field for culture and sensitivity to the distal phalange.  The area was inspected noting no devitalized tissue or  purulence at this time.    The area was flushed with copious amounts of normal saline. The skin was closed using 3-0 Nylon in a simple suture type fashion. Dressings were applied consisting of adaptic, dry 4x4 gauze, salima and an ACE wrap. The tourniquet was deflated for a total time of 18 minutes, noting an immediate hyperemic response to digits 1-5 of the right foot.     The patient tolerated the anesthesia and procedure well. The patient was transported to the recovery room with vital signs stable and capillary filling time less than 3 seconds to all digits. The patient was given verbal and written post-operative instructions including postoperative pain medications.

## 2024-09-20 NOTE — BRIEF OP NOTE
Pre-Operative Diagnosis: SKIN ULCER OF TOE OF RIGHT FOOT WITH FAT LAYER EXPOSED, HAMMER TOE OF RIGHT FOOT     Post-Operative Diagnosis: SKIN ULCER OF TOE OF RIGHT FOOT WITH FAT LAYER EXPOSED, HAMMER TOE OF RIGHT FOOT      Procedure Performed:   PARTIAL TOE AMPUTATION THIRD DIGIT RIGHT FOOT    Surgeons and Role:     * Ksenia Ram DPM - Primary    Assistant(s):        Surgical Findings: Consistent with diagnosis     Specimen: distal phalange, 3rd digit, right foot for Culture and sensativity     Estimated Blood Loss: Blood Output: 1 mL (9/20/2024  8:39 AM)      Dictation Number:  NA    Ksenia Ram DPM  9/20/2024  8:59 AM

## 2024-09-20 NOTE — ANESTHESIA POSTPROCEDURE EVALUATION
[facility]    Wagner Sheridan Patient Status:  Hospital Outpatient Surgery   Age/Gender 78 year old male MRN [id number]   Location [facility] SURGERY Attending Ksenia Ram*   Hosp Day # 0 PCP Edil Roy MD       Anesthesia Post-op Note    PARTIAL TOE AMPUTATION THIRD DIGIT RIGHT FOOT    Procedure Summary       Date: 09/20/24 Room / Location:  MAIN OR 06 / [facility] MAIN OR    Anesthesia Start: 0809 Anesthesia Stop: 0858    Procedure: PARTIAL TOE AMPUTATION THIRD DIGIT RIGHT FOOT (Right: Toe) Diagnosis: (SKIN ULCER OF TOE OF RIGHT FOOT WITH FAT LAYER EXPOSED, HAMMER TOE OF RIGHT FOOT)    Surgeons: Ksenia Ram DPM Anesthesiologist: Kermit Kate MD    Anesthesia Type: MAC, general ASA Status: 2            Anesthesia Type: MAC, general    Vitals Value Taken Time   /63 09/20/24 0901   Temp 97.4 09/20/24 0901   Pulse 57 09/20/24 0901   Resp 18 09/20/24 0901   SpO2 98% 09/20/24 0901       Patient Location: Same Day Surgery    Anesthesia Type: MAC    Airway Patency: patent    Postop Pain Control: adequate    Mental Status: mildly sedated but able to meaningfully participate in the post-anesthesia evaluation    Nausea/Vomiting: none    Cardiopulmonary/Hydration status: stable euvolemic    Complications: no apparent anesthesia related complications    Postop vital signs: stable    Dental Exam: Unchanged from Preop    Patient to be discharged home when criteria met.

## 2024-09-20 NOTE — DISCHARGE INSTRUCTIONS
DR. MIRELLA HORNER, [facility]  FOOT AND ANKLE SURGERY  [phone]    You have had a surgical operation.  A limited amount of pain and swelling is to be expected.  The skin may take on a bruised appearance.  This is no cause for alarm.    Have prescription(s) filled immediately and take medication(s) as directed.  Be sure to eat before taking pain medication for the first time.  Avoid alcoholic beverages while on medication.  Go directly home and lie down after surgery.  Elevate the foot/feet at least eight inches above hip level.  Support the elevated foot and leg with pillows.  Do not place pillows under the knee.  Keep foot/feet elevated at all times only putting it/them down to use the bathroom and grab a bite to eat.  The more the foot is elevated, the less swelling and pain you will experience and the better your healing will be.  Appearance of a blood spot through the dressing is not unusual.  However, if there is active and persistent bleeding, call your doctor at once.  Bend knee and rotate foot and ankle at least five minutes during each hour after surgery for 24-36 hours while awake.  Keep the bandage dry and do not tamper with the dressing.  Apply an ice pack to the ankle for 15-20 minutes 2-3 times per day for the next 2 weeks.  Do not leave this on the foot for longer than 20 minutes.  Do not apply any form of heat to the area.   Limit walking first 3 days.  Use the post op shoe. ____________________________  No walking_________________________________________      Call your doctor with any questions or concerns, or any of the following symptoms:  Continuous bleeding  Pain increasing and/or does not respond to medication after 24 hours  Bandage or cast appears too tight      It is recommended that you have a responsible adult available to you for the next 24 hours following your procedure.  You should not drive a car, operate machinery, perform any strenuous activity, make any major decisions, or sign any  legal documents for 24 hours due to the effects of the medication / anesthesia.  Do not drink alcohol after having anesthesia or while taking any pain medications.    [facility]  Podiatry Foot and Ankle Surgery [phone]  24 hour Answering Service  [phone]

## 2024-10-02 ENCOUNTER — LAB ENCOUNTER (OUTPATIENT)
Dept: LAB | Age: 79
End: 2024-10-02
Attending: INTERNAL MEDICINE
Payer: MEDICARE

## 2024-10-02 ENCOUNTER — OFFICE VISIT (OUTPATIENT)
Dept: NEPHROLOGY | Facility: CLINIC | Age: 79
End: 2024-10-02
Payer: MEDICARE

## 2024-10-02 VITALS — WEIGHT: 233 LBS | BODY MASS INDEX: 32 KG/M2 | SYSTOLIC BLOOD PRESSURE: 112 MMHG | DIASTOLIC BLOOD PRESSURE: 50 MMHG

## 2024-10-02 DIAGNOSIS — N18.30 STAGE 3 CHRONIC KIDNEY DISEASE, UNSPECIFIED WHETHER STAGE 3A OR 3B CKD (HCC): Primary | ICD-10-CM

## 2024-10-02 DIAGNOSIS — N18.30 STAGE 3 CHRONIC KIDNEY DISEASE, UNSPECIFIED WHETHER STAGE 3A OR 3B CKD (HCC): ICD-10-CM

## 2024-10-02 DIAGNOSIS — D50.9 IRON DEFICIENCY ANEMIA, UNSPECIFIED IRON DEFICIENCY ANEMIA TYPE: ICD-10-CM

## 2024-10-02 DIAGNOSIS — I50.33 ACUTE ON CHRONIC HEART FAILURE WITH PRESERVED EJECTION FRACTION (HCC): ICD-10-CM

## 2024-10-02 PROBLEM — D69.6 THROMBOCYTOPENIA (HCC): Chronic | Status: ACTIVE | Noted: 2024-10-02

## 2024-10-02 PROBLEM — N18.4 CKD (CHRONIC KIDNEY DISEASE) STAGE 4, GFR 15-29 ML/MIN (HCC): Chronic | Status: ACTIVE | Noted: 2024-10-02

## 2024-10-02 PROBLEM — D69.6 THROMBOCYTOPENIA: Chronic | Status: ACTIVE | Noted: 2024-10-02

## 2024-10-02 LAB
ANION GAP SERPL CALC-SCNC: 9 MMOL/L (ref 0–18)
BASOPHILS # BLD AUTO: 0.04 X10(3) UL (ref 0–0.2)
BASOPHILS NFR BLD AUTO: 0.5 %
BUN BLD-MCNC: 32 MG/DL (ref 9–23)
CALCIUM BLD-MCNC: 10.9 MG/DL (ref 8.7–10.4)
CHLORIDE SERPL-SCNC: 105 MMOL/L (ref 98–112)
CO2 SERPL-SCNC: 28 MMOL/L (ref 21–32)
CREAT BLD-MCNC: 1.68 MG/DL
DEPRECATED HBV CORE AB SER IA-ACNC: 57 NG/ML
EGFRCR SERPLBLD CKD-EPI 2021: 41 ML/MIN/1.73M2 (ref 60–?)
EOSINOPHIL # BLD AUTO: 0.2 X10(3) UL (ref 0–0.7)
EOSINOPHIL NFR BLD AUTO: 2.7 %
ERYTHROCYTE [DISTWIDTH] IN BLOOD BY AUTOMATED COUNT: 16.4 %
FASTING STATUS PATIENT QL REPORTED: NO
GLUCOSE BLD-MCNC: 87 MG/DL (ref 70–99)
HCT VFR BLD AUTO: 29.9 %
HGB BLD-MCNC: 9.4 G/DL
IMM GRANULOCYTES # BLD AUTO: 0.04 X10(3) UL (ref 0–1)
IMM GRANULOCYTES NFR BLD: 0.5 %
IRON SATN MFR SERPL: 12 %
IRON SERPL-MCNC: 32 UG/DL
LYMPHOCYTES # BLD AUTO: 1.64 X10(3) UL (ref 1–4)
LYMPHOCYTES NFR BLD AUTO: 22.1 %
MCH RBC QN AUTO: 28.2 PG (ref 26–34)
MCHC RBC AUTO-ENTMCNC: 31.4 G/DL (ref 31–37)
MCV RBC AUTO: 89.8 FL
MONOCYTES # BLD AUTO: 0.79 X10(3) UL (ref 0.1–1)
MONOCYTES NFR BLD AUTO: 10.6 %
NEUTROPHILS # BLD AUTO: 4.72 X10 (3) UL (ref 1.5–7.7)
NEUTROPHILS # BLD AUTO: 4.72 X10(3) UL (ref 1.5–7.7)
NEUTROPHILS NFR BLD AUTO: 63.6 %
OSMOLALITY SERPL CALC.SUM OF ELEC: 300 MOSM/KG (ref 275–295)
PLATELET # BLD AUTO: 251 10(3)UL (ref 150–450)
POTASSIUM SERPL-SCNC: 4.2 MMOL/L (ref 3.5–5.1)
RBC # BLD AUTO: 3.33 X10(6)UL
SODIUM SERPL-SCNC: 142 MMOL/L (ref 136–145)
TOTAL IRON BINDING CAPACITY: 275 UG/DL (ref 250–425)
TRANSFERRIN SERPL-MCNC: 203 MG/DL (ref 215–365)
WBC # BLD AUTO: 7.4 X10(3) UL (ref 4–11)

## 2024-10-02 PROCEDURE — 83540 ASSAY OF IRON: CPT

## 2024-10-02 PROCEDURE — 85025 COMPLETE CBC W/AUTO DIFF WBC: CPT

## 2024-10-02 PROCEDURE — 80048 BASIC METABOLIC PNL TOTAL CA: CPT

## 2024-10-02 PROCEDURE — 83550 IRON BINDING TEST: CPT

## 2024-10-02 PROCEDURE — 99215 OFFICE O/P EST HI 40 MIN: CPT | Performed by: INTERNAL MEDICINE

## 2024-10-02 PROCEDURE — 36415 COLL VENOUS BLD VENIPUNCTURE: CPT

## 2024-10-02 PROCEDURE — 82728 ASSAY OF FERRITIN: CPT

## 2024-10-02 NOTE — PROGRESS NOTES
Nephrology Progress Note      ASSESSMENT/PLAN:      1) CKD 3- baseline Cr 1.5-1.8 mg/dl > 10 yrs due to longstanding HTN / nephrosclerosis; no further w/u- focus on BP mgmt    2) s/p MV repair / TV repair with maze procedure / L atrial appendage amputation + PFO closure 4/24    3) HTN with hyperaldo- doing well on eplerenone 50 mg bid + metoprolol 50 mg bid    4) h/o PAF (with COVID 1/24 + post op 4/24)    5) Anemia- baseline hgb 13 1/24; dx'ed with gastritis / PUD 2/24 + H. Pylori- hgb has not improved since 4/24. PLAN- recheck Fe stores; consider IV Fe and resuming PPI    6) Poor balance despite 60# weight loss and completing cardiac rehab- consider neuro eval       HPI:   Wagner Sheridan is a 78 year old male who presents for follow-up of   Chief Complaint   Patient presents with    Chronic Kidney Disease    Hypertension       Presents for f/u CKD 3/ anemia / HTN / hyperaldo.     HISTORY:  Past Medical History:    Arrhythmia    BPH    Encounter for incision and drainage procedure    High blood pressure    High cholesterol    Hyperaldosteronism (HCC)    Mitral regurgitation    Osteoarthrosis, unspecified whether generalized or localized, unspecified site    PMR (polymyalgia rheumatica) (HCC)    off corticosteroids since 2013    Retention of urine    Shortness of breath      Past Surgical History:   Procedure Laterality Date    Anesth,open heart surgery  04/05/2024    Mitral valve repair, tricuspid repair,PFO closure etc    Colonoscopy N/A 02/27/2024    Procedure: COLONOSCOPY;  Surgeon: Heath Mina DO;  Location:  ENDOSCOPY    Knee surgery      Other surgical history  02/27/2009    [facility], Dr. Tavera    Other surgical history  09/04/2009    [facility] Dr. Tavera    Other surgical history  01/04/2011    [facility] - Dr. Tavera    Other surgical history  01/17/2013    PVP - Dr. Tavera    Other surgical history  02/03/2016    [facility]     Other surgical history  08/05/2020    cysto- Dr. Tavera    Skin surgery   10/01/2019    MMS/R nasal ala /BCC done by [facility]    Tonsillectomy      Total knee replacement Right 02/25/2015    Right total knee replacement 02/25/2015    Total knee replacement Left 05/11/2015    Left knee total replacement 05/11/2015       Family History   Problem Relation Age of Onset    Hypertension Father     Heart Disorder Father     Hypertension Mother     Diabetes Sister       Social History:   Social History     Socioeconomic History    Marital status:    Tobacco Use    Smoking status: Former     Current packs/day: 0.50     Average packs/day: 0.5 packs/day for 5.0 years (2.5 ttl pk-yrs)     Types: Cigarettes    Smokeless tobacco: Never    Tobacco comments:     in college   Vaping Use    Vaping status: Never Used   Substance and Sexual Activity    Alcohol use: Yes     Alcohol/week: 0.0 standard drinks of alcohol     Comment: rare    Drug use: No   Other Topics Concern    Exercise Yes    Seat Belt Yes     Social Determinants of Health     Food Insecurity: No Food Insecurity (4/5/2024)    Food Insecurity     Food Insecurity: Never true   Transportation Needs: No Transportation Needs (4/5/2024)    Transportation Needs     Lack of Transportation: No   Housing Stability: Low Risk  (4/5/2024)    Housing Stability     Housing Instability: No        Medications (Active prior to today's visit):  Current Outpatient Medications   Medication Sig Dispense Refill    eplerenone 50 MG Oral Tab Take 1 tablet (50 mg total) by mouth 2 (two) times daily.      ferrous sulfate 325 (65 FE) MG Oral Tab EC Take 1 tablet (325 mg total) by mouth daily with breakfast.      bumetanide 2 MG Oral Tab Take 2 tabs in the morning and 1 tab in the evening (Patient taking differently: 0.5 tablets (1 mg total) BID (Diuretic).) 270 tablet 1    metoprolol tartrate 25 MG Oral Tab Take 0.5 tablets (12.5 mg total) by mouth 2x Daily(Beta Blocker). 60 tablet 0    atorvastatin 20 MG Oral Tab Take 1 tablet (20 mg total) by mouth nightly.       finasteride 5 MG Oral Tab Take 1 tablet (5 mg total) by mouth at bedtime.      EPINEPHrine (EPIPEN 2-JAZ) 0.3 MG/0.3ML Injection Solution Auto-injector USE AS DIRECTED (Patient not taking: Reported on 10/2/2024) 2 each 5       Allergies:  Allergies   Allergen Reactions    Albumin Human ANAPHYLAXIS    Ciprofloxacin HIVES    Clindamycin HIVES    Other ANAPHYLAXIS     Alcohol swab caps    Penicillins HIVES     Tolerated Ancef 4/4/24    Wasp Venom RESPIRATORY FAILURE    Wasp Venom Protein RESPIRATORY FAILURE    Benazepril Coughing    Chlorhexidine RASH and OTHER (SEE COMMENTS)     redness       ROS:     Denies fever/chills  Denies wt loss/gain  Denies HA or visual changes  Denies CP or palpitations  Denies SOB/cough/hemoptysis  Denies abd or flank pain  Denies N/V/D  Denies change in urinary habits or gross hematuria  Denies LE edema  Denies skin rashes/myalgias/arthralgias      PHYSICAL EXAM:   Wt 233 lb (105.7 kg)   BMI 31.60 kg/m²   Wt Readings from Last 3 Encounters:   10/02/24 233 lb (105.7 kg)   09/20/24 231 lb (104.8 kg)   07/12/24 229 lb (103.9 kg)     General: Alert and oriented in no apparent distress.  HEENT: No scleral icterus, MMM  Neck: Supple, no OWEN or thyromegaly  Cardiac: Regular rate and rhythm, S1, S2 normal, no murmur or rub  Lungs: Clear without wheezes, rales, rhonchi.    Abdomen: Soft, non-tender. + bowel sounds, no palpable organomegaly  Extremities: Without clubbing, cyanosis or edema.  Neurologic: Alert and oriented, normal affect, cranial nerves grossly intact, moving all extremities  Skin: Warm and dry, no rashes      Britta Persaud MD  10/2/2024  11:29 AM

## 2024-11-10 ENCOUNTER — LAB ENCOUNTER (OUTPATIENT)
Dept: LAB | Facility: HOSPITAL | Age: 79
End: 2024-11-10
Attending: INTERNAL MEDICINE
Payer: MEDICARE

## 2024-11-10 DIAGNOSIS — Z01.818 PRE-OP TESTING: ICD-10-CM

## 2024-11-10 LAB — SARS-COV-2 RNA RESP QL NAA+PROBE: NOT DETECTED

## 2024-11-12 ENCOUNTER — ANESTHESIA EVENT (OUTPATIENT)
Dept: ENDOSCOPY | Facility: HOSPITAL | Age: 79
End: 2024-11-12
Payer: MEDICARE

## 2024-11-12 ENCOUNTER — ANESTHESIA (OUTPATIENT)
Dept: ENDOSCOPY | Facility: HOSPITAL | Age: 79
End: 2024-11-12
Payer: MEDICARE

## 2024-11-12 ENCOUNTER — HOSPITAL ENCOUNTER (OUTPATIENT)
Facility: HOSPITAL | Age: 79
Setting detail: HOSPITAL OUTPATIENT SURGERY
Discharge: HOME | End: 2024-11-12
Attending: INTERNAL MEDICINE | Admitting: INTERNAL MEDICINE
Payer: MEDICARE

## 2024-11-12 VITALS
RESPIRATION RATE: 15 BRPM | SYSTOLIC BLOOD PRESSURE: 142 MMHG | OXYGEN SATURATION: 98 % | DIASTOLIC BLOOD PRESSURE: 73 MMHG | HEIGHT: 73 IN | TEMPERATURE: 97 F | BODY MASS INDEX: 30.48 KG/M2 | WEIGHT: 230 LBS | HEART RATE: 53 BPM

## 2024-11-12 DIAGNOSIS — Z01.818 PRE-OP TESTING: Primary | ICD-10-CM

## 2024-11-12 DIAGNOSIS — Z86.0100 HISTORY OF COLONIC POLYPS: ICD-10-CM

## 2024-11-12 DIAGNOSIS — D50.9 IRON DEFICIENCY ANEMIA, UNSPECIFIED IRON DEFICIENCY ANEMIA TYPE: ICD-10-CM

## 2024-11-12 PROCEDURE — 0DB78ZX EXCISION OF STOMACH, PYLORUS, VIA NATURAL OR ARTIFICIAL OPENING ENDOSCOPIC, DIAGNOSTIC: ICD-10-PCS | Performed by: INTERNAL MEDICINE

## 2024-11-12 PROCEDURE — 88312 SPECIAL STAINS GROUP 1: CPT | Performed by: INTERNAL MEDICINE

## 2024-11-12 PROCEDURE — 0DBN8ZX EXCISION OF SIGMOID COLON, VIA NATURAL OR ARTIFICIAL OPENING ENDOSCOPIC, DIAGNOSTIC: ICD-10-PCS | Performed by: INTERNAL MEDICINE

## 2024-11-12 PROCEDURE — 88305 TISSUE EXAM BY PATHOLOGIST: CPT | Performed by: INTERNAL MEDICINE

## 2024-11-12 PROCEDURE — 0DBH8ZX EXCISION OF CECUM, VIA NATURAL OR ARTIFICIAL OPENING ENDOSCOPIC, DIAGNOSTIC: ICD-10-PCS | Performed by: INTERNAL MEDICINE

## 2024-11-12 PROCEDURE — 0DB98ZX EXCISION OF DUODENUM, VIA NATURAL OR ARTIFICIAL OPENING ENDOSCOPIC, DIAGNOSTIC: ICD-10-PCS | Performed by: INTERNAL MEDICINE

## 2024-11-12 RX ORDER — SODIUM CHLORIDE, SODIUM LACTATE, POTASSIUM CHLORIDE, CALCIUM CHLORIDE 600; 310; 30; 20 MG/100ML; MG/100ML; MG/100ML; MG/100ML
INJECTION, SOLUTION INTRAVENOUS CONTINUOUS
Status: DISCONTINUED | OUTPATIENT
Start: 2024-11-12 | End: 2024-11-12

## 2024-11-12 RX ORDER — LIDOCAINE HYDROCHLORIDE 10 MG/ML
INJECTION, SOLUTION EPIDURAL; INFILTRATION; INTRACAUDAL; PERINEURAL AS NEEDED
Status: DISCONTINUED | OUTPATIENT
Start: 2024-11-12 | End: 2024-11-12 | Stop reason: SURG

## 2024-11-12 RX ORDER — KETAMINE HYDROCHLORIDE 50 MG/ML
INJECTION, SOLUTION INTRAMUSCULAR; INTRAVENOUS AS NEEDED
Status: DISCONTINUED | OUTPATIENT
Start: 2024-11-12 | End: 2024-11-12 | Stop reason: SURG

## 2024-11-12 RX ADMIN — KETAMINE HYDROCHLORIDE 20 MG: 50 INJECTION, SOLUTION INTRAMUSCULAR; INTRAVENOUS at 08:34:00

## 2024-11-12 RX ADMIN — SODIUM CHLORIDE, SODIUM LACTATE, POTASSIUM CHLORIDE, CALCIUM CHLORIDE: 600; 310; 30; 20 INJECTION, SOLUTION INTRAVENOUS at 08:30:00

## 2024-11-12 RX ADMIN — LIDOCAINE HYDROCHLORIDE 25 MG: 10 INJECTION, SOLUTION EPIDURAL; INFILTRATION; INTRACAUDAL; PERINEURAL at 08:33:00

## 2024-11-12 NOTE — DISCHARGE INSTRUCTIONS
Home Care Instructions for Colonoscopy  with Sedation    Diet:  - Resume your regular diet as tolerated unless otherwise instructed.  - Start with light meals to minimize bloating.  - Do not drink alcohol today.    Medication:  - If you have questions about resuming your normal medications, please contact your Primary Care Physician.    Activities:  - Take it easy today. Do not return to work today.  - Do not drive today.  - Do not operate any machinery today (including kitchen equipment).    Colonoscopy:  - You may notice some rectal \"spotting\" (a little blood on the toilet tissue) for a day or two after the exam. This is normal.  - If you experience any rectal bleeding (not spotting), persistent tenderness or sharp severe abdominal pains, oral temperature over 100 degrees Fahrenheit, light-headedness or dizziness, or any other problems, contact your doctor.    **If unable to reach your doctor, please go to the [facility] Emergency Room**    - Your referring physician will receive a full report of your examination.  - If you do not hear from your doctor's office within two weeks of your biopsy, please call them for your results.    You may be able to see your laboratory results in [facility] between 4 and 7 business days.  In some cases, your physician may not have viewed the results before they are released to [facility].  If you have questions regarding your results contact the physician who ordered the test/exam by phone or via [facility] by choosing \"Ask a Medical Question.\"

## 2024-11-12 NOTE — H&P
Pre-procedure History and Physical      Wagner Sheridan Patient Status:  [facility]    1945 MRN [id number]   Location [facility] ENDOSCOPY LAB SUITES Attending Neri Hall MD   Hosp Day # 0 PCP Edil Roy MD     2024    HISTORY OF PRESENT ILLNESS    Wagner Sheridan is a  78 year old male with a history of anemia and a history of colon polyps.     The patient currently denies any GI symptoms.  He has been taking iron but was told to decrease his iron to 1-1/2 tablets/day.  His most recent hemoglobin is 9.4.  It was 8.9 on .  His current iron saturation is 12% and ferritin is 57.     He completed treatment for H pylori. H pylori breath test was negative in July.      The patient was admitted to [facility] in February with severe anemia.  He underwent an EGD and colonoscopy at that time.  The EGD revealed gastritis and punctate nonbleeding ulcers in the antrum.  Biopsies were positive for H. pylori.  The colonoscopy revealed 2 polyps that were removed from the colon.  One polyp was a tubulovillous adenoma and the second polyp was an adenoma.  His bowel prep was less than optimal, of note.     In addition, the patient had cardiac surgery in .  He was on iron daily since his discharge from the hospital.  He has no overt bleeding.  His hemoglobin was 8.9 on  and 8.6 on . A CBC in July revealed a hemoglobin of 10.5.      He denies melena, hematemesis or hematochezia.  He does not use NSAIDs.     PAST MEDICAL HISTORY    Past Medical History:    Arrhythmia    BPH    Encounter for incision and drainage procedure    High blood pressure    High cholesterol    Hyperaldosteronism (HCC)    Mitral regurgitation    Osteoarthrosis, unspecified whether generalized or localized, unspecified site    PMR (polymyalgia rheumatica) (HCC)    off corticosteroids since     Retention of urine    Shortness of breath       PAST SURGICAL HISTORY    Past Surgical History:    Procedure Laterality Date    Anesth,open heart surgery  04/05/2024    Mitral valve repair, tricuspid repair,PFO closure etc    Colonoscopy N/A 02/27/2024    Procedure: COLONOSCOPY;  Surgeon: Heath Mina DO;  Location:  ENDOSCOPY    Knee surgery      Other surgical history  02/27/2009    [facility], Dr. Tavera    Other surgical history  09/04/2009    [facility] Dr. Tavera    Other surgical history  01/04/2011    [facility] - Dr. Tavera    Other surgical history  01/17/2013    PVP - Dr. Tavera    Other surgical history  02/03/2016    [facility]     Other surgical history  08/05/2020    cysto- Dr. Tavera    Skin surgery  10/01/2019    MMS/R nasal ala /BCC done by [facility]    Tonsillectomy      Total knee replacement Right 02/25/2015    Right total knee replacement 02/25/2015    Total knee replacement Left 05/11/2015    Left knee total replacement 05/11/2015        MEDICATIONS    Current Outpatient Medications   Medication Sig Dispense Refill    eplerenone 50 MG Oral Tab Take 1 tablet (50 mg total) by mouth 2 (two) times daily.      ferrous sulfate 325 (65 FE) MG Oral Tab EC Take 1 tablet (325 mg total) by mouth daily with breakfast.      bumetanide 2 MG Oral Tab Take 2 tabs in the morning and 1 tab in the evening (Patient taking differently: 0.5 tablets (1 mg total) BID (Diuretic).) 270 tablet 1    metoprolol tartrate 25 MG Oral Tab Take 0.5 tablets (12.5 mg total) by mouth 2x Daily(Beta Blocker). 60 tablet 0    atorvastatin 20 MG Oral Tab Take 1 tablet (20 mg total) by mouth nightly.      finasteride 5 MG Oral Tab Take 1 tablet (5 mg total) by mouth at bedtime.      EPINEPHrine (EPIPEN 2-JAZ) 0.3 MG/0.3ML Injection Solution Auto-injector USE AS DIRECTED (Patient not taking: Reported on 10/2/2024) 2 each 5       PHYSICAL EXAM    Vitals:    11/12/24 0747   BP: 144/62   Pulse: 63   Resp: 15     HEART: normal S1S2  LUNGS: clear  ABDOMEN: soft, bowel sounds positive, no masses    ASSESSMENT:    Iron deficiency anemia  History of  colon polyps    PLAN:    EGD and colonoscopy. The procedure was reviewed with the patient. The patient is aware of the risks, including bleeding, perforation and anesthetic complications. The limitations of the procedure were reviewed. The patient agrees and all questions were answered.

## 2024-11-12 NOTE — OPERATIVE REPORT
[facility]  Esophagogastroduodenoscopy and Colonoscopy Report      Wagner Sheridan Patient Status:  [facility]ospital Outpatient [facility]   Date of Birth 12/24/1945 MRN [id number]   Location [facility] ENDOSCOPY LAB SUITES Attending Neri Hall MD     PCP Edli Roy MD       DATE OF OPERATION:  11/12/2024    PREOPERATIVE DIAGNOSIS:    Iron deficiency anemia  History of colon polyps    POSTOPERATIVE DIAGNOSIS:    Gastritis  Colon polyps  Diverticulosis     SURGERY PERFORMED:    1. Esophagogastroduodenoscopy with forceps biopsy  2. Colonoscopy, entire colon with cold snare polypectomy and cold forceps polypectomy    SURGEON: Neri Hall MD    ANESTHESIA: MAC    HISTORY OF PRESENT ILLNESS:    The patient is a 78 year old male with a history of anemia and a history of colon polyps.     The patient currently denies any GI symptoms.  He has been taking iron but was told to decrease his iron to 1-1/2 tablets/day.  His most recent hemoglobin is 9.4.  It was 8.9 on 9/12.  His current iron saturation is 12% and ferritin is 57.     He completed treatment for H pylori. H pylori breath test was negative in July.      The patient was admitted to [facility] in February with severe anemia.  He underwent an EGD and colonoscopy at that time.  The EGD revealed gastritis and punctate nonbleeding ulcers in the antrum.  Biopsies were positive for H. pylori.  The colonoscopy revealed 2 polyps that were removed from the colon.  One polyp was a tubulovillous adenoma and the second polyp was an adenoma.  His bowel prep was less than optimal, of note.     In addition, the patient had cardiac surgery in April, 2024.  He was on iron daily since his discharge from the hospital.  He has no overt bleeding.  His hemoglobin was 8.9 on April 7 and 8.6 on April 29. A CBC in July revealed a hemoglobin of 10.5.      He denies melena, hematemesis or hematochezia.  He does not use NSAIDs.    REPORT OF OPERATION:    The procedure was  reviewed with the patient. The patient is aware of the indications. The risks of bleeding, perforation and anesthetic complications have been reviewed. The possibility of missed lesions were reviewed.    After the patient was placed in the left lateral decubitus position, the Olympus video gastroscope was inserted into the esophagus and advanced to the descending duodenum.  The scope was withdrawn and the mucosa was observed for abnormalities.  Subsequently, the Olympus video colonoscope was inserted into the rectum and advanced to the cecum.  The scope was withdrawn and the mucosa was observed for abnormalities.    FINDNGS:    The visualized esophageal mucosa is normal.  The gastric mucosa including retrograde views of the cardia and fundus reveals antral erythema. Biopsies were taken from the antrum/body. The remainder of the gastric mucosa is normal.  The pylorus duodenal bulb and descending duodenum are normal. Biopsies were taken from the descending duodenum.     The visualized colonic mucosa reveals two polyps. A 2 mm cecal polyp was removed using forceps polypectomy. A 4 mm sessile sigmoid polyp was removed using snare polypectomy. Diverticulosis is noted in the sigmoid colon. The remainder of the colon is normal.  At the anal verge grade 2 internal hemorrhoids are identified.    The patient tolerated the procedure well without any immediate complications.    Aronchick bowel prep score was 2. (1 - excellent > 95% mucosa seen; 2 - good - clear liquid up to 25% of the mucosa, 90% mucosa seen;  3 - fair - semisolid stool not suctioned, but 90% of the mucosa seen; 4 - poor - semisolid stool not suctioned, but < 90% mucosa seen; 5 - inadequate - repeat prep needed)    The colon withdrawl time is 7 minutes.    PLAN:    Await histology.

## 2024-11-12 NOTE — ANESTHESIA POSTPROCEDURE EVALUATION
Patient: Wagner Sheridan    Procedure Summary       Date: 11/12/24 Room / Location: [facility] ENDOSCOPY 05 / [facility] ENDOSCOPY    Anesthesia Start: 0830 Anesthesia Stop: 0910    Procedures:       COLONOSCOPY      ESOPHAGOGASTRODUODENOSCOPY (EGD) Diagnosis:       Iron deficiency anemia, unspecified iron deficiency anemia type      History of colonic polyps      (gastritis, colon polyps, diverticulosis)    Surgeons: Neri Hall MD Anesthesiologist: Gwendolyn Murray CRNA    Anesthesia Type: MAC ASA Status: 2            Anesthesia Type: MAC    Vitals Value Taken Time   /68 11/12/24 0909   Temp 97 °F (36.1 °C) 11/12/24 0909   Pulse 55 11/12/24 0909   Resp 21 11/12/24 0909   SpO2 100 % 11/12/24 0909   Vitals shown include unfiled device data.    [facility] AN Post Evaluation:   Patient Evaluated in PACU  Patient Participation: complete - patient participated  Level of Consciousness: awake and alert  Pain Score: 0  Pain Management: adequateYes    Nausea/Vomiting: none  Cardiovascular Status: acceptable  Respiratory Status: acceptable  Postoperative Hydration acceptable      Gwendolyn Murray CRNA  11/12/2024 9:10 AM

## 2024-11-12 NOTE — ANESTHESIA PREPROCEDURE EVALUATION
PRE-OP EVALUATION    Patient Name: Wagner Sheridan            COLONOSCOPY / ESOPHAGOGASTRODUODENOSCOPY    Anesthesia Procedure: COLONOSCOPY / ESOPHAGOGASTRODUODENOSCOPY  ESOPHAGOGASTRODUODENOSCOPY (EGD)    Surgeons and Role:     * Ksenia Ram DPM - Primary    Pre-op vitals reviewed.  Pulse: 63  Resp: 15  BP: 144/62  SpO2: 99 %  Body mass index is 30.34 kg/m².    Current medications reviewed.  Hospital Medications:   lactated ringers infusion   Intravenous Continuous       Outpatient Medications:     Medications Prior to Admission   Medication Sig Dispense Refill Last Dose/Taking    eplerenone 50 MG Oral Tab Take 1 tablet (50 mg total) by mouth 2 (two) times daily.   11/11/2024    ferrous sulfate 325 (65 FE) MG Oral Tab EC Take 1 tablet (325 mg total) by mouth daily with breakfast.   Taking    bumetanide 2 MG Oral Tab Take 2 tabs in the morning and 1 tab in the evening (Patient taking differently: 0.5 tablets (1 mg total) BID (Diuretic).) 270 tablet 1 11/11/2024    metoprolol tartrate 25 MG Oral Tab Take 0.5 tablets (12.5 mg total) by mouth 2x Daily(Beta Blocker). 60 tablet 0 11/11/2024    atorvastatin 20 MG Oral Tab Take 1 tablet (20 mg total) by mouth nightly.   11/11/2024    finasteride 5 MG Oral Tab Take 1 tablet (5 mg total) by mouth at bedtime.   Taking    EPINEPHrine (EPIPEN 2-JAZ) 0.3 MG/0.3ML Injection Solution Auto-injector USE AS DIRECTED (Patient not taking: Reported on 10/2/2024) 2 each 5        Allergies: Albumin human, Ciprofloxacin, Clindamycin, Other, Penicillins, Wasp venom, Wasp venom protein, Benazepril, and Chlorhexidine      Anesthesia Evaluation    Patient summary reviewed.    Anesthetic Complications  (-) history of anesthetic complications         GI/Hepatic/Renal             (+) chronic renal disease and CRI                   Cardiovascular  Comment: Note from latest cardiology visit reviewed.  H/o mitral valve repair and tricuspid valve repair 4/2024  Preserved LV function on  echo  Prior h/o atrial fibrillation, NSR now    ECG reviewed.            (+) hypertension             (+) valvular problems/murmurs        (+) CHF                Endo/Other  Comment: Polymyalgia rheumatica                                Pulmonary               (+) shortness of breath            Neuro/Psych                              Right foot hammer toe with fat layer exposed for partial amputation of right 3rd toe        Past Surgical History:   Procedure Laterality Date    Anesth,open heart surgery  04/05/2024    Mitral valve repair, tricuspid repair,PFO closure etc    Colonoscopy N/A 02/27/2024    Procedure: COLONOSCOPY;  Surgeon: Heath Mina DO;  Location:  ENDOSCOPY    Knee surgery      Other surgical history  02/27/2009    [facility] Dr. Tavera    Other surgical history  09/04/2009    flow  Dr. Tavera    Other surgical history  01/04/2011    PNB - Dr. Tavera    Other surgical history  01/17/2013    PVP - Dr. Tavera    Other surgical history  02/03/2016    Flow US     Other surgical history  08/05/2020    cysto- Dr. Tavera    Skin surgery  10/01/2019    MMS/R nasal ala /BCC done by [facility]    Tonsillectomy      Total knee replacement Right 02/25/2015    Right total knee replacement 02/25/2015    Total knee replacement Left 05/11/2015    Left knee total replacement 05/11/2015      Social History     Socioeconomic History    Marital status:    Tobacco Use    Smoking status: Former     Current packs/day: 0.50     Average packs/day: 0.5 packs/day for 5.0 years (2.5 ttl pk-yrs)     Types: Cigarettes    Smokeless tobacco: Never    Tobacco comments:     in college   Vaping Use    Vaping status: Never Used   Substance and Sexual Activity    Alcohol use: Yes     Alcohol/week: 0.0 standard drinks of alcohol     Comment: rare    Drug use: No   Other Topics Concern    Exercise Yes    Seat Belt Yes     History   Drug Use No     Available pre-op labs reviewed.  Lab Results   Component Value Date    WBC 7.4  10/02/2024    RBC 3.33 (L) 10/02/2024    HGB 9.4 (L) 10/02/2024    HCT 29.9 (L) 10/02/2024    MCV 89.8 10/02/2024    MCH 28.2 10/02/2024    MCHC 31.4 10/02/2024    RDW 16.4 10/02/2024    .0 10/02/2024     Lab Results   Component Value Date     10/02/2024    K 4.2 10/02/2024     10/02/2024    CO2 28.0 10/02/2024    BUN 32 (H) 10/02/2024    CREATSERUM 1.68 (H) 10/02/2024    GLU 87 10/02/2024    CA 10.9 (H) 10/02/2024            Airway      Mallampati: III  Mouth opening: >3 FB  TM distance: > 6 cm  Neck ROM: full Cardiovascular    Cardiovascular exam normal.  Rhythm: regular  Rate: normal  (-) murmur   Dental    Dentition appears grossly intact         Pulmonary    Pulmonary exam normal.  Breath sounds clear to auscultation bilaterally.               Other findings              ASA: 2   Plan: MAC  NPO status verified and patient meets guidelines.        Comment: Plan for MAC. A detailed discussion of the risks and benefits of the proposed anesthetic was had in the preoperative area, including risk of conversion to a general anesthetic, nausea/vomiting, dental damage, sore throat and allergic/ adverse reactions to medications administered. The possibility of needing to convert to general anesthesia if necessary was discussed. Questions answered and patient wishes to proceed. Consent signed.     Plan/risks discussed with: patient and spouse                Present on Admission:  **None**         Anesthesia Evaluation     Patient summary reviewed    No history of anesthetic complications   Airway   Mallampati: III  TM distance: > 6 cm  Neck ROM: full  Dental - Dentition appears grossly intact     Pulmonary - normal exam    breath sounds clear to auscultation  (+) shortness of breath  Cardiovascular - normal exam  (+) hypertension, valvular problems/murmurs, CHF  (-) murmur    ECG reviewed  Rhythm: regular  Rate: normal  ROS comment: Note from latest cardiology visit reviewed.  H/o mitral valve repair  and tricuspid valve repair 4/2024  Preserved LV function on echo  Prior h/o atrial fibrillation, NSR now    Neuro/Psych      GI/Hepatic/Renal    (+) chronic renal disease CRI    Endo/Other      Comments: Polymyalgia rheumatica  Abdominal                 Anesthesia Plan:   ASA:  2  Plan:   MAC  Informed Consent Plan and Risks Discussed With:  Patient and spouse

## 2024-12-08 ENCOUNTER — PATIENT MESSAGE (OUTPATIENT)
Dept: NEPHROLOGY | Facility: CLINIC | Age: 79
End: 2024-12-08

## 2024-12-08 DIAGNOSIS — E26.9 HYPERALDOSTERONISM (HCC): ICD-10-CM

## 2024-12-08 DIAGNOSIS — N18.30 STAGE 3 CHRONIC KIDNEY DISEASE, UNSPECIFIED WHETHER STAGE 3A OR 3B CKD (HCC): Primary | ICD-10-CM

## 2024-12-08 DIAGNOSIS — D50.9 IRON DEFICIENCY ANEMIA, UNSPECIFIED IRON DEFICIENCY ANEMIA TYPE: ICD-10-CM

## 2025-02-01 ENCOUNTER — LAB ENCOUNTER (OUTPATIENT)
Dept: LAB | Age: 80
End: 2025-02-01
Attending: INTERNAL MEDICINE
Payer: MEDICARE

## 2025-02-01 DIAGNOSIS — D50.9 IRON DEFICIENCY ANEMIA, UNSPECIFIED IRON DEFICIENCY ANEMIA TYPE: ICD-10-CM

## 2025-02-01 DIAGNOSIS — I50.33 ACUTE ON CHRONIC HEART FAILURE WITH PRESERVED EJECTION FRACTION (HCC): ICD-10-CM

## 2025-02-01 DIAGNOSIS — N18.30 STAGE 3 CHRONIC KIDNEY DISEASE, UNSPECIFIED WHETHER STAGE 3A OR 3B CKD (HCC): ICD-10-CM

## 2025-02-01 DIAGNOSIS — E26.9 HYPERALDOSTERONISM (HCC): ICD-10-CM

## 2025-02-01 LAB
ANION GAP SERPL CALC-SCNC: 10 MMOL/L (ref 0–18)
BASOPHILS # BLD AUTO: 0.03 X10(3) UL (ref 0–0.2)
BASOPHILS NFR BLD AUTO: 0.3 %
BUN BLD-MCNC: 36 MG/DL (ref 9–23)
CALCIUM BLD-MCNC: 9.7 MG/DL (ref 8.7–10.6)
CHLORIDE SERPL-SCNC: 105 MMOL/L (ref 98–112)
CO2 SERPL-SCNC: 28 MMOL/L (ref 21–32)
CREAT BLD-MCNC: 1.61 MG/DL
EGFRCR SERPLBLD CKD-EPI 2021: 43 ML/MIN/1.73M2 (ref 60–?)
EOSINOPHIL # BLD AUTO: 0.13 X10(3) UL (ref 0–0.7)
EOSINOPHIL NFR BLD AUTO: 1.4 %
ERYTHROCYTE [DISTWIDTH] IN BLOOD BY AUTOMATED COUNT: 20.6 %
FASTING STATUS PATIENT QL REPORTED: NO
GLUCOSE BLD-MCNC: 78 MG/DL (ref 70–99)
HCT VFR BLD AUTO: 29.5 %
HGB BLD-MCNC: 9.4 G/DL
IMM GRANULOCYTES # BLD AUTO: 0.18 X10(3) UL (ref 0–1)
IMM GRANULOCYTES NFR BLD: 2 %
LYMPHOCYTES # BLD AUTO: 3.35 X10(3) UL (ref 1–4)
LYMPHOCYTES NFR BLD AUTO: 37.2 %
MCH RBC QN AUTO: 28.3 PG (ref 26–34)
MCHC RBC AUTO-ENTMCNC: 31.9 G/DL (ref 31–37)
MCV RBC AUTO: 88.9 FL
MONOCYTES # BLD AUTO: 0.94 X10(3) UL (ref 0.1–1)
MONOCYTES NFR BLD AUTO: 10.4 %
NEUTROPHILS # BLD AUTO: 4.37 X10 (3) UL (ref 1.5–7.7)
NEUTROPHILS # BLD AUTO: 4.37 X10(3) UL (ref 1.5–7.7)
NEUTROPHILS NFR BLD AUTO: 48.7 %
OSMOLALITY SERPL CALC.SUM OF ELEC: 303 MOSM/KG (ref 275–295)
PLATELET # BLD AUTO: 223 10(3)UL (ref 150–450)
POTASSIUM SERPL-SCNC: 4.1 MMOL/L (ref 3.5–5.1)
RBC # BLD AUTO: 3.32 X10(6)UL
SODIUM SERPL-SCNC: 143 MMOL/L (ref 136–145)
WBC # BLD AUTO: 9 X10(3) UL (ref 4–11)

## 2025-02-01 PROCEDURE — 85025 COMPLETE CBC W/AUTO DIFF WBC: CPT

## 2025-02-01 PROCEDURE — 80048 BASIC METABOLIC PNL TOTAL CA: CPT

## 2025-02-01 PROCEDURE — 36415 COLL VENOUS BLD VENIPUNCTURE: CPT

## 2025-05-15 RX ORDER — FAMOTIDINE 40 MG/1
40 TABLET, FILM COATED ORAL DAILY
COMMUNITY

## 2025-05-15 RX ORDER — PREDNISONE 2.5 MG/1
1.25 TABLET ORAL DAILY
COMMUNITY

## 2025-05-16 ENCOUNTER — ANESTHESIA EVENT (OUTPATIENT)
Dept: SURGERY | Facility: HOSPITAL | Age: 80
End: 2025-05-16
Payer: MEDICARE

## 2025-05-20 ENCOUNTER — APPOINTMENT (OUTPATIENT)
Dept: GENERAL RADIOLOGY | Facility: HOSPITAL | Age: 80
End: 2025-05-20
Attending: PODIATRIST
Payer: MEDICARE

## 2025-05-20 ENCOUNTER — HOSPITAL ENCOUNTER (OUTPATIENT)
Facility: HOSPITAL | Age: 80
Setting detail: HOSPITAL OUTPATIENT SURGERY
Discharge: HOME | End: 2025-05-20
Attending: PODIATRIST | Admitting: PODIATRIST
Payer: MEDICARE

## 2025-05-20 ENCOUNTER — ANESTHESIA (OUTPATIENT)
Dept: SURGERY | Facility: HOSPITAL | Age: 80
End: 2025-05-20
Payer: MEDICARE

## 2025-05-20 VITALS
BODY MASS INDEX: 33.93 KG/M2 | DIASTOLIC BLOOD PRESSURE: 62 MMHG | SYSTOLIC BLOOD PRESSURE: 153 MMHG | HEART RATE: 50 BPM | TEMPERATURE: 98 F | WEIGHT: 256 LBS | HEIGHT: 73 IN | RESPIRATION RATE: 14 BRPM | OXYGEN SATURATION: 99 %

## 2025-05-20 PROCEDURE — 88311 DECALCIFY TISSUE: CPT | Performed by: PODIATRIST

## 2025-05-20 PROCEDURE — 88305 TISSUE EXAM BY PATHOLOGIST: CPT | Performed by: PODIATRIST

## 2025-05-20 PROCEDURE — 88312 SPECIAL STAINS GROUP 1: CPT | Performed by: PODIATRIST

## 2025-05-20 RX ORDER — NALOXONE HYDROCHLORIDE 0.4 MG/ML
0.08 INJECTION, SOLUTION INTRAMUSCULAR; INTRAVENOUS; SUBCUTANEOUS AS NEEDED
Status: DISCONTINUED | OUTPATIENT
Start: 2025-05-20 | End: 2025-05-20

## 2025-05-20 RX ORDER — BUPIVACAINE HYDROCHLORIDE 5 MG/ML
INJECTION, SOLUTION EPIDURAL; INTRACAUDAL; PERINEURAL AS NEEDED
Status: DISCONTINUED | OUTPATIENT
Start: 2025-05-20 | End: 2025-05-20 | Stop reason: HOSPADM

## 2025-05-20 RX ORDER — HYDROCODONE BITARTRATE AND ACETAMINOPHEN 5; 325 MG/1; MG/1
2 TABLET ORAL ONCE AS NEEDED
Refills: 0 | Status: DISCONTINUED | OUTPATIENT
Start: 2025-05-20 | End: 2025-05-20

## 2025-05-20 RX ORDER — ONDANSETRON 2 MG/ML
4 INJECTION INTRAMUSCULAR; INTRAVENOUS EVERY 6 HOURS PRN
Status: DISCONTINUED | OUTPATIENT
Start: 2025-05-20 | End: 2025-05-20

## 2025-05-20 RX ORDER — SODIUM CHLORIDE, SODIUM LACTATE, POTASSIUM CHLORIDE, CALCIUM CHLORIDE 600; 310; 30; 20 MG/100ML; MG/100ML; MG/100ML; MG/100ML
INJECTION, SOLUTION INTRAVENOUS CONTINUOUS
Status: DISCONTINUED | OUTPATIENT
Start: 2025-05-20 | End: 2025-05-20

## 2025-05-20 RX ORDER — ACETAMINOPHEN 500 MG
1000 TABLET ORAL ONCE
Status: DISCONTINUED | OUTPATIENT
Start: 2025-05-20 | End: 2025-05-20 | Stop reason: HOSPADM

## 2025-05-20 RX ORDER — HYDROCODONE BITARTRATE AND ACETAMINOPHEN 5; 325 MG/1; MG/1
1 TABLET ORAL ONCE AS NEEDED
Refills: 0 | Status: DISCONTINUED | OUTPATIENT
Start: 2025-05-20 | End: 2025-05-20

## 2025-05-20 RX ORDER — METOCLOPRAMIDE HYDROCHLORIDE 5 MG/ML
10 INJECTION INTRAMUSCULAR; INTRAVENOUS EVERY 8 HOURS PRN
Status: DISCONTINUED | OUTPATIENT
Start: 2025-05-20 | End: 2025-05-20

## 2025-05-20 RX ORDER — HYDROMORPHONE HYDROCHLORIDE 1 MG/ML
0.2 INJECTION, SOLUTION INTRAMUSCULAR; INTRAVENOUS; SUBCUTANEOUS EVERY 5 MIN PRN
Refills: 0 | Status: DISCONTINUED | OUTPATIENT
Start: 2025-05-20 | End: 2025-05-20

## 2025-05-20 RX ORDER — HYDROMORPHONE HYDROCHLORIDE 1 MG/ML
0.6 INJECTION, SOLUTION INTRAMUSCULAR; INTRAVENOUS; SUBCUTANEOUS EVERY 5 MIN PRN
Refills: 0 | Status: DISCONTINUED | OUTPATIENT
Start: 2025-05-20 | End: 2025-05-20

## 2025-05-20 RX ORDER — ACETAMINOPHEN 500 MG
1000 TABLET ORAL ONCE AS NEEDED
Status: DISCONTINUED | OUTPATIENT
Start: 2025-05-20 | End: 2025-05-20

## 2025-05-20 RX ORDER — HYDROMORPHONE HYDROCHLORIDE 1 MG/ML
0.4 INJECTION, SOLUTION INTRAMUSCULAR; INTRAVENOUS; SUBCUTANEOUS EVERY 5 MIN PRN
Refills: 0 | Status: DISCONTINUED | OUTPATIENT
Start: 2025-05-20 | End: 2025-05-20

## 2025-05-20 NOTE — DISCHARGE INSTRUCTIONS
DR. MIRELLA HORNER, [facility]  FOOT AND ANKLE SURGERY  [phone]    You have had a surgical operation.  A limited amount of pain and swelling is to be expected.  The skin may take on a bruised appearance.  This is no cause for alarm.    Have prescription(s) filled immediately and take medication(s) as directed.  Be sure to eat before taking pain medication for the first time.  Avoid alcoholic beverages while on medication.  Go directly home and lie down after surgery.  Elevate the foot/feet at least eight inches above hip level.  Support the elevated foot and leg with pillows.  Do not place pillows under the knee.  Keep foot/feet elevated at all times only putting it/them down to use the bathroom and grab a bite to eat.  The more the foot is elevated, the less swelling and pain you will experience and the better your healing will be.  Appearance of a blood spot through the dressing is not unusual.  However, if there is active and persistent bleeding, call your doctor at once.  Bend knee and rotate foot and ankle at least five minutes during each hour after surgery for 24-36 hours while awake.  Keep the bandage dry and do not tamper with the dressing.  Apply an ice pack to the ankle for 15-20 minutes 2-3 times per day for the next 2 weeks.  Do not leave this on the foot for longer than 20 minutes.  Do not apply any form of heat to the area.   Weight bearing as tolerated with the post op shoe to the right foot.     Call your doctor with any questions or concerns, or any of the following symptoms:  Continuous bleeding  Pain increasing and/or does not respond to medication after 24 hours  Bandage or cast appears too tight      It is recommended that you have a responsible adult available to you for the next 24 hours following your procedure.  You should not drive a car, operate machinery, perform any strenuous activity, make any major decisions, or sign any legal documents for 24 hours due to the effects of the medication /  anesthesia.  Do not drink alcohol after having anesthesia or while taking any pain medications.    [facility]  Podiatry Foot and Ankle Surgery [phone]  24 hour Answering Service  [phone]

## 2025-05-20 NOTE — ANESTHESIA PREPROCEDURE EVALUATION
PRE-OP EVALUATION    Patient Name: Wagner Sheridan    Admit Diagnosis: HAMMER TOE OF RIGHT FOOT, SKIN ULCCER OF TOE OF RIGHT FOOT WITH FAT LAYER EXPOSED, RIGHT FOOT PAIN    Pre-op Diagnosis: HAMMER TOE OF RIGHT FOOT, SKIN ULCCER OF TOE OF RIGHT FOOT WITH FAT LAYER EXPOSED, RIGHT FOOT PAIN    AMPUTATION AT THE DISTAL INTERPHALANGEAL JOINT, SECOND DIGIT RIGHT FOOT    Anesthesia Procedure: AMPUTATION AT THE DISTAL INTERPHALANGEAL JOINT, SECOND DIGIT RIGHT FOOT (Right: Foot)    Surgeons and Role:     * Ksenia Ram DPM - Primary    Pre-op vitals reviewed.  Temp: 97.6 °F (36.4 °C)  Pulse: 50  Resp: 16  BP: 140/65  SpO2: 97 %  Body mass index is 33.78 kg/m².    Current medications reviewed.  Hospital Medications:  Current Medications[1]    Outpatient Medications:   Prescriptions Prior to Admission[2]    Allergies: Albumin human, Ciprofloxacin, Clindamycin, Other, Penicillins, Wasp venom, Wasp venom protein, Benazepril, and Chlorhexidine      Anesthesia Evaluation    Patient summary reviewed.    Anesthetic Complications  (-) history of anesthetic complications         GI/Hepatic/Renal             (+) chronic renal disease and CRI                   Cardiovascular  Comment: Note from latest cardiology visit reviewed.  H/o mitral valve repair and tricuspid valve repair 4/2024  Preserved LV function on echo  Prior h/o atrial fibrillation, NSR now    ECG reviewed.            (+) hypertension             (+) valvular problems/murmurs        (+) CHF                Endo/Other  Comment: Polymyalgia rheumatica                                Pulmonary                           Neuro/Psych                              Right foot hammer toe with fat layer exposed for partial amputation of right 3rd toe    Past Surgical History[3]  Social Hx on file[4]  History   Drug Use No     Available pre-op labs reviewed.               Airway      Mallampati: II  Mouth opening: 3 FB  TM distance: < 4 cm  Neck ROM: full  Cardiovascular    Cardiovascular exam normal.         Dental  Comment: Normal wear/minor imperfections and discoloration noted. No grossly weakened or damaged teeth on exam.           Pulmonary    Pulmonary exam normal.                 Other findings        ASA: 3   Plan: MAC  NPO status verified and patient meets guidelines.        Comment: We have decided on MAC for this procedure. MAC stands for Monitored Anesthesia Care and is a type of sedation. The patient will be given medications through the IV to relax the patient and help with pain control. During MAC the patient will be breathing on their own during the case. The patient has a risk of awareness before, during and after the procedure. There is also a risk of requiring general anesthesia and placement of a breathing tube should the patient not breathe well or should MAC not provide adequate sedation for the patient to tolerate the procedure. All questions were answered.                  Present on Admission:  **None**               [1]  • acetaminophen (Tylenol Extra Strength) tab 1,000 mg  1,000 mg Oral Once   • lactated ringers infusion   Intravenous Continuous   • ceFAZolin (Ancef) 2g in 10mL IV syringe premix  2 g Intravenous Once   [2]  Medications Prior to Admission   Medication Sig Dispense Refill Last Dose/Taking   • famotidine 40 MG Oral Tab Take 1 tablet (40 mg total) by mouth daily.   5/20/2025 at  4:00 AM   • predniSONE 2.5 MG Oral Tab Take 0.5 tablets (1.25 mg total) by mouth daily.   5/20/2025 at  4:00 AM   • eplerenone 50 MG Oral Tab Take 1 tablet (50 mg total) by mouth in the morning and 1 tablet (50 mg total) before bedtime.   5/20/2025 at  4:00 AM   • ferrous sulfate 325 (65 FE) MG Oral Tab EC Take 1 tablet (325 mg total) by mouth daily with breakfast.   5/19/2025 Morning   • bumetanide 2 MG Oral Tab Take 2 tabs in the morning and 1 tab in the evening (Patient taking differently: 0.5 tablets (1 mg total) BID (Diuretic).) 270 tablet 1  5/19/2025 Morning   • metoprolol tartrate 25 MG Oral Tab Take 0.5 tablets (12.5 mg total) by mouth 2x Daily(Beta Blocker). (Patient taking differently: Take 1 tablet (25 mg total) by mouth 2x Daily(Beta Blocker).) 60 tablet 0 5/20/2025 at  4:00 AM   • atorvastatin 20 MG Oral Tab Take 1 tablet (20 mg total) by mouth nightly.   5/19/2025 Evening   • finasteride 5 MG Oral Tab Take 1 tablet (5 mg total) by mouth at bedtime.   Taking   • EPINEPHrine (EPIPEN 2-JAZ) 0.3 MG/0.3ML Injection Solution Auto-injector USE AS DIRECTED 2 each 5 5/19/2025   [3]  Past Surgical History:  Procedure Laterality Date   • Anesth,open heart surgery  04/05/2024    Mitral valve repair, tricuspid repair,PFO closure etc   • Colonoscopy N/A 02/27/2024    Procedure: COLONOSCOPY;  Surgeon: Heath Mina DO;  Location:  ENDOSCOPY   • Colonoscopy N/A 11/12/2024    Dr. Hall, polyps, diverticulosis   • Colonoscopy N/A 11/12/2024    Procedure: COLONOSCOPY;  Surgeon: Neri Hall MD;  Location: [facility] ENDOSCOPY   • Egd N/A 11/12/2024    Dr. Hall, gastritis   • Knee surgery     • Other surgical history  02/27/2009    [facility], Dr. Tavera   • Other surgical history  09/04/2009    [facility] Dr. Tavera   • Other surgical history  01/04/2011    [facility] - Dr. Tavera   • Other surgical history  01/17/2013    PVP - Dr. Tavera   • Other surgical history  02/03/2016    [facility]    • Other surgical history  08/05/2020    cysto- Dr. Tavera   • Skin surgery  10/01/2019    MMS/R nasal ala /BCC done by [facility]   • Tonsillectomy     • Total knee replacement Right 02/25/2015    Right total knee replacement 02/25/2015   • Total knee replacement Left 05/11/2015    Left knee total replacement 05/11/2015    [4]  Social History  Socioeconomic History   • Marital status:    Tobacco Use   • Smoking status: Former     Current packs/day: 0.50     Average packs/day: 0.5 packs/day for 5.0 years (2.5 ttl pk-yrs)     Types: Cigarettes   • Smokeless tobacco: Never   •  Tobacco comments:     in college   Vaping Use   • Vaping status: Never Used   Substance and Sexual Activity   • Alcohol use: Not Currently     Comment: rare   • Drug use: No   Other Topics Concern   • Exercise Yes   • Seat Belt Yes

## 2025-05-20 NOTE — ANESTHESIA POSTPROCEDURE EVALUATION
[facility]    Wagner Sheridan Patient Status:  Hospital Outpatient Surgery   Age/Gender 79 year old male MRN [id number]   Location [facility] PERIOPERATIVE SERVICE Attending No att. providers found   Hosp Day # 0 PCP Edil Roy MD       Anesthesia Post-op Note    AMPUTATION AT THE DISTAL INTERPHALANGEAL JOINT, SECOND DIGIT RIGHT FOOT    Procedure Summary       Date: 05/20/25 Room / Location:  MAIN OR 01 / [facility] MAIN OR    Anesthesia Start: 0804 Anesthesia Stop: 0846    Procedure: AMPUTATION AT THE DISTAL INTERPHALANGEAL JOINT, SECOND DIGIT RIGHT FOOT (Right: Foot) Diagnosis: (HAMMER TOE OF RIGHT FOOT, SKIN ULCCER OF TOE OF RIGHT FOOT WITH FAT LAYER EXPOSED, RIGHT FOOT PAIN)    Surgeons: Ksenia Ram DPM Anesthesiologist: Kenneth De León MD    Anesthesia Type: MAC ASA Status: 3            Anesthesia Type: MAC    Vitals Value Taken Time   /62 05/20/25 09:17   Temp 97.9 °F (36.6 °C) 05/20/25 09:16   Pulse 50 05/20/25 09:24   Resp 14 05/20/25 09:16   SpO2 99 % 05/20/25 09:24           Patient Location: Same Day Surgery    Anesthesia Type: MAC    Airway Patency: patent    Postop Pain Control: adequate    Mental Status: mildly sedated but able to meaningfully participate in the post-anesthesia evaluation    Nausea/Vomiting: none    Cardiopulmonary/Hydration status: stable euvolemic    Complications: no apparent anesthesia related complications    Postop vital signs: stable    Dental Exam: Unchanged from Preop    Patient to be discharged home when criteria met.

## 2025-05-20 NOTE — OPERATIVE REPORT
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     Procedure in Detail:   The patient was brought to the operating room, placed on the operating table in a supine position. A time out was called in the presence of the surgeon, anesthesiologist and operating room staff to verify the pt's name, , location of the procedure and procedure to be performed. All present were in agreement. 2G of Ancef were administered to the patient prior to the operation as antibiotic prophylaxis. A well padded pneumatic ankle tourniquet was placed upon the patient's right ankle in the supramalleolar position. After adequate IV sedation was achieved local anesthetic block was then given to the patient in a digital block type to the left foot using 9cc of 0.5% marcaine plain. The right foot was then scrubbed, prepped and draped in the usual aseptic manner.    Once adequate anesthesia was verified, an esmarch bandage was then utilized to exsanguinate the right foot to the level of the tourniquet which was inflated to 250mmHg. Attention was directed to the dorsal aspect of the 2nd digit, right foot. A fishmouth incision was made about the DIPJ of the 2nd digit. The incision was carried down to bone, taking care to protect all neurovascular structures. The distal phalanx was removed in total with all soft tissue attached, passed off the surgical field for complete analysis. The area was inspected noting no devitalized  tissue or purulence at this time.    The area was flushed with copious amounts of normal saline. The skin was closed using 3-0 Nylon in a horizontal mattress type fashion. Dressings were applied consisting of betadine ointment, dry 4x4 gauze, salima and an ACE wrap. The tourniquet was deflated for a total time of 14 minutes, noting an immediate hyperemic response to digits 1-5 of the right foot.     The patient tolerated the anesthesia and procedure well. The patient was transported to the recovery room with vital signs stable and capillary filling time less than 3 seconds to all digits. The patient was given verbal and written post-operative instructions including postoperative pain medications. Patient will follow-up in 10-14 days.

## 2025-05-20 NOTE — BRIEF OP NOTE
Pre-Operative Diagnosis: HAMMER TOE OF RIGHT FOOT, SKIN ULCCER OF TOE OF RIGHT FOOT WITH FAT LAYER EXPOSED, RIGHT FOOT PAIN     Post-Operative Diagnosis: HAMMER TOE OF RIGHT FOOT, SKIN ULCCER OF TOE OF RIGHT FOOT WITH FAT LAYER EXPOSED, RIGHT FOOT PAIN      Procedure Performed:   AMPUTATION AT THE DISTAL INTERPHALANGEAL JOINT, SECOND DIGIT RIGHT FOOT    Surgeons and Role:     * Ksenia Ram DPM - Primary    Assistant(s):        Surgical Findings: Consistent with diagnosis     Specimen: distal phalange 2nd digit, right foot     Estimated Blood Loss: Blood Output: 1 mL (5/20/2025  8:31 AM)      Dictation Number:  art Ram DPM  5/20/2025  8:55 AM

## 2025-06-12 ENCOUNTER — PATIENT MESSAGE (OUTPATIENT)
Dept: NEPHROLOGY | Facility: CLINIC | Age: 80
End: 2025-06-12

## 2025-06-12 DIAGNOSIS — D63.1 ANEMIA DUE TO STAGE 3 CHRONIC KIDNEY DISEASE, UNSPECIFIED WHETHER STAGE 3A OR 3B CKD (HCC): ICD-10-CM

## 2025-06-12 DIAGNOSIS — N18.30 ANEMIA DUE TO STAGE 3 CHRONIC KIDNEY DISEASE, UNSPECIFIED WHETHER STAGE 3A OR 3B CKD (HCC): ICD-10-CM

## 2025-06-12 DIAGNOSIS — N18.30 STAGE 3 CHRONIC KIDNEY DISEASE, UNSPECIFIED WHETHER STAGE 3A OR 3B CKD (HCC): Primary | ICD-10-CM

## 2025-06-19 ENCOUNTER — LAB ENCOUNTER (OUTPATIENT)
Dept: LAB | Age: 80
End: 2025-06-19
Attending: INTERNAL MEDICINE
Payer: MEDICARE

## 2025-06-19 DIAGNOSIS — N18.30 ANEMIA DUE TO STAGE 3 CHRONIC KIDNEY DISEASE, UNSPECIFIED WHETHER STAGE 3A OR 3B CKD (HCC): ICD-10-CM

## 2025-06-19 DIAGNOSIS — D63.1 ANEMIA DUE TO STAGE 3 CHRONIC KIDNEY DISEASE, UNSPECIFIED WHETHER STAGE 3A OR 3B CKD (HCC): ICD-10-CM

## 2025-06-19 DIAGNOSIS — N18.30 STAGE 3 CHRONIC KIDNEY DISEASE, UNSPECIFIED WHETHER STAGE 3A OR 3B CKD (HCC): ICD-10-CM

## 2025-06-19 LAB
ANION GAP SERPL CALC-SCNC: 10 MMOL/L (ref 0–18)
BASOPHILS # BLD AUTO: 0.04 X10(3) UL (ref 0–0.2)
BASOPHILS NFR BLD AUTO: 0.6 %
BUN BLD-MCNC: 30 MG/DL (ref 9–23)
CALCIUM BLD-MCNC: 9.9 MG/DL (ref 8.7–10.6)
CHLORIDE SERPL-SCNC: 103 MMOL/L (ref 98–112)
CO2 SERPL-SCNC: 28 MMOL/L (ref 21–32)
CREAT BLD-MCNC: 1.85 MG/DL (ref 0.7–1.3)
EGFRCR SERPLBLD CKD-EPI 2021: 36 ML/MIN/1.73M2 (ref 60–?)
EOSINOPHIL # BLD AUTO: 0.17 X10(3) UL (ref 0–0.7)
EOSINOPHIL NFR BLD AUTO: 2.5 %
ERYTHROCYTE [DISTWIDTH] IN BLOOD BY AUTOMATED COUNT: 15.4 %
FASTING STATUS PATIENT QL REPORTED: NO
GLUCOSE BLD-MCNC: 92 MG/DL (ref 70–99)
HCT VFR BLD AUTO: 28.3 % (ref 39–53)
HGB BLD-MCNC: 8.9 G/DL (ref 13–17.5)
IMM GRANULOCYTES # BLD AUTO: 0.06 X10(3) UL (ref 0–1)
IMM GRANULOCYTES NFR BLD: 0.9 %
LYMPHOCYTES # BLD AUTO: 1.6 X10(3) UL (ref 1–4)
LYMPHOCYTES NFR BLD AUTO: 23.5 %
MCH RBC QN AUTO: 27.6 PG (ref 26–34)
MCHC RBC AUTO-ENTMCNC: 31.4 G/DL (ref 31–37)
MCV RBC AUTO: 87.9 FL (ref 80–100)
MONOCYTES # BLD AUTO: 0.72 X10(3) UL (ref 0.1–1)
MONOCYTES NFR BLD AUTO: 10.6 %
NEUTROPHILS # BLD AUTO: 4.23 X10 (3) UL (ref 1.5–7.7)
NEUTROPHILS # BLD AUTO: 4.23 X10(3) UL (ref 1.5–7.7)
NEUTROPHILS NFR BLD AUTO: 61.9 %
OSMOLALITY SERPL CALC.SUM OF ELEC: 298 MOSM/KG (ref 275–295)
PLATELET # BLD AUTO: 222 10(3)UL (ref 150–450)
POTASSIUM SERPL-SCNC: 3.9 MMOL/L (ref 3.5–5.1)
RBC # BLD AUTO: 3.22 X10(6)UL (ref 3.8–5.8)
SODIUM SERPL-SCNC: 141 MMOL/L (ref 136–145)
WBC # BLD AUTO: 6.8 X10(3) UL (ref 4–11)

## 2025-06-19 PROCEDURE — 85025 COMPLETE CBC W/AUTO DIFF WBC: CPT

## 2025-06-19 PROCEDURE — 80048 BASIC METABOLIC PNL TOTAL CA: CPT

## 2025-06-19 PROCEDURE — 36415 COLL VENOUS BLD VENIPUNCTURE: CPT

## (undated) DEVICE — SYRINGE, LUER SLIP, STERILE, 60ML: Brand: MEDLINE

## (undated) DEVICE — DISPOSABLE TOURNIQUET CUFF SINGLE BLADDER, DUAL PORT AND QUICK CONNECT CONNECTOR: Brand: COLOR CUFF

## (undated) DEVICE — 3M™ TEGADERM™ TRANSPARENT FILM DRESSING FRAME STYLE, 1624W, 2-3/8 IN X 2-3/4 IN (6 CM X 7 CM), 100/CT 4CT/CASE: Brand: 3M™ TEGADERM™

## (undated) DEVICE — SLEEVE COMPR MD KNEE LEN SGL USE KENDALL SCD

## (undated) DEVICE — CLIP INT L ORNG TI TRNSVRS GRV CHEVRON SHP

## (undated) DEVICE — LACTATED R INJ

## (undated) DEVICE — CONTAINER,SPECIMEN,PNEU TUBE,4OZ,OR STRL: Brand: MEDLINE

## (undated) DEVICE — SUT PERMA- 0 18IN FSL NABSRB BLK L30MM 3/8

## (undated) DEVICE — ELECTRODE ES AD CRD L9FT COND ADH HYDRGEL REM

## (undated) DEVICE — DRESSING ADAPTIC L16XW3IN OIL ADH

## (undated) DEVICE — Device

## (undated) DEVICE — GLOVE SUR 7.5 SENSICARE PI PIP CRM PWD F

## (undated) DEVICE — STERILE POLYISOPRENE POWDER-FREE SURGICAL GLOVES: Brand: PROTEXIS

## (undated) DEVICE — NDL CNTR 40CT FM MAG: Brand: MEDLINE INDUSTRIES, INC.

## (undated) DEVICE — KIT CUSTOM ENDOPROCEDURE STERIS

## (undated) DEVICE — CLAMP BPLR RF ABLAT OPN LNG JAW W/ GLIDEPATH

## (undated) DEVICE — CELL SAVER RESERVOIR

## (undated) DEVICE — PRECISION (9.0 X 0.51 X 25.0MM)

## (undated) DEVICE — BANDAGE GZ 2X75IN RAYON POLY CNFRM HEAVIER

## (undated) DEVICE — SOLUTION IRRIG 1000ML 0.9% NACL USP BTL

## (undated) DEVICE — KIT CLEAN ENDOKIT 1.1OZ GOWNX2

## (undated) DEVICE — GIJAW SINGLE-USE BIOPSY FORCEPS WITH NEEDLE: Brand: GIJAW

## (undated) DEVICE — BANDAGE,GAUZE,BULKEE II,4.5"X4.1YD,STRL: Brand: MEDLINE

## (undated) DEVICE — SYRINGE MED 50ML LL TIP DISP

## (undated) DEVICE — OPEN HEART: Brand: MEDLINE INDUSTRIES, INC.

## (undated) DEVICE — ELECTRODE MONIT RED DOT 3PK

## (undated) DEVICE — SUT ETHLN 3-0 18IN PS-1 NABSRB BLK 24MM 3/8 C

## (undated) DEVICE — GOWN,SIRUS,FABRIC-REINFORCED,X-LARGE: Brand: MEDLINE

## (undated) DEVICE — CANNULA NSL AD W/ FLTR 14FT O2/CO2

## (undated) DEVICE — LOWER EXTREMITY CDS-LF: Brand: MEDLINE INDUSTRIES, INC.

## (undated) DEVICE — CANISTER SUCT 1200CC LID BLU HRD ADPT AUTO

## (undated) DEVICE — CONMED SCOPE SAVER BITE BLOCK, 20X27 MM: Brand: SCOPE SAVER

## (undated) DEVICE — SUT ETHBND XL 4-0 30IN RB-1 NABSRB GRN 17MM 1

## (undated) DEVICE — GLOVE SUR 7 SENSICARE PI PIP CRM PWD F

## (undated) DEVICE — COR-KNOT MINI® COMBO KITBASE PACKAGE TYPE - KITEACH STERILE PACKAGE KIT CONTAINS (2) SINGLE PATIENT USE COR-KNOT MINI® DEVICES AND (12) COR-KNOT® QUICK LOADS®.: Brand: COR-KNOT MINI®

## (undated) DEVICE — SYRINGE BULB 50/CS 48/PLT: Brand: MEDEGEN MEDICAL PRODUCTS, LLC

## (undated) DEVICE — SUT PROL 3-0 36IN SH NABSRB BLU 26MM 1/2 CIR

## (undated) DEVICE — SUT ETHBND XL 2-0 30IN V-7 NABSRB GRN WHT 26M

## (undated) DEVICE — STANDARD HYPODERMIC NEEDLE,POLYPROPYLENE HUB: Brand: MONOJECT

## (undated) DEVICE — TRAP POLYP W/ 2 SPEC TY CLR MAGNIFYING WIND

## (undated) DEVICE — CATHETER URETH AD 20FR L16IN YEL RUB RND TIP

## (undated) DEVICE — #15 STERILE STAINLESS BLADE: Brand: STERILE STAINLESS BLADES

## (undated) DEVICE — Device: Brand: INTELLICART™

## (undated) DEVICE — GLOVE SUR 7 BIOGEL PI ORTHOPRO PIP BRN PWD F

## (undated) DEVICE — 3M™ TEGADERM™ TRANSPARENT FILM DRESSING FRAME STYLE, 1626W, 4 IN X 4-3/4 IN (10 CM X 12 CM), 50/CT 4CT/CASE: Brand: 3M™ TEGADERM™

## (undated) DEVICE — INTENT TO BE USED WITH SUTURE MATERIAL FOR TISSUE CLOSURE: Brand: RICHARD-ALLAN® NEEDLE 1/2 CIRCLE TAPER

## (undated) DEVICE — SKN PREP SPNG STKS PVP PNT STR: Brand: MEDLINE INDUSTRIES, INC.

## (undated) DEVICE — SUT PROL 4-0 36IN SH NABSRB BLU 26MM 1/2 CIR

## (undated) DEVICE — SUT PROL 8-0 18IN BV130-5 NABSRB BLU 6.5MM 3/

## (undated) DEVICE — VIOLET BRAIDED (POLYGLACTIN 910), SYNTHETIC ABSORBABLE SUTURE: Brand: COATED VICRYL

## (undated) DEVICE — VALVE AIR/H20 DEFENDO SCT BX

## (undated) DEVICE — BITEBLOCK ENDOSCP 60FR MAXI STRP

## (undated) DEVICE — NEEDLE INJ 25GA CATH 230CM CHN 2.8MM ACUJECT

## (undated) DEVICE — SUT PROL 5-0 24IN NABSRB BLU 13MM C-1 3/8

## (undated) DEVICE — YANKAUER,POOLE TIP,STERILE,50/CS: Brand: MEDLINE

## (undated) DEVICE — HANDPIECE SUR ROTARY NRECHRG BTTRY PWR ZDRIVE

## (undated) DEVICE — TRANSFER PACK CONTAINER 600 ML WITH COUPLER. STERILE FLUID PATH: Brand: FENWAL TRANSFER PACK CONTAINER 600 ML WITH COUPLER

## (undated) DEVICE — CABLE PT L10FT ELECTRD PIN DIA1-2MM WHT DISP

## (undated) DEVICE — V2 SPECIMEN COLLECTION MANIFOLD KIT: Brand: NEPTUNE

## (undated) DEVICE — PREMIUM WET SKIN PREP TRAY: Brand: MEDLINE INDUSTRIES, INC.

## (undated) DEVICE — MEDI-VAC NON-CONDUCTIVE SUCTION TUBING: Brand: CARDINAL HEALTH

## (undated) DEVICE — MEDI-VAC NON-CONDUCTIVE SUCTION TUBING 6MM X 1.8M (6FT.) L: Brand: CARDINAL HEALTH

## (undated) DEVICE — YANKAUER,BULB TIP,W/O VENT,RIGID,STERILE: Brand: MEDLINE

## (undated) DEVICE — SUT 6 SGL WIRE 14IN CCS-1 NABSRB L49MM 1/2 CI

## (undated) DEVICE — ABSORBABLE HEMOSTAT (OXIDIZED REGENERATED CELLULOSE): Brand: SURGICEL

## (undated) DEVICE — TOURNIQUET KIT 7 IN

## (undated) DEVICE — CO2 CANNULA,SSOFT,ADLT,7O2,4CO2,FEMALE: Brand: MEDLINE

## (undated) DEVICE — SUT PROL 4-0 36IN V-5 NABSRB BLU L17MM 1/2 CI

## (undated) DEVICE — COR-KNOT® QUICK LOAD® 6-POUCH: Brand: COR-KNOT® QUICK LOAD®

## (undated) DEVICE — MARKER ENDOSCP TISS TATTOO C BLK SUSP PREFIL

## (undated) DEVICE — KIT ENDO ORCAPOD 160/180/190

## (undated) DEVICE — LASSO POLYPECTOMY SNARE: Brand: LASSO

## (undated) DEVICE — SPONGE GZ 4XL4IN 100% COT 12 PLY TYP VII WVN

## (undated) NOTE — LETTER
[facility]  Administration of Anesthesia  I Wagner Sheridan agree to be cared for by a physician anesthesiologist alone and/or with a nurse anesthetist, who is specially trained to monitor me and give me medicine to put me to sleep or keep me comfortable during my procedure    I understand that my anesthesiologist and/or anesthetist is not an employee or agent of [facility] or [employer]. He or she works for [facility]    As the patient asking for anesthesia services, I agree to:  Allow the anesthesiologist (anesthesia doctor) to give me medicine and do additional procedures as necessary. Some examples are: Starting or using an “IV” to give me medicine, fluids or blood during my procedure, and having a breathing tube placed to help me breathe when I’m asleep (intubation). In the event that my heart stops working properly, I understand that my anesthesiologist will make every effort to sustain my life, unless otherwise directed by [facility] Do Not Resuscitate documents.  Tell my anesthesia doctor before my procedure:  If I am pregnant.  The last time that I ate or drank.  iii. All of the medicines I take (including prescriptions, herbal supplements, and pills I can buy without a prescription (including street drugs/illegal medications). Failure to inform my anesthesiologist about these medicines may increase my risk of anesthetic complications.  iv.If I am allergic to anything or have had a reaction to anesthesia before.  I understand how the anesthesia medicine will help me (benefits).  I understand that with any type of anesthesia medicine there are risks:  The most common risks are: nausea, vomiting, sore throat, muscle soreness, damage to my eyes, mouth, or teeth (from breathing tube placement).  Rare risks include: remembering what happened during my procedure, allergic reactions to medications, injury to my airway, heart, lungs, vision, nerves, or muscles  and in extremely rare instances death.  My doctor has explained to me other choices available to me for my care (alternatives).  Pregnant Patients (“epidural”):  I understand that the risks of having an epidural (medicine given into my back to help control pain during labor), include itching, low blood pressure, difficulty urinating, headache or slowing of the baby’s heart. Very rare risks include infection, bleeding, seizure, irregular heart rhythms and nerve injury.  Regional Anesthesia (“spinal”, “epidural”, & “nerve blocks”):  I understand that rare but potential complications include headache, bleeding, infection, seizure, irregular heart rhythms, and nerve injury.    _____________________________________________________________________________  Patient (or Representative) Signature/Relationship to Patient  Date   Time    _____________________________________________________________________________   Name (if used)    Language/Organization   Time    _____________________________________________________________________________  Nurse Anesthetist Signature     Date   Time  _____________________________________________________________________________  Anesthesiologist Signature     Date   Time  I have discussed the procedure and information above with the patient (or patient’s representative) and answered their questions. The patient or their representative has agreed to have anesthesia services.    _____________________________________________________________________________  Witness        Date   Time  I have verified that the signature is that of the patient or patient’s representative, and that it was signed before the procedure  Patient Name: Wagner Sheridan     : 1945                 Printed: 2024 at 8:33 AM    Medical Record #: [id number]                                            Page 1 of 1  ----------ANESTHESIA CONSENT----------

## (undated) NOTE — LETTER
Date: 7/12/2021  Patient name: Nelda Garcia  YOB: 1945  Medical Record Number: [id number]  Coverage: Payor: Nichole Menezes / Plan: Andrzej Lopez / Product Type: Medicare /   Insurance ID: [id number]  Patient Address: [address] [place] [address] 30 days  Dressing Frequency:Change dressingevery other day    Compression Stockings ordered: No    Notes: Order for Border Foam 6x6 or 4x4, gauze, saline

## (undated) NOTE — LETTER
Bijan Domingo M.D., F.A.C.S.  Blayne Demarco M.D., F.A.C.S.  Mau Shelley M.D., F.A.C.S  Delroy Henning M.D., F.A.C.S.   Dakota Portillo M.D., F.A.C.S.   Maria Del Carmen Zhong M.D.  Merly Arboleda M.D., F.A.C.S.  Adal Trinidad M.D., F.A.C.S.  Adal Powell M.D., F.A.C.SLiban Braxton M.D., F.A.C.S.  Adal Ramos M.D. F.A.C.S.  Jason Hansen M.D., F.A.C.S.   Mikey Doyle M.D., F.A.C.S.  Herb Hill M.D., F.A.C.S., F.A.C.C. Jeromy Alvarado M.D., F.A.C.S.   Jeferson Harper M.D., F.A.C.S.  Jeromy Campos M.D., F.A.C.S.  Trevor Arreguin M.D., F.A.C.S.  CHANG Andino M.D., F.A.C.S  CHANG Arroyo M.D., F.A.C.S.   Gregory Clemens M.D., F.A.C.S.  CHANG Genao M.D. R. Anthony Perez-Tamayo, M.D. PhD.  CHANG Mazariegos M.D. F A MARLEN Nielson M.D.   Pop Bullard M.D.   Graciela Winkler M.D.  CHANG Farrell D.O., F.A.CLibanS.  Jeferson Glover M.D., F.A.C.S.  Trevor Martinez M.D.        Welcome to [facility]    As you contemplate possible surgical treatment, it is very important to us that you understand fully what is being discussed, that all of your questions have been answered, and that your options for treatment have been fully explained.    To that end, on the following page we will ask you some questions to make certain that you understand everything which has been explained to you. Included in this understanding is that there are both surgical and nonsurgical treatments available for you, that you have options regarding where your care is given, and what doctors are involved in your care. Included in these options would, of course, be the option to elect for no treatment whatsoever. We especially want to be sure that you have had a chance to have all of your questions and  concerns answered. If there are any issues which have not been adequately addressed, we ask you to bring them forward so that we can thoroughly address them.    A patient who is fully informed and understands their condition and options for treatment, as well as potential adverse effects of treatment, is going to be a patient who receives the most benefit out of care rendered. Our goal in addition to providing excellent surgical care is to provide the necessary information to you and your family in order to make decisions which are appropriate relative to your own care.    Please take the time necessary to read and answer the questions on the next page. Again, if you have any questions, bring them forward and we will certainly address them.    Sincerely,    [facility].    _______________________  ____________________________________  Date:                                             Patient Signature  ________________________  Wagner Sheridan  Witness Consent Form         Revised: 2015  Patient Name: Wagner Sheridan     : 1945                 Printed: 6/15/13 9:43 AM     Medical Record #: [id number]                Page: 1 of  2        [facility]  Supplemental Consent Form    A [facility] surgeon has met with me and explained the matter of my illness, and what treatments might be available to improve my condition. As a result of that conversation, I understand the following:    A [facility] surgeon met with me and explained, in detail, the nature of my condition for which surgery is being contemplated. The procedure to be performed  Is: MITRAL VALVE REPAIR VS REPLACMENT, TRICUSPID VALVE REPAIR VS REPLACEME  NT,  LEFT ATRIAL  APPENDAGE AMPUTATION,  POSSIBLE CORONARY ARTERY BYPASS GRAFT, POSSIBLE PATENT FORAMEN OVALE CLOSURE            Yes _____ No _____    A [facility] surgeon has explained to me that there are alternatives to surgery which might include no  surgery, medical therapy, or interventional treatment, among other options and the risks and benefits of the different treatment options:    Yes _____ No _____    A [facility] surgeon as explained to me that if I should so desire, he/she is willing to explain my case and the surgical and non-surgical options to family members:            Yes _____ No _____    A [facility] surgeon has answered all of my questions regarding the topics we have discussed. I have been invited to ask more questions:  Yes _____ No _____    A [facility] surgeon has explained to me that if I seek other options or wish treatment at another facility in [place] or [place], or anywhere in the [place], that his/her office will assist me in making such accommodations:   Yes _____ No _____    A [facility] surgeon has explained to me, that death, risk of bleeding, stroke, multi-organ failure, heart attack or other complications are risks for the proposed surgical procedure:           Yes _____ No _____    A [facility] surgeon has explained to me that I have the right to cancel or postpone the surgery at any time prior to the start of surgery:    Yes _____ No _____    The nature and options for treatment for my condition have been explained to me, in detail, by a [facility] surgeon and all questions have been answered to my satisfaction. I understand that I am not required to undergo surgery, and further, that if I so desire, I could have surgery accomplished by another surgeon or at another institution. I understand and accept that which has been explained to me. I am able to make my decisions knowingly and willfully based on the data.    ______________________   __________________________________  Date       Patient Signature  ______________________________ Wagner Sheridan  Witness Consent Form   Patient Name: Wagner Sheridan     DATB: 12/24/1945                 Medical Record #: [id number]    Page: 2 of 2        Printed: March 20, 2024

## (undated) NOTE — LETTER
[facility] [address]  [address] [facility] [address] [address]  [phone]  AUTHORIZATION FOR SURGICAL OPERATION OR PROCEDURE                                                           I hereby authorize Heath Gomes, my physician and his/her assistants (if applicable), which may include medical students, residents, and/or fellows, to perform the following surgical operation/ procedure and administer such anesthesia as may be determined necessary by my physician:  Operation/Procedure name  Colonoscopy and Esophagogastroduodenoscopy(s)   On Wagner Sheridan.  2.   I recognize that during the surgical operation/procedure, unforeseen conditions may necessitate additional or different procedures than those listed above.  I, therefore, further authorize and request that the above-named surgeon, assistants, or designees perform such procedures as are, in their judgment, necessary and desirable.    3.   My surgeon/physician has discussed prior to my surgery the potential benefits, risks and side effects of this procedure; the likelihood of achieving goals; and potential problems that might occur during recuperation.  They also discussed reasonable alternatives to the procedure, including risks, benefits, and side effects related to the alternatives and risks related to not receiving this procedure.  I have had all my questions answered and I acknowledge that no guarantee has been made as to the result that may be obtained.    4.   Should the need arise during my operation/procedure, which includes change of level of care prior to discharge, I also consent to the administration of blood and/or blood products.  Further, I understand that despite careful testing and screening of blood or blood products by collecting agencies, I may still be subject to ill effects as a result of receiving a blood transfusion and/or blood products.  The following are some, but not all, of the potential risks that can occur:  fever and allergic reactions, hemolytic reactions, transmission of diseases such as Hepatitis, AIDS and Cytomegalovirus (CMV) and fluid overload.  In the event that I wish to have an autologous transfusion of my own blood, or a directed donor transfusion, I will discuss this with my physician.  Check only if Refusing Blood or Blood Products  I understand refusal of blood or blood products as deemed necessary by my physician may have serious consequences to my condition to include possible death. I hereby assume responsibility for my refusal and release the hospital, its personnel, and my physicians from any responsibility for the consequences of my refusal.          o  Refuse   5.   I authorize the use of any specimen, organs, tissues, body parts or foreign objects that may be removed from my body during the operation/procedure for diagnosis, research or teaching purposes and their subsequent disposal by hospital authorities.  I also authorize the release of specimen test results and/or written reports to my treating physician on the hospital medical staff or other referring or consulting physicians involved in my care, at the discretion of the Pathologist or my treating physician.    6.   I consent to the photographing or videotaping of the operations or procedures to be performed, including appropriate portions of my body for medical, scientific, or educational purposes, provided my identity is not revealed by the pictures or by descriptive texts accompanying them.  If the procedure has been photographed/videotaped, the surgeon will obtain the original picture, image, videotape or CD.  The hospital will not be responsible for storage, release or maintenance of the picture, image, tape or CD.    7.   I consent to the presence of a  or observers in the operating room as deemed necessary by my physician or their designees.    8.   I recognize that in the event my procedure results in extended  X-Ray/fluoroscopy time, I may develop a skin reaction.    9. If I have a Do Not Attempt Resuscitation (DNAR) order in place, that status will be suspended while in the operating room, procedural suite, and during the recovery period unless otherwise explicitly stated by me (or a person authorized to consent on my behalf). The surgeon or my attending physician will determine when the applicable recovery period ends for purposes of reinstating the DNAR order.  10. Patients having a sterilization procedure: I understand that if the procedure is successful the results will be permanent and it will therefore be impossible for me to inseminate, conceive, or bear children.  I also understand that the procedure is intended to result in sterility, although the result has not been guaranteed.   11. I acknowledge that my physician has explained sedation/analgesia administration to me including the risk and benefits I consent to the administration of sedation/analgesia as may be necessary or desirable in the judgment of my physician.    I CERTIFY THAT I HAVE READ AND FULLY UNDERSTAND THE ABOVE CONSENT TO OPERATION and/or OTHER PROCEDURE.     _________________________________________ _________________________________     ___________________________________  Signature of Patient     Signature of Responsible Person                   Printed Name of Responsible Person                              _________________________________________ ______________________________        ___________________________________  Signature of Witness         Date  Time         Relationship to Patient    Patient Name: Wagner Sheridan    : 1945   Printed: 2024      Medical Record #: [id number]                                              Page 1 of 1

## (undated) NOTE — LETTER
Bijan Domingo M.D., F.A.C.S.  Blayne Demarco M.D., F.A.C.S.  Mau Shelley M.D., F.A.C.S  Delroy Henning M.D., F.A.C.S.   Dakota Portillo M.D., F.A.C.S.   Maria Del Carmen Zhong M.D.  Merly Arboleda M.D., F.A.C.S.  Adal Trinidad M.D., F.A.C.S.  Adal Powell M.D., F.A.C.SLiban Braxton M.D., F.A.C.S.  Adal Ramos M.D. F.A.C.S.  Jason Hansen M.D., F.A.C.S.   Mikey Doyle M.D., F.A.C.S.  Herb Hill M.D., F.A.C.S., F.A.C.C. Jeromy Alvarado M.D., F.A.C.S.   Jeferson Harper M.D., F.A.C.S.  Jeromy Campos M.D., F.A.C.S.  Trevor Arreguin M.D., F.A.C.S.  CHANG Andino M.D., F.A.C.S  CHANG Arroyo M.D., F.A.C.S.   Gregory Clemens M.D., F.A.C.S.  CHANG Genao M.D. R. Anthony Perez-Tamayo, M.D. PhD.  CHANG Mazariegos M.D. F A MARLEN Nielson M.D.   Pop Bullard M.D.   Graciela Winkler M.D.  CHANG Farrell D.O., F.A.CLibanS.  Jeferson Glover M.D., F.A.C.S.  Trevor Martinez M.D.        Welcome to [facility]    As you contemplate possible surgical treatment, it is very important to us that you understand fully what is being discussed, that all of your questions have been answered, and that your options for treatment have been fully explained.    To that end, on the following page we will ask you some questions to make certain that you understand everything which has been explained to you. Included in this understanding is that there are both surgical and nonsurgical treatments available for you, that you have options regarding where your care is given, and what doctors are involved in your care. Included in these options would, of course, be the option to elect for no treatment whatsoever. We especially want to be sure that you have had a chance to have all of your questions and  concerns answered. If there are any issues which have not been adequately addressed, we ask you to bring them forward so that we can thoroughly address them.    A patient who is fully informed and understands their condition and options for treatment, as well as potential adverse effects of treatment, is going to be a patient who receives the most benefit out of care rendered. Our goal in addition to providing excellent surgical care is to provide the necessary information to you and your family in order to make decisions which are appropriate relative to your own care.    Please take the time necessary to read and answer the questions on the next page. Again, if you have any questions, bring them forward and we will certainly address them.    Sincerely,    [facility].    _______________________  ____________________________________  Date:                                             Patient Signature  ________________________  Wagner Sheridan  Witness Consent Form         Revised: 2015  Patient Name: Wagner Sheridan     : 1945                 Printed: 6/15/13 9:43 AM     Medical Record #: [id number]                Page: 1 of  2        [facility]  Supplemental Consent Form    A [facility] surgeon has met with me and explained the matter of my illness, and what treatments might be available to improve my condition. As a result of that conversation, I understand the following:    A [facility] surgeon met with me and explained, in detail, the nature of my condition for which surgery is being contemplated. The procedure to be performed  Is: MITRAL VALVE REPAIR VS REPLACMENT, TRICUSPID VALVE REPAIR VS REPLACEMENT,  LEFT ATRIAL  APPENDAGE AMPUTATION, CORONARY ARTERY BYPASS GRAFT, POSSIBLE CORONARY ARTERY BYPASS GRAFT, POSSIBLE PATENT FORAMEN OVALE CLOSURE            Yes _____ No _____    A [facility] surgeon has explained to me that there are alternatives to  surgery which might include no surgery, medical therapy, or interventional treatment, among other options and the risks and benefits of the different treatment options:    Yes _____ No _____    A [facility] surgeon as explained to me that if I should so desire, he/she is willing to explain my case and the surgical and non-surgical options to family members:            Yes _____ No _____    A [facility] surgeon has answered all of my questions regarding the topics we have discussed. I have been invited to ask more questions:  Yes _____ No _____    A [facility] surgeon has explained to me that if I seek other options or wish treatment at another facility in [place] or [place], or anywhere in the [place], that his/her office will assist me in making such accommodations:   Yes _____ No _____    A [facility] surgeon has explained to me, that death, risk of bleeding, stroke, multi-organ failure, heart attack or other complications are risks for the proposed surgical procedure:           Yes _____ No _____    A [facility] surgeon has explained to me that I have the right to cancel or postpone the surgery at any time prior to the start of surgery:    Yes _____ No _____    The nature and options for treatment for my condition have been explained to me, in detail, by a [facility] surgeon and all questions have been answered to my satisfaction. I understand that I am not required to undergo surgery, and further, that if I so desire, I could have surgery accomplished by another surgeon or at another institution. I understand and accept that which has been explained to me. I am able to make my decisions knowingly and willfully based on the data.    ______________________   __________________________________  Date       Patient Signature  ______________________________ Wagner Sheridan  Witness Consent Form   Patient Name: Wagner Sheridan     DATB: 12/24/1945                 Medical Record #: [id number]    Page: 2 of 2        Printed: February 20, 2024

## (undated) NOTE — LETTER
FAX REGARDING HISTORY AND PHYSICALS  Patient Name: Wagner Sheridan CSN: [id number]   MRN: [id number]  : 1945 - A: 79 y    Surgeon(s):  Ksenia Ram DPM  Consent Procedure: AMPUTATION AT THE DISTAL INTERPHALANGEAL JOINT, SECOND DIGIT RIGHT  Anesthesia Type: MAC    The patient listed above is coming in for a surgical/procedural case.  An H&P is needed within 30 days with an update note within 7 days of the surgery/procedure. It is the ordering physician’s responsibility to do the H&P or make arrangements with the PCP to have the H&P completed and faxed to Pre-Admission Testing at [phone].    Surgery Date: 2025    Acceptable History & Physicals are:     (a) Done within 30 days of the surgery/procedure, with an update note within 7 days of the surgery/procedure.                                                                                                               OR    (b) Done within 7 days of the surgery/procedure.      Thank you.

## (undated) NOTE — LETTER
Date: 7/19/2024  Patient name: Wagner Sheridan  YOB: 1945  Medical Record Number: [id number]  Primary Coverage: Payor: [employer]ET[employer] [facility] / Plan: AET[employer] MEDICARE / Product Type: Medicare /   Secondary Coverage:  -  FOR LIFE  Insurance ID: [id number]  Patient Address: [address] [place] [id number]  Telephone Information:   Home Phone [phone]   Mobile [phone]         Encounter Date: 7/19/2024  Provider: Papito Linares MD  Diagnosis:     ICD-10-CM   1. 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 Non-pressure chronic ulcer of left calf with fat layer exposed (HCC)  L97.222   3. Idiopathic chronic venous hypertension of both lower extremities with ulcer and inflammation (HCC)  I87.333   4. Sloughing of wound  R23.8   5. Obesity (BMI 30-39.9)  E66.9   6. Stage 3b chronic kidney disease (HCC)  N18.32   7. Chronic systolic heart failure (HCC)  I50.22       Progress Note:  [facility] PROGRESS NOTE  PAPITO LINARES MD  7/19/2024    Chief Complaint:   Chief Complaint   Patient presents with    Wound Recheck     Pt here for follow up arrives with compression wraps to bilateral legs. He reports tolerating wraps well       HPI:   Subjective  Wagner Sheridan is a 78 year old male coming in for a follow-up visit.    HPI    Wounds all  improved. Dimensions better - tissue quality better.   No s/o infection  Edema down  Tolerating wraps ok.     Review of Systems  Negative except HPI   Denies chest pain / SOB / palpitations  Denies fever.     Allergies  Allergies   Allergen Reactions    Albumin Human ANAPHYLAXIS    Ciprofloxacin HIVES    Clindamycin HIVES    Other ANAPHYLAXIS     Alcohol swab caps    Penicillins HIVES     Tolerated Ancef 4/4/24    Wasp Venom RESPIRATORY FAILURE    Wasp Venom Protein RESPIRATORY FAILURE    Benazepril Coughing    Chlorhexidine RASH and OTHER (SEE COMMENTS)     redness       Current Meds:  Current Outpatient  Medications   Medication Sig Dispense Refill    ferrous sulfate 325 (65 FE) MG Oral Tab EC Take 1 tablet (325 mg total) by mouth 3 (three) times daily with meals.      bumetanide 2 MG Oral Tab Take 2 tabs in the morning and 1 tab in the evening 270 tablet 1    Potassium Chloride ER 20 MEQ Oral Tab CR Take 20 mcg by mouth daily. 30 tablet 110    metoprolol tartrate 25 MG Oral Tab Take 0.5 tablets (12.5 mg total) by mouth 2x Daily(Beta Blocker). 60 tablet 0    eplerenone 25 MG Oral Tab Take 1 tablet (25 mg total) by mouth daily. 30 tablet 11    pantoprazole 40 MG Oral Tab EC Take 1 tablet (40 mg total) by mouth every morning before breakfast. (Patient not taking: Reported on 7/12/2024) 30 tablet 11    atorvastatin 20 MG Oral Tab Take 1 tablet (20 mg total) by mouth nightly.      finasteride 5 MG Oral Tab Take 1 tablet (5 mg total) by mouth at bedtime.      EPINEPHrine (EPIPEN 2-JAZ) 0.3 MG/0.3ML Injection Solution Auto-injector USE AS DIRECTED 2 each 5         EXAM:   Objective  Objective    Physical Exam    Vital Signs  Vitals:    07/19/24 0700   BP: 98/52   Pulse: 64   Resp: 14   Temp: 97.6 °F (36.4 °C)       Wound Assessment  Wound 07/12/24 #1 Leg Left (Active)   Wound Image    07/19/24 0951   Drainage Amount Small 07/19/24 0951   Drainage Description Sanguineous 07/19/24 0951   Treatments Compression 07/15/24 0940   Wound Length (cm) 7.5 cm 07/19/24 0951   Wound Width (cm) 14.8 cm 07/19/24 0951   Wound Surface Area (cm^2) 111 cm^2 07/19/24 0951   Wound Depth (cm) 0.1 cm 07/19/24 0951   Wound Volume (cm^3) 11.1 cm^3 07/19/24 0951   Wound Healing % 25 07/19/24 0951   Margins Well-defined edges 07/19/24 0951   Non-staged Wound Description Full thickness 07/19/24 0951   Delmy-wound Assessment Dry;Edema;Hemosiderin staining 07/19/24 0951   Wound Granulation Tissue Red;Firm 07/19/24 0951   Wound Bed Granulation (%) 30 % 07/19/24 0951   Wound Bed Epithelium (%) 70 % 07/19/24 0951   Wound Odor None 07/19/24 0951   Shape  cluster 07/19/24 0951   Tunneling? No 07/19/24 0951   Undermining? No 07/19/24 0951   Sinus Tracts? No 07/19/24 0951       Wound 07/12/24 #2 Leg Right (Active)   Wound Image   07/19/24 0949   Drainage Amount Small 07/19/24 0949   Drainage Description Serosanguineous 07/19/24 0949   Treatments Compression 07/15/24 0942   Wound Length (cm) 4.9 cm 07/19/24 0949   Wound Width (cm) 1.2 cm 07/19/24 0949   Wound Surface Area (cm^2) 5.88 cm^2 07/19/24 0949   Wound Depth (cm) 0.1 cm 07/19/24 0949   Wound Volume (cm^3) 0.588 cm^3 07/19/24 0949   Wound Healing % 82 07/19/24 0949   Margins Well-defined edges 07/19/24 0949   Non-staged Wound Description Full thickness 07/19/24 0949   Delmy-wound Assessment Edema;Hemosiderin staining 07/19/24 0949   Wound Granulation Tissue Red;Pink;Firm 07/19/24 0949   Wound Bed Granulation (%) 50 % 07/19/24 0949   Wound Bed Epithelium (%) 50 % 07/19/24 0949   Wound Odor None 07/19/24 0949   Shape clustered 07/15/24 0942   Tunneling? No 07/15/24 0942   Undermining? No 07/15/24 0942   Sinus Tracts? No 07/15/24 0942       Compression Wrap 07/12/24 Leg Anterior;Left (Active)   Response to Treatment Well tolerated 07/15/24 0958   Compression Layers Multilayer 07/15/24 0958   Compression Product Type Coflex 07/15/24 0958   Dressing Applied Yes 07/15/24 0958   Compression Wrap Location Toes to Knee 07/15/24 0958   Compression Wrap Status Clean;Dry;Intact 07/15/24 0958       Compression Wrap 07/12/24 Leg Anterior;Right (Active)   Response to Treatment Well tolerated 07/15/24 0958   Compression Layers Multilayer 07/15/24 0958   Compression Product Type Coflex 07/15/24 0958   Dressing Applied Yes 07/15/24 0958   Compression Wrap Location Toes to Knee 07/15/24 0958   Compression Wrap Status Clean;Dry;Intact 07/15/24 2903           ASSESSMENT AND PLAN:     Assessment  Assessment    Encounter Diagnosis  1. Non-pressure chronic ulcer of right calf with fat layer exposed (HCC)    2. Non-pressure chronic ulcer  of left calf with fat layer exposed (HCC)    3. Idiopathic chronic venous hypertension of both lower extremities with ulcer and inflammation (HCC)    4. Sloughing of wound    5. Obesity (BMI 30-39.9)    6. Stage 3b chronic kidney disease (HCC)    7. Chronic systolic heart failure (HCC)        Problem List  Patient Active Problem List   Diagnosis    Benign non-nodular prostatic hyperplasia without lower urinary tract symptoms    Hyperaldosteronism (HCC)    Primary hypertension    Acute idiopathic gout involving toe of left foot    Angioedema    Purpura (HCC)    Chronic renal disease, stage III (HCC)    Aortic atherosclerosis (HCC)    Community acquired pneumonia, unspecified laterality    Anemia    Mitral valve insufficiency    CEDRICK (acute kidney injury) (HCC)    PFO (patent foramen ovale) (HCC)    Tricuspid valve insufficiency    ATN (acute tubular necrosis) (HCC)    Acute on chronic heart failure with preserved ejection fraction (HCC)    Hypernatremia    Anasarca         MANAGEMENT    Start collagen  Continue HF transfer / kerramax.   Continue wraps - once weekly from now.   Dressing change weekly.   See in a few weeks.     PROCEDURES:    Callus removal of toe right foot done.     Compression wrap application after dressing change    Patient Instructions     Wound Cleaning and Dressings:    Shower with protection - use shower boot  DRESSINGS: zinc barrier cream periwound / HF transfer / kerramax  Change dressing weekly.     Compression Therapy : coflex-2  Compression Therapy Instructions:  1.  Okay to wear overnight        2.  Avoid prolonged standing in one place.  It is better to have your calf muscles moving       to pump fluid out of the legs.    3.  Elevate leg(s) above the level of the heart when sitting or as much as possible.    4.  Take your diuretics as directed by your provider.  Do not skip doses or change doses      unless instructed to do so by your provider.    5. Do not get leg(s) with compression  wrap wet. If wraps are too tight as indicated        By pain, numbness/tingling or discoloration of toes remove wrap completely       and call the   wound center.     Miscellaneous Instructions:  Supplement with a daily multivitamin   Low salt diet  Increase protein intake / consider protein supplements - see below  Elevate extremities at all times when sitting / laying down.    DIETARY MODIFICATIONS TO HELP WITH WOUND HEALING:    Protein: Meats, beans, eggs, milk and yogurt particularly Greek yogurt), tofu, soy nuts, soy protein products    Vitamin C: Citrus fruits and juices, strawberries, tomatoes, tomato juice, peppers, baked potatoes, spinach, broccoli, cauliflower, [place] sprouts, cabbage    Vitamin A: Dark green, leafy vegetables, orange or yellow vegetables, cantaloupe, fortified dairy products, liver, fortified cereals    Zinc: Fortified cereals, red meats, seafood    Consider Mir by [place] (These are essential branch chain amino acids that help with tissue building and wound healing) and take 2 packets/day. you can order online at [employer] or [employer]    ADDITIONAL REMINDERS:    The treatment plan has been discussed at length with you and your provider. Follow all instructions carefully, it is very important. If you do not follow all instructions, you are at  risk of your wound not healing, infection, possible loss of limb and even end of life.  Please call the clinic during regular business hours ( 7:30 AM - 5:30 PM) if you notice increased bleeding, redness, warmth, pain or pus like drainage or start running a fever greater than 100.3.    For after hour emergencies, please call your primary physician or go to the nearest emergency room.    Patient/Caregiver Education: There are no barriers to learning. Medical education for above diagnosis given.   Answered all questions.    Outcome: Patient verbalizes understanding. Patient is notified to call with any questions, complications, allergies, or  worsening or changing symptoms.  Patient is to call with any side effects or complications as a result of the treatments today.      DOCUMENTATION OF TIME SPENT: Code selection for this visit was based on time spent : 30 min on date of service in preparing to see the patient, obtaining and/or reviewing separately obtained history, performing a medically appropriate examination, counseling and educating the patient/family/caregiver, ordering medications or testing, referring and communicating with other healthcare providers, documenting clinical information in the E HR, independently interpreting results and communicating results to the patient/family/caregiver and care coordination with the patient's other providers.    Followup: Return in about 4 weeks (around 8/16/2024), or nurse visit weekly, for Wound followup.      Note to Patient:  The 21st Century Cures Act makes medical notes like these available to patients in the interest of transparency. However, be advised this is a medical document and is intended as [id number] communication; it is written in medical language and may appear blunt, direct, or contain abbreviations or verbiage that are unfamiliar. Medical documents are intended to carry relevant information, facts as evident, and the clinical opinion of the practitioner.    Also, please note that this report has been produced using speech recognition software and may contain errors related to that system including, but not limited to, errors in grammar, punctuation, and spelling, as well as words and phrases that possibly may have been recognized inappropriately.  If there are any questions or concerns, contact the dictating provider for clarification.      Paty Sosa MD  7/19/2024  10:12 AM                   Weekly Wound Education Note    Teaching Provided To: Patient  Training Topics: Cleasing and general instructions;Compression;Discharge instructions;Dressing;Edema control  Training Method:  Explain/Verbal  Training Response: Patient responds and understands;Reinforcement needed        Notes: Improving. Endoform, hydrofera transfer, ABD pad, conforming gauze, tape, coflex 2 30-40mmhg - BLE. Ordered compression garments this visit.      Wound Information/Order:  Wound Number: All wounds  Product: COMPRESSION ONLY  Dispense: 90 days  Dressing Frequency:Change dressing daily and/or PRN    Was a Debridement performed: Yes, Debridement type: mechanical    Compression Stockings ordered: Yes   Product type: Spandagrip F, Sigvaris Compreflex with Transition Calf - BLE  Frequency: daily  Duration: 90 days      Calf  Point of Measurement - Left Calf: 36  Point of Measurement - Right Calf: 36  Left Calf from:: Heel  Calf Left cm:: 42.2  Right Calf from:: Heel  Right Calf cm:: 41    Ankle  Point of Measurement - Left Ankle: 11  Point of Measurement - Right Ankle: 11  Left Ankle from:: Heel  Left Ankle cm:: 28.2     Right Ankle from:: Heel  Right Ankle cm:: 24.7     Heel to Knee   46CM       Notes: DISPENSE as written.

## (undated) NOTE — MR AVS SNAPSHOT
[facility], [place] [id number] [facility], [address] [address] [phone]               Thank you for choosing us for your health care visit with Akash Urbina MD.  We are glad to serve you and happy to provide you with this Take 1 tablet (20 mg total) by mouth once daily.    Commonly known as:  DEMADEX                   Today's Orders     Basic Metabolic Panel (8) [E]    Complete by:  Jan 05, 2017 (Approximate)        UA/M with Culture Reflex    Complete by:  Jan 05, 2017 Water is best for hydration Fast food. Eat at home when possible     Tips for increasing your physical activity – Adults who are physically active are less likely to develop some chronic diseases than adults who are inactive.      HOW TO GET STARTED: HOW

## (undated) NOTE — LETTER
OUTSIDE TESTING RESULT REQUEST     IMPORTANT: FOR YOUR IMMEDIATE ATTENTION  Please FAX all test results listed below to: [phone]     Testing already done on or about:  2024          Patient Name: Wagner Sheridan  Surgery Date: 2024  Medical Record: [id number]  CSN: [id number]  : 1945 - A: 78 y     Sex: male  Surgeon(s):  Ksenia Ram DPM  Procedure: PARTIAL TOE AMPUTATION THIRD DIGIT RIGHT FOOT  Anesthesia Type: MAC     Surgeon: Ksenia Ram DPM     The following Testing and Time Line are REQUIRED PER ANESTHESIA:     Please fax signed EKG done yesterday to above #.      Thank You,   Sent by: Ute Aguillon

## (undated) NOTE — LETTER
[facility]  [address], [place], [place]    Authorization for Surgical Operation and Procedure                               I hereby authorize Neri Hall MD, my physician and his/her assistants (if applicable), which may include medical students, residents, and/or fellows, to perform the following surgical operation/ procedure and administer such anesthesia as may be determined necessary by my physician: Operation/Procedure name (s) COLONOSCOPY / ESOPHAGOGASTRODUODENOSCOPY on Wagner Sheridan   2.   I recognize that during the surgical operation/procedure, unforeseen conditions may necessitate additional or different procedures than those listed above.  I, therefore, further authorize and request that the above-named surgeon, assistants, or designees perform such procedures as are, in their judgment, necessary and desirable.    3.   My surgeon/physician has discussed prior to my surgery the potential benefits, risks and side effects of this procedure; the likelihood of achieving goals; and potential problems that might occur during recuperation.  They also discussed reasonable alternatives to the procedure, including risks, benefits, and side effects related to the alternatives and risks related to not receiving this procedure.  I have had all my questions answered and I acknowledge that no guarantee has been made as to the result that may be obtained.    4.   Should the need arise during my operation/procedure, which includes change of level of care prior to discharge, I also consent to the administration of blood and/or blood products.  Further, I understand that despite careful testing and screening of blood or blood products by collecting agencies, I may still be subject to ill effects as a result of receiving a blood transfusion and/or blood products.  The following are some, but not all, of the potential risks that can occur: fever and allergic reactions, hemolytic reactions,  transmission of diseases such as Hepatitis, AIDS and Cytomegalovirus (CMV) and fluid overload.  In the event that I wish to have an autologous transfusion of my own blood, or a directed donor transfusion, I will discuss this with my physician.  Check only if Refusing Blood or Blood Products  I understand refusal of blood or blood products as deemed necessary by my physician may have serious consequences to my condition to include possible death. I hereby assume responsibility for my refusal and release the hospital, its personnel, and my physicians from any responsibility for the consequences of my refusal.    o  Refuse   5.   I authorize the use of any specimen, organs, tissues, body parts or foreign objects that may be removed from my body during the operation/procedure for diagnosis, research or teaching purposes and their subsequent disposal by hospital authorities.  I also authorize the release of specimen test results and/or written reports to my treating physician on the hospital medical staff or other referring or consulting physicians involved in my care, at the discretion of the Pathologist or my treating physician.    6.   I consent to the photographing or videotaping of the operations or procedures to be performed, including appropriate portions of my body for medical, scientific, or educational purposes, provided my identity is not revealed by the pictures or by descriptive texts accompanying them.  If the procedure has been photographed/videotaped, the surgeon will obtain the original picture, image, videotape or CD.  The hospital will not be responsible for storage, release or maintenance of the picture, image, tape or CD.    7.   I consent to the presence of a  or observers in the operating room as deemed necessary by my physician or their designees.    8.   I recognize that in the event my procedure results in extended X-Ray/fluoroscopy time, I may develop a skin reaction.    9. If  I have a Do Not Attempt Resuscitation (DNAR) order in place, that status will be suspended while in the operating room, procedural suite, and during the recovery period unless otherwise explicitly stated by me (or a person authorized to consent on my behalf). The surgeon or my attending physician will determine when the applicable recovery period ends for purposes of reinstating the DNAR order.  10. Patients having a sterilization procedure: I understand that if the procedure is successful the results will be permanent and it will therefore be impossible for me to inseminate, conceive, or bear children.  I also understand that the procedure is intended to result in sterility, although the result has not been guaranteed.   11. I acknowledge that my physician has explained sedation/analgesia administration to me including the risk and benefits I consent to the administration of sedation/analgesia as may be necessary or desirable in the judgment of my physician.    I CERTIFY THAT I HAVE READ AND FULLY UNDERSTAND THE ABOVE CONSENT TO OPERATION and/or OTHER PROCEDURE.     ____________________________________  _________________________________        ______________________________  Signature of Patient    Signature of Responsible Person                Printed Name of Responsible Person                                      ____________________________________  _____________________________                ________________________________  Signature of Witness        Date  Time         Relationship to Patient    STATEMENT OF PHYSICIAN My signature below affirms that prior to the time of the procedure; I have explained to the patient and/or his/her legal representative, the risks and benefits involved in the proposed treatment and any reasonable alternative to the proposed treatment. I have also explained the risks and benefits involved in refusal of the proposed treatment and alternatives to the proposed treatment and have  answered the patient's questions. If I have a significant financial interest in a co-management agreement or a significant financial interest in any product or implant, or other significant relationship used in this procedure/surgery, I have disclosed this and had a discussion with my patient.     _____________________________________________________              _____________________________  (Signature of Physician)                                                                                         (Date)                                   (Time)  Patient Name: Wagner Sheridan      : 1945      Printed: 2024     Medical Record #: [id number]                                      Page 1 of 1